# Patient Record
Sex: FEMALE | Race: WHITE | Employment: OTHER | URBAN - METROPOLITAN AREA
[De-identification: names, ages, dates, MRNs, and addresses within clinical notes are randomized per-mention and may not be internally consistent; named-entity substitution may affect disease eponyms.]

---

## 2017-01-29 PROBLEM — J45.901 ACUTE EXACERBATION OF COPD WITH ASTHMA (HCC): Status: ACTIVE | Noted: 2017-01-29

## 2017-01-29 PROBLEM — J44.1 ACUTE EXACERBATION OF COPD WITH ASTHMA (HCC): Status: ACTIVE | Noted: 2017-01-29

## 2017-01-30 PROBLEM — R09.02 HYPOXIA: Status: ACTIVE | Noted: 2017-01-30

## 2017-02-01 ENCOUNTER — HOSPITAL ENCOUNTER (OUTPATIENT)
Dept: OTHER | Age: 77
Discharge: HOME | End: 2017-02-01
Attending: INTERNAL MEDICINE | Admitting: INTERNAL MEDICINE

## 2017-02-15 ENCOUNTER — NURSE ONLY (OUTPATIENT)
Dept: CARDIOLOGY CLINIC | Age: 77
End: 2017-02-15

## 2017-02-15 ENCOUNTER — TELEPHONE (OUTPATIENT)
Dept: CARDIOLOGY CLINIC | Age: 77
End: 2017-02-15

## 2017-02-15 VITALS — SYSTOLIC BLOOD PRESSURE: 138 MMHG | DIASTOLIC BLOOD PRESSURE: 60 MMHG

## 2017-02-15 DIAGNOSIS — R07.9 CHEST PAIN, UNSPECIFIED TYPE: Primary | ICD-10-CM

## 2017-02-15 PROBLEM — J44.9 COPD (CHRONIC OBSTRUCTIVE PULMONARY DISEASE) (HCC): Chronic | Status: ACTIVE | Noted: 2017-02-15

## 2017-02-15 PROCEDURE — 93000 ELECTROCARDIOGRAM COMPLETE: CPT | Performed by: INTERNAL MEDICINE

## 2017-02-16 PROBLEM — R10.13 EPIGASTRIC PAIN: Status: ACTIVE | Noted: 2017-02-16

## 2017-02-16 PROBLEM — R00.2 PALPITATIONS: Status: ACTIVE | Noted: 2017-02-16

## 2017-02-24 LAB
ANION GAP SERPL CALCULATED.3IONS-SCNC: 12 MMOL/L (ref 4–16)
BASOPHILS ABSOLUTE: 0 K/CU MM
BASOPHILS RELATIVE PERCENT: 0.5 % (ref 0–1)
BUN BLDV-MCNC: 29 MG/DL (ref 6–23)
CALCIUM SERPL-MCNC: 9.5 MG/DL (ref 8.3–10.6)
CHLORIDE BLD-SCNC: 102 MMOL/L (ref 99–110)
CO2: 30 MMOL/L (ref 21–32)
CREAT SERPL-MCNC: 1.2 MG/DL (ref 0.6–1.1)
DIFFERENTIAL TYPE: ABNORMAL
EOSINOPHILS ABSOLUTE: 0.3 K/CU MM
EOSINOPHILS RELATIVE PERCENT: 3.7 % (ref 0–3)
GFR AFRICAN AMERICAN: 52 ML/MIN/1.73M2
GFR NON-AFRICAN AMERICAN: 43 ML/MIN/1.73M2
GLUCOSE BLD-MCNC: 121 MG/DL (ref 70–140)
HCT VFR BLD CALC: 34 % (ref 37–47)
HEMOGLOBIN: 10 GM/DL (ref 12.5–16)
IMMATURE NEUTROPHIL %: 1.4 % (ref 0–0.43)
LYMPHOCYTES ABSOLUTE: 1.5 K/CU MM
LYMPHOCYTES RELATIVE PERCENT: 20.3 % (ref 24–44)
MCH RBC QN AUTO: 28.9 PG (ref 27–31)
MCHC RBC AUTO-ENTMCNC: 29.4 % (ref 32–36)
MCV RBC AUTO: 98.3 FL (ref 78–100)
MONOCYTES ABSOLUTE: 1 K/CU MM
MONOCYTES RELATIVE PERCENT: 13.5 % (ref 0–4)
NUCLEATED RBC %: 0 %
PDW BLD-RTO: 18.3 % (ref 11.7–14.9)
PLATELET # BLD: 296 K/CU MM (ref 140–440)
PMV BLD AUTO: 12 FL (ref 7.5–11.1)
POTASSIUM SERPL-SCNC: 5.6 MMOL/L (ref 3.5–5.1)
RBC # BLD: 3.46 M/CU MM (ref 4.2–5.4)
SEGMENTED NEUTROPHILS ABSOLUTE COUNT: 4.5 K/CU MM
SEGMENTED NEUTROPHILS RELATIVE PERCENT: 60.6 % (ref 36–66)
SODIUM BLD-SCNC: 144 MMOL/L (ref 135–145)
TOTAL IMMATURE NEUTOROPHIL: 0.1 K/CU MM
TOTAL NUCLEATED RBC: 0 K/CU MM
WBC # BLD: 7.3 K/CU MM (ref 4–10.5)

## 2017-02-27 LAB
ANION GAP SERPL CALCULATED.3IONS-SCNC: 9 MMOL/L (ref 4–16)
BUN BLDV-MCNC: 30 MG/DL (ref 6–23)
CALCIUM SERPL-MCNC: 9.5 MG/DL (ref 8.3–10.6)
CHLORIDE BLD-SCNC: 103 MMOL/L (ref 99–110)
CO2: 31 MMOL/L (ref 21–32)
CREAT SERPL-MCNC: 1.2 MG/DL (ref 0.6–1.1)
GFR AFRICAN AMERICAN: 52 ML/MIN/1.73M2
GFR NON-AFRICAN AMERICAN: 43 ML/MIN/1.73M2
GLUCOSE BLD-MCNC: 97 MG/DL (ref 70–140)
POTASSIUM SERPL-SCNC: 5.6 MMOL/L (ref 3.5–5.1)
SODIUM BLD-SCNC: 143 MMOL/L (ref 135–145)

## 2017-03-01 ENCOUNTER — HOSPITAL ENCOUNTER (OUTPATIENT)
Dept: OTHER | Age: 77
Discharge: HOME | End: 2017-03-01
Attending: INTERNAL MEDICINE | Admitting: INTERNAL MEDICINE

## 2017-03-01 LAB
ANION GAP SERPL CALCULATED.3IONS-SCNC: 10 MMOL/L (ref 4–16)
BUN BLDV-MCNC: 32 MG/DL (ref 6–23)
CALCIUM SERPL-MCNC: 9 MG/DL (ref 8.3–10.6)
CHLORIDE BLD-SCNC: 105 MMOL/L (ref 99–110)
CO2: 30 MMOL/L (ref 21–32)
CREAT SERPL-MCNC: 1.1 MG/DL (ref 0.6–1.1)
GFR AFRICAN AMERICAN: 58 ML/MIN/1.73M2
GFR NON-AFRICAN AMERICAN: 48 ML/MIN/1.73M2
GLUCOSE BLD-MCNC: 111 MG/DL (ref 70–140)
POTASSIUM SERPL-SCNC: 5.2 MMOL/L (ref 3.5–5.1)
SODIUM BLD-SCNC: 145 MMOL/L (ref 135–145)

## 2017-03-03 LAB
ANION GAP SERPL CALCULATED.3IONS-SCNC: 10 MMOL/L (ref 4–16)
BUN BLDV-MCNC: 22 MG/DL (ref 6–23)
CALCIUM SERPL-MCNC: 9 MG/DL (ref 8.3–10.6)
CHLORIDE BLD-SCNC: 102 MMOL/L (ref 99–110)
CO2: 30 MMOL/L (ref 21–32)
CREAT SERPL-MCNC: 1.1 MG/DL (ref 0.6–1.1)
GFR AFRICAN AMERICAN: 58 ML/MIN/1.73M2
GFR NON-AFRICAN AMERICAN: 48 ML/MIN/1.73M2
GLUCOSE BLD-MCNC: 87 MG/DL (ref 70–140)
POTASSIUM SERPL-SCNC: 5.1 MMOL/L (ref 3.5–5.1)
SODIUM BLD-SCNC: 142 MMOL/L (ref 135–145)

## 2017-03-06 PROBLEM — R07.9 CHEST PAIN: Status: ACTIVE | Noted: 2017-03-06

## 2017-03-06 PROBLEM — R55 NEAR SYNCOPE: Status: ACTIVE | Noted: 2017-03-06

## 2017-03-06 PROBLEM — D64.9 ANEMIA: Status: ACTIVE | Noted: 2017-03-06

## 2017-03-07 PROBLEM — D50.0 CHRONIC BLOOD LOSS ANEMIA: Status: ACTIVE | Noted: 2017-03-06

## 2017-03-08 LAB
ANION GAP SERPL CALCULATED.3IONS-SCNC: 14 MMOL/L (ref 4–16)
BUN BLDV-MCNC: 12 MG/DL (ref 6–23)
CALCIUM SERPL-MCNC: 9.5 MG/DL (ref 8.3–10.6)
CHLORIDE BLD-SCNC: 102 MMOL/L (ref 99–110)
CO2: 27 MMOL/L (ref 21–32)
CREAT SERPL-MCNC: 0.8 MG/DL (ref 0.6–1.1)
GFR AFRICAN AMERICAN: >60 ML/MIN/1.73M2
GFR NON-AFRICAN AMERICAN: >60 ML/MIN/1.73M2
GLUCOSE BLD-MCNC: 81 MG/DL (ref 70–140)
HCT VFR BLD CALC: 37.7 % (ref 37–47)
HEMOGLOBIN: 11.3 GM/DL (ref 12.5–16)
MCH RBC QN AUTO: 28.9 PG (ref 27–31)
MCHC RBC AUTO-ENTMCNC: 30 % (ref 32–36)
MCV RBC AUTO: 96.4 FL (ref 78–100)
PDW BLD-RTO: 16.4 % (ref 11.7–14.9)
PLATELET # BLD: 446 K/CU MM (ref 140–440)
PMV BLD AUTO: 11.4 FL (ref 7.5–11.1)
POTASSIUM SERPL-SCNC: 4.8 MMOL/L (ref 3.5–5.1)
RBC # BLD: 3.91 M/CU MM (ref 4.2–5.4)
SODIUM BLD-SCNC: 143 MMOL/L (ref 135–145)
WBC # BLD: 9.8 K/CU MM (ref 4–10.5)

## 2017-03-15 LAB
HCT VFR BLD CALC: 40.7 % (ref 37–47)
HEMOGLOBIN: 11.8 GM/DL (ref 12.5–16)

## 2017-03-17 LAB
HCT VFR BLD CALC: 39.6 % (ref 37–47)
HEMOGLOBIN: 11.6 GM/DL (ref 12.5–16)

## 2017-03-20 LAB
HCT VFR BLD CALC: 41.7 % (ref 37–47)
HEMOGLOBIN: 12.3 GM/DL (ref 12.5–16)

## 2017-03-22 LAB
HCT VFR BLD CALC: 40.4 % (ref 37–47)
HEMOGLOBIN: 11.5 GM/DL (ref 12.5–16)

## 2017-04-03 ENCOUNTER — HOSPITAL ENCOUNTER (OUTPATIENT)
Dept: OTHER | Age: 77
Discharge: HOME | End: 2017-04-03
Attending: FAMILY MEDICINE | Admitting: FAMILY MEDICINE

## 2017-04-03 LAB
ALBUMIN SERPL-MCNC: 3.9 GM/DL (ref 3.4–5)
ALP BLD-CCNC: 92 IU/L (ref 40–128)
ALT SERPL-CCNC: 10 U/L (ref 10–40)
ANION GAP SERPL CALCULATED.3IONS-SCNC: 9 MMOL/L (ref 4–16)
AST SERPL-CCNC: 12 IU/L (ref 15–37)
BASOPHILS ABSOLUTE: 0.1 K/CU MM
BASOPHILS RELATIVE PERCENT: 1.3 % (ref 0–1)
BILIRUB SERPL-MCNC: 0.2 MG/DL (ref 0–1)
BUN BLDV-MCNC: 25 MG/DL (ref 6–23)
CALCIUM SERPL-MCNC: 9.7 MG/DL (ref 8.3–10.6)
CHLORIDE BLD-SCNC: 100 MMOL/L (ref 99–110)
CO2: 33 MMOL/L (ref 21–32)
CREAT SERPL-MCNC: 1.5 MG/DL (ref 0.6–1.1)
DIFFERENTIAL TYPE: ABNORMAL
EOSINOPHILS ABSOLUTE: 0.4 K/CU MM
EOSINOPHILS RELATIVE PERCENT: 6.3 % (ref 0–3)
GFR AFRICAN AMERICAN: 41 ML/MIN/1.73M2
GFR NON-AFRICAN AMERICAN: 33 ML/MIN/1.73M2
GLUCOSE BLD-MCNC: 94 MG/DL (ref 70–140)
HCT VFR BLD CALC: 38.9 % (ref 37–47)
HEMOGLOBIN: 11.9 GM/DL (ref 12.5–16)
IMMATURE NEUTROPHIL %: 0.4 % (ref 0–0.43)
LYMPHOCYTES ABSOLUTE: 1.5 K/CU MM
LYMPHOCYTES RELATIVE PERCENT: 21.5 % (ref 24–44)
MCH RBC QN AUTO: 29.7 PG (ref 27–31)
MCHC RBC AUTO-ENTMCNC: 30.6 % (ref 32–36)
MCV RBC AUTO: 97 FL (ref 78–100)
MONOCYTES ABSOLUTE: 0.9 K/CU MM
MONOCYTES RELATIVE PERCENT: 13.3 % (ref 0–4)
NUCLEATED RBC %: 0 %
PDW BLD-RTO: 16.5 % (ref 11.7–14.9)
PLATELET # BLD: 290 K/CU MM (ref 140–440)
PMV BLD AUTO: 13.1 FL (ref 7.5–11.1)
POTASSIUM SERPL-SCNC: 5.4 MMOL/L (ref 3.5–5.1)
RBC # BLD: 4.01 M/CU MM (ref 4.2–5.4)
SEGMENTED NEUTROPHILS ABSOLUTE COUNT: 3.9 K/CU MM
SEGMENTED NEUTROPHILS RELATIVE PERCENT: 57.2 % (ref 36–66)
SODIUM BLD-SCNC: 142 MMOL/L (ref 135–145)
TOTAL IMMATURE NEUTOROPHIL: 0.03 K/CU MM
TOTAL NUCLEATED RBC: 0 K/CU MM
TOTAL PROTEIN: 6 GM/DL (ref 6.4–8.2)
WBC # BLD: 6.8 K/CU MM (ref 4–10.5)

## 2017-04-10 ENCOUNTER — HOSPITAL ENCOUNTER (OUTPATIENT)
Dept: OTHER | Age: 77
Discharge: HOME | End: 2017-04-10
Attending: FAMILY MEDICINE | Admitting: FAMILY MEDICINE

## 2017-04-10 LAB
BASOPHILS ABSOLUTE: 0.1 K/CU MM
BASOPHILS RELATIVE PERCENT: 1.2 % (ref 0–1)
DIFFERENTIAL TYPE: ABNORMAL
EOSINOPHILS ABSOLUTE: 0.5 K/CU MM
EOSINOPHILS RELATIVE PERCENT: 7.9 % (ref 0–3)
HCT VFR BLD CALC: 41 % (ref 37–47)
HEMOGLOBIN: 12.4 GM/DL (ref 12.5–16)
IMMATURE NEUTROPHIL %: 0.1 % (ref 0–0.43)
LYMPHOCYTES ABSOLUTE: 1.4 K/CU MM
LYMPHOCYTES RELATIVE PERCENT: 20.5 % (ref 24–44)
MCH RBC QN AUTO: 29.5 PG (ref 27–31)
MCHC RBC AUTO-ENTMCNC: 30.2 % (ref 32–36)
MCV RBC AUTO: 97.6 FL (ref 78–100)
MONOCYTES ABSOLUTE: 0.9 K/CU MM
MONOCYTES RELATIVE PERCENT: 13.5 % (ref 0–4)
NUCLEATED RBC %: 0 %
PDW BLD-RTO: 16.9 % (ref 11.7–14.9)
PLATELET # BLD: 244 K/CU MM (ref 140–440)
PMV BLD AUTO: 13.1 FL (ref 7.5–11.1)
RBC # BLD: 4.2 M/CU MM (ref 4.2–5.4)
SEGMENTED NEUTROPHILS ABSOLUTE COUNT: 3.8 K/CU MM
SEGMENTED NEUTROPHILS RELATIVE PERCENT: 56.8 % (ref 36–66)
TOTAL IMMATURE NEUTOROPHIL: 0.01 K/CU MM
TOTAL NUCLEATED RBC: 0 K/CU MM
WBC # BLD: 6.7 K/CU MM (ref 4–10.5)

## 2017-04-19 ENCOUNTER — HOSPITAL ENCOUNTER (OUTPATIENT)
Dept: OTHER | Age: 77
Discharge: HOME | End: 2017-04-19
Attending: FAMILY MEDICINE | Admitting: FAMILY MEDICINE

## 2017-04-19 LAB
ALBUMIN SERPL-MCNC: 3.9 GM/DL (ref 3.4–5)
ALP BLD-CCNC: 95 IU/L (ref 40–129)
ALT SERPL-CCNC: 12 U/L (ref 10–40)
ANION GAP SERPL CALCULATED.3IONS-SCNC: 12 MMOL/L (ref 4–16)
AST SERPL-CCNC: 16 IU/L (ref 15–37)
BASOPHILS ABSOLUTE: 0.1 K/CU MM
BASOPHILS RELATIVE PERCENT: 1.3 % (ref 0–1)
BILIRUB SERPL-MCNC: 0.2 MG/DL (ref 0–1)
BUN BLDV-MCNC: 27 MG/DL (ref 6–23)
CALCIUM SERPL-MCNC: 9.7 MG/DL (ref 8.3–10.6)
CHLORIDE BLD-SCNC: 100 MMOL/L (ref 99–110)
CO2: 31 MMOL/L (ref 21–32)
CREAT SERPL-MCNC: 1.1 MG/DL (ref 0.6–1.1)
DIFFERENTIAL TYPE: ABNORMAL
EOSINOPHILS ABSOLUTE: 0.4 K/CU MM
EOSINOPHILS RELATIVE PERCENT: 5.3 % (ref 0–3)
GFR AFRICAN AMERICAN: 58 ML/MIN/1.73M2
GFR NON-AFRICAN AMERICAN: 48 ML/MIN/1.73M2
GLUCOSE BLD-MCNC: 91 MG/DL (ref 70–140)
HCT VFR BLD CALC: 41.9 % (ref 37–47)
HEMOGLOBIN: 12.8 GM/DL (ref 12.5–16)
IMMATURE NEUTROPHIL %: 0.4 % (ref 0–0.43)
LYMPHOCYTES ABSOLUTE: 1.2 K/CU MM
LYMPHOCYTES RELATIVE PERCENT: 18.1 % (ref 24–44)
MCH RBC QN AUTO: 29.8 PG (ref 27–31)
MCHC RBC AUTO-ENTMCNC: 30.5 % (ref 32–36)
MCV RBC AUTO: 97.4 FL (ref 78–100)
MONOCYTES ABSOLUTE: 0.8 K/CU MM
MONOCYTES RELATIVE PERCENT: 12 % (ref 0–4)
NUCLEATED RBC %: 0 %
PDW BLD-RTO: 16.7 % (ref 11.7–14.9)
PLATELET # BLD: 220 K/CU MM (ref 140–440)
PMV BLD AUTO: 13.4 FL (ref 7.5–11.1)
POTASSIUM SERPL-SCNC: 4.5 MMOL/L (ref 3.5–5.1)
RBC # BLD: 4.3 M/CU MM (ref 4.2–5.4)
SEGMENTED NEUTROPHILS ABSOLUTE COUNT: 4.3 K/CU MM
SEGMENTED NEUTROPHILS RELATIVE PERCENT: 62.9 % (ref 36–66)
SODIUM BLD-SCNC: 143 MMOL/L (ref 135–145)
TOTAL IMMATURE NEUTOROPHIL: 0.03 K/CU MM
TOTAL NUCLEATED RBC: 0 K/CU MM
TOTAL PROTEIN: 6.3 GM/DL (ref 6.4–8.2)
WBC # BLD: 6.8 K/CU MM (ref 4–10.5)

## 2017-05-08 ENCOUNTER — HOSPITAL ENCOUNTER (OUTPATIENT)
Dept: OTHER | Age: 77
Discharge: HOME | End: 2017-05-08
Attending: INTERNAL MEDICINE | Admitting: INTERNAL MEDICINE

## 2017-05-08 LAB
BASOPHILS ABSOLUTE: 0.1 K/CU MM
BASOPHILS RELATIVE PERCENT: 1 % (ref 0–1)
DIFFERENTIAL TYPE: ABNORMAL
EOSINOPHILS ABSOLUTE: 0.3 K/CU MM
EOSINOPHILS RELATIVE PERCENT: 4 % (ref 0–3)
FERRITIN: 49 NG/ML (ref 15–150)
FOLATE: 13.6 NG/ML (ref 3.1–17.5)
HCT VFR BLD CALC: 46.4 % (ref 37–47)
HEMOGLOBIN: 14.2 GM/DL (ref 12.5–16)
IMMATURE NEUTROPHIL %: 0.3 % (ref 0–0.43)
IRON: 100 UG/DL (ref 37–145)
LYMPHOCYTES ABSOLUTE: 1.8 K/CU MM
LYMPHOCYTES RELATIVE PERCENT: 26.3 % (ref 24–44)
MCH RBC QN AUTO: 30.4 PG (ref 27–31)
MCHC RBC AUTO-ENTMCNC: 30.6 % (ref 32–36)
MCV RBC AUTO: 99.4 FL (ref 78–100)
MONOCYTES ABSOLUTE: 0.9 K/CU MM
MONOCYTES RELATIVE PERCENT: 13.9 % (ref 0–4)
NUCLEATED RBC %: 0 %
PCT TRANSFERRIN: 34 % (ref 10–44)
PDW BLD-RTO: 16.3 % (ref 11.7–14.9)
PLATELET # BLD: 235 K/CU MM (ref 140–440)
PMV BLD AUTO: 13.3 FL (ref 7.5–11.1)
RBC # BLD: 4.67 M/CU MM (ref 4.2–5.4)
SEGMENTED NEUTROPHILS ABSOLUTE COUNT: 3.6 K/CU MM
SEGMENTED NEUTROPHILS RELATIVE PERCENT: 54.5 % (ref 36–66)
T3 FREE: 1.9 PG/ML (ref 2.3–4.2)
T4 FREE: 1.39 NG/DL (ref 0.9–1.8)
TOTAL IMMATURE NEUTOROPHIL: 0.02 K/CU MM
TOTAL IRON BINDING CAPACITY: 297 UG/DL (ref 250–450)
TOTAL NUCLEATED RBC: 0 K/CU MM
TSH HIGH SENSITIVITY: 0.48 UIU/ML (ref 0.27–4.2)
UNSATURATED IRON BINDING CAPACITY: 197 UG/DL (ref 110–370)
VITAMIN B-12: 245.6 PG/ML (ref 211–911)
WBC # BLD: 6.7 K/CU MM (ref 4–10.5)

## 2017-05-11 ENCOUNTER — OFFICE VISIT (OUTPATIENT)
Dept: CARDIOLOGY CLINIC | Age: 77
End: 2017-05-11

## 2017-05-11 VITALS
WEIGHT: 120 LBS | SYSTOLIC BLOOD PRESSURE: 112 MMHG | DIASTOLIC BLOOD PRESSURE: 72 MMHG | HEART RATE: 60 BPM | BODY MASS INDEX: 20.49 KG/M2 | HEIGHT: 64 IN

## 2017-05-11 DIAGNOSIS — I73.9 CLAUDICATION OF CALF MUSCLES (HCC): ICD-10-CM

## 2017-05-11 DIAGNOSIS — I50.31 ACUTE DIASTOLIC CHF (CONGESTIVE HEART FAILURE) (HCC): ICD-10-CM

## 2017-05-11 DIAGNOSIS — E78.2 MIXED HYPERLIPIDEMIA: Chronic | ICD-10-CM

## 2017-05-11 DIAGNOSIS — R55 NEAR SYNCOPE: ICD-10-CM

## 2017-05-11 DIAGNOSIS — E11.9 TYPE 2 DIABETES MELLITUS WITHOUT COMPLICATION, WITHOUT LONG-TERM CURRENT USE OF INSULIN (HCC): ICD-10-CM

## 2017-05-11 DIAGNOSIS — I25.10 CORONARY ARTERY DISEASE INVOLVING NATIVE CORONARY ARTERY OF NATIVE HEART WITHOUT ANGINA PECTORIS: Primary | ICD-10-CM

## 2017-05-11 DIAGNOSIS — R07.9 CHEST PAIN, UNSPECIFIED TYPE: ICD-10-CM

## 2017-05-11 DIAGNOSIS — I48.0 PAROXYSMAL ATRIAL FIBRILLATION (HCC): Chronic | ICD-10-CM

## 2017-05-11 PROCEDURE — G8427 DOCREV CUR MEDS BY ELIG CLIN: HCPCS | Performed by: INTERNAL MEDICINE

## 2017-05-11 PROCEDURE — 1123F ACP DISCUSS/DSCN MKR DOCD: CPT | Performed by: INTERNAL MEDICINE

## 2017-05-11 PROCEDURE — 93000 ELECTROCARDIOGRAM COMPLETE: CPT | Performed by: INTERNAL MEDICINE

## 2017-05-11 PROCEDURE — 4040F PNEUMOC VAC/ADMIN/RCVD: CPT | Performed by: INTERNAL MEDICINE

## 2017-05-11 PROCEDURE — G8400 PT W/DXA NO RESULTS DOC: HCPCS | Performed by: INTERNAL MEDICINE

## 2017-05-11 PROCEDURE — 1090F PRES/ABSN URINE INCON ASSESS: CPT | Performed by: INTERNAL MEDICINE

## 2017-05-11 PROCEDURE — G8598 ASA/ANTIPLAT THER USED: HCPCS | Performed by: INTERNAL MEDICINE

## 2017-05-11 PROCEDURE — 1036F TOBACCO NON-USER: CPT | Performed by: INTERNAL MEDICINE

## 2017-05-11 PROCEDURE — G8419 CALC BMI OUT NRM PARAM NOF/U: HCPCS | Performed by: INTERNAL MEDICINE

## 2017-05-11 PROCEDURE — 99214 OFFICE O/P EST MOD 30 MIN: CPT | Performed by: INTERNAL MEDICINE

## 2017-05-11 RX ORDER — CILOSTAZOL 50 MG/1
50 TABLET ORAL 2 TIMES DAILY
Status: ON HOLD | COMMUNITY
End: 2017-09-29 | Stop reason: HOSPADM

## 2017-08-08 ENCOUNTER — TELEPHONE (OUTPATIENT)
Dept: CARDIOLOGY CLINIC | Age: 77
End: 2017-08-08

## 2017-08-08 DIAGNOSIS — Z01.810 PRE-OPERATIVE CARDIOVASCULAR EXAMINATION: Primary | ICD-10-CM

## 2017-08-10 ENCOUNTER — PROCEDURE VISIT (OUTPATIENT)
Dept: CARDIOLOGY CLINIC | Age: 77
End: 2017-08-10

## 2017-08-10 DIAGNOSIS — Z01.810 PRE-OPERATIVE CARDIOVASCULAR EXAMINATION: ICD-10-CM

## 2017-08-10 LAB
LV EF: 70 %
LVEF MODALITY: NORMAL

## 2017-08-10 PROCEDURE — 93018 CV STRESS TEST I&R ONLY: CPT | Performed by: INTERNAL MEDICINE

## 2017-08-10 PROCEDURE — 78452 HT MUSCLE IMAGE SPECT MULT: CPT | Performed by: INTERNAL MEDICINE

## 2017-08-10 PROCEDURE — 93016 CV STRESS TEST SUPVJ ONLY: CPT | Performed by: INTERNAL MEDICINE

## 2017-08-10 PROCEDURE — 93017 CV STRESS TEST TRACING ONLY: CPT | Performed by: INTERNAL MEDICINE

## 2017-08-10 PROCEDURE — A9500 TC99M SESTAMIBI: HCPCS | Performed by: INTERNAL MEDICINE

## 2017-08-11 ENCOUNTER — TELEPHONE (OUTPATIENT)
Dept: CARDIOLOGY CLINIC | Age: 77
End: 2017-08-11

## 2017-09-28 PROBLEM — K92.2 GI BLEED: Status: ACTIVE | Noted: 2017-09-28

## 2017-09-28 PROBLEM — D64.9 ANEMIA: Status: ACTIVE | Noted: 2017-09-28

## 2017-09-28 PROBLEM — R06.00 DYSPNEA AND RESPIRATORY ABNORMALITIES: Status: ACTIVE | Noted: 2017-09-28

## 2017-09-28 PROBLEM — K92.1 MELENA: Status: ACTIVE | Noted: 2017-09-28

## 2017-09-28 PROBLEM — R06.89 DYSPNEA AND RESPIRATORY ABNORMALITIES: Status: ACTIVE | Noted: 2017-09-28

## 2017-09-28 PROBLEM — E11.9 TYPE 2 DIABETES MELLITUS (HCC): Chronic | Status: ACTIVE | Noted: 2017-09-28

## 2017-09-28 PROBLEM — Q27.30 AVM (ARTERIOVENOUS MALFORMATION): Chronic | Status: ACTIVE | Noted: 2017-09-28

## 2017-10-05 ENCOUNTER — INITIAL CONSULT (OUTPATIENT)
Dept: PULMONOLOGY | Age: 77
End: 2017-10-05

## 2017-10-05 VITALS
HEART RATE: 70 BPM | DIASTOLIC BLOOD PRESSURE: 60 MMHG | SYSTOLIC BLOOD PRESSURE: 114 MMHG | OXYGEN SATURATION: 94 % | HEIGHT: 64 IN | BODY MASS INDEX: 19.84 KG/M2 | RESPIRATION RATE: 18 BRPM | WEIGHT: 116.2 LBS

## 2017-10-05 DIAGNOSIS — Z72.0 TOBACCO ABUSE: Chronic | ICD-10-CM

## 2017-10-05 DIAGNOSIS — J42 CHRONIC BRONCHITIS, UNSPECIFIED CHRONIC BRONCHITIS TYPE (HCC): Primary | Chronic | ICD-10-CM

## 2017-10-05 DIAGNOSIS — R06.02 SHORTNESS OF BREATH: ICD-10-CM

## 2017-10-05 LAB
DLCO %PRED: NORMAL
DLCO PRE: NORMAL
FEF 25-75%-POST: 0.34
FEF 25-75%-PRE: 0.37
FEV1-POST: 0.86
FEV1-PRE: 0.78
FEV1/FVC-POST: 61.5
FEV1/FVC-PRE: 60
FVC-POST: 1.39
FVC-PRE: 1.31
MEP: NORMAL
MIP: NORMAL
TLC %PRED: NORMAL
TLC PRE: NORMAL

## 2017-10-05 PROCEDURE — G8926 SPIRO NO PERF OR DOC: HCPCS | Performed by: INTERNAL MEDICINE

## 2017-10-05 PROCEDURE — 1123F ACP DISCUSS/DSCN MKR DOCD: CPT | Performed by: INTERNAL MEDICINE

## 2017-10-05 PROCEDURE — 3023F SPIROM DOC REV: CPT | Performed by: INTERNAL MEDICINE

## 2017-10-05 PROCEDURE — G8420 CALC BMI NORM PARAMETERS: HCPCS | Performed by: INTERNAL MEDICINE

## 2017-10-05 PROCEDURE — 94060 EVALUATION OF WHEEZING: CPT | Performed by: INTERNAL MEDICINE

## 2017-10-05 PROCEDURE — G8400 PT W/DXA NO RESULTS DOC: HCPCS | Performed by: INTERNAL MEDICINE

## 2017-10-05 PROCEDURE — 99204 OFFICE O/P NEW MOD 45 MIN: CPT | Performed by: INTERNAL MEDICINE

## 2017-10-05 PROCEDURE — G8484 FLU IMMUNIZE NO ADMIN: HCPCS | Performed by: INTERNAL MEDICINE

## 2017-10-05 PROCEDURE — G8598 ASA/ANTIPLAT THER USED: HCPCS | Performed by: INTERNAL MEDICINE

## 2017-10-05 PROCEDURE — 4040F PNEUMOC VAC/ADMIN/RCVD: CPT | Performed by: INTERNAL MEDICINE

## 2017-10-05 PROCEDURE — 1036F TOBACCO NON-USER: CPT | Performed by: INTERNAL MEDICINE

## 2017-10-05 PROCEDURE — 1090F PRES/ABSN URINE INCON ASSESS: CPT | Performed by: INTERNAL MEDICINE

## 2017-10-05 PROCEDURE — G8427 DOCREV CUR MEDS BY ELIG CLIN: HCPCS | Performed by: INTERNAL MEDICINE

## 2017-10-05 PROCEDURE — 1111F DSCHRG MED/CURRENT MED MERGE: CPT | Performed by: INTERNAL MEDICINE

## 2017-10-05 RX ORDER — IPRATROPIUM BROMIDE AND ALBUTEROL SULFATE 2.5; .5 MG/3ML; MG/3ML
1 SOLUTION RESPIRATORY (INHALATION) EVERY 4 HOURS
COMMUNITY
End: 2017-10-05 | Stop reason: SDUPTHER

## 2017-10-05 RX ORDER — IPRATROPIUM BROMIDE AND ALBUTEROL SULFATE 2.5; .5 MG/3ML; MG/3ML
1 SOLUTION RESPIRATORY (INHALATION) EVERY 4 HOURS PRN
Qty: 120 VIAL | Refills: 12 | Status: SHIPPED | OUTPATIENT
Start: 2017-10-05 | End: 2017-10-25 | Stop reason: SDUPTHER

## 2017-10-05 ASSESSMENT — PULMONARY FUNCTION TESTS
FEV1_POST: 0.86
FVC_PRE: 1.31
FEV1/FVC_PRE: 60.0
FVC_POST: 1.39
FEV1_PRE: 0.78
FEV1/FVC_POST: 61.5

## 2017-10-05 NOTE — PROGRESS NOTES
Subjective:   CHIEF COMPLAINT / HPI: ***         Past Medical History:  Past Medical History:   Diagnosis Date    Acute diastolic CHF (congestive heart failure) ([facility] 12/23/2014    Anemia     Anxiety     Asthma     CAD (coronary artery disease)     follows with Dr Urmila Gomez [facility])     skin on ear    Chronic low back pain     s/p epidural steroids    COPD (chronic obstructive pulmonary disease) ([facility])     moderate to severe    Depression     Family history of cardiac disorder     Father, Brother    GERD (gastroesophageal reflux disease)     Goiter     s/p radioactive Iodine/ Low T4    H/O blood clots     right leg after miscarriage    H/O cardiovascular stress test 6/25/13 6/13-WNL EF 70%    H/O cardiovascular stress test 08/10/2017    Normal study EF 70%    Heart murmur     History of blood transfusion     2017    [place] (hard of hearing)     hearing aides    Hx of blood clots     Hx of echocardiogram 07/11/2013    EF>55%, mild MR, mild TR, abn lVSFx stage 1     HX OTHER MEDICAL     PCP is Dr Jacob Marc; Cardiologist is Dr Carola Hinojosa 7/30/13    Peripheral Angiogram.  Sluggish flow RLE, med mgmt.  Hydrocephalus, adult     shunt    Hyperlipidemia     Hypertension     stabled    Kidney stone     \"had stone last summer 2016- passed it\"    Memory loss     Old MI (myocardial infarction) 2001    Osteoarthritis     Pneumonia     recurrent    Sleep apnea     ? CPAP\"do not use this for long time\" per pt on 4/26/2017    Solitary kidney     Type II or unspecified type diabetes mellitus without mention of complication, not stated as uncontrolled     controlled    Unspecified cerebral artery occlusion with cerebral infarction     \"they discovered I had stroke in 1993- with CT scan of head but never knew I had it\"       Current Medications:    No current facility-administered medications for this visit.      Allergies   Allergen Reactions    Codeine Itching    Bactrim [Sulfamethoxazole-Trimethoprim]      dizziness       Social History:    Social History     Social History    Marital status:      Spouse name: N/A    Number of children: 5    Years of education: N/A     Occupational History    mental health counselor retired      Social History Main Topics    Smoking status: Former Smoker     Packs/day: 0.25     Years: 59.00     Types: Cigarettes     Quit date: 12/19/2014    Smokeless tobacco: Never Used    Alcohol use Yes      Comment: rarely/ average \"2-3 times per year'    Drug use: No    Sexual activity: Not Asked     Other Topics Concern    None     Social History Narrative    Do you donate blood or plasma? no    Caffeine intake? Moderate    Advance directive? yes    Is blood transfusion acceptable in an emergency? yes        Family History:    Family History   Problem Relation Age of Onset   [de-identified] Stroke Mother     Heart Disease Mother     Asthma Mother     Diabetes Mother     Cancer Mother      uterine    Emphysema Father     Stroke Father     Heart Disease Father     Heart Defect Father      hardening of arteries    Diabetes Sister     Diabetes Brother     Heart Disease Brother     Cancer Maternal Grandfather      stomach    Diabetes Paternal Grandmother     Stroke Paternal Grandmother     Diabetes Brother     Alcohol Abuse Maternal Aunt          REVIEW OF SYSTEMS:    CONSTITUTIONAL:  negative for fevers, chills, diaphoresis, activity change, appetite change, night sweats and unexpected weight change.    HEENT:  negative for hearing loss,  sinus pressure, nasal congestion, epistaxis and snoring  RESPIRATORY:  See HPI  CARDIOVASCULAR:  Negative for chest pain, palpitations, exertional chest pressure/discomfort, edema, syncope  GASTROINTESTINAL: negative for nausea, vomiting, diarrhea, constipation, blood in stool and abdominal pain  GENITOURINARY:  negative for frequency, dysuria and hematuria\  HEMATOLOGIC/LYMPHATIC:  negative for easy bruising, bleeding and lymphadenopathy  ALLERGIC/IMMUNOLOGIC:  negative for recurrent infections, angioedema, anaphylaxis and drug reaction  MUSCULOSKELETAL:  negative for  pain, joint swelling, decreased range of motion and muscle weakness    Objective:   PHYSICAL EXAM:      VITALS:    Vitals:    10/05/17 1522   BP: 114/60   Pulse: 70   Resp: 18   SpO2: 94%   Weight: 116 lb 3.2 oz (52.7 kg)   Height: 5' 4\" (1.626 m)         CONSTITUTIONAL:  awake, alert, cooperative, no apparent distress, and appears stated age  NECK:  Supple and nontender,  trachea midline, no adenopathy, thyroid nl, no JVD, no wheezing or stridor over neck  CHEST: Chest expansion equal and symmetrical, no intercostal retraction, no increased work of breathing  LUNGS: ***   CARDIOVASCULAR: Normal S1 and S2 , no murmurs or gallops ,no pericardial rubs  ABDOMEN:  normal bowel sounds, non-distended and no masses palpated, and no tenderness to palpation. No hepatospleenomegaly  LYMPHADENOPATHY:  no axillary or supraclavicular adenopathy. No cervical adnenopathy  RIGHT AND LEFT LOWER EXTREMITIES: No edema, no inflammation, no tenderness. LAB:***    RADIOLOGY: ***    PFT: ***    Assessment:     1. Chronic bronchitis, unspecified chronic bronchitis type ([facility]    2. Tobacco abuse    3. Shortness of breath        Plan:   ***  Return in about 6 weeks (around 11/16/2017) for Recheck for COPD, Recheck for Shortness of Breath, Recheck for Obstructive Sleep Apnea. This dictation was performed with a verbal recognition program and it was checked for errors. It is possible that there are still dictated errors within this office note. Any errors should be brought immediately to my attention for correction. All efforts were made to ensure that this office note is accurate.

## 2017-10-05 NOTE — PROGRESS NOTES
Subjective:   CHIEF COMPLAINT / HPI: Axel Toure Is a 68-year-old female referred here for evaluation of her COPD and chronic shortness of breath. She was recently hospitalized With multiple complaints including worsening shortness of breath, productive cough, dizziness   and recurrent episodes of GI bleeding from AVM. She has a long history of COPD and has been maintained on Advair, albuterol and DuoNeb per nebulizer. She has a less than 1 pack/day  of cigarette smoking for over 50 years. She states that just recently she's tried to completely stop. She denies hemoptysis or purulence being specked ration. She short of breath walking short distances. She also states that she has a history obstructive sleep apnea and just recently has gotten back to using her CPAP therapy. She has a long history of other comorbidities including coronary disease with myocardial infarction, GERD, hydrocephalus with shunt placement and multiple CVA  She denies a history of hemoptysis or pleuritic chest pain.   She is on anticoagulation but does not have a history of PE  Past Medical History:  Past Medical History:   Diagnosis Date    Acute diastolic CHF (congestive heart failure) ([facility] [address] 12/23/2014    Anemia     Anxiety     Asthma     CAD (coronary artery disease)     follows with Dr Becky Grey [facility])     skin on ear    Chronic low back pain     s/p epidural steroids    COPD (chronic obstructive pulmonary disease) (HCC)     moderate to severe    Depression     Family history of cardiac disorder     Father, Brother    GERD (gastroesophageal reflux disease)     Goiter     s/p radioactive Iodine/ Low T4    H/O blood clots     right leg after miscarriage    H/O cardiovascular stress test 6/25/13 6/13-WNL EF 70%    H/O cardiovascular stress test 08/10/2017    Normal study EF 70%    Heart murmur     History of blood transfusion     2017    [place] (hard of hearing)     hearing aides    Hx of blood clots     Hx of echocardiogram 07/11/2013    EF>55%, mild MR, mild TR, abn lVSFx stage 1     HX OTHER MEDICAL     PCP is Dr Baron Dee; Cardiologist is Dr Kimmy Eden 7/30/13    Peripheral Angiogram.  Sluggish flow RLE, med mgmt.  Hydrocephalus, adult     shunt    Hyperlipidemia     Hypertension     stabled    Kidney stone     \"had stone last summer 2016- passed it\"    Memory loss     Old MI (myocardial infarction) 2001    Osteoarthritis     Pneumonia     recurrent    Sleep apnea     ? CPAP\"do not use this for long time\" per pt on 4/26/2017    Solitary kidney     Type II or unspecified type diabetes mellitus without mention of complication, not stated as uncontrolled     controlled    Unspecified cerebral artery occlusion with cerebral infarction     \"they discovered I had stroke in 1993- with CT scan of head but never knew I had it\"       Current Medications:    No current facility-administered medications for this visit. Allergies   Allergen Reactions    Codeine Itching    Bactrim [Sulfamethoxazole-Trimethoprim]      dizziness       Social History:    Social History     Social History    Marital status:      Spouse name: N/A    Number of children: 11    Years of education: N/A     Occupational History    mental health counselor retired      Social History Main Topics    Smoking status: Former Smoker     Packs/day: 0.25     Years: 59.00     Types: Cigarettes     Quit date: 12/19/2014    Smokeless tobacco: Never Used    Alcohol use Yes      Comment: rarely/ average \"2-3 times per year'    Drug use: No    Sexual activity: Not Asked     Other Topics Concern    None     Social History Narrative    Do you donate blood or plasma? no    Caffeine intake? Moderate    Advance directive? yes    Is blood transfusion acceptable in an emergency?  yes        Family History:    Family History   Problem Relation Age of Onset    Stroke Mother     Heart Disease Mother     Asthma Mother    [facility] hepatospleenomegaly  LYMPHADENOPATHY:  no axillary or supraclavicular adenopathy. No cervical adnenopathy  RIGHT AND LEFT LOWER EXTREMITIES: No edema, no inflammation, no tenderness. RADIOLOGY: CT chest [facility] 9/25/17 demonstrated evidence of COPD/emphysema with linear change at left lung base suggesting atelectasis along with scarring possibly at the right lung base  Chest x-ray at [facility] on 9/27/17 showed no acute cardiopulmonary the was stable findings of COPD    PFT: Office spirometry demonstrates a moderate obstructive defect. Following bronchodilators she had no specific response  Assessment:     1. Chronic bronchitis, unspecified chronic bronchitis type ([address]    2. Tobacco abuse    3. Shortness of breath        Plan:   I think her current bronchodilator therapy is adequate. Hopefully she'll get back on CPAP using it on a regular basis. I will evaluate her oxygen needs pending her response to CPAP. Once again I urged her to quit smoking totally. I will continue to follow her  Return in about 6 weeks (around 11/16/2017) for Recheck for COPD, Recheck for Shortness of Breath, Recheck for Obstructive Sleep Apnea. This dictation was performed with a verbal recognition program and it was checked for errors. It is possible that there are still dictated errors within this office note. Any errors should be brought immediately to my attention for correction. All efforts were made to ensure that this office note is accurate.

## 2017-10-06 RX ORDER — IPRATROPIUM BROMIDE AND ALBUTEROL SULFATE 2.5; .5 MG/3ML; MG/3ML
1 SOLUTION RESPIRATORY (INHALATION) EVERY 4 HOURS
Qty: 360 ML | Refills: 3 | Status: SHIPPED | OUTPATIENT
Start: 2017-10-06 | End: 2018-07-20 | Stop reason: ALTCHOICE

## 2017-10-14 ENCOUNTER — HOSPITAL ENCOUNTER (OUTPATIENT)
Dept: OTHER | Age: 77
Discharge: HOME | End: 2017-10-14
Attending: FAMILY MEDICINE | Admitting: FAMILY MEDICINE

## 2017-10-16 LAB
BASOPHILS ABSOLUTE: 0 K/CU MM
BASOPHILS RELATIVE PERCENT: 0.3 % (ref 0–1)
DIFFERENTIAL TYPE: ABNORMAL
EOSINOPHILS ABSOLUTE: 0.1 K/CU MM
EOSINOPHILS RELATIVE PERCENT: 1.2 % (ref 0–3)
HCT VFR BLD CALC: 31.1 % (ref 37–47)
HEMOGLOBIN: 9.2 GM/DL (ref 12.5–16)
IMMATURE NEUTROPHIL %: 1 % (ref 0–0.43)
LYMPHOCYTES ABSOLUTE: 0.7 K/CU MM
LYMPHOCYTES RELATIVE PERCENT: 7.2 % (ref 24–44)
MCH RBC QN AUTO: 31.1 PG (ref 27–31)
MCHC RBC AUTO-ENTMCNC: 29.6 % (ref 32–36)
MCV RBC AUTO: 105.1 FL (ref 78–100)
MONOCYTES ABSOLUTE: 1.1 K/CU MM
MONOCYTES RELATIVE PERCENT: 11.4 % (ref 0–4)
NUCLEATED RBC %: 0 %
PDW BLD-RTO: 17.7 % (ref 11.7–14.9)
PLATELET # BLD: 302 K/CU MM (ref 140–440)
PMV BLD AUTO: 12.4 FL (ref 7.5–11.1)
RBC # BLD: 2.96 M/CU MM (ref 4.2–5.4)
SEGMENTED NEUTROPHILS ABSOLUTE COUNT: 7.9 K/CU MM
SEGMENTED NEUTROPHILS RELATIVE PERCENT: 78.9 % (ref 36–66)
TOTAL IMMATURE NEUTOROPHIL: 0.1 K/CU MM
TOTAL NUCLEATED RBC: 0 K/CU MM
TOTAL RETICULOCYTE COUNT: 0.19 K/CU MM
WBC # BLD: 10 K/CU MM (ref 4–10.5)

## 2017-10-25 ENCOUNTER — OFFICE VISIT (OUTPATIENT)
Dept: CARDIOLOGY CLINIC | Age: 77
End: 2017-10-25

## 2017-10-25 VITALS
BODY MASS INDEX: 19.94 KG/M2 | WEIGHT: 116.8 LBS | DIASTOLIC BLOOD PRESSURE: 58 MMHG | HEIGHT: 64 IN | HEART RATE: 84 BPM | SYSTOLIC BLOOD PRESSURE: 118 MMHG

## 2017-10-25 DIAGNOSIS — R55 NEAR SYNCOPE: ICD-10-CM

## 2017-10-25 DIAGNOSIS — R07.9 CHEST PAIN, UNSPECIFIED TYPE: ICD-10-CM

## 2017-10-25 DIAGNOSIS — K85.00 IDIOPATHIC ACUTE PANCREATITIS, UNSPECIFIED COMPLICATION STATUS: ICD-10-CM

## 2017-10-25 DIAGNOSIS — Z72.0 TOBACCO ABUSE: Chronic | ICD-10-CM

## 2017-10-25 DIAGNOSIS — I25.10 CORONARY ARTERY DISEASE INVOLVING NATIVE CORONARY ARTERY OF NATIVE HEART WITHOUT ANGINA PECTORIS: Primary | ICD-10-CM

## 2017-10-25 DIAGNOSIS — E11.9 TYPE 2 DIABETES MELLITUS WITHOUT COMPLICATION, WITHOUT LONG-TERM CURRENT USE OF INSULIN (HCC): ICD-10-CM

## 2017-10-25 DIAGNOSIS — R00.2 PALPITATIONS: ICD-10-CM

## 2017-10-25 DIAGNOSIS — R06.02 SHORTNESS OF BREATH: ICD-10-CM

## 2017-10-25 DIAGNOSIS — I48.0 PAROXYSMAL ATRIAL FIBRILLATION (HCC): ICD-10-CM

## 2017-10-25 DIAGNOSIS — E78.2 MIXED HYPERLIPIDEMIA: Chronic | ICD-10-CM

## 2017-10-25 PROCEDURE — 99214 OFFICE O/P EST MOD 30 MIN: CPT | Performed by: INTERNAL MEDICINE

## 2017-10-25 PROCEDURE — 1036F TOBACCO NON-USER: CPT | Performed by: INTERNAL MEDICINE

## 2017-10-25 PROCEDURE — G8484 FLU IMMUNIZE NO ADMIN: HCPCS | Performed by: INTERNAL MEDICINE

## 2017-10-25 PROCEDURE — 4040F PNEUMOC VAC/ADMIN/RCVD: CPT | Performed by: INTERNAL MEDICINE

## 2017-10-25 PROCEDURE — 1111F DSCHRG MED/CURRENT MED MERGE: CPT | Performed by: INTERNAL MEDICINE

## 2017-10-25 PROCEDURE — G8400 PT W/DXA NO RESULTS DOC: HCPCS | Performed by: INTERNAL MEDICINE

## 2017-10-25 PROCEDURE — G8420 CALC BMI NORM PARAMETERS: HCPCS | Performed by: INTERNAL MEDICINE

## 2017-10-25 PROCEDURE — 1090F PRES/ABSN URINE INCON ASSESS: CPT | Performed by: INTERNAL MEDICINE

## 2017-10-25 PROCEDURE — G8427 DOCREV CUR MEDS BY ELIG CLIN: HCPCS | Performed by: INTERNAL MEDICINE

## 2017-10-25 PROCEDURE — G8598 ASA/ANTIPLAT THER USED: HCPCS | Performed by: INTERNAL MEDICINE

## 2017-10-25 PROCEDURE — 1123F ACP DISCUSS/DSCN MKR DOCD: CPT | Performed by: INTERNAL MEDICINE

## 2017-10-25 NOTE — PROGRESS NOTES
OFFICE PROGRESS NOTES      Cherri Tan is a 76 y.o. female who has    CHIEF COMPLAINT AS FOLLOWS:  CHEST PAIN: Patient C/O chest pain but symptoms appear to be atypical.   SOB:  Has SOB with exertion but no change over previous noted. LEG EDEMA: No leg edema   PALPITATIONS: Denies any C/O Palpitations                                  DIZZINESS: No C/O Dizziness   SYNCOPE: None   OTHER:                                     HPI: Patient is here for F/U on her CAD, HTN & Dyslipidemia problems.      Silvia Jean has the following history recorded in care path:  Patient Active Problem List    Diagnosis Date Noted    COPD exacerbation [facility])      Priority: High    Coronary artery disease involving native coronary artery of native heart without angina pectoris      Priority: High    Type 2 diabetes mellitus without complication, without long-term current use of insulin ([facility] [address]      Priority: High    Mental status alteration 06/26/2016     Priority: High    Idiopathic acute pancreatitis 06/11/2016     Priority: High    Hypoxia 01/30/2017     Priority: Low    Acute exacerbation of COPD with asthma ([facility] [address] 01/29/2017     Priority: Low    Moderate protein-calorie malnutrition ([facility] [address] 06/28/2016     Priority: Low    Decreased thyroid stimulating hormone (TSH) level 06/27/2016     Priority: Low    Dehydration, mild 06/26/2016     Priority: Low    Anemia 06/11/2016     Priority: Low    Abdominal hernia without obstruction or gangrene 06/11/2016     Priority: Low    Paroxysmal atrial fibrillation (HCC)      Priority: Low    Shingles 12/31/2014     Priority: Low    Acute diastolic CHF (congestive heart failure) ([facility] [address] 12/23/2014     Priority: Low    A-fib ([facility] [address]      Priority: Low    Acute upper respiratory infection      Priority: Low    Bronchitis 12/19/2014     Priority: Low    Shortness of breath 12/19/2014     Priority: Low    Weakness 06/24/2013 Priority: Low    Claudication of calf muscles (HCC) 06/24/2013     Priority: Low    Tobacco abuse 06/24/2013     Priority: Low    Hyperlipidemia      Priority: Low    Anemia 09/28/2017    GI bleed 09/28/2017    Type 2 diabetes mellitus ([facility] 09/28/2017    history of AVM (arteriovenous malformation)-duodenum 09/28/2017    Melena 09/28/2017    Dyspnea and respiratory abnormalities 09/28/2017    Near syncope 03/06/2017    Chest pain 03/06/2017    Chronic blood loss anemia 03/06/2017    Palpitations 02/16/2017    Epigastric pain 02/16/2017    COPD (chronic obstructive pulmonary disease) ([facility] 02/15/2017     Current Outpatient Prescriptions   Medication Sig Dispense Refill    ipratropium-albuterol (DUONEB) 0.5-2.5 (3) MG/3ML SOLN nebulizer solution Inhale 3 mLs into the lungs every 4 hours 360 mL 3    CPAP Machine MISC by Does not apply route      ferrous sulfate 325 (65 Fe) MG tablet Take 1 tablet by mouth nightly Until you start iv iron (Patient taking differently: Take 650 mg by mouth nightly Until you start iv iron) 30 tablet 3    rivaroxaban (XARELTO) 15 MG TABS tablet Take 15 mg by mouth daily      acetaminophen (APAP EXTRA STRENGTH) 500 MG tablet Take 1 tablet by mouth every 6 hours as needed for Pain 30 tablet 0    aspirin EC 81 MG EC tablet Take 1 tablet by mouth daily 30 tablet 3    pantoprazole (PROTONIX) 40 MG tablet Take 1 tablet by mouth every morning (before breakfast) (Patient taking differently: Take 40 mg by mouth 2 times daily ) 30 tablet 3    estradiol (ESTRACE) 0.5 MG tablet Take 1 mg by mouth daily       ranitidine (ZANTAC) 150 MG tablet Take 150 mg by mouth 2 times daily      gabapentin (NEURONTIN) 300 MG capsule Take 300 mg by mouth nightly       DULoxetine (CYMBALTA) 60 MG extended release capsule Take 60 mg by mouth nightly      azelastine HCl 0.15 % SOLN 2 sprays by Nasal route 2 times daily      amiodarone (CORDARONE) 200 MG tablet Take 1 tablet by mouth 2 times daily (Patient taking differently: Take 200 mg by mouth daily ) 30 tablet 3    digoxin (LANOXIN) 125 MCG tablet Take 1 tablet by mouth daily 30 tablet 3    fluticasone-salmeterol (ADVAIR DISKUS) 250-50 MCG/DOSE AEPB Inhale 1 puff into the lungs every 12 hours 60 each 3    metFORMIN (GLUCOPHAGE) 500 MG tablet Take 1 tablet by mouth nightly 60 tablet 3    donepezil (ARICEPT) 10 MG tablet Take 10 mg by mouth nightly.  diltiazem (CARTIA XT) 180 MG extended release capsule Take 1 capsule by mouth daily 30 capsule 5     No current facility-administered medications for this visit. Allergies: Codeine and Bactrim [sulfamethoxazole-trimethoprim]  Past Medical History:   Diagnosis Date    A-fib [facility]) 2015    Acute diastolic CHF (congestive heart failure) ([facility] [address] 12/23/2014    Anemia     Anxiety     Asthma     CAD (coronary artery disease)     follows with Dr Aleena Lopez [facility])     skin on ear    Chronic low back pain     s/p epidural steroids    COPD (chronic obstructive pulmonary disease) ([facility])     moderate to severe    Depression     Family history of cardiac disorder     Father, Brother    GERD (gastroesophageal reflux disease)     Goiter     s/p radioactive Iodine/ Low T4    H/O blood clots     right leg after miscarriage    H/O cardiovascular stress test 6/25/13 6/13-WNL EF 70%    H/O cardiovascular stress test 08/10/2017    Normal study EF 70%    Heart murmur     History of blood transfusion     2017    [place] (hard of hearing)     hearing aides    Hx of blood clots     Hx of echocardiogram 07/11/2013    EF>55%, mild MR, mild TR, abn lVSFx stage 1     HX OTHER MEDICAL     PCP is Dr Baron Dee; Cardiologist is Dr Kimmy Eden 7/30/13    Peripheral Angiogram.  Sluggish flow RLE, med mgmt.      Hydrocephalus, adult     shunt    Hyperlipidemia     Hypertension     stabled    Kidney stone     \"had stone last summer 2016- passed it\"    Memory loss     Old MI BNP  PT/INR:    Lab Results   Component Value Date    INR 0.93 06/10/2017     Lab Results   Component Value Date    LABA1C 5.6 03/05/2017    LABA1C 6.1 02/02/2017     Lab Results   Component Value Date    WBC 10.0 10/14/2017    HCT 31.1 (L) 10/14/2017    .1 (H) 10/14/2017     10/14/2017     Lab Results   Component Value Date    CHOL 137 01/10/2014    TRIG 78 06/12/2016    HDL 60 (L) 01/10/2014    LDLCALC 125 06/26/2013    LDLDIRECT 61 01/10/2014     Lab Results   Component Value Date    ALT 11 09/27/2017    AST 10 (L) 09/27/2017     BMP:    Lab Results   Component Value Date     09/29/2017    K 4.6 09/29/2017     09/29/2017    CO2 31 09/29/2017    BUN 24 09/29/2017    CREATININE 1.1 09/29/2017     CMP:   Lab Results   Component Value Date     09/29/2017    K 4.6 09/29/2017     09/29/2017    CO2 31 09/29/2017    BUN 24 09/29/2017    PROT 5.7 09/27/2017     TSH:    Lab Results   Component Value Date    TSH 0.20 06/26/2013       QUALITY MEASURES REVIEWED:  1.CAD:Patient is taking anti platelet agent:Yes  2. DYSLIPIDEMIA: Patient is on cholesterol lowering medication:Yes  3. Beta-Blocker therapy for CAD, if prior Myocardial Infarction:No  4. Atrial fibrillation & anticoagulation therapy Yes    Impression:    1. Coronary artery disease involving native coronary artery of native heart without angina pectoris    2. Type 2 diabetes mellitus without complication, without long-term current use of insulin ([facility]    3. Idiopathic acute pancreatitis, unspecified complication status    4. Tobacco abuse    5. Shortness of breath    6. Paroxysmal atrial fibrillation (HCC)    7. Mixed hyperlipidemia    8. Near syncope    9. Palpitations    10.  Chest pain, unspecified type       Patient Active Problem List   Diagnosis Code    Hyperlipidemia E78.5    Weakness R53.1    Claudication of calf muscles (HCC) I73.9    Tobacco abuse Z72.0    Bronchitis J40    Shortness of breath R06.02    A-fib ([facility]

## 2017-10-25 NOTE — LETTER
Cardiology [address] [place] [id number]  Phone: [phone]  Fax: [phone]    Natalie Kelley MD        October 25, 2017     Diana Cunningham MD  [address]    Patient: Amrik Alicea  MR Number: [id number]  YOB: 1940  Date of Visit: 10/25/2017    Dear Dr. Diana Cunningham: Thank you for the request for consultation for Gisel Padron to me for the evaluation of CAD / A-fib. Below are the relevant portions of my assessment and plan of care. If you have questions, please do not hesitate to call me. I look forward to following Alan Aldana along with you.     Sincerely,        Natalie Kelley MD

## 2017-10-25 NOTE — PROGRESS NOTES
Patient did not bring medication bottles or list. Medications were verbally verified with the patient. Patient was reminded to bring medication bottles to appointments. When Pt was started on Amiodarone:     Test                     Date of last test  EKG                     [x]  1025/2017  PFT                      [x] 10/05/2017                                                                        CXR                     [x] 09/28/2017                                                                       THYROID            [x] 09/27/2017                                               LFT                      [x] 09/27/2017                                         EYE EXAM          [x]  05/2017    [id number] Score for Atrial Fibrillation Stroke Risk   Risk   Factors  Component Value   C CHF Yes 1   H HTN No 0   A2 Age >= 76 Yes,  (73 y.o.) 2   D DM Yes 1   S2 Prior Stroke/TIA No 0   V Vascular Disease No 0   A Age 74-69 No,  (73 y.o.) 0   Sc Sex female 1    [id number]  Score  5   Score last updated 53/38/64 2:97 PM    Click here for a link to the UpToDate guideline \"Atrial Fibrillation: Anticoagulation therapy to prevent embolization    Disclaimer: Risk Score calculation is dependent on accuracy of patient problem list and past encounter diagnosis.

## 2017-10-25 NOTE — PATIENT INSTRUCTIONS
If you have any questions, please call our office at [phone]    CAD:Yes   clinically stable. Patient is on optimal medical regimen ( see medication list above )  -     CORONARY ARTERY DISEASE: Patient is currently  mildly symptomatic from CAD. - changes in  treatment:   no           - Testing ordered:  no  [facility] classification: 2  [place] RISK SCORE:  [place] RISK SCORE:  HTN:well controlled on current medical regimen, see list above. - changes in  treatment:   no     VHD: No significant VHD noted  DYSLIPIDEMIA: Patient's profile is at / near Goal.yes,                                 HDL is low                                Tolerating current medical regimen wellyes,                                  See most recent Lab values in Labs section above. Diabetes mellitis: .yes,                                      Managed by family MD                                     BS under good control yes,                                      Hgb A1c avilable yes,   OTHER RELEVANT DIAGNOSIS:as noted in patient's active problem list:  TESTS ORDERED: None this visit                                             All previously ordered tests reviewed. ARRHYTHMIAS: Patient has H/O Atrial fibrillation                                She is rate controlled & on anticoagulation. MEDICATIONS: CPM   Office f/u in three months. Primary/secondary prevention is the goal by aggressive risk modification, healthy and therapeutic life style changes for cardiovascular risk reduction. Various goals are discussed and multiple questions answered.

## 2017-10-26 ENCOUNTER — TELEPHONE (OUTPATIENT)
Dept: CARDIOLOGY CLINIC | Age: 77
End: 2017-10-26

## 2017-10-26 ENCOUNTER — HOSPITAL ENCOUNTER (OUTPATIENT)
Dept: OTHER | Age: 77
Discharge: HOME | End: 2017-10-26
Attending: INTERNAL MEDICINE | Admitting: INTERNAL MEDICINE

## 2017-10-26 LAB
ALBUMIN SERPL-MCNC: 4 GM/DL (ref 3.4–5)
ALP BLD-CCNC: 89 IU/L (ref 40–129)
ALT SERPL-CCNC: 10 U/L (ref 10–40)
ANION GAP SERPL CALCULATED.3IONS-SCNC: 11 MMOL/L (ref 4–16)
APTT: 61.1 SECONDS (ref 21.2–33)
AST SERPL-CCNC: 12 IU/L (ref 15–37)
BILIRUB SERPL-MCNC: 0.2 MG/DL (ref 0–1)
BUN BLDV-MCNC: 21 MG/DL (ref 6–23)
CALCIUM SERPL-MCNC: 9.2 MG/DL (ref 8.3–10.6)
CHLORIDE BLD-SCNC: 100 MMOL/L (ref 99–110)
CO2: 32 MMOL/L (ref 21–32)
CREAT SERPL-MCNC: 1.2 MG/DL (ref 0.6–1.1)
FERRITIN: 115 NG/ML (ref 15–150)
FOLATE: 15.6 NG/ML (ref 3.1–17.5)
GFR AFRICAN AMERICAN: 52 ML/MIN/1.73M2
GFR NON-AFRICAN AMERICAN: 43 ML/MIN/1.73M2
GLUCOSE BLD-MCNC: 81 MG/DL (ref 70–140)
INR BLD: 1.65 INDEX
IRON: 28 UG/DL (ref 37–145)
PCT TRANSFERRIN: 8 % (ref 10–44)
POTASSIUM SERPL-SCNC: 5.2 MMOL/L (ref 3.5–5.1)
PROTHROMBIN TIME: 19.1 SECONDS (ref 9.12–12.5)
RETICULOCYTE COUNT PCT: 5.6 % (ref 0.2–2.2)
SODIUM BLD-SCNC: 143 MMOL/L (ref 135–145)
TOTAL IRON BINDING CAPACITY: 369 UG/DL (ref 250–450)
TOTAL PROTEIN: 5.9 GM/DL (ref 6.4–8.2)
UNSATURATED IRON BINDING CAPACITY: 341 UG/DL (ref 110–370)
VITAMIN B-12: 272.8 PG/ML (ref 211–911)

## 2017-10-27 ENCOUNTER — TELEPHONE (OUTPATIENT)
Dept: CARDIOLOGY CLINIC | Age: 77
End: 2017-10-27

## 2017-12-20 ENCOUNTER — HOSPITAL ENCOUNTER (OUTPATIENT)
Dept: OTHER | Age: 77
Discharge: HOME | End: 2017-12-20
Attending: INTERNAL MEDICINE | Admitting: INTERNAL MEDICINE

## 2017-12-20 LAB
ALBUMIN SERPL-MCNC: 4.1 GM/DL (ref 3.4–5)
ALP BLD-CCNC: 88 IU/L (ref 40–129)
ALT SERPL-CCNC: 9 U/L (ref 10–40)
ANION GAP SERPL CALCULATED.3IONS-SCNC: 9 MMOL/L (ref 4–16)
AST SERPL-CCNC: 11 IU/L (ref 15–37)
BILIRUB SERPL-MCNC: 0.1 MG/DL (ref 0–1)
BUN BLDV-MCNC: 30 MG/DL (ref 6–23)
CALCIUM SERPL-MCNC: 10.1 MG/DL (ref 8.3–10.6)
CHLORIDE BLD-SCNC: 99 MMOL/L (ref 99–110)
CO2: 32 MMOL/L (ref 21–32)
CREAT SERPL-MCNC: 1.1 MG/DL (ref 0.6–1.1)
FERRITIN: 341 NG/ML (ref 15–150)
GFR AFRICAN AMERICAN: 58 ML/MIN/1.73M2
GFR NON-AFRICAN AMERICAN: 48 ML/MIN/1.73M2
GLUCOSE BLD-MCNC: 80 MG/DL (ref 70–99)
IRON: 65 UG/DL (ref 37–145)
PCT TRANSFERRIN: 27 % (ref 10–44)
POTASSIUM SERPL-SCNC: 5 MMOL/L (ref 3.5–5.1)
SODIUM BLD-SCNC: 140 MMOL/L (ref 135–145)
T4 FREE: 1.91 NG/DL (ref 0.9–1.8)
TOTAL IRON BINDING CAPACITY: 243 UG/DL (ref 250–450)
TOTAL PROTEIN: 6.1 GM/DL (ref 6.4–8.2)
TSH HIGH SENSITIVITY: 0.19 UIU/ML (ref 0.27–4.2)
UNSATURATED IRON BINDING CAPACITY: 178 UG/DL (ref 110–370)

## 2018-01-19 ENCOUNTER — HOSPITAL ENCOUNTER (OUTPATIENT)
Dept: OTHER | Age: 78
Discharge: HOME | End: 2018-01-19
Attending: INTERNAL MEDICINE | Admitting: INTERNAL MEDICINE

## 2018-01-19 LAB
ALBUMIN SERPL-MCNC: 4.3 GM/DL (ref 3.4–5)
ALP BLD-CCNC: 76 IU/L (ref 40–129)
ALT SERPL-CCNC: 10 U/L (ref 10–40)
ANION GAP SERPL CALCULATED.3IONS-SCNC: 12 MMOL/L (ref 4–16)
AST SERPL-CCNC: 13 IU/L (ref 15–37)
BILIRUB SERPL-MCNC: 0.1 MG/DL (ref 0–1)
BUN BLDV-MCNC: 39 MG/DL (ref 6–23)
CALCIUM SERPL-MCNC: 10.5 MG/DL (ref 8.3–10.6)
CHLORIDE BLD-SCNC: 97 MMOL/L (ref 99–110)
CO2: 32 MMOL/L (ref 21–32)
CREAT SERPL-MCNC: 1.5 MG/DL (ref 0.6–1.1)
FERRITIN: 299 NG/ML (ref 15–150)
GFR AFRICAN AMERICAN: 41 ML/MIN/1.73M2
GFR NON-AFRICAN AMERICAN: 33 ML/MIN/1.73M2
GLUCOSE BLD-MCNC: 159 MG/DL (ref 70–99)
IRON: 81 UG/DL (ref 37–145)
POTASSIUM SERPL-SCNC: 5.5 MMOL/L (ref 3.5–5.1)
SODIUM BLD-SCNC: 141 MMOL/L (ref 135–145)
TOTAL PROTEIN: 6.2 GM/DL (ref 6.4–8.2)

## 2018-07-05 ENCOUNTER — HOSPITAL ENCOUNTER (OUTPATIENT)
Dept: OTHER | Age: 78
Discharge: HOME | End: 2018-07-05
Attending: INTERNAL MEDICINE | Admitting: INTERNAL MEDICINE

## 2018-07-05 LAB
ALBUMIN SERPL-MCNC: 4.4 GM/DL (ref 3.4–5)
ALP BLD-CCNC: 129 IU/L (ref 40–129)
ALT SERPL-CCNC: 11 U/L (ref 10–40)
ANION GAP SERPL CALCULATED.3IONS-SCNC: 10 MMOL/L (ref 4–16)
AST SERPL-CCNC: 14 IU/L (ref 15–37)
BILIRUB SERPL-MCNC: 0.1 MG/DL (ref 0–1)
BUN BLDV-MCNC: 40 MG/DL (ref 6–23)
CALCIUM SERPL-MCNC: 10.1 MG/DL (ref 8.3–10.6)
CHLORIDE BLD-SCNC: 98 MMOL/L (ref 99–110)
CO2: 34 MMOL/L (ref 21–32)
CREAT SERPL-MCNC: 1.5 MG/DL (ref 0.6–1.1)
FERRITIN: 42 NG/ML (ref 15–150)
GFR AFRICAN AMERICAN: 40 ML/MIN/1.73M2
GFR NON-AFRICAN AMERICAN: 33 ML/MIN/1.73M2
GLUCOSE BLD-MCNC: 135 MG/DL (ref 70–99)
IRON: 137 UG/DL (ref 37–145)
PCT TRANSFERRIN: 49 % (ref 10–44)
POTASSIUM SERPL-SCNC: 5.1 MMOL/L (ref 3.5–5.1)
SODIUM BLD-SCNC: 142 MMOL/L (ref 135–145)
TOTAL IRON BINDING CAPACITY: 282 UG/DL (ref 250–450)
TOTAL PROTEIN: 6.5 GM/DL (ref 6.4–8.2)
UNSATURATED IRON BINDING CAPACITY: 145 UG/DL (ref 110–370)

## 2018-07-20 ENCOUNTER — OFFICE VISIT (OUTPATIENT)
Dept: CARDIOLOGY CLINIC | Age: 78
End: 2018-07-20

## 2018-07-20 VITALS
DIASTOLIC BLOOD PRESSURE: 74 MMHG | HEART RATE: 69 BPM | SYSTOLIC BLOOD PRESSURE: 116 MMHG | WEIGHT: 137.6 LBS | HEIGHT: 64 IN | BODY MASS INDEX: 23.49 KG/M2

## 2018-07-20 DIAGNOSIS — R10.13 EPIGASTRIC PAIN: ICD-10-CM

## 2018-07-20 DIAGNOSIS — R07.9 CHEST PAIN, UNSPECIFIED TYPE: ICD-10-CM

## 2018-07-20 DIAGNOSIS — Z72.0 TOBACCO ABUSE: Chronic | ICD-10-CM

## 2018-07-20 DIAGNOSIS — E11.9 TYPE 2 DIABETES MELLITUS WITHOUT COMPLICATION, WITHOUT LONG-TERM CURRENT USE OF INSULIN (HCC): ICD-10-CM

## 2018-07-20 DIAGNOSIS — R55 NEAR SYNCOPE: ICD-10-CM

## 2018-07-20 DIAGNOSIS — I25.10 CORONARY ARTERY DISEASE INVOLVING NATIVE CORONARY ARTERY OF NATIVE HEART WITHOUT ANGINA PECTORIS: Primary | ICD-10-CM

## 2018-07-20 DIAGNOSIS — K85.00 IDIOPATHIC ACUTE PANCREATITIS, UNSPECIFIED COMPLICATION STATUS: ICD-10-CM

## 2018-07-20 DIAGNOSIS — I48.0 PAROXYSMAL ATRIAL FIBRILLATION (HCC): Chronic | ICD-10-CM

## 2018-07-20 DIAGNOSIS — E78.2 MIXED HYPERLIPIDEMIA: Chronic | ICD-10-CM

## 2018-07-20 PROCEDURE — G8598 ASA/ANTIPLAT THER USED: HCPCS | Performed by: INTERNAL MEDICINE

## 2018-07-20 PROCEDURE — 1101F PT FALLS ASSESS-DOCD LE1/YR: CPT | Performed by: INTERNAL MEDICINE

## 2018-07-20 PROCEDURE — 99214 OFFICE O/P EST MOD 30 MIN: CPT | Performed by: INTERNAL MEDICINE

## 2018-07-20 PROCEDURE — G8400 PT W/DXA NO RESULTS DOC: HCPCS | Performed by: INTERNAL MEDICINE

## 2018-07-20 PROCEDURE — 1090F PRES/ABSN URINE INCON ASSESS: CPT | Performed by: INTERNAL MEDICINE

## 2018-07-20 PROCEDURE — G8427 DOCREV CUR MEDS BY ELIG CLIN: HCPCS | Performed by: INTERNAL MEDICINE

## 2018-07-20 PROCEDURE — 1036F TOBACCO NON-USER: CPT | Performed by: INTERNAL MEDICINE

## 2018-07-20 PROCEDURE — G8420 CALC BMI NORM PARAMETERS: HCPCS | Performed by: INTERNAL MEDICINE

## 2018-07-20 PROCEDURE — 1123F ACP DISCUSS/DSCN MKR DOCD: CPT | Performed by: INTERNAL MEDICINE

## 2018-07-20 PROCEDURE — 93000 ELECTROCARDIOGRAM COMPLETE: CPT | Performed by: INTERNAL MEDICINE

## 2018-07-20 PROCEDURE — 4040F PNEUMOC VAC/ADMIN/RCVD: CPT | Performed by: INTERNAL MEDICINE

## 2018-07-20 RX ORDER — PRIMIDONE 50 MG/1
50 TABLET ORAL 3 TIMES DAILY
COMMUNITY
End: 2019-08-16 | Stop reason: SDUPTHER

## 2018-07-20 RX ORDER — ATORVASTATIN CALCIUM 10 MG/1
10 TABLET, FILM COATED ORAL DAILY
COMMUNITY
End: 2019-08-16 | Stop reason: SDUPTHER

## 2018-07-20 RX ORDER — FUROSEMIDE 20 MG/1
20 TABLET ORAL SEE ADMIN INSTRUCTIONS
COMMUNITY
End: 2019-08-16 | Stop reason: SDUPTHER

## 2018-07-20 NOTE — PROGRESS NOTES
OFFICE PROGRESS NOTES      Lupe Muller is a [de-identified] y.o. female who has    CHIEF COMPLAINT AS FOLLOWS:  CHEST PAIN: Patient C/O chest pain but symptoms appear to be atypical.   SOB: No C/O SOB at this time. LEG EDEMA: No leg edema   PALPITATIONS: Denies any C/O Palpitations                                   DIZZINESS: No C/O Dizziness                           SYNCOPE: None   OTHER:                                     HPI: Patient is here for F/U on her CAD, HTN & Dyslipidemia problems. She does not have any complaints at this time.     Marilee Mak has the following history recorded in care path:  Patient Active Problem List    Diagnosis Date Noted    COPD exacerbation [facility])      Priority: High    Coronary artery disease involving native coronary artery of native heart without angina pectoris      Priority: High    Type 2 diabetes mellitus without complication, without long-term current use of insulin ([facility] [address]      Priority: High    Mental status alteration 06/26/2016     Priority: High    Idiopathic acute pancreatitis 06/11/2016     Priority: High    Hypoxia 01/30/2017     Priority: Low    Acute exacerbation of COPD with asthma ([address] 01/29/2017     Priority: Low    Moderate protein-calorie malnutrition ([address] 06/28/2016     Priority: Low    Decreased thyroid stimulating hormone (TSH) level 06/27/2016     Priority: Low    Dehydration, mild 06/26/2016     Priority: Low    Anemia 06/11/2016     Priority: Low    Abdominal hernia without obstruction or gangrene 06/11/2016     Priority: Low    Paroxysmal atrial fibrillation ([address]      Priority: Low    Shingles 12/31/2014     Priority: Low    Acute diastolic CHF (congestive heart failure) ([address] 12/23/2014     Priority: Low    A-fib ([address]      Priority: Low    Acute upper respiratory infection      Priority: Low    Bronchitis 12/19/2014     Priority: Low    Shortness of breath 12/19/2014     Priority: Low    Weakness 06/24/2013     Priority: Low    Claudication of calf muscles ([address] 06/24/2013     Priority: Low    Tobacco abuse 06/24/2013     Priority: Low    Hyperlipidemia      Priority: Low    Anemia 09/28/2017    GI bleed 09/28/2017    Type 2 diabetes mellitus ([address] 09/28/2017    history of AVM (arteriovenous malformation)-duodenum 09/28/2017    Melena 09/28/2017    Dyspnea and respiratory abnormalities 09/28/2017    Near syncope 03/06/2017    Chest pain 03/06/2017    Chronic blood loss anemia 03/06/2017    Palpitations 02/16/2017    Epigastric pain 02/16/2017    COPD (chronic obstructive pulmonary disease) ([address] 02/15/2017     Current Outpatient Prescriptions   Medication Sig Dispense Refill    ipratropium-albuterol (DUONEB) 0.5-2.5 (3) MG/3ML SOLN nebulizer solution Inhale 3 mLs into the lungs every 4 hours 360 mL 3    CPAP Machine MISC by Does not apply route      ferrous sulfate 325 (65 Fe) MG tablet Take 1 tablet by mouth nightly Until you start iv iron (Patient taking differently: Take 650 mg by mouth nightly Until you start iv iron) 30 tablet 3    rivaroxaban (XARELTO) 15 MG TABS tablet Take 15 mg by mouth daily      acetaminophen (APAP EXTRA STRENGTH) 500 MG tablet Take 1 tablet by mouth every 6 hours as needed for Pain 30 tablet 0    aspirin EC 81 MG EC tablet Take 1 tablet by mouth daily 30 tablet 3    pantoprazole (PROTONIX) 40 MG tablet Take 1 tablet by mouth every morning (before breakfast) (Patient taking differently: Take 40 mg by mouth 2 times daily ) 30 tablet 3    estradiol (ESTRACE) 0.5 MG tablet Take 1 mg by mouth daily       ranitidine (ZANTAC) 150 MG tablet Take 150 mg by mouth 2 times daily      gabapentin (NEURONTIN) 300 MG capsule Take 300 mg by mouth nightly       DULoxetine (CYMBALTA) 60 MG extended release capsule Take 60 mg by mouth nightly      azelastine HCl 0.15 % SOLN 2 sprays by Nasal route 2 times daily      amiodarone Review of Systems:    Constitutional: Negative for diaphoresis and fatigue  Psychological:Negative for anxiety or depression  HENT: Negative for headaches, nasal congestion, sinus pain or vertigo  Eyes: Negative for visual disturbance. Endocrine: Negative for polydipsia/polyuria  Respiratory: Negative for shortness of breath  Cardiovascular: Negative for chest pain, dyspnea on exertion, claudication, edema, irregular heartbeat, murmur, palpitations or shortness of breath  Gastrointestinal: Negative for abdominal pain or heartburn  Genito-Urinary: Negative for urinary frequency/urgency  Musculoskeletal: Negative for muscle pain, muscular weakness, negative for pain in arm and leg or swelling in foot and leg  Neurological: Negative for dizziness, headaches, memory loss, numbness/tingling, visual changes, syncope  Dermatological: Negative for rash    Objective:  /74   Pulse 69   Ht 5' 4\" (1.626 m)   Wt 137 lb 9.6 oz (62.4 kg)   BMI 23.62 kg/m²   Wt Readings from Last 3 Encounters:   07/20/18 137 lb 9.6 oz (62.4 kg)   01/26/18 107 lb (48.5 kg)   10/25/17 116 lb 12.8 oz (53 kg)     Body mass index is 23.62 kg/m². GENERAL - Alert, oriented, pleasant, in no apparent distress. EYES: No jaundice, no conjunctival pallor. SKIN: It is warm & dry. No rashes. No Echhymosis    HEENT  No clinically significant abnormalities seen. Neck - Supple. No jugular venous distention noted. No carotid bruits. Cardiovascular  Normal S1 and S2 without obvious murmur or gallop. Extremities - No cyanosis, clubbing, or significant edema. Pulmonary  No respiratory distress. No wheezes or rales. Abdomen  No masses, tenderness, or organomegaly. Musculoskeletal  No significant edema. No joint deformities. No muscle wasting. Neurologic  Cranial nerves II through XII are grossly intact. There were no gross focal neurologic abnormalities.     Lab Review   Lab Results   Component Value Date    CKTOTAL 21 calf muscles ([facility]) I73.9    Tobacco abuse Z72.0    Bronchitis J40    Shortness of breath R06.02    A-fib ([facility]) I48.91    Acute upper respiratory infection J06.9    Acute diastolic CHF (congestive heart failure) ([facility]) I50.31    Shingles B02.9    Paroxysmal atrial fibrillation ([facility]) I48.0    Anemia D64.9    Idiopathic acute pancreatitis K85.00    Abdominal hernia without obstruction or gangrene K46.9    Mental status alteration R41.82    Dehydration, mild E86.0    Decreased thyroid stimulating hormone (TSH) level R94.6    Moderate protein-calorie malnutrition ([facility]) E44.0    Acute exacerbation of COPD with asthma ([facility]) J44.1, J45.901    Hypoxia R09.02    COPD exacerbation ([facility]) J44.1    Coronary artery disease involving native coronary artery of native heart without angina pectoris I25.10    Type 2 diabetes mellitus without complication, without long-term current use of insulin ([facility]) E11.9    COPD (chronic obstructive pulmonary disease) ([facility]) J44.9    Palpitations R00.2    Epigastric pain R10.13    Near syncope R55    Chest pain R07.9    Chronic blood loss anemia D50.0    Anemia D64.9    GI bleed K92.2    Type 2 diabetes mellitus ([facility]) E11.9    history of AVM (arteriovenous malformation)-duodenum Q27.30    Melena K92.1    Dyspnea and respiratory abnormalities R06.00, R06.89     EKG: NSR, QTc 421    Assessment & Plan:    CAD:Yes   clinically stable. Patient is on optimal medical regimen ( see medication list above )  -     CORONARY ARTERY DISEASE: Patient is currently  asymptomatic from CAD. Symptoms reported are atypical.           - changes in  treatment:   no           - Testing ordered:  no  [facility] classification: 1  [place] RISK SCORE:  [place] RISK SCORE:  HTN:well controlled on current medical regimen, see list above. Not well controlled needs medication adjustment.    - changes in  treatment:   no   CARDIOMYOPATHY: None known   CONGESTIVE HEART FAILURE:  HISTORY of in the past.      VHD: No significant VHD noted  DYSLIPIDEMIA: Patient's profile is at / near Goal.yes,                                 HDL is low                                Patient is not on any medications                                See most recent Lab values in Labs section above. Diabetes mellitis: .yes,                                      Managed by family MD                                     BS under good control yes,                                      Hgb A1c avilable yes,   OTHER RELEVANT DIAGNOSIS:as noted in patient's active problem list:  TESTS ORDERED: None this visit                                    All previously ordered tests reviewed. ARRHYTHMIAS: known                                Patient has H/O Atrial fibrillation                                She is on anticoagulation. Patient is in NSR with the help of anti arrhythmics. Reportedly thyroid function abnormalities are noted. ? Due to Amiodarone. Patient wants me to discuss with  & decide if she should stop Amiodarone as she is anticoagulated any ways. MEDICATIONS: CPM   Office f/u in six months. Primary/secondary prevention is the goal by aggressive risk modification, healthy and therapeutic life style changes for cardiovascular risk reduction. Various goals are discussed and multiple questions answered.

## 2019-02-11 ENCOUNTER — HOSPITAL ENCOUNTER (OUTPATIENT)
Age: 79
Setting detail: SPECIMEN
Discharge: HOME | End: 2019-02-11
Payer: MEDICARE

## 2019-02-11 LAB
ALBUMIN SERPL-MCNC: 4.4 GM/DL (ref 3.4–5)
ALP BLD-CCNC: 134 IU/L (ref 40–129)
ALT SERPL-CCNC: 10 U/L (ref 10–40)
ANION GAP SERPL CALCULATED.3IONS-SCNC: 12 MMOL/L (ref 4–16)
AST SERPL-CCNC: 12 IU/L (ref 15–37)
BILIRUB SERPL-MCNC: 0.1 MG/DL (ref 0–1)
BUN BLDV-MCNC: 39 MG/DL (ref 6–23)
CALCIUM SERPL-MCNC: 10.1 MG/DL (ref 8.3–10.6)
CHLORIDE BLD-SCNC: 96 MMOL/L (ref 99–110)
CO2: 31 MMOL/L (ref 21–32)
CREAT SERPL-MCNC: 1.5 MG/DL (ref 0.6–1.1)
FERRITIN: 15 NG/ML (ref 15–150)
GFR AFRICAN AMERICAN: 40 ML/MIN/1.73M2
GFR NON-AFRICAN AMERICAN: 33 ML/MIN/1.73M2
GLUCOSE BLD-MCNC: 140 MG/DL (ref 70–99)
IRON: 35 UG/DL (ref 37–145)
PCT TRANSFERRIN: 10 % (ref 10–44)
POTASSIUM SERPL-SCNC: 5.3 MMOL/L (ref 3.5–5.1)
SODIUM BLD-SCNC: 139 MMOL/L (ref 135–145)
TOTAL IRON BINDING CAPACITY: 366 UG/DL (ref 250–450)
TOTAL PROTEIN: 6.8 GM/DL (ref 6.4–8.2)
UNSATURATED IRON BINDING CAPACITY: 331 UG/DL (ref 110–370)

## 2019-02-11 PROCEDURE — 83540 ASSAY OF IRON: CPT

## 2019-02-11 PROCEDURE — 80053 COMPREHEN METABOLIC PANEL: CPT

## 2019-02-11 PROCEDURE — 82728 ASSAY OF FERRITIN: CPT

## 2019-02-11 PROCEDURE — 83550 IRON BINDING TEST: CPT

## 2019-04-18 ENCOUNTER — HOSPITAL ENCOUNTER (OUTPATIENT)
Dept: ULTRASOUND IMAGING | Age: 79
Discharge: HOME | End: 2019-04-18
Payer: MEDICARE

## 2019-04-18 DIAGNOSIS — I73.9 PERIPHERAL VASCULAR DISEASE, UNSPECIFIED (HCC): ICD-10-CM

## 2019-04-18 PROCEDURE — 93925 LOWER EXTREMITY STUDY: CPT

## 2019-05-02 ENCOUNTER — TELEPHONE (OUTPATIENT)
Dept: FAMILY MEDICINE CLINIC | Age: 79
End: 2019-05-02

## 2019-05-02 NOTE — TELEPHONE ENCOUNTER
Spoke with pt informed received prior auth cyclobenzaprine. Exp 4/30/2020.   Electronically signed by Teri Ca LPN on 5/0/3134 at 7:68 PM

## 2019-05-08 RX ORDER — PANTOPRAZOLE SODIUM 40 MG/1
TABLET, DELAYED RELEASE ORAL
Qty: 180 TABLET | Refills: 0 | Status: SHIPPED | OUTPATIENT
Start: 2019-05-08 | End: 2019-08-16 | Stop reason: SDUPTHER

## 2019-05-09 DIAGNOSIS — G89.29 CHRONIC BILATERAL LOW BACK PAIN WITHOUT SCIATICA: Primary | ICD-10-CM

## 2019-05-09 DIAGNOSIS — M54.50 CHRONIC BILATERAL LOW BACK PAIN WITHOUT SCIATICA: Primary | ICD-10-CM

## 2019-05-09 RX ORDER — HYDROCODONE BITARTRATE AND ACETAMINOPHEN 7.5; 325 MG/1; MG/1
.5-1 TABLET ORAL 3 TIMES DAILY PRN
Qty: 90 TABLET | Refills: 0 | Status: SHIPPED | OUTPATIENT
Start: 2019-05-09 | End: 2019-10-23 | Stop reason: SDUPTHER

## 2019-05-09 RX ORDER — HYDROCODONE BITARTRATE AND ACETAMINOPHEN 5; 325 MG/1; MG/1
.5-1 TABLET ORAL 3 TIMES DAILY PRN
Refills: 0 | COMMUNITY
Start: 2019-03-28 | End: 2019-05-09

## 2019-05-09 NOTE — TELEPHONE ENCOUNTER
We dont have any messages from yesterday requesting norco. The only thing we received was a request for pantoprazole.  (just fyi)

## 2019-06-06 ENCOUNTER — HOSPITAL ENCOUNTER (OUTPATIENT)
Age: 79
Setting detail: SPECIMEN
Discharge: HOME | End: 2019-06-06
Payer: MEDICARE

## 2019-06-06 LAB
ALBUMIN SERPL-MCNC: 4.3 GM/DL (ref 3.4–5)
ALP BLD-CCNC: 136 IU/L (ref 40–129)
ALT SERPL-CCNC: 6 U/L (ref 10–40)
ANION GAP SERPL CALCULATED.3IONS-SCNC: 11 MMOL/L (ref 4–16)
AST SERPL-CCNC: 10 IU/L (ref 15–37)
BILIRUB SERPL-MCNC: 0.2 MG/DL (ref 0–1)
BUN BLDV-MCNC: 25 MG/DL (ref 6–23)
CALCIUM SERPL-MCNC: 9.7 MG/DL (ref 8.3–10.6)
CHLORIDE BLD-SCNC: 99 MMOL/L (ref 99–110)
CO2: 33 MMOL/L (ref 21–32)
CREAT SERPL-MCNC: 1.2 MG/DL (ref 0.6–1.1)
FERRITIN: 53 NG/ML (ref 15–150)
FOLATE: 7.4 NG/ML (ref 3.1–17.5)
GFR AFRICAN AMERICAN: 52 ML/MIN/1.73M2
GFR NON-AFRICAN AMERICAN: 43 ML/MIN/1.73M2
GLUCOSE BLD-MCNC: 120 MG/DL (ref 70–99)
IRON: 50 UG/DL (ref 37–145)
PCT TRANSFERRIN: 20 % (ref 10–44)
POTASSIUM SERPL-SCNC: 4.6 MMOL/L (ref 3.5–5.1)
SODIUM BLD-SCNC: 143 MMOL/L (ref 135–145)
T4 FREE: 1.69 NG/DL (ref 0.9–1.8)
TOTAL IRON BINDING CAPACITY: 247 UG/DL (ref 250–450)
TOTAL PROTEIN: 6.2 GM/DL (ref 6.4–8.2)
TSH HIGH SENSITIVITY: 0.41 UIU/ML (ref 0.27–4.2)
UNSATURATED IRON BINDING CAPACITY: 197 UG/DL (ref 110–370)
VITAMIN B-12: 297.8 PG/ML (ref 211–911)

## 2019-06-06 PROCEDURE — 82607 VITAMIN B-12: CPT

## 2019-06-06 PROCEDURE — 82746 ASSAY OF FOLIC ACID SERUM: CPT

## 2019-06-06 PROCEDURE — 80053 COMPREHEN METABOLIC PANEL: CPT

## 2019-06-06 PROCEDURE — 83550 IRON BINDING TEST: CPT

## 2019-06-06 PROCEDURE — 84443 ASSAY THYROID STIM HORMONE: CPT

## 2019-06-06 PROCEDURE — 82728 ASSAY OF FERRITIN: CPT

## 2019-06-06 PROCEDURE — 83540 ASSAY OF IRON: CPT

## 2019-06-06 PROCEDURE — 84439 ASSAY OF FREE THYROXINE: CPT

## 2019-07-11 DIAGNOSIS — Q27.30 AVM (ARTERIOVENOUS MALFORMATION): Primary | Chronic | ICD-10-CM

## 2019-07-11 DIAGNOSIS — R10.13 EPIGASTRIC PAIN: ICD-10-CM

## 2019-07-11 RX ORDER — AMIODARONE HYDROCHLORIDE 200 MG/1
200 TABLET ORAL DAILY
Qty: 30 TABLET | Refills: 1 | Status: SHIPPED | OUTPATIENT
Start: 2019-07-11 | End: 2019-11-26 | Stop reason: SDUPTHER

## 2019-07-11 RX ORDER — HYDROCODONE BITARTRATE AND ACETAMINOPHEN 5; 325 MG/1; MG/1
1 TABLET ORAL 3 TIMES DAILY PRN
COMMUNITY
End: 2019-07-11 | Stop reason: SDUPTHER

## 2019-07-11 RX ORDER — HYDROCODONE BITARTRATE AND ACETAMINOPHEN 5; 325 MG/1; MG/1
1 TABLET ORAL 3 TIMES DAILY PRN
Qty: 90 TABLET | Refills: 0 | Status: SHIPPED | OUTPATIENT
Start: 2019-07-11 | End: 2019-08-10

## 2019-07-20 DIAGNOSIS — N39.0 ACUTE URINARY TRACT INFECTION: ICD-10-CM

## 2019-07-20 DIAGNOSIS — E05.90 HYPERTHYROIDISM: ICD-10-CM

## 2019-07-20 DIAGNOSIS — E04.9 GOITER: ICD-10-CM

## 2019-07-20 DIAGNOSIS — Z86.73 H/O: CVA (CEREBROVASCULAR ACCIDENT): ICD-10-CM

## 2019-07-20 PROBLEM — G47.33 OSA (OBSTRUCTIVE SLEEP APNEA): Status: ACTIVE | Noted: 2019-07-20

## 2019-07-20 PROBLEM — M54.50 CHRONIC LOW BACK PAIN: Status: ACTIVE | Noted: 2019-07-20

## 2019-07-20 PROBLEM — K21.9 GERD (GASTROESOPHAGEAL REFLUX DISEASE): Status: ACTIVE | Noted: 2019-07-20

## 2019-07-20 PROBLEM — G89.29 CHRONIC LOW BACK PAIN: Status: ACTIVE | Noted: 2019-07-20

## 2019-07-20 PROBLEM — G91.9 HYDROCEPHALUS (HCC): Status: ACTIVE | Noted: 2019-07-20

## 2019-07-20 PROBLEM — J18.9 PNEUMONIA: Status: ACTIVE | Noted: 2019-07-20

## 2019-07-20 RX ORDER — LANCETS 28 GAUGE
1 EACH MISCELLANEOUS DAILY
COMMUNITY
End: 2020-12-30 | Stop reason: SDUPTHER

## 2019-07-20 RX ORDER — DIGOXIN 125 MCG
125 TABLET ORAL DAILY
Status: ON HOLD | COMMUNITY
End: 2019-09-27 | Stop reason: HOSPADM

## 2019-07-20 RX ORDER — MONTELUKAST SODIUM 10 MG/1
1 TABLET ORAL DAILY
Refills: 1 | COMMUNITY
Start: 2019-05-08 | End: 2019-08-16 | Stop reason: SDUPTHER

## 2019-07-20 RX ORDER — DONEPEZIL HYDROCHLORIDE 10 MG/1
1 TABLET, FILM COATED ORAL DAILY
COMMUNITY
Start: 2019-07-08 | End: 2019-11-06

## 2019-07-20 RX ORDER — METHIMAZOLE 10 MG/1
10 TABLET ORAL 3 TIMES DAILY
COMMUNITY
End: 2019-08-16 | Stop reason: SDUPTHER

## 2019-07-20 RX ORDER — MIRTAZAPINE 15 MG/1
1 TABLET, FILM COATED ORAL NIGHTLY
COMMUNITY
Start: 2019-07-08 | End: 2020-03-16 | Stop reason: SDUPTHER

## 2019-07-20 RX ORDER — DOCUSATE SODIUM 100 MG/1
100 CAPSULE, LIQUID FILLED ORAL 2 TIMES DAILY
Status: ON HOLD | COMMUNITY
End: 2021-09-05

## 2019-07-20 RX ORDER — DILTIAZEM HYDROCHLORIDE 120 MG/1
1 CAPSULE, COATED, EXTENDED RELEASE ORAL DAILY
COMMUNITY
Start: 2019-07-08 | End: 2019-08-16 | Stop reason: SDUPTHER

## 2019-07-20 RX ORDER — CYCLOBENZAPRINE HCL 10 MG
1 TABLET ORAL NIGHTLY
Refills: 1 | COMMUNITY
Start: 2019-05-02 | End: 2019-08-16 | Stop reason: SDUPTHER

## 2019-07-20 RX ORDER — ALPRAZOLAM 0.5 MG/1
0.5 TABLET ORAL NIGHTLY PRN
Status: ON HOLD | COMMUNITY
End: 2019-09-27 | Stop reason: HOSPADM

## 2019-08-07 RX ORDER — FLUTICASONE PROPIONATE 50 MCG
SPRAY, SUSPENSION (ML) NASAL
Refills: 0 | OUTPATIENT
Start: 2019-08-07

## 2019-08-16 ENCOUNTER — TELEPHONE (OUTPATIENT)
Dept: FAMILY MEDICINE CLINIC | Age: 79
End: 2019-08-16

## 2019-08-16 RX ORDER — CYCLOBENZAPRINE HCL 10 MG
10 TABLET ORAL NIGHTLY
Qty: 90 TABLET | Refills: 0 | Status: SHIPPED | OUTPATIENT
Start: 2019-08-16 | End: 2020-02-21 | Stop reason: SDUPTHER

## 2019-08-16 RX ORDER — METHIMAZOLE 10 MG/1
10 TABLET ORAL 3 TIMES DAILY
Qty: 270 TABLET | Refills: 0 | Status: ON HOLD | OUTPATIENT
Start: 2019-08-16 | End: 2019-09-27 | Stop reason: HOSPADM

## 2019-08-16 RX ORDER — RANITIDINE 150 MG/1
150 TABLET ORAL 2 TIMES DAILY
Qty: 180 TABLET | Refills: 0 | Status: ON HOLD | OUTPATIENT
Start: 2019-08-16 | End: 2020-01-10 | Stop reason: HOSPADM

## 2019-08-16 RX ORDER — PRIMIDONE 50 MG/1
50 TABLET ORAL 3 TIMES DAILY
Qty: 270 TABLET | Refills: 0 | Status: SHIPPED | OUTPATIENT
Start: 2019-08-16 | End: 2019-11-26 | Stop reason: SDUPTHER

## 2019-08-16 RX ORDER — DULOXETIN HYDROCHLORIDE 60 MG/1
30 CAPSULE, DELAYED RELEASE ORAL NIGHTLY
Qty: 30 CAPSULE | Refills: 0 | Status: SHIPPED | OUTPATIENT
Start: 2019-08-16 | End: 2019-11-26 | Stop reason: SDUPTHER

## 2019-08-16 RX ORDER — ATORVASTATIN CALCIUM 10 MG/1
10 TABLET, FILM COATED ORAL DAILY
Qty: 90 TABLET | Refills: 0 | Status: SHIPPED | OUTPATIENT
Start: 2019-08-16 | End: 2019-11-26 | Stop reason: SDUPTHER

## 2019-08-16 RX ORDER — DONEPEZIL HYDROCHLORIDE 10 MG/1
10 TABLET, FILM COATED ORAL NIGHTLY
Qty: 90 TABLET | Refills: 0 | Status: SHIPPED | OUTPATIENT
Start: 2019-08-16 | End: 2019-11-26 | Stop reason: SDUPTHER

## 2019-08-16 RX ORDER — GABAPENTIN 300 MG/1
300 CAPSULE ORAL NIGHTLY
Qty: 90 CAPSULE | Refills: 0 | Status: SHIPPED | OUTPATIENT
Start: 2019-08-16 | End: 2019-09-20

## 2019-08-16 RX ORDER — MONTELUKAST SODIUM 10 MG/1
10 TABLET ORAL DAILY
Qty: 90 TABLET | Refills: 0 | Status: SHIPPED | OUTPATIENT
Start: 2019-08-16 | End: 2019-11-26 | Stop reason: SDUPTHER

## 2019-08-16 RX ORDER — FUROSEMIDE 20 MG/1
20 TABLET ORAL SEE ADMIN INSTRUCTIONS
Qty: 45 TABLET | Refills: 0 | Status: SHIPPED | OUTPATIENT
Start: 2019-08-16 | End: 2019-11-26 | Stop reason: SDUPTHER

## 2019-08-16 RX ORDER — PANTOPRAZOLE SODIUM 40 MG/1
40 TABLET, DELAYED RELEASE ORAL DAILY
Qty: 180 TABLET | Refills: 0 | Status: SHIPPED | OUTPATIENT
Start: 2019-08-16 | End: 2019-11-26 | Stop reason: SDUPTHER

## 2019-08-16 RX ORDER — DILTIAZEM HYDROCHLORIDE 120 MG/1
120 CAPSULE, COATED, EXTENDED RELEASE ORAL DAILY
Qty: 90 CAPSULE | Refills: 0 | Status: SHIPPED | OUTPATIENT
Start: 2019-08-16 | End: 2019-11-26 | Stop reason: SDUPTHER

## 2019-08-16 NOTE — TELEPHONE ENCOUNTER
mouth daily     Authorizing Provider: Daryn Shea     Ordering User: ANTOINETTE Pelayo    methIMAzole (TAPAZOLE) 10 MG tablet 270 tablet 0     Sig: Take 1 tablet by mouth 3 times daily     Authorizing Provider: Daryn Shea     Ordering User: ANTOINETTE Pelayo    rivaroxaban (XARELTO) 15 MG TABS tablet 90 tablet 0     Sig: Take 1 tablet by mouth daily     Authorizing Provider: Daryn Shea     Ordering User: Suki Quan

## 2019-08-27 DIAGNOSIS — M54.50 CHRONIC BILATERAL LOW BACK PAIN WITHOUT SCIATICA: Primary | ICD-10-CM

## 2019-08-27 DIAGNOSIS — G89.29 CHRONIC BILATERAL LOW BACK PAIN WITHOUT SCIATICA: Primary | ICD-10-CM

## 2019-08-27 RX ORDER — HYDROCODONE BITARTRATE AND ACETAMINOPHEN 5; 325 MG/1; MG/1
1 TABLET ORAL DAILY PRN
Qty: 30 TABLET | Refills: 0 | Status: SHIPPED | OUTPATIENT
Start: 2019-08-27 | End: 2019-09-20 | Stop reason: SDUPTHER

## 2019-09-20 ENCOUNTER — OFFICE VISIT (OUTPATIENT)
Dept: FAMILY MEDICINE CLINIC | Age: 79
End: 2019-09-20
Payer: MEDICARE

## 2019-09-20 VITALS
HEIGHT: 64 IN | SYSTOLIC BLOOD PRESSURE: 112 MMHG | WEIGHT: 135 LBS | TEMPERATURE: 98.5 F | DIASTOLIC BLOOD PRESSURE: 64 MMHG | BODY MASS INDEX: 23.05 KG/M2 | OXYGEN SATURATION: 91 % | HEART RATE: 62 BPM

## 2019-09-20 DIAGNOSIS — J45.901 ACUTE EXACERBATION OF COPD WITH ASTHMA (HCC): ICD-10-CM

## 2019-09-20 DIAGNOSIS — J44.1 ACUTE EXACERBATION OF COPD WITH ASTHMA (HCC): ICD-10-CM

## 2019-09-20 DIAGNOSIS — M54.50 CHRONIC BILATERAL LOW BACK PAIN WITHOUT SCIATICA: ICD-10-CM

## 2019-09-20 DIAGNOSIS — G89.29 CHRONIC BILATERAL LOW BACK PAIN WITHOUT SCIATICA: ICD-10-CM

## 2019-09-20 DIAGNOSIS — J42 CHRONIC BRONCHITIS, UNSPECIFIED CHRONIC BRONCHITIS TYPE (HCC): ICD-10-CM

## 2019-09-20 DIAGNOSIS — J21.9 ACUTE BRONCHIOLITIS DUE TO UNSPECIFIED ORGANISM: Primary | ICD-10-CM

## 2019-09-20 PROBLEM — N39.0 ACUTE URINARY TRACT INFECTION: Status: RESOLVED | Noted: 2019-07-20 | Resolved: 2019-09-20

## 2019-09-20 PROCEDURE — 4040F PNEUMOC VAC/ADMIN/RCVD: CPT | Performed by: FAMILY MEDICINE

## 2019-09-20 PROCEDURE — G8598 ASA/ANTIPLAT THER USED: HCPCS | Performed by: FAMILY MEDICINE

## 2019-09-20 PROCEDURE — 99213 OFFICE O/P EST LOW 20 MIN: CPT | Performed by: FAMILY MEDICINE

## 2019-09-20 PROCEDURE — G8400 PT W/DXA NO RESULTS DOC: HCPCS | Performed by: FAMILY MEDICINE

## 2019-09-20 PROCEDURE — 1123F ACP DISCUSS/DSCN MKR DOCD: CPT | Performed by: FAMILY MEDICINE

## 2019-09-20 PROCEDURE — 1090F PRES/ABSN URINE INCON ASSESS: CPT | Performed by: FAMILY MEDICINE

## 2019-09-20 PROCEDURE — G8427 DOCREV CUR MEDS BY ELIG CLIN: HCPCS | Performed by: FAMILY MEDICINE

## 2019-09-20 PROCEDURE — G8420 CALC BMI NORM PARAMETERS: HCPCS | Performed by: FAMILY MEDICINE

## 2019-09-20 PROCEDURE — 1036F TOBACCO NON-USER: CPT | Performed by: FAMILY MEDICINE

## 2019-09-20 PROCEDURE — 3023F SPIROM DOC REV: CPT | Performed by: FAMILY MEDICINE

## 2019-09-20 PROCEDURE — G8926 SPIRO NO PERF OR DOC: HCPCS | Performed by: FAMILY MEDICINE

## 2019-09-20 RX ORDER — GABAPENTIN 300 MG/1
300 CAPSULE ORAL 2 TIMES DAILY
Qty: 180 CAPSULE | Refills: 1 | Status: SHIPPED | OUTPATIENT
Start: 2019-09-20 | End: 2019-11-26 | Stop reason: SDUPTHER

## 2019-09-20 RX ORDER — AMOXICILLIN AND CLAVULANATE POTASSIUM 500; 125 MG/1; MG/1
1 TABLET, FILM COATED ORAL 2 TIMES DAILY
Qty: 20 TABLET | Refills: 0 | Status: ON HOLD | OUTPATIENT
Start: 2019-09-20 | End: 2019-09-27 | Stop reason: HOSPADM

## 2019-09-20 RX ORDER — HYDROCODONE BITARTRATE AND ACETAMINOPHEN 5; 325 MG/1; MG/1
1 TABLET ORAL 2 TIMES DAILY PRN
Qty: 60 TABLET | Refills: 0 | Status: SHIPPED | OUTPATIENT
Start: 2019-09-20 | End: 2019-10-20

## 2019-09-20 RX ORDER — PREDNISONE 20 MG/1
20 TABLET ORAL DAILY
Qty: 5 TABLET | Refills: 0 | Status: ON HOLD | OUTPATIENT
Start: 2019-09-20 | End: 2019-09-27

## 2019-09-20 ASSESSMENT — PATIENT HEALTH QUESTIONNAIRE - PHQ9
1. LITTLE INTEREST OR PLEASURE IN DOING THINGS: 0
SUM OF ALL RESPONSES TO PHQ QUESTIONS 1-9: 0
SUM OF ALL RESPONSES TO PHQ9 QUESTIONS 1 & 2: 0
2. FEELING DOWN, DEPRESSED OR HOPELESS: 0
SUM OF ALL RESPONSES TO PHQ QUESTIONS 1-9: 0

## 2019-09-20 ASSESSMENT — ENCOUNTER SYMPTOMS
SHORTNESS OF BREATH: 0
SINUS PRESSURE: 0
SORE THROAT: 0
RHINORRHEA: 0
CHEST TIGHTNESS: 0

## 2019-09-20 NOTE — PROGRESS NOTES
9/20/2019    Ebony Dennison    Chief Complaint   Patient presents with    Other     urgent care f/u bronchitis. received rx's tessalon perles 100mg 1 tid prn, [id number] 1 bid x 7 days, zyrtec 100mg 1 qd    Cough     productive, chest congestiin, mid back pain, sob, nausea, malaise. also using nebulizer, taking mucinex. finished atb 9/19/19       HPI  Laura Doctor is a 78 y.o. female who presents today with follow-up on exacerbation of COPD brought by bronchitis. Patient also wanting the pain medications brought back to prior doses. Her narcotic had been decreased from 3 a day she likely due to 1 a day she like it back to at least twice a day. And her gabapentin down from twice a day to once a day once brought back up to twice daily. Patient notes that her back pain is not controlled with the lower doses. Part of her dose lowering is that patient was taking multiple months to get through her narcotic and insurance was seeing her as acute feels on her narcotic. There is no sign of abuse or diversion. Patient has finished a seven-day course of doxycycline for COPD exacerbation. She appears to be in no distress although pale. Patient's COPD is moderate to severe at baseline. REVIEW OF SYMPTOMS  Review of Systems   Constitutional: Negative for chills and fever. HENT: Negative for ear pain, rhinorrhea, sinus pressure and sore throat. Respiratory: Negative for chest tightness and shortness of breath. Musculoskeletal: Negative for myalgias.        PAST MEDICAL HISTORY  Past Medical History:   Diagnosis Date    A-fib [facility]) 2015    Acute diastolic CHF (congestive heart failure) ([facility] [address] 12/23/2014    Acute urinary tract infection 7/20/2019    Single Kidney    Anemia     Anxiety     Asthma     CAD (coronary artery disease)     follows with Dr Zachary Bryson [facility])     skin on ear    Chronic low back pain     s/p epidural steroids    COPD (chronic obstructive pulmonary disease) ([facility] [address] Paternal Grandmother     Diabetes Brother     Alcohol Abuse Maternal Aunt        SOCIAL HISTORY  Social History     Socioeconomic History    Marital status:      Spouse name: None    Number of children: 11    Years of education: None    Highest education level: None   Occupational History    Occupation: mental health counselor retired   Social Needs    Financial resource strain: None    Food insecurity:     Worry: None     Inability: None    Transportation needs:     Medical: None     Non-medical: None   Tobacco Use    Smoking status: Former Smoker     Packs/day: 0.25     Years: 59.00     Pack years: 14.75     Types: Cigarettes     Last attempt to quit: 2014     Years since quittin.7    Smokeless tobacco: Never Used   Substance and Sexual Activity    Alcohol use: Yes     Comment: rarely/ average \"2-3 times per year'    Drug use: No    Sexual activity: None   Lifestyle    Physical activity:     Days per week: None     Minutes per session: None    Stress: None   Relationships    Social connections:     Talks on phone: None     Gets together: None     Attends Yazdanism service: None     Active member of club or organization: None     Attends meetings of clubs or organizations: None     Relationship status: None    Intimate partner violence:     Fear of current or ex partner: None     Emotionally abused: None     Physically abused: None     Forced sexual activity: None   Other Topics Concern    None   Social History Narrative    Do you donate blood or plasma? no    Caffeine intake? Moderate    Advance directive? yes    Is blood transfusion acceptable in an emergency?  yes         SURGICAL HISTORY  Past Surgical History:   Procedure Laterality Date    BACK SURGERY      disc ruptured    BRAIN SURGERY      right shunt    CHEST SURGERY      reconstruction of breast bone and rib-\"pigeon chest\"   [facility]  2011    F/U 2016    CORONARY reviewed. ASSESSMENT & PLAN  1. Chronic bilateral low back pain without sciatica  No sign of abuse or diversion. Patient originally was 3 times a day and lowered to once a day bring her back to twice a day. No evidence of side effects or problems from the gabapentin. Bring her back up to twice a day. - HYDROcodone-acetaminophen (NORCO) 5-325 MG per tablet; Take 1 tablet by mouth 2 times daily as needed for Pain for up to 30 days. Dispense: 60 tablet; Refill: 0    2. Acute bronchiolitis due to unspecified organism  Use prednisone at 20 mg daily x5 days  Add Augmentin 500 mg twice daily for 10 days. Call Monday if not improving. 3. Acute exacerbation of COPD with asthma ([facility] [address]  Refilled patient's albuterol for nebulization. Patient to use it 4 times daily  Refilled her Advair to use twice daily                 No follow-ups on file.             Electronically signed by Brooklyn Lopez MD on 9/20/2019

## 2019-09-22 ENCOUNTER — APPOINTMENT (OUTPATIENT)
Dept: GENERAL RADIOLOGY | Age: 79
DRG: 242 | End: 2019-09-22
Payer: MEDICARE

## 2019-09-22 ENCOUNTER — HOSPITAL ENCOUNTER (INPATIENT)
Age: 79
LOS: 5 days | Discharge: NURSING, REHAB OR LONG-TERM CARE | DRG: 242 | End: 2019-09-27
Attending: EMERGENCY MEDICINE | Admitting: INTERNAL MEDICINE
Payer: MEDICARE

## 2019-09-22 ENCOUNTER — APPOINTMENT (OUTPATIENT)
Dept: CT IMAGING | Age: 79
DRG: 242 | End: 2019-09-22
Payer: MEDICARE

## 2019-09-22 DIAGNOSIS — K85.90 ACUTE PANCREATITIS, UNSPECIFIED COMPLICATION STATUS, UNSPECIFIED PANCREATITIS TYPE: ICD-10-CM

## 2019-09-22 DIAGNOSIS — R07.9 CHEST PAIN, UNSPECIFIED TYPE: Primary | ICD-10-CM

## 2019-09-22 LAB
ALBUMIN SERPL-MCNC: 3.9 GM/DL (ref 3.4–5)
ALP BLD-CCNC: 123 IU/L (ref 40–129)
ALT SERPL-CCNC: 16 U/L (ref 10–40)
ANION GAP SERPL CALCULATED.3IONS-SCNC: 8 MMOL/L (ref 4–16)
AST SERPL-CCNC: 24 IU/L (ref 15–37)
BASOPHILS ABSOLUTE: 0 K/CU MM
BASOPHILS RELATIVE PERCENT: 0.3 % (ref 0–1)
BILIRUB SERPL-MCNC: 0.2 MG/DL (ref 0–1)
BUN BLDV-MCNC: 55 MG/DL (ref 6–23)
CALCIUM SERPL-MCNC: 8.8 MG/DL (ref 8.3–10.6)
CHLORIDE BLD-SCNC: 97 MMOL/L (ref 99–110)
CO2: 34 MMOL/L (ref 21–32)
CREAT SERPL-MCNC: 1.3 MG/DL (ref 0.6–1.1)
DIFFERENTIAL TYPE: ABNORMAL
EOSINOPHILS ABSOLUTE: 0.2 K/CU MM
EOSINOPHILS RELATIVE PERCENT: 1.2 % (ref 0–3)
GFR AFRICAN AMERICAN: 48 ML/MIN/1.73M2
GFR NON-AFRICAN AMERICAN: 39 ML/MIN/1.73M2
GLUCOSE BLD-MCNC: 96 MG/DL (ref 70–99)
HCT VFR BLD CALC: 35.4 % (ref 37–47)
HEMOGLOBIN: 10.4 GM/DL (ref 12.5–16)
IMMATURE NEUTROPHIL %: 0.7 % (ref 0–0.43)
LIPASE: 391 IU/L (ref 13–60)
LYMPHOCYTES ABSOLUTE: 1.7 K/CU MM
LYMPHOCYTES RELATIVE PERCENT: 14.2 % (ref 24–44)
MCH RBC QN AUTO: 29.9 PG (ref 27–31)
MCHC RBC AUTO-ENTMCNC: 29.4 % (ref 32–36)
MCV RBC AUTO: 101.7 FL (ref 78–100)
MONOCYTES ABSOLUTE: 1.2 K/CU MM
MONOCYTES RELATIVE PERCENT: 9.7 % (ref 0–4)
NUCLEATED RBC %: 0 %
PDW BLD-RTO: 14.5 % (ref 11.7–14.9)
PLATELET # BLD: 191 K/CU MM (ref 140–440)
PMV BLD AUTO: 12.7 FL (ref 7.5–11.1)
POTASSIUM SERPL-SCNC: 4.5 MMOL/L (ref 3.5–5.1)
RBC # BLD: 3.48 M/CU MM (ref 4.2–5.4)
SEGMENTED NEUTROPHILS ABSOLUTE COUNT: 8.9 K/CU MM
SEGMENTED NEUTROPHILS RELATIVE PERCENT: 73.9 % (ref 36–66)
SODIUM BLD-SCNC: 139 MMOL/L (ref 135–145)
TOTAL IMMATURE NEUTOROPHIL: 0.09 K/CU MM
TOTAL NUCLEATED RBC: 0 K/CU MM
TOTAL PROTEIN: 6.3 GM/DL (ref 6.4–8.2)
TROPONIN T: <0.01 NG/ML
WBC # BLD: 12 K/CU MM (ref 4–10.5)

## 2019-09-22 PROCEDURE — 93005 ELECTROCARDIOGRAM TRACING: CPT | Performed by: PHYSICIAN ASSISTANT

## 2019-09-22 PROCEDURE — 83690 ASSAY OF LIPASE: CPT

## 2019-09-22 PROCEDURE — 80053 COMPREHEN METABOLIC PANEL: CPT

## 2019-09-22 PROCEDURE — 1200000000 HC SEMI PRIVATE

## 2019-09-22 PROCEDURE — 83880 ASSAY OF NATRIURETIC PEPTIDE: CPT

## 2019-09-22 PROCEDURE — 6360000002 HC RX W HCPCS: Performed by: INTERNAL MEDICINE

## 2019-09-22 PROCEDURE — 2580000003 HC RX 258: Performed by: INTERNAL MEDICINE

## 2019-09-22 PROCEDURE — 74176 CT ABD & PELVIS W/O CONTRAST: CPT

## 2019-09-22 PROCEDURE — 6370000000 HC RX 637 (ALT 250 FOR IP): Performed by: INTERNAL MEDICINE

## 2019-09-22 PROCEDURE — 71045 X-RAY EXAM CHEST 1 VIEW: CPT

## 2019-09-22 PROCEDURE — 99285 EMERGENCY DEPT VISIT HI MDM: CPT

## 2019-09-22 PROCEDURE — 6370000000 HC RX 637 (ALT 250 FOR IP): Performed by: PHYSICIAN ASSISTANT

## 2019-09-22 PROCEDURE — 84484 ASSAY OF TROPONIN QUANT: CPT

## 2019-09-22 PROCEDURE — 36415 COLL VENOUS BLD VENIPUNCTURE: CPT

## 2019-09-22 PROCEDURE — 85025 COMPLETE CBC W/AUTO DIFF WBC: CPT

## 2019-09-22 RX ORDER — DILTIAZEM HYDROCHLORIDE 120 MG/1
120 CAPSULE, COATED, EXTENDED RELEASE ORAL DAILY
Status: DISCONTINUED | OUTPATIENT
Start: 2019-09-23 | End: 2019-09-23

## 2019-09-22 RX ORDER — ASPIRIN 81 MG/1
81 TABLET, CHEWABLE ORAL ONCE
Status: COMPLETED | OUTPATIENT
Start: 2019-09-22 | End: 2019-09-22

## 2019-09-22 RX ORDER — SODIUM CHLORIDE 0.9 % (FLUSH) 0.9 %
10 SYRINGE (ML) INJECTION PRN
Status: DISCONTINUED | OUTPATIENT
Start: 2019-09-22 | End: 2019-09-27 | Stop reason: HOSPADM

## 2019-09-22 RX ORDER — ASPIRIN 81 MG/1
81 TABLET ORAL DAILY
Status: DISCONTINUED | OUTPATIENT
Start: 2019-09-23 | End: 2019-09-27 | Stop reason: HOSPADM

## 2019-09-22 RX ORDER — ALPRAZOLAM 0.5 MG/1
0.5 TABLET ORAL NIGHTLY PRN
Status: DISCONTINUED | OUTPATIENT
Start: 2019-09-23 | End: 2019-09-23

## 2019-09-22 RX ORDER — IPRATROPIUM BROMIDE AND ALBUTEROL SULFATE 2.5; .5 MG/3ML; MG/3ML
1 SOLUTION RESPIRATORY (INHALATION) EVERY 4 HOURS PRN
Status: DISCONTINUED | OUTPATIENT
Start: 2019-09-22 | End: 2019-09-23

## 2019-09-22 RX ORDER — AMIODARONE HYDROCHLORIDE 200 MG/1
200 TABLET ORAL DAILY
Status: DISCONTINUED | OUTPATIENT
Start: 2019-09-23 | End: 2019-09-23

## 2019-09-22 RX ORDER — GABAPENTIN 300 MG/1
300 CAPSULE ORAL 2 TIMES DAILY
Status: DISCONTINUED | OUTPATIENT
Start: 2019-09-23 | End: 2019-09-27 | Stop reason: HOSPADM

## 2019-09-22 RX ORDER — METHIMAZOLE 10 MG/1
10 TABLET ORAL 3 TIMES DAILY
Status: DISCONTINUED | OUTPATIENT
Start: 2019-09-23 | End: 2019-09-25

## 2019-09-22 RX ORDER — PANTOPRAZOLE SODIUM 40 MG/1
40 TABLET, DELAYED RELEASE ORAL DAILY
Status: DISCONTINUED | OUTPATIENT
Start: 2019-09-23 | End: 2019-09-23 | Stop reason: SDUPTHER

## 2019-09-22 RX ORDER — PRIMIDONE 50 MG/1
50 TABLET ORAL 3 TIMES DAILY
Status: DISCONTINUED | OUTPATIENT
Start: 2019-09-23 | End: 2019-09-27 | Stop reason: HOSPADM

## 2019-09-22 RX ORDER — ATORVASTATIN CALCIUM 10 MG/1
10 TABLET, FILM COATED ORAL NIGHTLY
Status: DISCONTINUED | OUTPATIENT
Start: 2019-09-23 | End: 2019-09-27 | Stop reason: HOSPADM

## 2019-09-22 RX ORDER — CYCLOBENZAPRINE HCL 10 MG
10 TABLET ORAL NIGHTLY
Status: DISCONTINUED | OUTPATIENT
Start: 2019-09-23 | End: 2019-09-27 | Stop reason: HOSPADM

## 2019-09-22 RX ORDER — SODIUM CHLORIDE 0.9 % (FLUSH) 0.9 %
10 SYRINGE (ML) INJECTION EVERY 12 HOURS SCHEDULED
Status: DISCONTINUED | OUTPATIENT
Start: 2019-09-22 | End: 2019-09-27 | Stop reason: HOSPADM

## 2019-09-22 RX ORDER — DIGOXIN 125 MCG
125 TABLET ORAL DAILY
Status: DISCONTINUED | OUTPATIENT
Start: 2019-09-23 | End: 2019-09-23

## 2019-09-22 RX ORDER — FUROSEMIDE 10 MG/ML
40 INJECTION INTRAMUSCULAR; INTRAVENOUS DAILY
Status: DISCONTINUED | OUTPATIENT
Start: 2019-09-22 | End: 2019-09-23

## 2019-09-22 RX ORDER — MONTELUKAST SODIUM 10 MG/1
10 TABLET ORAL NIGHTLY
Status: DISCONTINUED | OUTPATIENT
Start: 2019-09-23 | End: 2019-09-27 | Stop reason: HOSPADM

## 2019-09-22 RX ORDER — AMOXICILLIN AND CLAVULANATE POTASSIUM 500; 125 MG/1; MG/1
1 TABLET, FILM COATED ORAL 2 TIMES DAILY
Status: DISCONTINUED | OUTPATIENT
Start: 2019-09-23 | End: 2019-09-23

## 2019-09-22 RX ORDER — DONEPEZIL HYDROCHLORIDE 10 MG/1
10 TABLET, FILM COATED ORAL NIGHTLY
Status: DISCONTINUED | OUTPATIENT
Start: 2019-09-23 | End: 2019-09-27 | Stop reason: HOSPADM

## 2019-09-22 RX ORDER — ONDANSETRON 2 MG/ML
4 INJECTION INTRAMUSCULAR; INTRAVENOUS EVERY 6 HOURS PRN
Status: DISCONTINUED | OUTPATIENT
Start: 2019-09-22 | End: 2019-09-27 | Stop reason: HOSPADM

## 2019-09-22 RX ADMIN — GABAPENTIN 300 MG: 300 CAPSULE ORAL at 23:51

## 2019-09-22 RX ADMIN — CYCLOBENZAPRINE HYDROCHLORIDE 10 MG: 10 TABLET, FILM COATED ORAL at 23:51

## 2019-09-22 RX ADMIN — ASPIRIN 81 MG 81 MG: 81 TABLET ORAL at 18:42

## 2019-09-22 RX ADMIN — METHIMAZOLE 10 MG: 10 TABLET ORAL at 23:51

## 2019-09-22 RX ADMIN — FUROSEMIDE 40 MG: 10 INJECTION, SOLUTION INTRAMUSCULAR; INTRAVENOUS at 23:51

## 2019-09-22 RX ADMIN — Medication 10 ML: at 23:51

## 2019-09-22 RX ADMIN — PRIMIDONE 50 MG: 50 TABLET ORAL at 23:51

## 2019-09-22 RX ADMIN — DONEPEZIL HYDROCHLORIDE 10 MG: 10 TABLET, FILM COATED ORAL at 23:51

## 2019-09-22 ASSESSMENT — PAIN DESCRIPTION - PAIN TYPE: TYPE: ACUTE PAIN

## 2019-09-22 ASSESSMENT — PAIN DESCRIPTION - DESCRIPTORS: DESCRIPTORS: STABBING

## 2019-09-22 ASSESSMENT — PAIN - FUNCTIONAL ASSESSMENT: PAIN_FUNCTIONAL_ASSESSMENT: ACTIVITIES ARE NOT PREVENTED

## 2019-09-22 ASSESSMENT — PAIN DESCRIPTION - ONSET: ONSET: ON-GOING

## 2019-09-22 ASSESSMENT — PAIN SCALES - GENERAL
PAINLEVEL_OUTOF10: 4
PAINLEVEL_OUTOF10: 0

## 2019-09-22 ASSESSMENT — PAIN DESCRIPTION - FREQUENCY: FREQUENCY: CONTINUOUS

## 2019-09-22 ASSESSMENT — PAIN DESCRIPTION - LOCATION: LOCATION: CHEST

## 2019-09-22 ASSESSMENT — PAIN DESCRIPTION - PROGRESSION: CLINICAL_PROGRESSION: GRADUALLY WORSENING

## 2019-09-23 PROBLEM — K85.90 ACUTE PANCREATITIS WITHOUT INFECTION OR NECROSIS: Status: ACTIVE | Noted: 2019-09-23

## 2019-09-23 LAB
ALBUMIN SERPL-MCNC: 3.5 GM/DL (ref 3.4–5)
ALP BLD-CCNC: 114 IU/L (ref 40–129)
ALT SERPL-CCNC: 12 U/L (ref 10–40)
ANION GAP SERPL CALCULATED.3IONS-SCNC: 8 MMOL/L (ref 4–16)
AST SERPL-CCNC: 13 IU/L (ref 15–37)
BILIRUB SERPL-MCNC: 0.2 MG/DL (ref 0–1)
BILIRUBIN DIRECT: 0.2 MG/DL (ref 0–0.3)
BILIRUBIN, INDIRECT: 0 MG/DL (ref 0–0.7)
BUN BLDV-MCNC: 55 MG/DL (ref 6–23)
CALCIUM SERPL-MCNC: 8.9 MG/DL (ref 8.3–10.6)
CHLORIDE BLD-SCNC: 101 MMOL/L (ref 99–110)
CO2: 34 MMOL/L (ref 21–32)
CREAT SERPL-MCNC: 1.4 MG/DL (ref 0.6–1.1)
DIGOXIN LEVEL: <0.3 NG/ML (ref 0.8–2)
DOSE AMOUNT: ABNORMAL
DOSE TIME: ABNORMAL
GFR AFRICAN AMERICAN: 44 ML/MIN/1.73M2
GFR NON-AFRICAN AMERICAN: 36 ML/MIN/1.73M2
GLUCOSE BLD-MCNC: 94 MG/DL (ref 70–99)
HCT VFR BLD CALC: 33.9 % (ref 37–47)
HEMOGLOBIN: 10.3 GM/DL (ref 12.5–16)
LIPASE: 46 IU/L (ref 13–60)
MCH RBC QN AUTO: 30.5 PG (ref 27–31)
MCHC RBC AUTO-ENTMCNC: 30.4 % (ref 32–36)
MCV RBC AUTO: 100.3 FL (ref 78–100)
PDW BLD-RTO: 14.3 % (ref 11.7–14.9)
PLATELET # BLD: 176 K/CU MM (ref 140–440)
PMV BLD AUTO: 13.5 FL (ref 7.5–11.1)
POTASSIUM SERPL-SCNC: 4.5 MMOL/L (ref 3.5–5.1)
PRO-BNP: 666 PG/ML
RBC # BLD: 3.38 M/CU MM (ref 4.2–5.4)
SODIUM BLD-SCNC: 143 MMOL/L (ref 135–145)
TOTAL PROTEIN: 5.4 GM/DL (ref 6.4–8.2)
TROPONIN T: <0.01 NG/ML
TROPONIN T: <0.01 NG/ML
TSH HIGH SENSITIVITY: 0.01 UIU/ML (ref 0.27–4.2)
WBC # BLD: 9.9 K/CU MM (ref 4–10.5)

## 2019-09-23 PROCEDURE — 6360000002 HC RX W HCPCS: Performed by: PHYSICIAN ASSISTANT

## 2019-09-23 PROCEDURE — 36415 COLL VENOUS BLD VENIPUNCTURE: CPT

## 2019-09-23 PROCEDURE — 6370000000 HC RX 637 (ALT 250 FOR IP): Performed by: INTERNAL MEDICINE

## 2019-09-23 PROCEDURE — C9113 INJ PANTOPRAZOLE SODIUM, VIA: HCPCS | Performed by: PHYSICIAN ASSISTANT

## 2019-09-23 PROCEDURE — 94761 N-INVAS EAR/PLS OXIMETRY MLT: CPT

## 2019-09-23 PROCEDURE — 84484 ASSAY OF TROPONIN QUANT: CPT

## 2019-09-23 PROCEDURE — 84443 ASSAY THYROID STIM HORMONE: CPT

## 2019-09-23 PROCEDURE — 85027 COMPLETE CBC AUTOMATED: CPT

## 2019-09-23 PROCEDURE — 2580000003 HC RX 258: Performed by: INTERNAL MEDICINE

## 2019-09-23 PROCEDURE — 80053 COMPREHEN METABOLIC PANEL: CPT

## 2019-09-23 PROCEDURE — 93005 ELECTROCARDIOGRAM TRACING: CPT | Performed by: INTERNAL MEDICINE

## 2019-09-23 PROCEDURE — 80162 ASSAY OF DIGOXIN TOTAL: CPT

## 2019-09-23 PROCEDURE — 2060000000 HC ICU INTERMEDIATE R&B

## 2019-09-23 PROCEDURE — 99222 1ST HOSP IP/OBS MODERATE 55: CPT | Performed by: INTERNAL MEDICINE

## 2019-09-23 PROCEDURE — 94664 DEMO&/EVAL PT USE INHALER: CPT

## 2019-09-23 PROCEDURE — 99223 1ST HOSP IP/OBS HIGH 75: CPT | Performed by: INTERNAL MEDICINE

## 2019-09-23 PROCEDURE — 94640 AIRWAY INHALATION TREATMENT: CPT

## 2019-09-23 PROCEDURE — 93005 ELECTROCARDIOGRAM TRACING: CPT | Performed by: HOSPITALIST

## 2019-09-23 PROCEDURE — 6370000000 HC RX 637 (ALT 250 FOR IP): Performed by: PHYSICIAN ASSISTANT

## 2019-09-23 PROCEDURE — 82248 BILIRUBIN DIRECT: CPT

## 2019-09-23 PROCEDURE — 83690 ASSAY OF LIPASE: CPT

## 2019-09-23 RX ORDER — AMIODARONE HYDROCHLORIDE 200 MG/1
200 TABLET ORAL DAILY
Status: DISCONTINUED | OUTPATIENT
Start: 2019-09-23 | End: 2019-09-27 | Stop reason: HOSPADM

## 2019-09-23 RX ORDER — ALPRAZOLAM 0.5 MG/1
0.5 TABLET ORAL NIGHTLY PRN
Status: DISCONTINUED | OUTPATIENT
Start: 2019-09-23 | End: 2019-09-27 | Stop reason: HOSPADM

## 2019-09-23 RX ORDER — CALCIUM CARBONATE 200(500)MG
500 TABLET,CHEWABLE ORAL 3 TIMES DAILY PRN
Status: DISCONTINUED | OUTPATIENT
Start: 2019-09-23 | End: 2019-09-27 | Stop reason: HOSPADM

## 2019-09-23 RX ORDER — FUROSEMIDE 10 MG/ML
20 INJECTION INTRAMUSCULAR; INTRAVENOUS DAILY
Status: DISCONTINUED | OUTPATIENT
Start: 2019-09-23 | End: 2019-09-23

## 2019-09-23 RX ORDER — PANTOPRAZOLE SODIUM 40 MG/10ML
40 INJECTION, POWDER, LYOPHILIZED, FOR SOLUTION INTRAVENOUS DAILY
Status: DISCONTINUED | OUTPATIENT
Start: 2019-09-23 | End: 2019-09-27 | Stop reason: HOSPADM

## 2019-09-23 RX ORDER — IPRATROPIUM BROMIDE AND ALBUTEROL SULFATE 2.5; .5 MG/3ML; MG/3ML
1 SOLUTION RESPIRATORY (INHALATION) 4 TIMES DAILY
Status: DISCONTINUED | OUTPATIENT
Start: 2019-09-24 | End: 2019-09-27 | Stop reason: HOSPADM

## 2019-09-23 RX ADMIN — PRIMIDONE 50 MG: 50 TABLET ORAL at 20:01

## 2019-09-23 RX ADMIN — PRIMIDONE 50 MG: 50 TABLET ORAL at 10:40

## 2019-09-23 RX ADMIN — Medication 10 ML: at 12:18

## 2019-09-23 RX ADMIN — Medication 2 PUFF: at 20:58

## 2019-09-23 RX ADMIN — CALCIUM CARBONATE 500 MG: 500 TABLET, CHEWABLE ORAL at 10:39

## 2019-09-23 RX ADMIN — ASPIRIN 81 MG: 81 TABLET, COATED ORAL at 10:39

## 2019-09-23 RX ADMIN — DONEPEZIL HYDROCHLORIDE 10 MG: 10 TABLET, FILM COATED ORAL at 20:01

## 2019-09-23 RX ADMIN — ATORVASTATIN CALCIUM 10 MG: 10 TABLET, FILM COATED ORAL at 20:01

## 2019-09-23 RX ADMIN — ALPRAZOLAM 0.5 MG: 0.5 TABLET ORAL at 01:26

## 2019-09-23 RX ADMIN — PANTOPRAZOLE SODIUM 40 MG: 40 INJECTION, POWDER, FOR SOLUTION INTRAVENOUS at 01:23

## 2019-09-23 RX ADMIN — AMIODARONE HYDROCHLORIDE 200 MG: 200 TABLET ORAL at 16:48

## 2019-09-23 RX ADMIN — GABAPENTIN 300 MG: 300 CAPSULE ORAL at 10:40

## 2019-09-23 RX ADMIN — GABAPENTIN 300 MG: 300 CAPSULE ORAL at 20:00

## 2019-09-23 RX ADMIN — CYCLOBENZAPRINE HYDROCHLORIDE 10 MG: 10 TABLET, FILM COATED ORAL at 20:00

## 2019-09-23 RX ADMIN — Medication 2 PUFF: at 08:07

## 2019-09-23 RX ADMIN — MONTELUKAST 10 MG: 10 TABLET, FILM COATED ORAL at 20:00

## 2019-09-23 RX ADMIN — IPRATROPIUM BROMIDE AND ALBUTEROL SULFATE 1 AMPULE: .5; 3 SOLUTION RESPIRATORY (INHALATION) at 22:38

## 2019-09-23 RX ADMIN — METHIMAZOLE 10 MG: 10 TABLET ORAL at 20:00

## 2019-09-23 RX ADMIN — Medication 10 ML: at 20:01

## 2019-09-23 ASSESSMENT — PAIN SCALES - GENERAL
PAINLEVEL_OUTOF10: 0
PAINLEVEL_OUTOF10: 0

## 2019-09-24 ENCOUNTER — APPOINTMENT (OUTPATIENT)
Dept: ULTRASOUND IMAGING | Age: 79
DRG: 242 | End: 2019-09-24
Payer: MEDICARE

## 2019-09-24 LAB
ALBUMIN SERPL-MCNC: 3.3 GM/DL (ref 3.4–5)
ALP BLD-CCNC: 114 IU/L (ref 40–128)
ALT SERPL-CCNC: 10 U/L (ref 10–40)
ANION GAP SERPL CALCULATED.3IONS-SCNC: 12 MMOL/L (ref 4–16)
AST SERPL-CCNC: 11 IU/L (ref 15–37)
BACTERIA: NEGATIVE /HPF
BILIRUB SERPL-MCNC: 0.2 MG/DL (ref 0–1)
BILIRUBIN URINE: NEGATIVE MG/DL
BLOOD, URINE: ABNORMAL
BUN BLDV-MCNC: 48 MG/DL (ref 6–23)
CALCIUM SERPL-MCNC: 9.1 MG/DL (ref 8.3–10.6)
CHLORIDE BLD-SCNC: 97 MMOL/L (ref 99–110)
CHLORIDE URINE RANDOM: 29 MMOL/L (ref 43–210)
CLARITY: ABNORMAL
CO2: 30 MMOL/L (ref 21–32)
COLOR: YELLOW
CREAT SERPL-MCNC: 1.5 MG/DL (ref 0.6–1.1)
CREATININE URINE: 71 MG/DL (ref 28–217)
EKG ATRIAL RATE: 62 BPM
EKG ATRIAL RATE: 68 BPM
EKG ATRIAL RATE: 70 BPM
EKG DIAGNOSIS: NORMAL
EKG P AXIS: 68 DEGREES
EKG P AXIS: 72 DEGREES
EKG P AXIS: 81 DEGREES
EKG P-R INTERVAL: 140 MS
EKG P-R INTERVAL: 140 MS
EKG P-R INTERVAL: 142 MS
EKG Q-T INTERVAL: 422 MS
EKG Q-T INTERVAL: 436 MS
EKG Q-T INTERVAL: 444 MS
EKG QRS DURATION: 76 MS
EKG QRS DURATION: 78 MS
EKG QRS DURATION: 78 MS
EKG QTC CALCULATION (BAZETT): 448 MS
EKG QTC CALCULATION (BAZETT): 450 MS
EKG QTC CALCULATION (BAZETT): 470 MS
EKG R AXIS: -2 DEGREES
EKG R AXIS: 23 DEGREES
EKG R AXIS: 9 DEGREES
EKG T AXIS: 23 DEGREES
EKG T AXIS: 29 DEGREES
EKG T AXIS: 29 DEGREES
EKG VENTRICULAR RATE: 62 BPM
EKG VENTRICULAR RATE: 68 BPM
EKG VENTRICULAR RATE: 70 BPM
GFR AFRICAN AMERICAN: 40 ML/MIN/1.73M2
GFR NON-AFRICAN AMERICAN: 33 ML/MIN/1.73M2
GLUCOSE BLD-MCNC: 187 MG/DL (ref 70–99)
GLUCOSE, URINE: 150 MG/DL
HCT VFR BLD CALC: 35.4 % (ref 37–47)
HEMOGLOBIN: 10.6 GM/DL (ref 12.5–16)
KETONES, URINE: NEGATIVE MG/DL
LEUKOCYTE ESTERASE, URINE: NEGATIVE
LIPASE: 33 IU/L (ref 13–60)
MAGNESIUM: 2.4 MG/DL (ref 1.8–2.4)
MCH RBC QN AUTO: 30.6 PG (ref 27–31)
MCHC RBC AUTO-ENTMCNC: 29.9 % (ref 32–36)
MCV RBC AUTO: 102.3 FL (ref 78–100)
MUCUS: ABNORMAL HPF
NITRITE URINE, QUANTITATIVE: NEGATIVE
PDW BLD-RTO: 14.3 % (ref 11.7–14.9)
PH, URINE: 5 (ref 5–8)
PLATELET # BLD: 187 K/CU MM (ref 140–440)
PMV BLD AUTO: 13.3 FL (ref 7.5–11.1)
POTASSIUM SERPL-SCNC: 4.4 MMOL/L (ref 3.5–5.1)
POTASSIUM, UR: 45.6 MMOL/L (ref 22–119)
PROT/CREAT RATIO, UR: ABNORMAL
PROTEIN UA: NEGATIVE MG/DL
RBC # BLD: 3.46 M/CU MM (ref 4.2–5.4)
RBC URINE: 11 /HPF (ref 0–6)
SODIUM BLD-SCNC: 139 MMOL/L (ref 135–145)
SODIUM URINE: 56 MMOL/L (ref 35–167)
SPECIFIC GRAVITY UA: 1.02 (ref 1–1.03)
SQUAMOUS EPITHELIAL: 1 /HPF
TOTAL PROTEIN: 5.4 GM/DL (ref 6.4–8.2)
TRICHOMONAS: ABNORMAL /HPF
URINE TOTAL PROTEIN: 12.5 MG/DL
UROBILINOGEN, URINE: NORMAL MG/DL (ref 0.2–1)
WBC # BLD: 11.1 K/CU MM (ref 4–10.5)
WBC UA: 10 /HPF (ref 0–5)

## 2019-09-24 PROCEDURE — 84133 ASSAY OF URINE POTASSIUM: CPT

## 2019-09-24 PROCEDURE — 6370000000 HC RX 637 (ALT 250 FOR IP): Performed by: INTERNAL MEDICINE

## 2019-09-24 PROCEDURE — 80053 COMPREHEN METABOLIC PANEL: CPT

## 2019-09-24 PROCEDURE — 2580000003 HC RX 258: Performed by: INTERNAL MEDICINE

## 2019-09-24 PROCEDURE — 87086 URINE CULTURE/COLONY COUNT: CPT

## 2019-09-24 PROCEDURE — 99231 SBSQ HOSP IP/OBS SF/LOW 25: CPT | Performed by: INTERNAL MEDICINE

## 2019-09-24 PROCEDURE — 2060000000 HC ICU INTERMEDIATE R&B

## 2019-09-24 PROCEDURE — 82436 ASSAY OF URINE CHLORIDE: CPT

## 2019-09-24 PROCEDURE — 94761 N-INVAS EAR/PLS OXIMETRY MLT: CPT

## 2019-09-24 PROCEDURE — 99232 SBSQ HOSP IP/OBS MODERATE 35: CPT | Performed by: NURSE PRACTITIONER

## 2019-09-24 PROCEDURE — 94640 AIRWAY INHALATION TREATMENT: CPT

## 2019-09-24 PROCEDURE — 84156 ASSAY OF PROTEIN URINE: CPT

## 2019-09-24 PROCEDURE — 93010 ELECTROCARDIOGRAM REPORT: CPT | Performed by: INTERNAL MEDICINE

## 2019-09-24 PROCEDURE — 83690 ASSAY OF LIPASE: CPT

## 2019-09-24 PROCEDURE — 36415 COLL VENOUS BLD VENIPUNCTURE: CPT

## 2019-09-24 PROCEDURE — 99232 SBSQ HOSP IP/OBS MODERATE 35: CPT | Performed by: INTERNAL MEDICINE

## 2019-09-24 PROCEDURE — 83735 ASSAY OF MAGNESIUM: CPT

## 2019-09-24 PROCEDURE — 82570 ASSAY OF URINE CREATININE: CPT

## 2019-09-24 PROCEDURE — 85027 COMPLETE CBC AUTOMATED: CPT

## 2019-09-24 PROCEDURE — 6370000000 HC RX 637 (ALT 250 FOR IP): Performed by: NURSE PRACTITIONER

## 2019-09-24 PROCEDURE — 6370000000 HC RX 637 (ALT 250 FOR IP): Performed by: HOSPITALIST

## 2019-09-24 PROCEDURE — C9113 INJ PANTOPRAZOLE SODIUM, VIA: HCPCS | Performed by: PHYSICIAN ASSISTANT

## 2019-09-24 PROCEDURE — 84300 ASSAY OF URINE SODIUM: CPT

## 2019-09-24 PROCEDURE — 6360000002 HC RX W HCPCS: Performed by: PHYSICIAN ASSISTANT

## 2019-09-24 PROCEDURE — 76775 US EXAM ABDO BACK WALL LIM: CPT

## 2019-09-24 PROCEDURE — 81001 URINALYSIS AUTO W/SCOPE: CPT

## 2019-09-24 PROCEDURE — 6370000000 HC RX 637 (ALT 250 FOR IP): Performed by: PHYSICIAN ASSISTANT

## 2019-09-24 RX ORDER — HYDROCODONE BITARTRATE AND ACETAMINOPHEN 5; 325 MG/1; MG/1
1 TABLET ORAL 2 TIMES DAILY PRN
Status: DISCONTINUED | OUTPATIENT
Start: 2019-09-24 | End: 2019-09-27 | Stop reason: HOSPADM

## 2019-09-24 RX ORDER — SODIUM CHLORIDE 9 MG/ML
INJECTION, SOLUTION INTRAVENOUS CONTINUOUS
Status: DISCONTINUED | OUTPATIENT
Start: 2019-09-24 | End: 2019-09-27

## 2019-09-24 RX ADMIN — Medication 2 PUFF: at 08:50

## 2019-09-24 RX ADMIN — IPRATROPIUM BROMIDE AND ALBUTEROL SULFATE 1 AMPULE: .5; 3 SOLUTION RESPIRATORY (INHALATION) at 08:50

## 2019-09-24 RX ADMIN — SODIUM CHLORIDE: 9 INJECTION, SOLUTION INTRAVENOUS at 15:16

## 2019-09-24 RX ADMIN — METHIMAZOLE 10 MG: 10 TABLET ORAL at 15:16

## 2019-09-24 RX ADMIN — GABAPENTIN 300 MG: 300 CAPSULE ORAL at 09:29

## 2019-09-24 RX ADMIN — MONTELUKAST 10 MG: 10 TABLET, FILM COATED ORAL at 21:23

## 2019-09-24 RX ADMIN — DONEPEZIL HYDROCHLORIDE 10 MG: 10 TABLET, FILM COATED ORAL at 21:23

## 2019-09-24 RX ADMIN — DILTIAZEM HYDROCHLORIDE 30 MG: 30 TABLET, FILM COATED ORAL at 17:53

## 2019-09-24 RX ADMIN — PRIMIDONE 50 MG: 50 TABLET ORAL at 15:16

## 2019-09-24 RX ADMIN — GABAPENTIN 300 MG: 300 CAPSULE ORAL at 21:22

## 2019-09-24 RX ADMIN — ASPIRIN 81 MG: 81 TABLET, COATED ORAL at 09:29

## 2019-09-24 RX ADMIN — PANTOPRAZOLE SODIUM 40 MG: 40 INJECTION, POWDER, FOR SOLUTION INTRAVENOUS at 09:29

## 2019-09-24 RX ADMIN — Medication 2 PUFF: at 20:37

## 2019-09-24 RX ADMIN — HYDROCODONE BITARTRATE AND ACETAMINOPHEN 1 TABLET: 5; 325 TABLET ORAL at 15:16

## 2019-09-24 RX ADMIN — HYDROCODONE BITARTRATE AND ACETAMINOPHEN 1 TABLET: 5; 325 TABLET ORAL at 03:10

## 2019-09-24 RX ADMIN — Medication 10 ML: at 21:23

## 2019-09-24 RX ADMIN — CYCLOBENZAPRINE HYDROCHLORIDE 10 MG: 10 TABLET, FILM COATED ORAL at 21:23

## 2019-09-24 RX ADMIN — IPRATROPIUM BROMIDE AND ALBUTEROL SULFATE 1 AMPULE: .5; 3 SOLUTION RESPIRATORY (INHALATION) at 13:14

## 2019-09-24 RX ADMIN — PRIMIDONE 50 MG: 50 TABLET ORAL at 09:29

## 2019-09-24 RX ADMIN — IPRATROPIUM BROMIDE AND ALBUTEROL SULFATE 1 AMPULE: .5; 3 SOLUTION RESPIRATORY (INHALATION) at 20:37

## 2019-09-24 RX ADMIN — RIVAROXABAN 15 MG: 15 TABLET, FILM COATED ORAL at 09:29

## 2019-09-24 RX ADMIN — Medication 10 ML: at 09:31

## 2019-09-24 RX ADMIN — PRIMIDONE 50 MG: 50 TABLET ORAL at 21:23

## 2019-09-24 RX ADMIN — METHIMAZOLE 10 MG: 10 TABLET ORAL at 09:29

## 2019-09-24 RX ADMIN — IPRATROPIUM BROMIDE AND ALBUTEROL SULFATE 1 AMPULE: .5; 3 SOLUTION RESPIRATORY (INHALATION) at 16:18

## 2019-09-24 RX ADMIN — ATORVASTATIN CALCIUM 10 MG: 10 TABLET, FILM COATED ORAL at 21:23

## 2019-09-24 RX ADMIN — METHIMAZOLE 10 MG: 10 TABLET ORAL at 21:22

## 2019-09-24 ASSESSMENT — PAIN DESCRIPTION - PROGRESSION: CLINICAL_PROGRESSION: GRADUALLY WORSENING

## 2019-09-24 ASSESSMENT — PAIN DESCRIPTION - ONSET: ONSET: ON-GOING

## 2019-09-24 ASSESSMENT — PAIN SCALES - GENERAL
PAINLEVEL_OUTOF10: 8
PAINLEVEL_OUTOF10: 2
PAINLEVEL_OUTOF10: 7
PAINLEVEL_OUTOF10: 8

## 2019-09-24 ASSESSMENT — PAIN DESCRIPTION - DESCRIPTORS: DESCRIPTORS: THROBBING

## 2019-09-24 ASSESSMENT — PAIN DESCRIPTION - PAIN TYPE: TYPE: ACUTE PAIN;CHRONIC PAIN

## 2019-09-24 ASSESSMENT — PAIN DESCRIPTION - LOCATION: LOCATION: BACK;CHEST

## 2019-09-24 ASSESSMENT — PAIN DESCRIPTION - FREQUENCY: FREQUENCY: CONTINUOUS

## 2019-09-25 ENCOUNTER — APPOINTMENT (OUTPATIENT)
Dept: GENERAL RADIOLOGY | Age: 79
DRG: 242 | End: 2019-09-25
Payer: MEDICARE

## 2019-09-25 ENCOUNTER — APPOINTMENT (OUTPATIENT)
Dept: ULTRASOUND IMAGING | Age: 79
DRG: 242 | End: 2019-09-25
Payer: MEDICARE

## 2019-09-25 LAB
ALBUMIN SERPL-MCNC: 3.2 GM/DL (ref 3.4–5)
ANION GAP SERPL CALCULATED.3IONS-SCNC: 10 MMOL/L (ref 4–16)
BUN BLDV-MCNC: 36 MG/DL (ref 6–23)
CALCIUM SERPL-MCNC: 8.8 MG/DL (ref 8.3–10.6)
CHLORIDE BLD-SCNC: 104 MMOL/L (ref 99–110)
CO2: 26 MMOL/L (ref 21–32)
CREAT SERPL-MCNC: 1.1 MG/DL (ref 0.6–1.1)
EKG ATRIAL RATE: 34 BPM
EKG DIAGNOSIS: NORMAL
EKG DIAGNOSIS: NORMAL
EKG P AXIS: 63 DEGREES
EKG P-R INTERVAL: 148 MS
EKG Q-T INTERVAL: 312 MS
EKG Q-T INTERVAL: 442 MS
EKG QRS DURATION: 74 MS
EKG QRS DURATION: 78 MS
EKG QTC CALCULATION (BAZETT): 332 MS
EKG QTC CALCULATION (BAZETT): 443 MS
EKG R AXIS: 17 DEGREES
EKG R AXIS: 18 DEGREES
EKG T AXIS: -71 DEGREES
EKG T AXIS: 31 DEGREES
EKG VENTRICULAR RATE: 121 BPM
EKG VENTRICULAR RATE: 34 BPM
GFR AFRICAN AMERICAN: 58 ML/MIN/1.73M2
GFR NON-AFRICAN AMERICAN: 48 ML/MIN/1.73M2
GLUCOSE BLD-MCNC: 192 MG/DL (ref 70–99)
INR BLD: 1.4 INDEX
INR BLD: 1.93 INDEX
LV EF: 58 %
LVEF MODALITY: NORMAL
MAGNESIUM: 2.2 MG/DL (ref 1.8–2.4)
PHOSPHORUS: 2.8 MG/DL (ref 2.5–4.9)
POTASSIUM SERPL-SCNC: 4.4 MMOL/L (ref 3.5–5.1)
PROTHROMBIN TIME: 16 SECONDS (ref 9.12–12.5)
PROTHROMBIN TIME: 22.2 SECONDS (ref 9.12–12.5)
SODIUM BLD-SCNC: 140 MMOL/L (ref 135–145)
T3 FREE: 2.1 PG/ML (ref 2.3–4.2)

## 2019-09-25 PROCEDURE — 2580000003 HC RX 258: Performed by: INTERNAL MEDICINE

## 2019-09-25 PROCEDURE — 84481 FREE ASSAY (FT-3): CPT

## 2019-09-25 PROCEDURE — 93010 ELECTROCARDIOGRAM REPORT: CPT | Performed by: INTERNAL MEDICINE

## 2019-09-25 PROCEDURE — 93306 TTE W/DOPPLER COMPLETE: CPT

## 2019-09-25 PROCEDURE — 6370000000 HC RX 637 (ALT 250 FOR IP): Performed by: INTERNAL MEDICINE

## 2019-09-25 PROCEDURE — 86376 MICROSOMAL ANTIBODY EACH: CPT

## 2019-09-25 PROCEDURE — 80069 RENAL FUNCTION PANEL: CPT

## 2019-09-25 PROCEDURE — 6370000000 HC RX 637 (ALT 250 FOR IP): Performed by: NURSE PRACTITIONER

## 2019-09-25 PROCEDURE — 86800 THYROGLOBULIN ANTIBODY: CPT

## 2019-09-25 PROCEDURE — 94761 N-INVAS EAR/PLS OXIMETRY MLT: CPT

## 2019-09-25 PROCEDURE — 93005 ELECTROCARDIOGRAM TRACING: CPT | Performed by: INTERNAL MEDICINE

## 2019-09-25 PROCEDURE — 83735 ASSAY OF MAGNESIUM: CPT

## 2019-09-25 PROCEDURE — 85610 PROTHROMBIN TIME: CPT

## 2019-09-25 PROCEDURE — 6370000000 HC RX 637 (ALT 250 FOR IP): Performed by: HOSPITALIST

## 2019-09-25 PROCEDURE — 2060000000 HC ICU INTERMEDIATE R&B

## 2019-09-25 PROCEDURE — 76536 US EXAM OF HEAD AND NECK: CPT

## 2019-09-25 PROCEDURE — 6360000002 HC RX W HCPCS: Performed by: PHYSICIAN ASSISTANT

## 2019-09-25 PROCEDURE — 71045 X-RAY EXAM CHEST 1 VIEW: CPT

## 2019-09-25 PROCEDURE — 99232 SBSQ HOSP IP/OBS MODERATE 35: CPT | Performed by: NURSE PRACTITIONER

## 2019-09-25 PROCEDURE — C9113 INJ PANTOPRAZOLE SODIUM, VIA: HCPCS | Performed by: PHYSICIAN ASSISTANT

## 2019-09-25 PROCEDURE — 36415 COLL VENOUS BLD VENIPUNCTURE: CPT

## 2019-09-25 PROCEDURE — 6370000000 HC RX 637 (ALT 250 FOR IP): Performed by: PHYSICIAN ASSISTANT

## 2019-09-25 PROCEDURE — 94640 AIRWAY INHALATION TREATMENT: CPT

## 2019-09-25 RX ORDER — GUAIFENESIN/DEXTROMETHORPHAN 100-10MG/5
5 SYRUP ORAL EVERY 4 HOURS PRN
Status: DISCONTINUED | OUTPATIENT
Start: 2019-09-25 | End: 2019-09-27 | Stop reason: HOSPADM

## 2019-09-25 RX ORDER — METHIMAZOLE 10 MG/1
5 TABLET ORAL 2 TIMES DAILY
Status: DISCONTINUED | OUTPATIENT
Start: 2019-09-25 | End: 2019-09-27 | Stop reason: HOSPADM

## 2019-09-25 RX ORDER — DIAPER,BRIEF,INFANT-TODD,DISP
EACH MISCELLANEOUS 2 TIMES DAILY PRN
Status: DISCONTINUED | OUTPATIENT
Start: 2019-09-25 | End: 2019-09-27 | Stop reason: HOSPADM

## 2019-09-25 RX ADMIN — PRIMIDONE 50 MG: 50 TABLET ORAL at 21:50

## 2019-09-25 RX ADMIN — DILTIAZEM HYDROCHLORIDE 30 MG: 30 TABLET, FILM COATED ORAL at 07:14

## 2019-09-25 RX ADMIN — IPRATROPIUM BROMIDE AND ALBUTEROL SULFATE 1 AMPULE: .5; 3 SOLUTION RESPIRATORY (INHALATION) at 11:19

## 2019-09-25 RX ADMIN — GUAIFENESIN AND DEXTROMETHORPHAN 5 ML: 100; 10 SYRUP ORAL at 03:47

## 2019-09-25 RX ADMIN — IPRATROPIUM BROMIDE AND ALBUTEROL SULFATE 1 AMPULE: .5; 3 SOLUTION RESPIRATORY (INHALATION) at 16:30

## 2019-09-25 RX ADMIN — PANTOPRAZOLE SODIUM 40 MG: 40 INJECTION, POWDER, FOR SOLUTION INTRAVENOUS at 09:56

## 2019-09-25 RX ADMIN — CYCLOBENZAPRINE HYDROCHLORIDE 10 MG: 10 TABLET, FILM COATED ORAL at 21:50

## 2019-09-25 RX ADMIN — MONTELUKAST 10 MG: 10 TABLET, FILM COATED ORAL at 21:50

## 2019-09-25 RX ADMIN — METHIMAZOLE 10 MG: 10 TABLET ORAL at 09:56

## 2019-09-25 RX ADMIN — METHIMAZOLE 5 MG: 10 TABLET ORAL at 21:50

## 2019-09-25 RX ADMIN — ALPRAZOLAM 0.5 MG: 0.5 TABLET ORAL at 01:12

## 2019-09-25 RX ADMIN — GABAPENTIN 300 MG: 300 CAPSULE ORAL at 21:50

## 2019-09-25 RX ADMIN — PRIMIDONE 50 MG: 50 TABLET ORAL at 14:14

## 2019-09-25 RX ADMIN — HYDROCODONE BITARTRATE AND ACETAMINOPHEN 1 TABLET: 5; 325 TABLET ORAL at 14:15

## 2019-09-25 RX ADMIN — SODIUM CHLORIDE: 9 INJECTION, SOLUTION INTRAVENOUS at 03:44

## 2019-09-25 RX ADMIN — ATORVASTATIN CALCIUM 10 MG: 10 TABLET, FILM COATED ORAL at 21:50

## 2019-09-25 RX ADMIN — RIVAROXABAN 15 MG: 15 TABLET, FILM COATED ORAL at 09:56

## 2019-09-25 RX ADMIN — DILTIAZEM HYDROCHLORIDE 30 MG: 30 TABLET, FILM COATED ORAL at 01:16

## 2019-09-25 RX ADMIN — HYDROCODONE BITARTRATE AND ACETAMINOPHEN 1 TABLET: 5; 325 TABLET ORAL at 23:49

## 2019-09-25 RX ADMIN — AMIODARONE HYDROCHLORIDE 200 MG: 200 TABLET ORAL at 09:56

## 2019-09-25 RX ADMIN — HYDROCODONE BITARTRATE AND ACETAMINOPHEN 1 TABLET: 5; 325 TABLET ORAL at 01:11

## 2019-09-25 RX ADMIN — Medication 2 PUFF: at 11:20

## 2019-09-25 RX ADMIN — PRIMIDONE 50 MG: 50 TABLET ORAL at 09:56

## 2019-09-25 RX ADMIN — GABAPENTIN 300 MG: 300 CAPSULE ORAL at 09:56

## 2019-09-25 RX ADMIN — ASPIRIN 81 MG: 81 TABLET, COATED ORAL at 09:56

## 2019-09-25 RX ADMIN — DONEPEZIL HYDROCHLORIDE 10 MG: 10 TABLET, FILM COATED ORAL at 21:51

## 2019-09-25 RX ADMIN — ALPRAZOLAM 0.5 MG: 0.5 TABLET ORAL at 23:49

## 2019-09-25 ASSESSMENT — PAIN SCALES - GENERAL
PAINLEVEL_OUTOF10: 0
PAINLEVEL_OUTOF10: 6
PAINLEVEL_OUTOF10: 0
PAINLEVEL_OUTOF10: 8
PAINLEVEL_OUTOF10: 8

## 2019-09-26 ENCOUNTER — APPOINTMENT (OUTPATIENT)
Dept: GENERAL RADIOLOGY | Age: 79
DRG: 242 | End: 2019-09-26
Payer: MEDICARE

## 2019-09-26 LAB
ALBUMIN SERPL-MCNC: 3.4 GM/DL (ref 3.4–5)
ANION GAP SERPL CALCULATED.3IONS-SCNC: 8 MMOL/L (ref 4–16)
BACTERIA: NEGATIVE /HPF
BILIRUBIN URINE: NEGATIVE MG/DL
BLOOD, URINE: NEGATIVE
BUN BLDV-MCNC: 26 MG/DL (ref 6–23)
CALCIUM SERPL-MCNC: 9.5 MG/DL (ref 8.3–10.6)
CHLORIDE BLD-SCNC: 105 MMOL/L (ref 99–110)
CLARITY: CLEAR
CO2: 29 MMOL/L (ref 21–32)
COLOR: YELLOW
CREAT SERPL-MCNC: 1 MG/DL (ref 0.6–1.1)
CULTURE: ABNORMAL
CULTURE: ABNORMAL
GFR AFRICAN AMERICAN: >60 ML/MIN/1.73M2
GFR NON-AFRICAN AMERICAN: 53 ML/MIN/1.73M2
GLUCOSE BLD-MCNC: 139 MG/DL (ref 70–99)
GLUCOSE, URINE: 50 MG/DL
KETONES, URINE: NEGATIVE MG/DL
LEUKOCYTE ESTERASE, URINE: ABNORMAL
Lab: ABNORMAL
MAGNESIUM: 2.3 MG/DL (ref 1.8–2.4)
MUCUS: ABNORMAL HPF
NITRITE URINE, QUANTITATIVE: NEGATIVE
PH, URINE: 5 (ref 5–8)
PHOSPHORUS: 3.1 MG/DL (ref 2.5–4.9)
POTASSIUM SERPL-SCNC: 5.1 MMOL/L (ref 3.5–5.1)
PROTEIN UA: NEGATIVE MG/DL
RBC URINE: 3 /HPF (ref 0–6)
SODIUM BLD-SCNC: 142 MMOL/L (ref 135–145)
SPECIFIC GRAVITY UA: 1.02 (ref 1–1.03)
SPECIMEN: ABNORMAL
SQUAMOUS EPITHELIAL: 4 /HPF
TOTAL COLONY COUNT: ABNORMAL
TRICHOMONAS: ABNORMAL /HPF
UROBILINOGEN, URINE: NORMAL MG/DL (ref 0.2–1)
WBC UA: 19 /HPF (ref 0–5)

## 2019-09-26 PROCEDURE — 87086 URINE CULTURE/COLONY COUNT: CPT

## 2019-09-26 PROCEDURE — 02HK3JZ INSERTION OF PACEMAKER LEAD INTO RIGHT VENTRICLE, PERCUTANEOUS APPROACH: ICD-10-PCS | Performed by: INTERNAL MEDICINE

## 2019-09-26 PROCEDURE — 6370000000 HC RX 637 (ALT 250 FOR IP): Performed by: INTERNAL MEDICINE

## 2019-09-26 PROCEDURE — 6360000002 HC RX W HCPCS: Performed by: PHYSICIAN ASSISTANT

## 2019-09-26 PROCEDURE — 87186 SC STD MICRODIL/AGAR DIL: CPT

## 2019-09-26 PROCEDURE — 6360000002 HC RX W HCPCS

## 2019-09-26 PROCEDURE — 71045 X-RAY EXAM CHEST 1 VIEW: CPT

## 2019-09-26 PROCEDURE — 80069 RENAL FUNCTION PANEL: CPT

## 2019-09-26 PROCEDURE — 33208 INSRT HEART PM ATRIAL & VENT: CPT

## 2019-09-26 PROCEDURE — 2060000000 HC ICU INTERMEDIATE R&B

## 2019-09-26 PROCEDURE — 2580000003 HC RX 258: Performed by: INTERNAL MEDICINE

## 2019-09-26 PROCEDURE — 6360000002 HC RX W HCPCS: Performed by: INTERNAL MEDICINE

## 2019-09-26 PROCEDURE — 36415 COLL VENOUS BLD VENIPUNCTURE: CPT

## 2019-09-26 PROCEDURE — 6360000004 HC RX CONTRAST MEDICATION

## 2019-09-26 PROCEDURE — 6370000000 HC RX 637 (ALT 250 FOR IP): Performed by: HOSPITALIST

## 2019-09-26 PROCEDURE — 02H63JZ INSERTION OF PACEMAKER LEAD INTO RIGHT ATRIUM, PERCUTANEOUS APPROACH: ICD-10-PCS | Performed by: INTERNAL MEDICINE

## 2019-09-26 PROCEDURE — 83735 ASSAY OF MAGNESIUM: CPT

## 2019-09-26 PROCEDURE — C1898 LEAD, PMKR, OTHER THAN TRANS: HCPCS

## 2019-09-26 PROCEDURE — 2709999900 HC NON-CHARGEABLE SUPPLY

## 2019-09-26 PROCEDURE — 2500000003 HC RX 250 WO HCPCS

## 2019-09-26 PROCEDURE — 94761 N-INVAS EAR/PLS OXIMETRY MLT: CPT

## 2019-09-26 PROCEDURE — 0JH606Z INSERTION OF PACEMAKER, DUAL CHAMBER INTO CHEST SUBCUTANEOUS TISSUE AND FASCIA, OPEN APPROACH: ICD-10-PCS | Performed by: INTERNAL MEDICINE

## 2019-09-26 PROCEDURE — C1785 PMKR, DUAL, RATE-RESP: HCPCS

## 2019-09-26 PROCEDURE — 6370000000 HC RX 637 (ALT 250 FOR IP): Performed by: NURSE PRACTITIONER

## 2019-09-26 PROCEDURE — 33208 INSRT HEART PM ATRIAL & VENT: CPT | Performed by: INTERNAL MEDICINE

## 2019-09-26 PROCEDURE — 81001 URINALYSIS AUTO W/SCOPE: CPT

## 2019-09-26 PROCEDURE — 6370000000 HC RX 637 (ALT 250 FOR IP): Performed by: PHYSICIAN ASSISTANT

## 2019-09-26 PROCEDURE — C1894 INTRO/SHEATH, NON-LASER: HCPCS

## 2019-09-26 PROCEDURE — C9113 INJ PANTOPRAZOLE SODIUM, VIA: HCPCS | Performed by: PHYSICIAN ASSISTANT

## 2019-09-26 PROCEDURE — 87088 URINE BACTERIA CULTURE: CPT

## 2019-09-26 PROCEDURE — B51N1ZZ FLUOROSCOPY OF LEFT UPPER EXTREMITY VEINS USING LOW OSMOLAR CONTRAST: ICD-10-PCS | Performed by: INTERNAL MEDICINE

## 2019-09-26 PROCEDURE — 94640 AIRWAY INHALATION TREATMENT: CPT

## 2019-09-26 RX ORDER — CEFAZOLIN SODIUM 1 G/50ML
1 INJECTION, SOLUTION INTRAVENOUS EVERY 8 HOURS
Status: COMPLETED | OUTPATIENT
Start: 2019-09-26 | End: 2019-09-27

## 2019-09-26 RX ORDER — SODIUM CHLORIDE 0.9 % (FLUSH) 0.9 %
10 SYRINGE (ML) INJECTION EVERY 12 HOURS SCHEDULED
Status: DISCONTINUED | OUTPATIENT
Start: 2019-09-26 | End: 2019-09-27 | Stop reason: HOSPADM

## 2019-09-26 RX ORDER — SODIUM CHLORIDE 0.9 % (FLUSH) 0.9 %
10 SYRINGE (ML) INJECTION PRN
Status: DISCONTINUED | OUTPATIENT
Start: 2019-09-26 | End: 2019-09-27 | Stop reason: HOSPADM

## 2019-09-26 RX ORDER — LORAZEPAM 2 MG/ML
0.5 INJECTION INTRAMUSCULAR ONCE
Status: DISCONTINUED | OUTPATIENT
Start: 2019-09-26 | End: 2019-09-26

## 2019-09-26 RX ORDER — HYDROCODONE BITARTRATE AND ACETAMINOPHEN 5; 325 MG/1; MG/1
1 TABLET ORAL ONCE
Status: COMPLETED | OUTPATIENT
Start: 2019-09-26 | End: 2019-09-26

## 2019-09-26 RX ORDER — ACETAMINOPHEN 325 MG/1
650 TABLET ORAL EVERY 6 HOURS PRN
Status: DISCONTINUED | OUTPATIENT
Start: 2019-09-26 | End: 2019-09-27 | Stop reason: HOSPADM

## 2019-09-26 RX ADMIN — DONEPEZIL HYDROCHLORIDE 10 MG: 10 TABLET, FILM COATED ORAL at 20:22

## 2019-09-26 RX ADMIN — DILTIAZEM HYDROCHLORIDE 30 MG: 30 TABLET, FILM COATED ORAL at 18:15

## 2019-09-26 RX ADMIN — CEFAZOLIN SODIUM 1 G: 1 INJECTION, SOLUTION INTRAVENOUS at 18:51

## 2019-09-26 RX ADMIN — HYDROCODONE BITARTRATE AND ACETAMINOPHEN 1 TABLET: 5; 325 TABLET ORAL at 14:26

## 2019-09-26 RX ADMIN — HYDROCODONE BITARTRATE AND ACETAMINOPHEN 1 TABLET: 5; 325 TABLET ORAL at 21:03

## 2019-09-26 RX ADMIN — PRIMIDONE 50 MG: 50 TABLET ORAL at 20:22

## 2019-09-26 RX ADMIN — CYCLOBENZAPRINE HYDROCHLORIDE 10 MG: 10 TABLET, FILM COATED ORAL at 20:22

## 2019-09-26 RX ADMIN — IPRATROPIUM BROMIDE AND ALBUTEROL SULFATE 1 AMPULE: .5; 3 SOLUTION RESPIRATORY (INHALATION) at 11:44

## 2019-09-26 RX ADMIN — ASPIRIN 81 MG: 81 TABLET, COATED ORAL at 09:08

## 2019-09-26 RX ADMIN — AMIODARONE HYDROCHLORIDE 200 MG: 200 TABLET ORAL at 09:08

## 2019-09-26 RX ADMIN — CALCIUM CARBONATE 500 MG: 500 TABLET, CHEWABLE ORAL at 23:49

## 2019-09-26 RX ADMIN — PANTOPRAZOLE SODIUM 40 MG: 40 INJECTION, POWDER, FOR SOLUTION INTRAVENOUS at 09:09

## 2019-09-26 RX ADMIN — METHIMAZOLE 5 MG: 10 TABLET ORAL at 09:08

## 2019-09-26 RX ADMIN — SODIUM CHLORIDE: 9 INJECTION, SOLUTION INTRAVENOUS at 00:45

## 2019-09-26 RX ADMIN — ATORVASTATIN CALCIUM 10 MG: 10 TABLET, FILM COATED ORAL at 20:22

## 2019-09-26 RX ADMIN — GABAPENTIN 300 MG: 300 CAPSULE ORAL at 09:09

## 2019-09-26 RX ADMIN — IPRATROPIUM BROMIDE AND ALBUTEROL SULFATE 1 AMPULE: .5; 3 SOLUTION RESPIRATORY (INHALATION) at 08:26

## 2019-09-26 RX ADMIN — Medication 2 PUFF: at 08:27

## 2019-09-26 RX ADMIN — PRIMIDONE 50 MG: 50 TABLET ORAL at 14:26

## 2019-09-26 RX ADMIN — DILTIAZEM HYDROCHLORIDE 30 MG: 30 TABLET, FILM COATED ORAL at 23:47

## 2019-09-26 RX ADMIN — MONTELUKAST 10 MG: 10 TABLET, FILM COATED ORAL at 20:22

## 2019-09-26 RX ADMIN — METHIMAZOLE 5 MG: 10 TABLET ORAL at 20:22

## 2019-09-26 RX ADMIN — SODIUM CHLORIDE: 9 INJECTION, SOLUTION INTRAVENOUS at 14:52

## 2019-09-26 RX ADMIN — Medication 10 ML: at 09:11

## 2019-09-26 RX ADMIN — CALCIUM CARBONATE 500 MG: 500 TABLET, CHEWABLE ORAL at 04:33

## 2019-09-26 RX ADMIN — Medication 3 MG: at 03:19

## 2019-09-26 RX ADMIN — GABAPENTIN 300 MG: 300 CAPSULE ORAL at 20:22

## 2019-09-26 RX ADMIN — GUAIFENESIN AND DEXTROMETHORPHAN 5 ML: 100; 10 SYRUP ORAL at 00:43

## 2019-09-26 RX ADMIN — PRIMIDONE 50 MG: 50 TABLET ORAL at 09:09

## 2019-09-26 RX ADMIN — IPRATROPIUM BROMIDE AND ALBUTEROL SULFATE 1 AMPULE: .5; 3 SOLUTION RESPIRATORY (INHALATION) at 15:47

## 2019-09-26 ASSESSMENT — PAIN SCALES - GENERAL
PAINLEVEL_OUTOF10: 7
PAINLEVEL_OUTOF10: 10

## 2019-09-26 ASSESSMENT — PAIN DESCRIPTION - PAIN TYPE: TYPE: ACUTE PAIN

## 2019-09-26 ASSESSMENT — PAIN DESCRIPTION - LOCATION: LOCATION: BACK

## 2019-09-27 VITALS
DIASTOLIC BLOOD PRESSURE: 49 MMHG | RESPIRATION RATE: 17 BRPM | WEIGHT: 143.3 LBS | OXYGEN SATURATION: 92 % | HEIGHT: 64 IN | TEMPERATURE: 98.2 F | HEART RATE: 64 BPM | BODY MASS INDEX: 24.46 KG/M2 | SYSTOLIC BLOOD PRESSURE: 126 MMHG

## 2019-09-27 LAB
ALBUMIN SERPL-MCNC: 3 GM/DL (ref 3.4–5)
ALBUMIN SERPL-MCNC: 3.1 GM/DL (ref 3.4–5)
ALP BLD-CCNC: 112 IU/L (ref 40–128)
ALT SERPL-CCNC: 10 U/L (ref 10–40)
ANION GAP SERPL CALCULATED.3IONS-SCNC: 10 MMOL/L (ref 4–16)
ANION GAP SERPL CALCULATED.3IONS-SCNC: 8 MMOL/L (ref 4–16)
ANTITHYROGLOBULIN AB: <0.9 IU/ML (ref 0–4)
ANTITHYROGLOBULIN AB: NORMAL IU/ML (ref 0–4)
ANTITHYROID MICORSOMAL: <0.3 IU/ML (ref 0–9)
ANTITHYROID MICORSOMAL: NORMAL IU/ML (ref 0–9)
AST SERPL-CCNC: 17 IU/L (ref 15–37)
BILIRUB SERPL-MCNC: 0.2 MG/DL (ref 0–1)
BUN BLDV-MCNC: 21 MG/DL (ref 6–23)
BUN BLDV-MCNC: 21 MG/DL (ref 6–23)
CALCIUM SERPL-MCNC: 9 MG/DL (ref 8.3–10.6)
CALCIUM SERPL-MCNC: 9.1 MG/DL (ref 8.3–10.6)
CHLORIDE BLD-SCNC: 102 MMOL/L (ref 99–110)
CHLORIDE BLD-SCNC: 104 MMOL/L (ref 99–110)
CHOLESTEROL: 133 MG/DL
CO2: 27 MMOL/L (ref 21–32)
CO2: 27 MMOL/L (ref 21–32)
CREAT SERPL-MCNC: 1 MG/DL (ref 0.6–1.1)
CREAT SERPL-MCNC: 1 MG/DL (ref 0.6–1.1)
GFR AFRICAN AMERICAN: >60 ML/MIN/1.73M2
GFR AFRICAN AMERICAN: >60 ML/MIN/1.73M2
GFR NON-AFRICAN AMERICAN: 53 ML/MIN/1.73M2
GFR NON-AFRICAN AMERICAN: 53 ML/MIN/1.73M2
GLUCOSE BLD-MCNC: 108 MG/DL (ref 70–99)
GLUCOSE BLD-MCNC: 171 MG/DL (ref 70–99)
HDLC SERPL-MCNC: 62 MG/DL
LDL CHOLESTEROL DIRECT: 53 MG/DL
MAGNESIUM: 2.1 MG/DL (ref 1.8–2.4)
PHOSPHORUS: 3.1 MG/DL (ref 2.5–4.9)
POTASSIUM SERPL-SCNC: 4.6 MMOL/L (ref 3.5–5.1)
POTASSIUM SERPL-SCNC: 4.9 MMOL/L (ref 3.5–5.1)
SODIUM BLD-SCNC: 139 MMOL/L (ref 135–145)
SODIUM BLD-SCNC: 139 MMOL/L (ref 135–145)
T3 FREE: 2.1 PG/ML (ref 2.3–4.2)
T4 FREE: 1.72 NG/DL (ref 0.9–1.8)
THYROTROPIN BINDING INHIBITORY IMMUNOGLOBULIN: <0.9 IU/L
THYROTROPIN BINDING INHIBITORY IMMUNOGLOBULIN: NORMAL IU/L
TOTAL PROTEIN: 5.3 GM/DL (ref 6.4–8.2)
TRIGL SERPL-MCNC: 88 MG/DL

## 2019-09-27 PROCEDURE — 82787 IGG 1 2 3 OR 4 EACH: CPT

## 2019-09-27 PROCEDURE — 80053 COMPREHEN METABOLIC PANEL: CPT

## 2019-09-27 PROCEDURE — 84439 ASSAY OF FREE THYROXINE: CPT

## 2019-09-27 PROCEDURE — 80061 LIPID PANEL: CPT

## 2019-09-27 PROCEDURE — 2580000003 HC RX 258: Performed by: INTERNAL MEDICINE

## 2019-09-27 PROCEDURE — 86301 IMMUNOASSAY TUMOR CA 19-9: CPT

## 2019-09-27 PROCEDURE — 93280 PM DEVICE PROGR EVAL DUAL: CPT | Performed by: INTERNAL MEDICINE

## 2019-09-27 PROCEDURE — 83735 ASSAY OF MAGNESIUM: CPT

## 2019-09-27 PROCEDURE — 99231 SBSQ HOSP IP/OBS SF/LOW 25: CPT | Performed by: NURSE PRACTITIONER

## 2019-09-27 PROCEDURE — 83721 ASSAY OF BLOOD LIPOPROTEIN: CPT

## 2019-09-27 PROCEDURE — 84481 FREE ASSAY (FT-3): CPT

## 2019-09-27 PROCEDURE — 6370000000 HC RX 637 (ALT 250 FOR IP): Performed by: INTERNAL MEDICINE

## 2019-09-27 PROCEDURE — 6360000002 HC RX W HCPCS: Performed by: INTERNAL MEDICINE

## 2019-09-27 PROCEDURE — 6370000000 HC RX 637 (ALT 250 FOR IP): Performed by: NURSE PRACTITIONER

## 2019-09-27 PROCEDURE — 80069 RENAL FUNCTION PANEL: CPT

## 2019-09-27 PROCEDURE — C9113 INJ PANTOPRAZOLE SODIUM, VIA: HCPCS | Performed by: INTERNAL MEDICINE

## 2019-09-27 PROCEDURE — 36415 COLL VENOUS BLD VENIPUNCTURE: CPT

## 2019-09-27 RX ORDER — DILTIAZEM HYDROCHLORIDE 60 MG/1
60 TABLET, FILM COATED ORAL EVERY 6 HOURS SCHEDULED
Status: DISCONTINUED | OUTPATIENT
Start: 2019-09-27 | End: 2019-09-27 | Stop reason: HOSPADM

## 2019-09-27 RX ORDER — METHIMAZOLE 5 MG/1
5 TABLET ORAL 2 TIMES DAILY
Qty: 60 TABLET | Refills: 3 | Status: SHIPPED | OUTPATIENT
Start: 2019-09-27 | End: 2019-10-27

## 2019-09-27 RX ORDER — GUAIFENESIN/DEXTROMETHORPHAN 100-10MG/5
5 SYRUP ORAL EVERY 4 HOURS PRN
Qty: 120 ML | Refills: 0 | Status: SHIPPED | OUTPATIENT
Start: 2019-09-27 | End: 2019-10-07

## 2019-09-27 RX ORDER — PREDNISONE 20 MG/1
20 TABLET ORAL DAILY
Qty: 5 TABLET | Refills: 0
Start: 2019-09-27 | End: 2019-10-02

## 2019-09-27 RX ADMIN — HYDROCODONE BITARTRATE AND ACETAMINOPHEN 1 TABLET: 5; 325 TABLET ORAL at 09:06

## 2019-09-27 RX ADMIN — ASPIRIN 81 MG: 81 TABLET, COATED ORAL at 10:01

## 2019-09-27 RX ADMIN — DILTIAZEM HYDROCHLORIDE 30 MG: 30 TABLET, FILM COATED ORAL at 06:02

## 2019-09-27 RX ADMIN — PRIMIDONE 50 MG: 50 TABLET ORAL at 10:01

## 2019-09-27 RX ADMIN — Medication 10 ML: at 09:07

## 2019-09-27 RX ADMIN — CEFAZOLIN SODIUM 1 G: 1 INJECTION, SOLUTION INTRAVENOUS at 03:17

## 2019-09-27 RX ADMIN — DILTIAZEM HYDROCHLORIDE 60 MG: 60 TABLET, FILM COATED ORAL at 19:19

## 2019-09-27 RX ADMIN — AMIODARONE HYDROCHLORIDE 200 MG: 200 TABLET ORAL at 10:01

## 2019-09-27 RX ADMIN — DILTIAZEM HYDROCHLORIDE 60 MG: 60 TABLET, FILM COATED ORAL at 13:29

## 2019-09-27 RX ADMIN — PRIMIDONE 50 MG: 50 TABLET ORAL at 15:24

## 2019-09-27 RX ADMIN — GABAPENTIN 300 MG: 300 CAPSULE ORAL at 10:02

## 2019-09-27 RX ADMIN — SODIUM CHLORIDE: 9 INJECTION, SOLUTION INTRAVENOUS at 05:59

## 2019-09-27 RX ADMIN — Medication 2 PUFF: at 12:00

## 2019-09-27 RX ADMIN — Medication 10 ML: at 09:06

## 2019-09-27 RX ADMIN — HYDROCODONE BITARTRATE AND ACETAMINOPHEN 1 TABLET: 5; 325 TABLET ORAL at 03:16

## 2019-09-27 RX ADMIN — PANTOPRAZOLE SODIUM 40 MG: 40 INJECTION, POWDER, FOR SOLUTION INTRAVENOUS at 09:05

## 2019-09-27 RX ADMIN — GUAIFENESIN AND DEXTROMETHORPHAN 5 ML: 100; 10 SYRUP ORAL at 13:29

## 2019-09-27 RX ADMIN — METHIMAZOLE 5 MG: 10 TABLET ORAL at 10:01

## 2019-09-27 RX ADMIN — RIVAROXABAN 15 MG: 15 TABLET, FILM COATED ORAL at 10:01

## 2019-09-27 ASSESSMENT — PAIN SCALES - GENERAL
PAINLEVEL_OUTOF10: 5
PAINLEVEL_OUTOF10: 6

## 2019-09-29 LAB
CULTURE: ABNORMAL
CULTURE: ABNORMAL
Lab: ABNORMAL
SPECIMEN: ABNORMAL
TOTAL COLONY COUNT: ABNORMAL

## 2019-09-30 ENCOUNTER — TELEPHONE (OUTPATIENT)
Dept: CARDIOLOGY CLINIC | Age: 79
End: 2019-09-30

## 2019-10-01 LAB
CA 19-9: 6 U/ML (ref 0–37)
CA 19-9: NORMAL U/ML (ref 0–37)

## 2019-10-02 ENCOUNTER — TELEPHONE (OUTPATIENT)
Dept: FAMILY MEDICINE CLINIC | Age: 79
End: 2019-10-02

## 2019-10-02 LAB
Lab: NORMAL
Lab: NORMAL
TEST NAME: NORMAL

## 2019-10-04 PROBLEM — N20.0 STAGHORN CALCULUS: Status: ACTIVE | Noted: 2019-10-04

## 2019-10-07 ENCOUNTER — OFFICE VISIT (OUTPATIENT)
Dept: FAMILY MEDICINE CLINIC | Age: 79
End: 2019-10-07
Payer: MEDICARE

## 2019-10-07 ENCOUNTER — NURSE ONLY (OUTPATIENT)
Dept: CARDIOLOGY CLINIC | Age: 79
End: 2019-10-07

## 2019-10-07 VITALS
HEART RATE: 65 BPM | WEIGHT: 137.8 LBS | DIASTOLIC BLOOD PRESSURE: 68 MMHG | HEIGHT: 64 IN | BODY MASS INDEX: 23.52 KG/M2 | SYSTOLIC BLOOD PRESSURE: 122 MMHG

## 2019-10-07 VITALS — TEMPERATURE: 98.9 F

## 2019-10-07 DIAGNOSIS — E11.9 TYPE 2 DIABETES MELLITUS WITHOUT COMPLICATION, WITHOUT LONG-TERM CURRENT USE OF INSULIN (HCC): Primary | ICD-10-CM

## 2019-10-07 DIAGNOSIS — I48.0 PAROXYSMAL ATRIAL FIBRILLATION (HCC): Chronic | ICD-10-CM

## 2019-10-07 DIAGNOSIS — Z95.0 STATUS POST PLACEMENT OF CARDIAC PACEMAKER: Primary | ICD-10-CM

## 2019-10-07 DIAGNOSIS — R00.1 BRADYCARDIA: ICD-10-CM

## 2019-10-07 DIAGNOSIS — G47.33 OSA (OBSTRUCTIVE SLEEP APNEA): ICD-10-CM

## 2019-10-07 DIAGNOSIS — I10 ESSENTIAL HYPERTENSION: ICD-10-CM

## 2019-10-07 PROCEDURE — G8420 CALC BMI NORM PARAMETERS: HCPCS | Performed by: FAMILY MEDICINE

## 2019-10-07 PROCEDURE — 4040F PNEUMOC VAC/ADMIN/RCVD: CPT | Performed by: FAMILY MEDICINE

## 2019-10-07 PROCEDURE — G8598 ASA/ANTIPLAT THER USED: HCPCS | Performed by: FAMILY MEDICINE

## 2019-10-07 PROCEDURE — 1090F PRES/ABSN URINE INCON ASSESS: CPT | Performed by: FAMILY MEDICINE

## 2019-10-07 PROCEDURE — 1123F ACP DISCUSS/DSCN MKR DOCD: CPT | Performed by: FAMILY MEDICINE

## 2019-10-07 PROCEDURE — G8482 FLU IMMUNIZE ORDER/ADMIN: HCPCS | Performed by: FAMILY MEDICINE

## 2019-10-07 PROCEDURE — G0008 ADMIN INFLUENZA VIRUS VAC: HCPCS | Performed by: FAMILY MEDICINE

## 2019-10-07 PROCEDURE — G8400 PT W/DXA NO RESULTS DOC: HCPCS | Performed by: FAMILY MEDICINE

## 2019-10-07 PROCEDURE — 4004F PT TOBACCO SCREEN RCVD TLK: CPT | Performed by: FAMILY MEDICINE

## 2019-10-07 PROCEDURE — 1111F DSCHRG MED/CURRENT MED MERGE: CPT | Performed by: FAMILY MEDICINE

## 2019-10-07 PROCEDURE — 99024 POSTOP FOLLOW-UP VISIT: CPT | Performed by: INTERNAL MEDICINE

## 2019-10-07 PROCEDURE — 99214 OFFICE O/P EST MOD 30 MIN: CPT | Performed by: FAMILY MEDICINE

## 2019-10-07 PROCEDURE — 90653 IIV ADJUVANT VACCINE IM: CPT | Performed by: FAMILY MEDICINE

## 2019-10-07 PROCEDURE — G8427 DOCREV CUR MEDS BY ELIG CLIN: HCPCS | Performed by: FAMILY MEDICINE

## 2019-10-07 RX ORDER — BUDESONIDE AND FORMOTEROL FUMARATE DIHYDRATE 160; 4.5 UG/1; UG/1
2 AEROSOL RESPIRATORY (INHALATION) 2 TIMES DAILY
Qty: 1 INHALER | Refills: 5 | Status: SHIPPED | OUTPATIENT
Start: 2019-10-07 | End: 2019-11-26 | Stop reason: SDUPTHER

## 2019-10-07 ASSESSMENT — ENCOUNTER SYMPTOMS
CHEST TIGHTNESS: 0
RHINORRHEA: 0
SORE THROAT: 0
CONSTIPATION: 0
ABDOMINAL PAIN: 0
DIARRHEA: 0
SINUS PRESSURE: 0
SHORTNESS OF BREATH: 0
COUGH: 0

## 2019-10-08 ENCOUNTER — TELEPHONE (OUTPATIENT)
Dept: CARDIOLOGY CLINIC | Age: 79
End: 2019-10-08

## 2019-10-11 ENCOUNTER — TELEPHONE (OUTPATIENT)
Dept: CARDIOLOGY CLINIC | Age: 79
End: 2019-10-11

## 2019-10-22 DIAGNOSIS — M54.50 CHRONIC BILATERAL LOW BACK PAIN WITHOUT SCIATICA: ICD-10-CM

## 2019-10-22 DIAGNOSIS — G89.29 CHRONIC BILATERAL LOW BACK PAIN WITHOUT SCIATICA: ICD-10-CM

## 2019-10-23 RX ORDER — HYDROCODONE BITARTRATE AND ACETAMINOPHEN 7.5; 325 MG/1; MG/1
.5-1 TABLET ORAL 3 TIMES DAILY PRN
Qty: 90 TABLET | Refills: 0 | Status: SHIPPED | OUTPATIENT
Start: 2019-10-23 | End: 2019-11-26 | Stop reason: SDUPTHER

## 2019-11-06 ENCOUNTER — OFFICE VISIT (OUTPATIENT)
Dept: CARDIOLOGY CLINIC | Age: 79
End: 2019-11-06
Payer: MEDICARE

## 2019-11-06 ENCOUNTER — APPOINTMENT (OUTPATIENT)
Dept: GENERAL RADIOLOGY | Age: 79
End: 2019-11-06
Payer: MEDICARE

## 2019-11-06 ENCOUNTER — HOSPITAL ENCOUNTER (EMERGENCY)
Age: 79
Discharge: HOME | End: 2019-11-06
Attending: EMERGENCY MEDICINE
Payer: MEDICARE

## 2019-11-06 ENCOUNTER — TELEPHONE (OUTPATIENT)
Dept: CARDIOLOGY CLINIC | Age: 79
End: 2019-11-06

## 2019-11-06 VITALS
TEMPERATURE: 97.7 F | WEIGHT: 137 LBS | DIASTOLIC BLOOD PRESSURE: 63 MMHG | SYSTOLIC BLOOD PRESSURE: 117 MMHG | OXYGEN SATURATION: 95 % | HEIGHT: 63 IN | RESPIRATION RATE: 20 BRPM | BODY MASS INDEX: 24.27 KG/M2 | HEART RATE: 63 BPM

## 2019-11-06 VITALS
HEART RATE: 88 BPM | DIASTOLIC BLOOD PRESSURE: 60 MMHG | WEIGHT: 137.6 LBS | BODY MASS INDEX: 24.38 KG/M2 | SYSTOLIC BLOOD PRESSURE: 102 MMHG | HEIGHT: 63 IN

## 2019-11-06 DIAGNOSIS — I48.91 ATRIAL FIBRILLATION WITH RVR (HCC): ICD-10-CM

## 2019-11-06 DIAGNOSIS — R53.83 FATIGUE, UNSPECIFIED TYPE: ICD-10-CM

## 2019-11-06 DIAGNOSIS — R05.9 COUGH: ICD-10-CM

## 2019-11-06 DIAGNOSIS — I48.91 ATRIAL FIBRILLATION WITH RVR (HCC): Primary | ICD-10-CM

## 2019-11-06 DIAGNOSIS — D64.9 ANEMIA, UNSPECIFIED TYPE: ICD-10-CM

## 2019-11-06 DIAGNOSIS — Z95.0 S/P PLACEMENT OF CARDIAC PACEMAKER: Primary | ICD-10-CM

## 2019-11-06 LAB
ALBUMIN SERPL-MCNC: 3.6 GM/DL (ref 3.4–5)
ALP BLD-CCNC: 124 IU/L (ref 40–129)
ALT SERPL-CCNC: 6 U/L (ref 10–40)
ANION GAP SERPL CALCULATED.3IONS-SCNC: 8 MMOL/L (ref 4–16)
AST SERPL-CCNC: 11 IU/L (ref 15–37)
BACTERIA: NEGATIVE /HPF
BASOPHILS ABSOLUTE: 0.1 K/CU MM
BASOPHILS RELATIVE PERCENT: 0.9 % (ref 0–1)
BILIRUB SERPL-MCNC: 0.1 MG/DL (ref 0–1)
BILIRUBIN URINE: NEGATIVE MG/DL
BLOOD, URINE: NEGATIVE
BUN BLDV-MCNC: 22 MG/DL (ref 6–23)
CALCIUM SERPL-MCNC: 9.3 MG/DL (ref 8.3–10.6)
CHLORIDE BLD-SCNC: 102 MMOL/L (ref 99–110)
CLARITY: CLEAR
CO2: 32 MMOL/L (ref 21–32)
COLOR: ABNORMAL
CREAT SERPL-MCNC: 1.2 MG/DL (ref 0.6–1.1)
DIFFERENTIAL TYPE: ABNORMAL
EOSINOPHILS ABSOLUTE: 0.3 K/CU MM
EOSINOPHILS RELATIVE PERCENT: 3.9 % (ref 0–3)
GFR AFRICAN AMERICAN: 52 ML/MIN/1.73M2
GFR NON-AFRICAN AMERICAN: 43 ML/MIN/1.73M2
GLUCOSE BLD-MCNC: 103 MG/DL (ref 70–99)
GLUCOSE, URINE: NEGATIVE MG/DL
HCT VFR BLD CALC: 32.5 % (ref 37–47)
HEMOGLOBIN: 9.3 GM/DL (ref 12.5–16)
HYALINE CASTS: 2 /LPF
IMMATURE NEUTROPHIL %: 0.2 % (ref 0–0.43)
KETONES, URINE: NEGATIVE MG/DL
LEUKOCYTE ESTERASE, URINE: NEGATIVE
LIPASE: 37 IU/L (ref 13–60)
LYMPHOCYTES ABSOLUTE: 1.7 K/CU MM
LYMPHOCYTES RELATIVE PERCENT: 21.2 % (ref 24–44)
MCH RBC QN AUTO: 28.4 PG (ref 27–31)
MCHC RBC AUTO-ENTMCNC: 28.6 % (ref 32–36)
MCV RBC AUTO: 99.1 FL (ref 78–100)
MONOCYTES ABSOLUTE: 1 K/CU MM
MONOCYTES RELATIVE PERCENT: 12.6 % (ref 0–4)
MUCUS: ABNORMAL HPF
NITRITE URINE, QUANTITATIVE: NEGATIVE
NUCLEATED RBC %: 0 %
PDW BLD-RTO: 14.6 % (ref 11.7–14.9)
PH, URINE: 6 (ref 5–8)
PLATELET # BLD: 351 K/CU MM (ref 140–440)
PMV BLD AUTO: 12 FL (ref 7.5–11.1)
POTASSIUM SERPL-SCNC: 4.5 MMOL/L (ref 3.5–5.1)
PRO-BNP: 429.2 PG/ML
PROTEIN UA: NEGATIVE MG/DL
RBC # BLD: 3.28 M/CU MM (ref 4.2–5.4)
RBC URINE: ABNORMAL /HPF (ref 0–6)
SEGMENTED NEUTROPHILS ABSOLUTE COUNT: 5 K/CU MM
SEGMENTED NEUTROPHILS RELATIVE PERCENT: 61.2 % (ref 36–66)
SODIUM BLD-SCNC: 142 MMOL/L (ref 135–145)
SPECIFIC GRAVITY UA: 1.01 (ref 1–1.03)
TOTAL IMMATURE NEUTOROPHIL: 0.02 K/CU MM
TOTAL NUCLEATED RBC: 0 K/CU MM
TOTAL PROTEIN: 6.5 GM/DL (ref 6.4–8.2)
TRICHOMONAS: ABNORMAL /HPF
TROPONIN T: <0.01 NG/ML
UROBILINOGEN, URINE: NORMAL MG/DL (ref 0.2–1)
WBC # BLD: 8.2 K/CU MM (ref 4–10.5)
WBC UA: <1 /HPF (ref 0–5)

## 2019-11-06 PROCEDURE — G8400 PT W/DXA NO RESULTS DOC: HCPCS | Performed by: NURSE PRACTITIONER

## 2019-11-06 PROCEDURE — G8427 DOCREV CUR MEDS BY ELIG CLIN: HCPCS | Performed by: NURSE PRACTITIONER

## 2019-11-06 PROCEDURE — G8598 ASA/ANTIPLAT THER USED: HCPCS | Performed by: NURSE PRACTITIONER

## 2019-11-06 PROCEDURE — 71046 X-RAY EXAM CHEST 2 VIEWS: CPT

## 2019-11-06 PROCEDURE — 99212 OFFICE O/P EST SF 10 MIN: CPT | Performed by: NURSE PRACTITIONER

## 2019-11-06 PROCEDURE — 6370000000 HC RX 637 (ALT 250 FOR IP): Performed by: PHYSICIAN ASSISTANT

## 2019-11-06 PROCEDURE — G8420 CALC BMI NORM PARAMETERS: HCPCS | Performed by: NURSE PRACTITIONER

## 2019-11-06 PROCEDURE — 1090F PRES/ABSN URINE INCON ASSESS: CPT | Performed by: NURSE PRACTITIONER

## 2019-11-06 PROCEDURE — 1123F ACP DISCUSS/DSCN MKR DOCD: CPT | Performed by: NURSE PRACTITIONER

## 2019-11-06 PROCEDURE — G8482 FLU IMMUNIZE ORDER/ADMIN: HCPCS | Performed by: NURSE PRACTITIONER

## 2019-11-06 PROCEDURE — 85025 COMPLETE CBC W/AUTO DIFF WBC: CPT

## 2019-11-06 PROCEDURE — 93005 ELECTROCARDIOGRAM TRACING: CPT | Performed by: PHYSICIAN ASSISTANT

## 2019-11-06 PROCEDURE — 93010 ELECTROCARDIOGRAM REPORT: CPT | Performed by: INTERNAL MEDICINE

## 2019-11-06 PROCEDURE — 80053 COMPREHEN METABOLIC PANEL: CPT

## 2019-11-06 PROCEDURE — 83880 ASSAY OF NATRIURETIC PEPTIDE: CPT

## 2019-11-06 PROCEDURE — 93000 ELECTROCARDIOGRAM COMPLETE: CPT | Performed by: NURSE PRACTITIONER

## 2019-11-06 PROCEDURE — 83690 ASSAY OF LIPASE: CPT

## 2019-11-06 PROCEDURE — 84484 ASSAY OF TROPONIN QUANT: CPT

## 2019-11-06 PROCEDURE — 81001 URINALYSIS AUTO W/SCOPE: CPT

## 2019-11-06 PROCEDURE — 99285 EMERGENCY DEPT VISIT HI MDM: CPT

## 2019-11-06 PROCEDURE — 1036F TOBACCO NON-USER: CPT | Performed by: NURSE PRACTITIONER

## 2019-11-06 PROCEDURE — 4040F PNEUMOC VAC/ADMIN/RCVD: CPT | Performed by: NURSE PRACTITIONER

## 2019-11-06 PROCEDURE — 36415 COLL VENOUS BLD VENIPUNCTURE: CPT

## 2019-11-06 RX ORDER — ALPRAZOLAM 0.5 MG/1
0.5 TABLET ORAL NIGHTLY PRN
COMMUNITY
End: 2019-11-25 | Stop reason: SDUPTHER

## 2019-11-06 RX ORDER — DILTIAZEM HYDROCHLORIDE 120 MG/1
120 CAPSULE, COATED, EXTENDED RELEASE ORAL ONCE
Status: COMPLETED | OUTPATIENT
Start: 2019-11-06 | End: 2019-11-06

## 2019-11-06 RX ADMIN — DILTIAZEM HYDROCHLORIDE 120 MG: 120 CAPSULE, COATED, EXTENDED RELEASE ORAL at 15:38

## 2019-11-06 ASSESSMENT — ENCOUNTER SYMPTOMS
VOMITING: 0
BACK PAIN: 0
COLOR CHANGE: 0
COUGH: 0
EYE REDNESS: 0
SINUS PAIN: 0
DIARRHEA: 0
NAUSEA: 0
CONSTIPATION: 0
BLOOD IN STOOL: 0
SHORTNESS OF BREATH: 1
EYE ITCHING: 0
ABDOMINAL DISTENTION: 0
SINUS PRESSURE: 0
CHEST TIGHTNESS: 0
SORE THROAT: 0
ABDOMINAL PAIN: 0

## 2019-11-06 NOTE — TELEPHONE ENCOUNTER
Daughter called Mother was taken  by [facility] to [facility] , They are going to discharge her Daughter very upset doesn't feel she should be discharged

## 2019-11-07 ENCOUNTER — TELEPHONE (OUTPATIENT)
Dept: CARDIOLOGY CLINIC | Age: 79
End: 2019-11-07

## 2019-11-08 ENCOUNTER — PROCEDURE VISIT (OUTPATIENT)
Dept: CARDIOLOGY CLINIC | Age: 79
End: 2019-11-08
Payer: MEDICARE

## 2019-11-08 ENCOUNTER — OFFICE VISIT (OUTPATIENT)
Dept: CARDIOLOGY CLINIC | Age: 79
End: 2019-11-08
Payer: MEDICARE

## 2019-11-08 ENCOUNTER — TELEPHONE (OUTPATIENT)
Dept: CARDIOLOGY CLINIC | Age: 79
End: 2019-11-08

## 2019-11-08 VITALS
WEIGHT: 136.2 LBS | DIASTOLIC BLOOD PRESSURE: 52 MMHG | OXYGEN SATURATION: 96 % | HEIGHT: 63 IN | HEART RATE: 64 BPM | BODY MASS INDEX: 24.13 KG/M2 | SYSTOLIC BLOOD PRESSURE: 88 MMHG

## 2019-11-08 DIAGNOSIS — I48.91 ATRIAL FIBRILLATION WITH RVR (HCC): ICD-10-CM

## 2019-11-08 DIAGNOSIS — I50.31 ACUTE DIASTOLIC CHF (CONGESTIVE HEART FAILURE) (HCC): ICD-10-CM

## 2019-11-08 DIAGNOSIS — R06.02 SHORTNESS OF BREATH: ICD-10-CM

## 2019-11-08 DIAGNOSIS — I48.0 PAF (PAROXYSMAL ATRIAL FIBRILLATION) (HCC): Primary | ICD-10-CM

## 2019-11-08 PROCEDURE — 93308 TTE F-UP OR LMTD: CPT | Performed by: INTERNAL MEDICINE

## 2019-11-08 PROCEDURE — 1090F PRES/ABSN URINE INCON ASSESS: CPT | Performed by: INTERNAL MEDICINE

## 2019-11-08 PROCEDURE — G8420 CALC BMI NORM PARAMETERS: HCPCS | Performed by: INTERNAL MEDICINE

## 2019-11-08 PROCEDURE — 4040F PNEUMOC VAC/ADMIN/RCVD: CPT | Performed by: INTERNAL MEDICINE

## 2019-11-08 PROCEDURE — G8482 FLU IMMUNIZE ORDER/ADMIN: HCPCS | Performed by: INTERNAL MEDICINE

## 2019-11-08 PROCEDURE — 99212 OFFICE O/P EST SF 10 MIN: CPT | Performed by: INTERNAL MEDICINE

## 2019-11-08 PROCEDURE — 1123F ACP DISCUSS/DSCN MKR DOCD: CPT | Performed by: INTERNAL MEDICINE

## 2019-11-08 PROCEDURE — 1036F TOBACCO NON-USER: CPT | Performed by: INTERNAL MEDICINE

## 2019-11-08 PROCEDURE — G8427 DOCREV CUR MEDS BY ELIG CLIN: HCPCS | Performed by: INTERNAL MEDICINE

## 2019-11-08 PROCEDURE — G8598 ASA/ANTIPLAT THER USED: HCPCS | Performed by: INTERNAL MEDICINE

## 2019-11-08 PROCEDURE — G8400 PT W/DXA NO RESULTS DOC: HCPCS | Performed by: INTERNAL MEDICINE

## 2019-11-08 PROCEDURE — 93000 ELECTROCARDIOGRAM COMPLETE: CPT | Performed by: NURSE PRACTITIONER

## 2019-11-08 RX ORDER — MIDODRINE HYDROCHLORIDE 5 MG/1
5 TABLET ORAL 3 TIMES DAILY
Qty: 90 TABLET | Refills: 3 | Status: SHIPPED | OUTPATIENT
Start: 2019-11-08 | End: 2019-11-08 | Stop reason: SDUPTHER

## 2019-11-11 ENCOUNTER — TELEPHONE (OUTPATIENT)
Dept: CARDIOLOGY CLINIC | Age: 79
End: 2019-11-11

## 2019-11-11 ENCOUNTER — HOSPITAL ENCOUNTER (OUTPATIENT)
Age: 79
Setting detail: SPECIMEN
Discharge: HOME | End: 2019-11-11
Payer: MEDICARE

## 2019-11-11 LAB
FERRITIN: 14 NG/ML (ref 15–150)
IRON: 24 UG/DL (ref 37–145)
PCT TRANSFERRIN: 7 % (ref 10–44)
TOTAL IRON BINDING CAPACITY: 367 UG/DL (ref 250–450)
UNSATURATED IRON BINDING CAPACITY: 343 UG/DL (ref 110–370)

## 2019-11-11 PROCEDURE — 82728 ASSAY OF FERRITIN: CPT

## 2019-11-11 PROCEDURE — 83540 ASSAY OF IRON: CPT

## 2019-11-11 PROCEDURE — 83550 IRON BINDING TEST: CPT

## 2019-11-15 LAB
EKG DIAGNOSIS: NORMAL
EKG Q-T INTERVAL: 348 MS
EKG QRS DURATION: 72 MS
EKG QTC CALCULATION (BAZETT): 457 MS
EKG R AXIS: 9 DEGREES
EKG T AXIS: -29 DEGREES
EKG VENTRICULAR RATE: 104 BPM

## 2019-11-20 ENCOUNTER — TELEPHONE (OUTPATIENT)
Dept: CARDIOLOGY CLINIC | Age: 79
End: 2019-11-20

## 2019-11-22 PROBLEM — F32.A DEPRESSION: Status: ACTIVE | Noted: 2019-11-22

## 2019-11-22 PROBLEM — F32.9 REACTIVE DEPRESSION: Status: ACTIVE | Noted: 2019-11-22

## 2019-11-25 ENCOUNTER — TELEPHONE (OUTPATIENT)
Dept: FAMILY MEDICINE CLINIC | Age: 79
End: 2019-11-25

## 2019-11-25 DIAGNOSIS — F41.1 GENERALIZED ANXIETY DISORDER: Primary | ICD-10-CM

## 2019-11-25 DIAGNOSIS — M54.50 CHRONIC BILATERAL LOW BACK PAIN WITHOUT SCIATICA: ICD-10-CM

## 2019-11-25 DIAGNOSIS — G89.29 CHRONIC BILATERAL LOW BACK PAIN WITHOUT SCIATICA: ICD-10-CM

## 2019-11-25 RX ORDER — MIDODRINE HYDROCHLORIDE 5 MG/1
TABLET ORAL
Qty: 270 TABLET | Refills: 3 | Status: CANCELLED | OUTPATIENT
Start: 2019-11-25

## 2019-11-26 RX ORDER — BUDESONIDE AND FORMOTEROL FUMARATE DIHYDRATE 160; 4.5 UG/1; UG/1
2 AEROSOL RESPIRATORY (INHALATION) 2 TIMES DAILY
Qty: 1 INHALER | Refills: 5 | Status: SHIPPED | OUTPATIENT
Start: 2019-11-26 | End: 2020-03-16 | Stop reason: SDUPTHER

## 2019-11-26 RX ORDER — DONEPEZIL HYDROCHLORIDE 10 MG/1
10 TABLET, FILM COATED ORAL NIGHTLY
Qty: 90 TABLET | Refills: 0 | Status: SHIPPED | OUTPATIENT
Start: 2019-11-26 | End: 2020-03-16 | Stop reason: SDUPTHER

## 2019-11-26 RX ORDER — ATORVASTATIN CALCIUM 10 MG/1
10 TABLET, FILM COATED ORAL DAILY
Qty: 90 TABLET | Refills: 0 | Status: SHIPPED | OUTPATIENT
Start: 2019-11-26 | End: 2020-03-16 | Stop reason: SDUPTHER

## 2019-11-26 RX ORDER — ALPRAZOLAM 0.5 MG/1
0.5 TABLET ORAL NIGHTLY PRN
Qty: 30 TABLET | Refills: 2 | Status: SHIPPED | OUTPATIENT
Start: 2019-11-26 | End: 2019-12-26

## 2019-11-26 RX ORDER — FUROSEMIDE 20 MG/1
20 TABLET ORAL SEE ADMIN INSTRUCTIONS
Qty: 45 TABLET | Refills: 0 | Status: SHIPPED | OUTPATIENT
Start: 2019-11-26 | End: 2020-03-16 | Stop reason: SDUPTHER

## 2019-11-26 RX ORDER — PANTOPRAZOLE SODIUM 40 MG/1
40 TABLET, DELAYED RELEASE ORAL DAILY
Qty: 90 TABLET | Refills: 0 | Status: SHIPPED | OUTPATIENT
Start: 2019-11-26 | End: 2020-03-16 | Stop reason: SDUPTHER

## 2019-11-26 RX ORDER — DULOXETIN HYDROCHLORIDE 60 MG/1
60 CAPSULE, DELAYED RELEASE ORAL NIGHTLY
Qty: 90 CAPSULE | Refills: 0 | Status: SHIPPED | OUTPATIENT
Start: 2019-11-26 | End: 2020-03-16 | Stop reason: SDUPTHER

## 2019-11-26 RX ORDER — PRIMIDONE 50 MG/1
50 TABLET ORAL 3 TIMES DAILY
Qty: 270 TABLET | Refills: 0 | Status: SHIPPED | OUTPATIENT
Start: 2019-11-26 | End: 2020-03-16 | Stop reason: SDUPTHER

## 2019-11-26 RX ORDER — HYDROCODONE BITARTRATE AND ACETAMINOPHEN 7.5; 325 MG/1; MG/1
.5-1 TABLET ORAL 3 TIMES DAILY PRN
Qty: 90 TABLET | Refills: 0 | Status: SHIPPED | OUTPATIENT
Start: 2019-11-26 | End: 2020-02-20 | Stop reason: SDUPTHER

## 2019-11-26 RX ORDER — AMIODARONE HYDROCHLORIDE 200 MG/1
200 TABLET ORAL DAILY
Qty: 90 TABLET | Refills: 0 | Status: ON HOLD | OUTPATIENT
Start: 2019-11-26 | End: 2020-01-10 | Stop reason: HOSPADM

## 2019-11-26 RX ORDER — GABAPENTIN 300 MG/1
300 CAPSULE ORAL 2 TIMES DAILY
Qty: 180 CAPSULE | Refills: 1 | Status: SHIPPED | OUTPATIENT
Start: 2019-11-26 | End: 2020-03-16 | Stop reason: SDUPTHER

## 2019-11-26 RX ORDER — DILTIAZEM HYDROCHLORIDE 120 MG/1
120 CAPSULE, COATED, EXTENDED RELEASE ORAL DAILY
Qty: 90 CAPSULE | Refills: 0 | Status: ON HOLD | OUTPATIENT
Start: 2019-11-26 | End: 2020-01-10 | Stop reason: HOSPADM

## 2019-11-26 RX ORDER — MONTELUKAST SODIUM 10 MG/1
10 TABLET ORAL DAILY
Qty: 90 TABLET | Refills: 0 | Status: SHIPPED | OUTPATIENT
Start: 2019-11-26 | End: 2020-03-16 | Stop reason: SDUPTHER

## 2019-11-27 ENCOUNTER — TELEPHONE (OUTPATIENT)
Dept: FAMILY MEDICINE CLINIC | Age: 79
End: 2019-11-27

## 2019-12-03 ENCOUNTER — TELEPHONE (OUTPATIENT)
Dept: FAMILY MEDICINE CLINIC | Age: 79
End: 2019-12-03

## 2019-12-06 ENCOUNTER — TELEPHONE (OUTPATIENT)
Dept: CARDIOLOGY CLINIC | Age: 79
End: 2019-12-06

## 2019-12-18 ENCOUNTER — OFFICE VISIT (OUTPATIENT)
Dept: FAMILY MEDICINE CLINIC | Age: 79
End: 2019-12-18
Payer: MEDICARE

## 2019-12-18 VITALS
HEIGHT: 64 IN | WEIGHT: 131 LBS | DIASTOLIC BLOOD PRESSURE: 78 MMHG | SYSTOLIC BLOOD PRESSURE: 118 MMHG | OXYGEN SATURATION: 92 % | HEART RATE: 60 BPM | BODY MASS INDEX: 22.36 KG/M2

## 2019-12-18 DIAGNOSIS — I10 ESSENTIAL HYPERTENSION: ICD-10-CM

## 2019-12-18 DIAGNOSIS — D50.0 CHRONIC BLOOD LOSS ANEMIA: ICD-10-CM

## 2019-12-18 DIAGNOSIS — I48.0 PAROXYSMAL ATRIAL FIBRILLATION (HCC): Primary | ICD-10-CM

## 2019-12-18 PROCEDURE — 4040F PNEUMOC VAC/ADMIN/RCVD: CPT | Performed by: PHYSICIAN ASSISTANT

## 2019-12-18 PROCEDURE — G8400 PT W/DXA NO RESULTS DOC: HCPCS | Performed by: PHYSICIAN ASSISTANT

## 2019-12-18 PROCEDURE — 1123F ACP DISCUSS/DSCN MKR DOCD: CPT | Performed by: PHYSICIAN ASSISTANT

## 2019-12-18 PROCEDURE — 1090F PRES/ABSN URINE INCON ASSESS: CPT | Performed by: PHYSICIAN ASSISTANT

## 2019-12-18 PROCEDURE — G8427 DOCREV CUR MEDS BY ELIG CLIN: HCPCS | Performed by: PHYSICIAN ASSISTANT

## 2019-12-18 PROCEDURE — 1036F TOBACCO NON-USER: CPT | Performed by: PHYSICIAN ASSISTANT

## 2019-12-18 PROCEDURE — 99213 OFFICE O/P EST LOW 20 MIN: CPT | Performed by: PHYSICIAN ASSISTANT

## 2019-12-18 PROCEDURE — G8598 ASA/ANTIPLAT THER USED: HCPCS | Performed by: PHYSICIAN ASSISTANT

## 2019-12-18 PROCEDURE — G8482 FLU IMMUNIZE ORDER/ADMIN: HCPCS | Performed by: PHYSICIAN ASSISTANT

## 2019-12-18 PROCEDURE — G8420 CALC BMI NORM PARAMETERS: HCPCS | Performed by: PHYSICIAN ASSISTANT

## 2019-12-20 ENCOUNTER — TELEPHONE (OUTPATIENT)
Dept: CARDIOLOGY CLINIC | Age: 79
End: 2019-12-20

## 2019-12-31 ENCOUNTER — PROCEDURE VISIT (OUTPATIENT)
Dept: CARDIOLOGY CLINIC | Age: 79
End: 2019-12-31
Payer: MEDICARE

## 2019-12-31 DIAGNOSIS — Z95.0 CARDIAC PACEMAKER IN SITU: Primary | ICD-10-CM

## 2019-12-31 DIAGNOSIS — I49.5 SINUS NODE DYSFUNCTION (HCC): ICD-10-CM

## 2019-12-31 DIAGNOSIS — I44.0 AV BLOCK, 1ST DEGREE: ICD-10-CM

## 2019-12-31 PROCEDURE — 93296 REM INTERROG EVL PM/IDS: CPT | Performed by: INTERNAL MEDICINE

## 2019-12-31 PROCEDURE — 93294 REM INTERROG EVL PM/LDLS PM: CPT | Performed by: INTERNAL MEDICINE

## 2020-01-02 ENCOUNTER — TELEPHONE (OUTPATIENT)
Dept: CARDIOLOGY CLINIC | Age: 80
End: 2020-01-02

## 2020-01-02 NOTE — TELEPHONE ENCOUNTER
Patient's daughter, Marie Shah, calling to reschedule procedure with Matt Auguste that had to be canceled last month. Please return her call at ph# [phone].

## 2020-01-02 NOTE — LETTER
? Chest Prep> Clip hair anterior chest and posterior back. ? Groin Prep> Clip hair bilateral groins. ? Otero catheter if AVN Ablation is scheduled with new device implant or BIV upgrade. Physician Signature:_______________________Date:___________Time:___________                                                     [facility] ([facility]) Informed Consent for Anesthesia/Sedation, Surgery, Invasive Procedures, and other High-risk Interventions and Medication use     *This consent is applicable for 30 days following patient signature*    Procedure(s)   I, Patrick Handler authorize, Dr. Terry Higgins   and the associate(s) or assistant(s) of his/her choice, to perform the following procedure(s):  Atrioventricular Node Ablation    I know that unexpected conditions may require additional or different procedures than those above. I authorize the above named practitioner(s) perform these as necessary and desirable. This is based on the practitioners professional judgment. The above named practitioner has discussed the above procedure(s) with me, including:  ? Potential benefits, including likelihood of success of the procedure(s) goals  ? Risks  ? Side effects, risk of death, and risk of infection  ? Any potential problems that might occur during recuperation or healing post-procedure  ? Reasonable alternatives  ? Risks of NOT performing the procedure(s)    I acknowledge that no warranty or guarantee has been made to the results the procedure(s). I consent to the above named practitioner(s) providing additional services to me as deemed reasonable and necessary, including but not limited to:    ? Use of medications for anesthesia or sedation. ? All anesthesia and sedation carry risks. My practitioner has discussed my anticipated anesthesia and/or sedation and the risks of using, risk of not using, benefits, side effects, and alternatives.       ? Use of pathology This form has been fully explained to me. I understand its contents. Patients Signature: ___________________________Date: ________  Time: ________    If patient unable to sign, has engaged the [address] [place] [address], is a minor, or has a court-appointed Guardian:  [facility] Representative Name (Print):  ____________________________________      Relationship ([place] one):    Guardian   Parent    Spouse    HCPOA   Child   Sibling  Next-of-Kin Friend    Patients Representative Signature: _______________________________________              Date: ______________  Time: __________    An  was used.  name/ID: _________________________________      [facility] ([facility] Witness________________________  Date: ________   Time: _________    Physician/Practitioner _______________________  Date: ________   Time: _________           Revision 2017      Wright Goss[address] Dr. Apolinar Aldo     PATIENT NAME:    Maximino Castellon                               :   1940    PROCEDURE: Atrioventricular Node Ablation     DATE OF PROCEDURE: 20    DIAGNOSIS:   Z01.810, Z79.0        X MAG    X PHOS       X CBC       X  BMP    X    PT   X  PTT       X     Chest x-Ray PA & Lateral View      ? PLEASE CALL ABNORMAL RESULTS TO THE REQUESTING PHYSICIAN? ATTENTION PATIENTS:  You do not have to fast for the lab work.      You must go to the [facility], [facility] [address] or [facility]      Physician Signature:_______________________Date:_________Time:_________                                            Wright Goss[address] Dr . Apolinar Aldo     PROCEDURE TO SCHEDULE:    PROCEDURE: Atrioventricular Node Ablation    Patient Name: Maximino Castellon  : 1940 MRN# [id number]    Home Phone Number: [phone]   Weight:    Wt Readings from Last 3 Encounters:   19 131 lb (59.4 kg)   19 136 lb 3.2 oz (61.8 kg)   19 137 lb (62.1 kg) Insurance: Payor: MEDICARE / Plan: MEDICARE PART A AND B / Product Type: *No Product type* /     Date of Procedure: 1/9/20 Time: 1:00pm Arrival Time: 11:00am    Diagnosis:  afib (I48.91)  Allergies:    Allergies   Allergen Reactions    Codeine Itching    Bactrim [Sulfamethoxazole-Trimethoprim]      dizziness        1) Call [facility] scheduling ([phone]) or [phone] [facility]     PHONE OR   INSTANT MESSAGE  2) PREAUTHORIZATION NUMBER:    Spoke to:      From date:     expiration date:        Alcon Tejada

## 2020-01-06 ENCOUNTER — HOSPITAL ENCOUNTER (OUTPATIENT)
Age: 80
Discharge: HOME | End: 2020-01-06
Payer: MEDICARE

## 2020-01-06 ENCOUNTER — HOSPITAL ENCOUNTER (OUTPATIENT)
Dept: GENERAL RADIOLOGY | Age: 80
Discharge: HOME | End: 2020-01-06
Payer: MEDICARE

## 2020-01-06 ENCOUNTER — TELEPHONE (OUTPATIENT)
Dept: CARDIOLOGY CLINIC | Age: 80
End: 2020-01-06

## 2020-01-06 LAB
ANION GAP SERPL CALCULATED.3IONS-SCNC: 13 MMOL/L (ref 4–16)
APTT: 34 SECONDS (ref 25.1–37.1)
BASOPHILS ABSOLUTE: 0.1 K/CU MM
BASOPHILS RELATIVE PERCENT: 1.1 % (ref 0–1)
BUN BLDV-MCNC: 19 MG/DL (ref 6–23)
CALCIUM SERPL-MCNC: 9.7 MG/DL (ref 8.3–10.6)
CHLORIDE BLD-SCNC: 101 MMOL/L (ref 99–110)
CO2: 27 MMOL/L (ref 21–32)
CREAT SERPL-MCNC: 1.3 MG/DL (ref 0.6–1.1)
DIFFERENTIAL TYPE: ABNORMAL
EOSINOPHILS ABSOLUTE: 0.2 K/CU MM
EOSINOPHILS RELATIVE PERCENT: 3.4 % (ref 0–3)
GFR AFRICAN AMERICAN: 48 ML/MIN/1.73M2
GFR NON-AFRICAN AMERICAN: 39 ML/MIN/1.73M2
GLUCOSE BLD-MCNC: 112 MG/DL (ref 70–99)
HCT VFR BLD CALC: 39.5 % (ref 37–47)
HEMOGLOBIN: 11.1 GM/DL (ref 12.5–16)
IMMATURE NEUTROPHIL %: 0.3 % (ref 0–0.43)
INR BLD: 0.93 INDEX
LYMPHOCYTES ABSOLUTE: 1.6 K/CU MM
LYMPHOCYTES RELATIVE PERCENT: 24.7 % (ref 24–44)
MAGNESIUM: 2.3 MG/DL (ref 1.8–2.4)
MCH RBC QN AUTO: 29.1 PG (ref 27–31)
MCHC RBC AUTO-ENTMCNC: 28.1 % (ref 32–36)
MCV RBC AUTO: 103.7 FL (ref 78–100)
MONOCYTES ABSOLUTE: 0.8 K/CU MM
MONOCYTES RELATIVE PERCENT: 13 % (ref 0–4)
NUCLEATED RBC %: 0 %
PDW BLD-RTO: 22.5 % (ref 11.7–14.9)
PHOSPHORUS: 3.9 MG/DL (ref 2.5–4.9)
PLATELET # BLD: 231 K/CU MM (ref 140–440)
PMV BLD AUTO: 13.1 FL (ref 7.5–11.1)
POTASSIUM SERPL-SCNC: 5 MMOL/L (ref 3.5–5.1)
PROTHROMBIN TIME: 11.3 SECONDS (ref 11.7–14.5)
RBC # BLD: 3.81 M/CU MM (ref 4.2–5.4)
SEGMENTED NEUTROPHILS ABSOLUTE COUNT: 3.7 K/CU MM
SEGMENTED NEUTROPHILS RELATIVE PERCENT: 57.5 % (ref 36–66)
SODIUM BLD-SCNC: 141 MMOL/L (ref 135–145)
TOTAL IMMATURE NEUTOROPHIL: 0.02 K/CU MM
TOTAL NUCLEATED RBC: 0 K/CU MM
WBC # BLD: 6.5 K/CU MM (ref 4–10.5)

## 2020-01-06 PROCEDURE — 83735 ASSAY OF MAGNESIUM: CPT

## 2020-01-06 PROCEDURE — 71046 X-RAY EXAM CHEST 2 VIEWS: CPT

## 2020-01-06 PROCEDURE — 85730 THROMBOPLASTIN TIME PARTIAL: CPT

## 2020-01-06 PROCEDURE — 85610 PROTHROMBIN TIME: CPT

## 2020-01-06 PROCEDURE — 85025 COMPLETE CBC W/AUTO DIFF WBC: CPT

## 2020-01-06 PROCEDURE — 80048 BASIC METABOLIC PNL TOTAL CA: CPT

## 2020-01-06 PROCEDURE — 36415 COLL VENOUS BLD VENIPUNCTURE: CPT

## 2020-01-06 PROCEDURE — 84100 ASSAY OF PHOSPHORUS: CPT

## 2020-01-09 ENCOUNTER — HOSPITAL ENCOUNTER (OUTPATIENT)
Dept: CARDIAC CATH/INVASIVE PROCEDURES | Age: 80
Discharge: HOME | End: 2020-01-10
Attending: INTERNAL MEDICINE | Admitting: INTERNAL MEDICINE
Payer: MEDICARE

## 2020-01-09 PROBLEM — Z98.890 S/P ATRIOVENTRICULAR NODAL ABLATION: Status: ACTIVE | Noted: 2020-01-09

## 2020-01-09 LAB
ABO/RH: NORMAL
ANTIBODY SCREEN: NEGATIVE
GLUCOSE BLD-MCNC: 97 MG/DL (ref 70–99)

## 2020-01-09 PROCEDURE — 86901 BLOOD TYPING SEROLOGIC RH(D): CPT

## 2020-01-09 PROCEDURE — 2709999900 HC NON-CHARGEABLE SUPPLY

## 2020-01-09 PROCEDURE — 82962 GLUCOSE BLOOD TEST: CPT

## 2020-01-09 PROCEDURE — 86850 RBC ANTIBODY SCREEN: CPT

## 2020-01-09 PROCEDURE — 86900 BLOOD TYPING SEROLOGIC ABO: CPT

## 2020-01-09 PROCEDURE — C1732 CATH, EP, DIAG/ABL, 3D/VECT: HCPCS

## 2020-01-09 PROCEDURE — 93650 ICAR CATH ABLTJ AV NODE FUNC: CPT | Performed by: INTERNAL MEDICINE

## 2020-01-09 PROCEDURE — 6370000000 HC RX 637 (ALT 250 FOR IP): Performed by: INTERNAL MEDICINE

## 2020-01-09 PROCEDURE — C1894 INTRO/SHEATH, NON-LASER: HCPCS

## 2020-01-09 PROCEDURE — 94640 AIRWAY INHALATION TREATMENT: CPT

## 2020-01-09 PROCEDURE — 93650 ICAR CATH ABLTJ AV NODE FUNC: CPT

## 2020-01-09 PROCEDURE — 6360000002 HC RX W HCPCS

## 2020-01-09 PROCEDURE — 2500000003 HC RX 250 WO HCPCS

## 2020-01-09 PROCEDURE — 6360000002 HC RX W HCPCS: Performed by: INTERNAL MEDICINE

## 2020-01-09 RX ORDER — METFORMIN HYDROCHLORIDE 500 MG/1
500 TABLET, EXTENDED RELEASE ORAL NIGHTLY
Status: DISCONTINUED | OUTPATIENT
Start: 2020-01-09 | End: 2020-01-10 | Stop reason: HOSPADM

## 2020-01-09 RX ORDER — ASPIRIN 81 MG/1
81 TABLET, CHEWABLE ORAL DAILY
Status: DISCONTINUED | OUTPATIENT
Start: 2020-01-09 | End: 2020-01-10 | Stop reason: HOSPADM

## 2020-01-09 RX ORDER — FUROSEMIDE 20 MG/1
20 TABLET ORAL EVERY OTHER DAY
Status: DISCONTINUED | OUTPATIENT
Start: 2020-01-10 | End: 2020-01-10 | Stop reason: HOSPADM

## 2020-01-09 RX ORDER — MIRTAZAPINE 15 MG/1
15 TABLET, FILM COATED ORAL NIGHTLY
Status: DISCONTINUED | OUTPATIENT
Start: 2020-01-09 | End: 2020-01-10 | Stop reason: HOSPADM

## 2020-01-09 RX ORDER — BUDESONIDE AND FORMOTEROL FUMARATE DIHYDRATE 160; 4.5 UG/1; UG/1
2 AEROSOL RESPIRATORY (INHALATION) 2 TIMES DAILY
Status: DISCONTINUED | OUTPATIENT
Start: 2020-01-09 | End: 2020-01-09 | Stop reason: CLARIF

## 2020-01-09 RX ORDER — HYDROCODONE BITARTRATE AND ACETAMINOPHEN 7.5; 325 MG/1; MG/1
1 TABLET ORAL 2 TIMES DAILY PRN
Status: DISCONTINUED | OUTPATIENT
Start: 2020-01-09 | End: 2020-01-10 | Stop reason: HOSPADM

## 2020-01-09 RX ORDER — DONEPEZIL HYDROCHLORIDE 10 MG/1
10 TABLET, FILM COATED ORAL NIGHTLY
Status: DISCONTINUED | OUTPATIENT
Start: 2020-01-09 | End: 2020-01-10 | Stop reason: HOSPADM

## 2020-01-09 RX ORDER — FERROUS SULFATE 325(65) MG
325 TABLET ORAL NIGHTLY
Status: DISCONTINUED | OUTPATIENT
Start: 2020-01-09 | End: 2020-01-10 | Stop reason: HOSPADM

## 2020-01-09 RX ORDER — LANCETS 28 GAUGE
1 EACH MISCELLANEOUS DAILY
Status: DISCONTINUED | OUTPATIENT
Start: 2020-01-09 | End: 2020-01-09 | Stop reason: CLARIF

## 2020-01-09 RX ORDER — PANTOPRAZOLE SODIUM 40 MG/1
40 TABLET, DELAYED RELEASE ORAL DAILY
Status: DISCONTINUED | OUTPATIENT
Start: 2020-01-10 | End: 2020-01-10 | Stop reason: HOSPADM

## 2020-01-09 RX ORDER — ATORVASTATIN CALCIUM 10 MG/1
10 TABLET, FILM COATED ORAL DAILY
Status: DISCONTINUED | OUTPATIENT
Start: 2020-01-09 | End: 2020-01-10 | Stop reason: HOSPADM

## 2020-01-09 RX ORDER — GABAPENTIN 300 MG/1
300 CAPSULE ORAL 2 TIMES DAILY
Status: DISCONTINUED | OUTPATIENT
Start: 2020-01-09 | End: 2020-01-10 | Stop reason: HOSPADM

## 2020-01-09 RX ORDER — DOCUSATE SODIUM 100 MG/1
100 CAPSULE, LIQUID FILLED ORAL 2 TIMES DAILY
Status: DISCONTINUED | OUTPATIENT
Start: 2020-01-09 | End: 2020-01-10 | Stop reason: HOSPADM

## 2020-01-09 RX ORDER — DULOXETIN HYDROCHLORIDE 30 MG/1
60 CAPSULE, DELAYED RELEASE ORAL NIGHTLY
Status: DISCONTINUED | OUTPATIENT
Start: 2020-01-09 | End: 2020-01-10 | Stop reason: HOSPADM

## 2020-01-09 RX ORDER — MONTELUKAST SODIUM 10 MG/1
10 TABLET ORAL DAILY
Status: DISCONTINUED | OUTPATIENT
Start: 2020-01-09 | End: 2020-01-10 | Stop reason: HOSPADM

## 2020-01-09 RX ORDER — PRIMIDONE 50 MG/1
50 TABLET ORAL 3 TIMES DAILY
Status: DISCONTINUED | OUTPATIENT
Start: 2020-01-09 | End: 2020-01-10 | Stop reason: HOSPADM

## 2020-01-09 RX ADMIN — ATORVASTATIN CALCIUM 10 MG: 10 TABLET, FILM COATED ORAL at 21:17

## 2020-01-09 RX ADMIN — METOPROLOL TARTRATE 25 MG: 25 TABLET ORAL at 21:17

## 2020-01-09 RX ADMIN — ALBUTEROL SULFATE 5 MG: 2.5 SOLUTION RESPIRATORY (INHALATION) at 22:59

## 2020-01-09 RX ADMIN — DULOXETINE HYDROCHLORIDE 60 MG: 30 CAPSULE, DELAYED RELEASE ORAL at 21:17

## 2020-01-09 RX ADMIN — GABAPENTIN 300 MG: 300 CAPSULE ORAL at 21:18

## 2020-01-09 RX ADMIN — FERROUS SULFATE TAB 325 MG (65 MG ELEMENTAL FE) 325 MG: 325 (65 FE) TAB at 21:17

## 2020-01-09 RX ADMIN — ASPIRIN 81 MG 81 MG: 81 TABLET ORAL at 21:31

## 2020-01-09 RX ADMIN — HYDROCODONE BITARTRATE AND ACETAMINOPHEN 1 TABLET: 7.5; 325 TABLET ORAL at 21:31

## 2020-01-09 RX ADMIN — Medication 2 PUFF: at 22:58

## 2020-01-09 RX ADMIN — PRIMIDONE 50 MG: 50 TABLET ORAL at 21:17

## 2020-01-09 RX ADMIN — DONEPEZIL HYDROCHLORIDE 10 MG: 10 TABLET, FILM COATED ORAL at 21:17

## 2020-01-09 RX ADMIN — MONTELUKAST 10 MG: 10 TABLET, FILM COATED ORAL at 21:17

## 2020-01-09 RX ADMIN — MIRTAZAPINE 15 MG: 15 TABLET, FILM COATED ORAL at 21:17

## 2020-01-09 RX ADMIN — DOCUSATE SODIUM 100 MG: 100 CAPSULE, LIQUID FILLED ORAL at 21:17

## 2020-01-09 ASSESSMENT — PAIN DESCRIPTION - LOCATION: LOCATION: BACK

## 2020-01-09 ASSESSMENT — PAIN DESCRIPTION - ORIENTATION: ORIENTATION: LOWER

## 2020-01-09 ASSESSMENT — PAIN SCALES - GENERAL
PAINLEVEL_OUTOF10: 0
PAINLEVEL_OUTOF10: 9
PAINLEVEL_OUTOF10: 9

## 2020-01-09 ASSESSMENT — PAIN DESCRIPTION - DESCRIPTORS: DESCRIPTORS: ACHING

## 2020-01-09 ASSESSMENT — PAIN DESCRIPTION - PAIN TYPE: TYPE: ACUTE PAIN

## 2020-01-09 NOTE — OP NOTE
up pacing was arranged at 50 by the pacemaker prior to start of the procedure). After appropriate waiting period, we noted that patient was still paced through out with complete AV block. Sheaths and catheter were removed and hemostasis was achieved with manual compression    We then programmed the device to 80 bpm.    There was escape rhythm at 30 bpm    Patient tolerated he procedure well and no complications were noted. Summary:    Intracardiac catheter ablation of atrioventricular node function, atrioventricular conduction for creation of complete heart block      Plan:    Observe overnight  Continue anticoagulation.   Bed rest for 4 hours

## 2020-01-09 NOTE — FLOWSHEET NOTE
Received from cath lab. Rt Groin site wnl. No bleeding or hematoma noted. Denies any pain. Monitor and alarms on. Vital signs obtained. Menu provided. Drink provided. Call Light in reach. Daughter at bedside. Will continue to monitor patient.

## 2020-01-09 NOTE — H&P
Electrophysiology H&p  Note      Reason for consultation: Bradycardia    Chief complaint : shortness of breath and light headedness    Referring physician: Dr. Antonia Dunham      Primary care physician: Kenzie Ahumada MD      History of Present Illness: Today visit (01/09/20)    Patient here for AV NODE ABLATION. No change in H&P noted from previous clinic visit. Previous visit (11/8/2019)      Chief Complaint   Patient presents with    Shortness of Breath     Dr Shakira Walker patient here for c/o SOB and lightheadedness. She had pacemaker placed 9/26. Patient was in the office Wednesday for an appointment with Herlinda Bangura, patient was found to be in Afib and sent to ED. Patient was given cardizem and converted back to NSR. Dr Shakira Walker added metoprolol. Patient also c/o pain in her back and neck. Also unsteady on her feet. Previous visit:    Patient is a 49-year-old female with history of atrial fibrillation coronary artery disease COPD, hypertension, hyperlipidemia  and thyroid disease resented with chest pain and recently diagnosed with bronchitis. Patient is calm her mentation is completely altered and she is unable to give any information. she is very sleepy and confused. Most of the history is obtained from the chart.   Patient was noted to have bradycardia so she was moved to stepdown from first floor  EP consult for bradycardia      Pastmedical history:   Past Medical History:   Diagnosis Date    Acute diastolic CHF (congestive heart failure) ([facility] [address] 12/23/2014    Acute urinary tract infection 7/20/2019    Single Kidney    Anemia     Anxiety     CAD (coronary artery disease)     follows with Dr Fredrick [facility])     skin on ear    Chronic low back pain     s/p epidural steroids    COPD (chronic obstructive pulmonary disease) (HCC)     moderate to severe    Depression     Family history of cardiac disorder     Father, Brother    GERD [employer] XT DR ALEX Torres     ROTATOR CUFF REPAIR  1997    right    TUBAL LIGATION  1972       Family history:   Family History   Problem Relation Age of Onset   Gomez Stroke Mother     Heart Disease Mother     Asthma Mother     Diabetes Mother     Cancer Mother         uterine    Emphysema Father     Stroke Father     Heart Disease Father     Heart Defect Father         hardening of arteries    Diabetes Sister     Diabetes Brother     Heart Disease Brother     Cancer Maternal Grandfather         stomach    Diabetes Paternal Grandmother     Stroke Paternal Grandmother     Diabetes Brother     Alcohol Abuse Maternal Aunt        Social history :  reports that she has quit smoking. Her smoking use included cigarettes. She started smoking about 59 years ago. She quit after 59.00 years of use. She has never used smokeless tobacco. She reports current alcohol use. She reports that she does not use drugs. Allergies   Allergen Reactions    Codeine Itching    Bactrim [Sulfamethoxazole-Trimethoprim]      dizziness       No current facility-administered medications on file prior to encounter. Current Outpatient Medications on File Prior to Encounter   Medication Sig Dispense Refill    atorvastatin (LIPITOR) 10 MG tablet Take 1 tablet by mouth daily 90 tablet 0    diltiazem (CARDIZEM CD) 120 MG extended release capsule Take 1 capsule by mouth daily 90 capsule 0    donepezil (ARICEPT) 10 MG tablet Take 1 tablet by mouth nightly 90 tablet 0    furosemide (LASIX) 20 MG tablet Take 1 tablet by mouth See Admin Instructions Take 1 tab every other day 45 tablet 0    gabapentin (NEURONTIN) 300 MG capsule Take 1 capsule by mouth 2 times daily for 90 days.  180 capsule 1    montelukast (SINGULAIR) 10 MG tablet Take 1 tablet by mouth daily 90 tablet 0    pantoprazole (PROTONIX) 40 MG tablet Take 1 tablet by mouth daily 90 tablet 0    primidone (MYSOLINE) 50 MG tablet Take 1 tablet by mouth 3 times daily 270 tablet 0    DULoxetine (CYMBALTA) 60 MG extended release capsule Take 1 capsule by mouth nightly 90 capsule 0    HYDROcodone-acetaminophen (NORCO) 7.5-325 MG per tablet Take 0.5-1 tablets by mouth 3 times daily as needed for Pain (m54.5) for up to 30 days.  90 tablet 0    amiodarone (CORDARONE) 200 MG tablet Take 1 tablet by mouth daily 90 tablet 0    metoprolol tartrate (LOPRESSOR) 25 MG tablet Take 1 tablet by mouth 2 times daily 60 tablet 0    ranitidine (ZANTAC) 150 MG tablet Take 1 tablet by mouth 2 times daily 180 tablet 0    mirtazapine (REMERON) 15 MG tablet Take 1 tablet by mouth nightly      CPAP Machine MISC by Does not apply route      ferrous sulfate 325 (65 Fe) MG tablet Take 1 tablet by mouth nightly Until you start iv iron (Patient taking differently: Take 650 mg by mouth nightly Until you start iv iron) 30 tablet 3    metFORMIN (GLUCOPHAGE) 500 MG tablet Take 1 tablet by mouth nightly 90 tablet 0    rivaroxaban (XARELTO) 15 MG TABS tablet Take 1 tablet by mouth daily 90 tablet 0    albuterol (PROVENTIL) (5 MG/ML) 0.5% nebulizer solution Take 1 mL by nebulization 4 times daily 120 vial 5    budesonide-formoterol (SYMBICORT) 160-4.5 MCG/ACT AERO Inhale 2 puffs into the lungs 2 times daily 1 Inhaler 5    midodrine (PROAMATINE) 5 MG tablet TAKE 1 TABLET BY MOUTH THREE TIMES DAILY 270 tablet 3    aspirin 81 MG tablet Take 81 mg by mouth daily      docusate sodium (COLACE) 100 MG capsule Take 100 mg by mouth 2 times daily      FREESTYLE LANCETS MISC 1 each by Does not apply route daily      Glucose Blood (FREESTYLE LITE TEST VI) by In Vitro route         Review of Systems:   Review of Systems   Reports shortness o fbreath with exertion and dizziness  Some arthralgia  Denies chest pain  Occasional palpitations   C/o back and neck pain  Denies abdominal pain, nausea and vomiting  Denies wheezing  DEnies any eye problems        Examination:      Vitals:    01/09/20 1219   BP: 131/62 Pulse: 61   Resp: 11   Temp: 97.1 °F (36.2 °C)   TempSrc: Temporal   SpO2: 92%   Weight: 130 lb (59 kg)   Height: 5' 4\" (1.626 m)        Body mass index is 22.31 kg/m². Physical Exam   Constitutional: She appears well-developed and well-nourished. HENT:   Head: Normocephalic and atraumatic. Eyes: Pupils are equal, round, and reactive to light. Conjunctivae and EOM are normal. Right eye exhibits no discharge. Neck: Normal range of motion. No JVD present. No thyromegaly present. Cardiovascular: Normal rate, regular rhythm and normal heart sounds. Exam reveals no friction rub. No murmur heard. Pulmonary/Chest: Effort normal and breath sounds normal. No stridor. No respiratory distress. She has no wheezes. Abdominal: Soft. Bowel sounds are normal. She exhibits no distension. There is no tenderness. Musculoskeletal: Normal range of motion. She exhibits no edema or tenderness. Neurological: She displays normal reflexes. Skin: Skin is warm and dry. No rash noted. No erythema. Psychiatric: She has a normal mood and affect.  Her behavior is normal.           CBC:   Lab Results   Component Value Date    WBC 6.5 01/06/2020    HGB 11.1 01/06/2020    HCT 39.5 01/06/2020     01/06/2020     Lipids:  Lab Results   Component Value Date    CHOL 133 09/27/2019    TRIG 88 09/27/2019    HDL 62 09/27/2019    LDLCALC 125 06/26/2013    LDLDIRECT 53 09/27/2019     PT/INR:   Lab Results   Component Value Date    INR 0.93 01/06/2020        BMP:    Lab Results   Component Value Date     01/06/2020    K 5.0 01/06/2020     01/06/2020    CO2 27 01/06/2020    BUN 19 01/06/2020     CMP:   Lab Results   Component Value Date    AST 11 (L) 11/06/2019    PROT 6.5 11/06/2019    BILITOT 0.1 11/06/2019    ALKPHOS 124 11/06/2019     TSH:    Lab Results   Component Value Date    TSH 0.20 06/26/2013       EKGINTERPRETATION - EKG Interpretation:  Atrial paced adnd ventricular sensed    ASA -3  MALLAMPATI - 2        IMPRESSION / RECOMMENDATIONS:     Tachy tiffany sp pacemaker  Recurrent atrial fibrillation with RVR episodes  Coronary artery disease  Hypertension  Hyperlipidemia  Altered mental status    Patient having PAF episodes. Discussed with patient about options of atrial fib ablation vs av node ablation  BP is low because of medications probably - start midodrine  Patient decided to proceed with AV node ablation and does not want atrial fib ablation            Thanks again for allowing me to participate in care of this patient. Please call me if you have any questions. With best regards.       Ahsan George MD,

## 2020-01-10 VITALS
BODY MASS INDEX: 22.2 KG/M2 | TEMPERATURE: 98.3 F | HEART RATE: 80 BPM | HEIGHT: 64 IN | DIASTOLIC BLOOD PRESSURE: 57 MMHG | WEIGHT: 130 LBS | SYSTOLIC BLOOD PRESSURE: 110 MMHG | RESPIRATION RATE: 19 BRPM | OXYGEN SATURATION: 92 %

## 2020-01-10 PROCEDURE — 6370000000 HC RX 637 (ALT 250 FOR IP): Performed by: INTERNAL MEDICINE

## 2020-01-10 PROCEDURE — 6370000000 HC RX 637 (ALT 250 FOR IP): Performed by: NURSE PRACTITIONER

## 2020-01-10 RX ORDER — MIDODRINE HYDROCHLORIDE 5 MG/1
5 TABLET ORAL
Status: DISCONTINUED | OUTPATIENT
Start: 2020-01-10 | End: 2020-01-10 | Stop reason: HOSPADM

## 2020-01-10 RX ADMIN — GABAPENTIN 300 MG: 300 CAPSULE ORAL at 08:55

## 2020-01-10 RX ADMIN — MIDODRINE HYDROCHLORIDE 5 MG: 5 TABLET ORAL at 08:55

## 2020-01-10 RX ADMIN — PRIMIDONE 50 MG: 50 TABLET ORAL at 08:55

## 2020-01-10 RX ADMIN — ASPIRIN 81 MG 81 MG: 81 TABLET ORAL at 08:55

## 2020-01-10 RX ADMIN — MIDODRINE HYDROCHLORIDE 5 MG: 5 TABLET ORAL at 11:47

## 2020-01-10 RX ADMIN — DOCUSATE SODIUM 100 MG: 100 CAPSULE, LIQUID FILLED ORAL at 08:56

## 2020-01-10 RX ADMIN — RIVAROXABAN 15 MG: 15 TABLET, FILM COATED ORAL at 09:54

## 2020-01-10 RX ADMIN — FUROSEMIDE 20 MG: 20 TABLET ORAL at 09:54

## 2020-01-10 RX ADMIN — PANTOPRAZOLE SODIUM 40 MG: 40 TABLET, DELAYED RELEASE ORAL at 06:39

## 2020-01-10 ASSESSMENT — PAIN SCALES - GENERAL
PAINLEVEL_OUTOF10: 0
PAINLEVEL_OUTOF10: 0

## 2020-01-14 ENCOUNTER — TELEPHONE (OUTPATIENT)
Dept: CARDIOLOGY CLINIC | Age: 80
End: 2020-01-14

## 2020-01-14 NOTE — TELEPHONE ENCOUNTER
Daughter called and needs to schedule ov post ablation for this week with Josef.   Also 1 month with Maria Esther Massey call daughter at [phone] Usha Avila

## 2020-01-21 ENCOUNTER — OFFICE VISIT (OUTPATIENT)
Dept: CARDIOLOGY CLINIC | Age: 80
End: 2020-01-21
Payer: MEDICARE

## 2020-01-21 VITALS
DIASTOLIC BLOOD PRESSURE: 60 MMHG | HEART RATE: 80 BPM | SYSTOLIC BLOOD PRESSURE: 102 MMHG | HEIGHT: 64 IN | BODY MASS INDEX: 23.01 KG/M2 | WEIGHT: 134.8 LBS | OXYGEN SATURATION: 96 %

## 2020-01-21 PROBLEM — I48.91 ATRIAL FIBRILLATION WITH RVR (HCC): Status: ACTIVE | Noted: 2020-01-21

## 2020-01-21 PROCEDURE — G8427 DOCREV CUR MEDS BY ELIG CLIN: HCPCS | Performed by: NURSE PRACTITIONER

## 2020-01-21 PROCEDURE — 93000 ELECTROCARDIOGRAM COMPLETE: CPT | Performed by: NURSE PRACTITIONER

## 2020-01-21 PROCEDURE — 1090F PRES/ABSN URINE INCON ASSESS: CPT | Performed by: NURSE PRACTITIONER

## 2020-01-21 PROCEDURE — 1036F TOBACCO NON-USER: CPT | Performed by: NURSE PRACTITIONER

## 2020-01-21 PROCEDURE — 1123F ACP DISCUSS/DSCN MKR DOCD: CPT | Performed by: NURSE PRACTITIONER

## 2020-01-21 PROCEDURE — G8420 CALC BMI NORM PARAMETERS: HCPCS | Performed by: NURSE PRACTITIONER

## 2020-01-21 PROCEDURE — 4040F PNEUMOC VAC/ADMIN/RCVD: CPT | Performed by: NURSE PRACTITIONER

## 2020-01-21 PROCEDURE — G8482 FLU IMMUNIZE ORDER/ADMIN: HCPCS | Performed by: NURSE PRACTITIONER

## 2020-01-21 PROCEDURE — G8400 PT W/DXA NO RESULTS DOC: HCPCS | Performed by: NURSE PRACTITIONER

## 2020-01-21 PROCEDURE — 99212 OFFICE O/P EST SF 10 MIN: CPT | Performed by: NURSE PRACTITIONER

## 2020-01-21 NOTE — PROGRESS NOTES
pulmonary disease) ([facility] [address]     moderate to severe    Depression     Family history of cardiac disorder     Father, Brother    GERD (gastroesophageal reflux disease)     Goiter     s/p radioactive Iodine/ Low T4    H/O blood clots     right leg after miscarriage    H/O cardiovascular stress test 6/25/13 6/13-WNL EF 70%    H/O cardiovascular stress test 08/10/2017    Normal study EF 70%    H/O: CVA (cerebrovascular accident) 7/20/2019    Heart murmur     History of blood transfusion     2017    History of LIMITED Echocardiogram 11/08/2019    EF 55-60%, Normal left ventricle structure and systolic function , no regional wall motion abnormalities were detected    [place] (hard of hearing[place]     hearing aides    Hx of blood clots     Hx of echocardiogram 07/11/2013    EF>55%, mild MR, mild TR, abn lVSFx stage 1     HX OTHER MEDICAL     PCP is Dr Juan Mccall; Cardiologist is Dr Nahum Rios 7/30/13    Peripheral Angiogram.  Sluggish flow RLE, med mgmt.      Hydrocephalus ([facility] [address] 7/20/2019    Hydrocephalus, adult ([facility] [address]     shunt    Hyperlipidemia     Hyperthyroidism 7/20/2019    Kidney stone     \"had stone last summer 2016- passed it\"    Memory loss     Old MI (myocardial infarction) 2001    Osteoarthritis     Pneumonia     recurrent    Solitary kidney     Unspecified cerebral artery occlusion with cerebral infarction     \"they discovered I had stroke in 1993- with CT scan of head but never knew I had it\"       Surgical history :   Past Surgical History:   Procedure Laterality Date   [place]    disc ruptured    BRAIN SURGERY  2001    right shunt    CHEST SURGERY  1953    reconstruction of breast bone and rib-\"pigeon chest\"    CHOLECYSTECTOMY  1990    COLONOSCOPY  7/2011    F/U 7/2016    CORONARY ANGIOPLASTY WITH STENT PLACEMENT  2001    CSF SHUNT      ENDOSCOPY, COLON, DIAGNOSTIC      per old chart pt had EGD done with admission 2/17/2017   [address] BSO-dub/fibroids    KIDNEY SURGERY Right 1967    suspension    KNEE SURGERY  1997    right     PACEMAKER INSERTION Left 09/26/2019    [employer] XT DR ALEX Torres     ROTATOR CUFF REPAIR  1997    right    TUBAL LIGATION  1972       Family history:   Family History   Problem Relation Age of Onset   Gomez Stroke Mother     Heart Disease Mother     Asthma Mother     Diabetes Mother     Cancer Mother         uterine    Emphysema Father     Stroke Father     Heart Disease Father     Heart Defect Father         hardening of arteries    Diabetes Sister     Diabetes Brother     Heart Disease Brother     Cancer Maternal Grandfather         stomach    Diabetes Paternal Grandmother     Stroke Paternal Grandmother     Diabetes Brother     Alcohol Abuse Maternal Aunt        Social history :  reports that she has quit smoking. Her smoking use included cigarettes. She started smoking about 59 years ago. She quit after 59.00 years of use. She has never used smokeless tobacco. She reports current alcohol use. She reports that she does not use drugs. Allergies   Allergen Reactions    Codeine Itching    Bactrim [Sulfamethoxazole-Trimethoprim]      dizziness       Current Outpatient Medications on File Prior to Visit   Medication Sig Dispense Refill    atorvastatin (LIPITOR) 10 MG tablet Take 1 tablet by mouth daily 90 tablet 0    donepezil (ARICEPT) 10 MG tablet Take 1 tablet by mouth nightly 90 tablet 0    furosemide (LASIX) 20 MG tablet Take 1 tablet by mouth See Admin Instructions Take 1 tab every other day 45 tablet 0    gabapentin (NEURONTIN) 300 MG capsule Take 1 capsule by mouth 2 times daily for 90 days.  180 capsule 1    metFORMIN (GLUCOPHAGE) 500 MG tablet Take 1 tablet by mouth nightly 90 tablet 0    montelukast (SINGULAIR) 10 MG tablet Take 1 tablet by mouth daily 90 tablet 0    pantoprazole (PROTONIX) 40 MG tablet Take 1 tablet by mouth daily 90 tablet 0    rivaroxaban (XARELTO) 15 MG urinating and dysuria. Musculoskeletal: Positive for arthralgias and gait problem. Negative for back pain. Skin: Negative for color change, pallor, rash and wound. Neurological: Negative for dizziness, syncope and light-headedness. Psychiatric/Behavioral: Negative for confusion. The patient is not nervous/anxious. Examination:      Vitals:    01/21/20 1315   BP: 102/60   Pulse: 80   SpO2: 96%   Weight: 134 lb 12.8 oz (61.1 kg)   Height: 5' 4\" (1.626 m)        Body mass index is 23.14 kg/m². Physical Exam   Constitutional: She appears well-developed and well-nourished. HENT:   Head: Normocephalic and atraumatic. Eyes: Pupils are equal, round, and reactive to light. Conjunctivaeare normal. Right and left eye exhibits no discharge. Neck: Normal range of motion. No JVD present. No thyromegaly present. Cardiovascular: Normal rate, regular rhythm and normal heart sounds. Exam reveals no friction rub. No murmur heard. Pulmonary/Chest: Effort normal and breath sounds normal. No stridor. No respiratory distress. She has no wheezes. Abdominal: Soft. Bowel sounds are normal. She exhibits no distension. There is no tenderness. Musculoskeletal: Normal range of motion. She exhibits no edema or tenderness. Neurological: She displays normal reflexes. Skin: Skin is warm and dry. No rash noted. No erythema. right femoral groin site soft, nontender. No hematoma. No redness or swelling  Psychiatric: She has a normal mood and affect.  Her behavior is normal.           CBC:   Lab Results   Component Value Date    WBC 6.5 01/06/2020    HGB 11.1 01/06/2020    HCT 39.5 01/06/2020     01/06/2020     Lipids:  Lab Results   Component Value Date    CHOL 133 09/27/2019    TRIG 88 09/27/2019    HDL 62 09/27/2019    LDLCALC 125 06/26/2013    LDLDIRECT 53 09/27/2019     PT/INR:   Lab Results   Component Value Date    INR 0.93 01/06/2020        BMP:    Lab Results   Component Value Date

## 2020-02-17 ENCOUNTER — TELEPHONE (OUTPATIENT)
Dept: CARDIOLOGY CLINIC | Age: 80
End: 2020-02-17

## 2020-02-18 ENCOUNTER — TELEPHONE (OUTPATIENT)
Dept: CARDIOLOGY CLINIC | Age: 80
End: 2020-02-18

## 2020-02-18 NOTE — TELEPHONE ENCOUNTER
Cancelled patients appointment with Caldwell Phalen. Left message on voice mail for patient to reschedule the appointment.

## 2020-02-20 RX ORDER — HYDROCODONE BITARTRATE AND ACETAMINOPHEN 7.5; 325 MG/1; MG/1
.5-1 TABLET ORAL 3 TIMES DAILY PRN
Qty: 90 TABLET | Refills: 0 | Status: SHIPPED | OUTPATIENT
Start: 2020-02-20 | End: 2020-03-16 | Stop reason: SDUPTHER

## 2020-02-21 RX ORDER — CYCLOBENZAPRINE HCL 10 MG
10 TABLET ORAL NIGHTLY
Qty: 90 TABLET | Refills: 0 | Status: SHIPPED | OUTPATIENT
Start: 2020-02-21 | End: 2020-03-16 | Stop reason: SDUPTHER

## 2020-02-26 ENCOUNTER — OFFICE VISIT (OUTPATIENT)
Dept: CARDIOLOGY CLINIC | Age: 80
End: 2020-02-26
Payer: MEDICARE

## 2020-02-26 VITALS
BODY MASS INDEX: 22.57 KG/M2 | DIASTOLIC BLOOD PRESSURE: 78 MMHG | HEIGHT: 64 IN | HEART RATE: 80 BPM | SYSTOLIC BLOOD PRESSURE: 100 MMHG | WEIGHT: 132.2 LBS

## 2020-02-26 PROCEDURE — 1123F ACP DISCUSS/DSCN MKR DOCD: CPT | Performed by: NURSE PRACTITIONER

## 2020-02-26 PROCEDURE — 99213 OFFICE O/P EST LOW 20 MIN: CPT | Performed by: NURSE PRACTITIONER

## 2020-02-26 PROCEDURE — 1036F TOBACCO NON-USER: CPT | Performed by: NURSE PRACTITIONER

## 2020-02-26 PROCEDURE — G8420 CALC BMI NORM PARAMETERS: HCPCS | Performed by: NURSE PRACTITIONER

## 2020-02-26 PROCEDURE — 1090F PRES/ABSN URINE INCON ASSESS: CPT | Performed by: NURSE PRACTITIONER

## 2020-02-26 PROCEDURE — G8482 FLU IMMUNIZE ORDER/ADMIN: HCPCS | Performed by: NURSE PRACTITIONER

## 2020-02-26 PROCEDURE — 4040F PNEUMOC VAC/ADMIN/RCVD: CPT | Performed by: NURSE PRACTITIONER

## 2020-02-26 PROCEDURE — G8400 PT W/DXA NO RESULTS DOC: HCPCS | Performed by: NURSE PRACTITIONER

## 2020-02-26 PROCEDURE — G8427 DOCREV CUR MEDS BY ELIG CLIN: HCPCS | Performed by: NURSE PRACTITIONER

## 2020-02-26 NOTE — LETTER
CLINICAL STAFF DOCUMENTATION    Serg Parsons  1940  [id number]    Have you had any Chest Pain - No    Have you had any Shortness of Breath - Yes  If Yes - When on exertion    Have you had any dizziness - No    Have you had any palpitations - No    Do you have any edema - swelling in legs    If Yes - CHECK TO SEE IF THE EDEMA IS PITTING  How long have they been having edema - Years  If Yes - Have they worn compression stockings No    Do you have a surgery or procedure scheduled in the near future - No      Ask patient if they [facility] if they are not already signed [facility]    Check to see if we have an E-MAIL on file for the patient    Check medication list thoroughly!!!  BE SURE TO ASK PATIENT IF THEY NEED MEDICATION REFILLS

## 2020-02-26 NOTE — PROGRESS NOTES
Electrophysiology Follow up    Reason for consultation: bradycardia      Chief complaint :  F/u AV node ablation     Referring physician: Dr. Marialuisa Alatorre     Primary care physician: Jhonathan Kramer MD     History of Present Illness: 2/26/2020  She is here for a 1 month follow up post AV node ablation she does note occasional palpations. She is feeling tired. She notes SOB exertion. She denies any dizziness. There is no edema noted. Previous visit on 1/21/2020  Patient is here today for 1 week follow up s/p av node ablation. She reports she is feeling well. She states her shortness of breath has resolved. She denies chest pain,  palpitations, shortness of breath, edema, dizziness, or syncope.      Previous visit (11/8/2019)             Chief Complaint   Patient presents with    Shortness of Breath       Dr Devi Welch patient here for c/o SOB and lightheadedness. She had pacemaker placed 9/26. Patient was in the office Wednesday for an appointment with Anant Price, patient was found to be in Afib and sent to ED. Patient was given cardizem and converted back to NSR. Dr Devi Welch added metoprolol. Patient also c/o pain in her back and neck. Also unsteady on her feet.             Previous visit:     Patient is a 13-year-old female with history of atrial fibrillation coronary artery disease COPD, hypertension, hyperlipidemia  and thyroid disease resented with chest pain and recently diagnosed with bronchitis.     Patient is calm her mentation is completely altered and she is unable to give any information. she is very sleepy and confused. Most of the history is obtained from the chart.   Patient was noted to have bradycardia so she was moved to stepdown from first floor  EP consult for bradycardia      Past Medical History:   Diagnosis Date    Acute diastolic CHF (congestive heart failure) ([facility] [address] 12/23/2014    Acute urinary tract infection 7/20/2019    Single Kidney    Anemia     Anxiety     CAD (coronary artery disease)     follows with [facility] Amy Le[facility])     skin on ear    Chronic low back pain     s/p epidural steroids    COPD (chronic obstructive pulmonary disease) (HCC)     moderate to severe    Depression     Family history of cardiac disorder     Father, Brother    GERD (gastroesophageal reflux disease)     Goiter     s/p radioactive Iodine/ Low T4    H/O blood clots     right leg after miscarriage    H/O cardiovascular stress test 6/25/13 6/13-WNL EF 70%    H/O cardiovascular stress test 08/10/2017    Normal study EF 70%    H/O: CVA (cerebrovascular accident) 7/20/2019    Heart murmur     History of blood transfusion     2017    History of LIMITED Echocardiogram 11/08/2019    EF 55-60%, Normal left ventricle structure and systolic function , no regional wall motion abnormalities were detected    [place] (hard of hearing)     hearing aides    Hx of blood clots     Hx of echocardiogram 07/11/2013    EF>55%, mild MR, mild TR, abn lVSFx stage 1     HX OTHER MEDICAL     PCP is Dr John Dumas; Cardiologist is Dr Omalley Said 7/30/13    Peripheral Angiogram.  Sluggish flow RLE, med mgmt.      Hydrocephalus ([facility] [address] 7/20/2019    Hydrocephalus, adult ([facility] [address]     shunt    Hyperlipidemia     Hyperthyroidism 7/20/2019    Kidney stone     \"had stone last summer 2016- passed it\"    Memory loss     Old MI (myocardial infarction) 2001    Osteoarthritis     Pneumonia     recurrent    Solitary kidney     Unspecified cerebral artery occlusion with cerebral infarction     \"they discovered I had stroke in 1993- with CT scan of head but never knew I had it\"     Past Surgical History:   Procedure Laterality Date   [place]    disc ruptured    BRAIN SURGERY  2001    right shunt    CHEST SURGERY  1953    reconstruction of breast bone and rib-\"pigeon chest\"    CHOLECYSTECTOMY  1990    COLONOSCOPY  7/2011    F/U 7/2016    CORONARY ANGIOPLASTY WITH STENT PLACEMENT  2001    CSF SHUNT intolerance  · Hematologic/Lymphatic: No bleeding problems, blood clots or swollen lymph nodes  · Allergic/Immunologic: No nasal congestion or hives        There were no vitals taken for this visit. Wt Readings from Last 3 Encounters:   01/21/20 134 lb 12.8 oz (61.1 kg)   01/09/20 130 lb (59 kg)   12/18/19 131 lb (59.4 kg)       Physical exam:   General Appearance:  No distress, conversant  Constitutional:  Well developed, Well nourished, No acute distress, Non-toxic appearance. HENT:  Normocephalic,shunt noted to right side parietal lobe region, Atraumatic, Bilateral external ears normal, Oropharynx moist, No oral exudates, Nose normal. Neck- Normal range of motion, No tenderness, Supple, No stridor,no apical-carotid delay  Eyes:  PERRL, EOMI, Conjunctiva normal, No discharge. Respiratory:  Normal breath sounds, No respiratory distress, No wheezing, No chest tenderness. Cardiovascular: (PMI) apex non displaced,no lifts no thrills, S1 S2 normal   RRR  No murmur appreciated left sided pacer pocket is intact. GI:  Bowel sounds normal, Soft, No tenderness   :  No costovertebral angle tenderness   Musculoskeletal:  trace edema to left foot, no tenderness, no deformities. Integument:  Well hydrated, no rash   Lymphatic:  No lymphadenopathy noted   Neurologic:  Alert & oriented x 3, CN 2-12 grossly intact. normal   Psychiatric:  Speech and behavior appropriate     DATA:  Lab Results   Component Value Date    TROPONINT <0.010 11/06/2019     BNP:    Lab Results   Component Value Date    PROBNP 429.2 (H) 11/06/2019     PT/INR:  No results found for: PTINR  Lab Results   Component Value Date    LABA1C 5.6 03/05/2017    LABA1C 6.1 02/02/2017     Lab Results   Component Value Date    CHOL 133 09/27/2019    TRIG 88 09/27/2019    HDL 62 09/27/2019    LDLCALC 125 06/26/2013    LDLDIRECT 53 09/27/2019     Lab Results   Component Value Date    ALT 6 (L) 11/06/2019    AST 11 (L) 11/06/2019     TSH:   Lab Results Component Value Date    TSH 0.20 06/26/2013         Impression and recommendations:   S/p AV node ablation   Tachy tiffany  S/p Pacemaker  PAF  CAD   HTN  Dementia   SUE    Patient states she is feeling fair since ablation   She does note occasional palpitation   Will continue with xarelto   S/p Medtronic dual chamber pacemaker   Device Assessment:- left sided pacer pocket intact. The device is Medtronic pacemaker - Dual Chamber chamber    MRI Compatible : yes  Device interrogation was performed. Mode: AAI --- DDD   Sensing is normal. Impedence is normal.  Threshold is normal.   There has not been interval changes. Estimated battery life is 13.1 years   The underlying rhythm is , AP.  86 % atrial paced; 100 % ventricular paced. Atrial Arrhythmia : Yes  Non sustained VT episodes : No  Sustained VT episodes : No  Patient activity reported 0 hrs/day    The patient is pacemaker dependent.          FREDERICK Walsh - CNP on 2/26/2020 at 10:48 AM

## 2020-02-27 PROBLEM — F32.9 REACTIVE DEPRESSION: Status: RESOLVED | Noted: 2019-11-22 | Resolved: 2020-02-27

## 2020-02-27 PROBLEM — I50.32 CHRONIC DIASTOLIC CHF (CONGESTIVE HEART FAILURE) (HCC): Status: ACTIVE | Noted: 2020-02-27

## 2020-03-02 PROBLEM — J44.9 COPD (CHRONIC OBSTRUCTIVE PULMONARY DISEASE) (HCC): Chronic | Status: RESOLVED | Noted: 2017-02-15 | Resolved: 2020-03-02

## 2020-03-04 ENCOUNTER — TELEPHONE (OUTPATIENT)
Dept: FAMILY MEDICINE CLINIC | Age: 80
End: 2020-03-04

## 2020-03-05 ENCOUNTER — HOSPITAL ENCOUNTER (OUTPATIENT)
Dept: INFUSION THERAPY | Age: 80
Discharge: HOME | End: 2020-03-05
Payer: MEDICARE

## 2020-03-05 LAB
ALBUMIN SERPL-MCNC: 4.2 GM/DL (ref 3.4–5)
ALP BLD-CCNC: 115 IU/L (ref 40–129)
ALT SERPL-CCNC: 9 U/L (ref 10–40)
ANION GAP SERPL CALCULATED.3IONS-SCNC: 7 MMOL/L (ref 4–16)
AST SERPL-CCNC: 13 IU/L (ref 15–37)
BASOPHILS ABSOLUTE: 0 K/CU MM
BASOPHILS RELATIVE PERCENT: 0.3 % (ref 0–1)
BILIRUB SERPL-MCNC: 0.1 MG/DL (ref 0–1)
BUN BLDV-MCNC: 27 MG/DL (ref 6–23)
CALCIUM SERPL-MCNC: 9.9 MG/DL (ref 8.3–10.6)
CHLORIDE BLD-SCNC: 99 MMOL/L (ref 99–110)
CO2: 31 MMOL/L (ref 21–32)
CREAT SERPL-MCNC: 1.3 MG/DL (ref 0.6–1.1)
DIFFERENTIAL TYPE: ABNORMAL
EOSINOPHILS ABSOLUTE: 0.2 K/CU MM
EOSINOPHILS RELATIVE PERCENT: 3.7 % (ref 0–3)
FERRITIN: 61 NG/ML (ref 15–150)
FOLATE: 11.1 NG/ML (ref 3.1–17.5)
GFR AFRICAN AMERICAN: 47 ML/MIN/1.73M2
GFR NON-AFRICAN AMERICAN: 39 ML/MIN/1.73M2
GLUCOSE BLD-MCNC: 97 MG/DL (ref 70–99)
HCT VFR BLD CALC: 37.3 % (ref 37–47)
HEMOGLOBIN: 11.5 GM/DL (ref 12.5–16)
IRON: 49 UG/DL (ref 37–145)
LYMPHOCYTES ABSOLUTE: 1.2 K/CU MM
LYMPHOCYTES RELATIVE PERCENT: 19.1 % (ref 24–44)
MCH RBC QN AUTO: 31.3 PG (ref 27–31)
MCHC RBC AUTO-ENTMCNC: 30.8 % (ref 32–36)
MCV RBC AUTO: 101.4 FL (ref 78–100)
MONOCYTES ABSOLUTE: 0.8 K/CU MM
MONOCYTES RELATIVE PERCENT: 13.4 % (ref 0–4)
PCT TRANSFERRIN: 18 % (ref 10–44)
PDW BLD-RTO: 15.5 % (ref 11.7–14.9)
PLATELET # BLD: 213 K/CU MM (ref 140–440)
PMV BLD AUTO: 12.8 FL (ref 7.5–11.1)
POTASSIUM SERPL-SCNC: 4.9 MMOL/L (ref 3.5–5.1)
RBC # BLD: 3.68 M/CU MM (ref 4.2–5.4)
RETICULOCYTE COUNT PCT: 2.3 % (ref 0.2–2.2)
SEGMENTED NEUTROPHILS ABSOLUTE COUNT: 4 K/CU MM
SEGMENTED NEUTROPHILS RELATIVE PERCENT: 63.5 % (ref 36–66)
SODIUM BLD-SCNC: 137 MMOL/L (ref 135–145)
TOTAL IRON BINDING CAPACITY: 265 UG/DL (ref 250–450)
TOTAL PROTEIN: 6.3 GM/DL (ref 6.4–8.2)
UNSATURATED IRON BINDING CAPACITY: 216 UG/DL (ref 110–370)
VITAMIN B-12: 334.4 PG/ML (ref 211–911)
WBC # BLD: 6.3 K/CU MM (ref 4–10.5)

## 2020-03-05 PROCEDURE — 85025 COMPLETE CBC W/AUTO DIFF WBC: CPT

## 2020-03-05 PROCEDURE — 82746 ASSAY OF FOLIC ACID SERUM: CPT

## 2020-03-05 PROCEDURE — 82728 ASSAY OF FERRITIN: CPT

## 2020-03-05 PROCEDURE — 80053 COMPREHEN METABOLIC PANEL: CPT

## 2020-03-05 PROCEDURE — 83540 ASSAY OF IRON: CPT

## 2020-03-05 PROCEDURE — 85045 AUTOMATED RETICULOCYTE COUNT: CPT

## 2020-03-05 PROCEDURE — 83550 IRON BINDING TEST: CPT

## 2020-03-05 PROCEDURE — 82607 VITAMIN B-12: CPT

## 2020-03-05 PROCEDURE — 36415 COLL VENOUS BLD VENIPUNCTURE: CPT

## 2020-03-16 ENCOUNTER — APPOINTMENT (OUTPATIENT)
Dept: CT IMAGING | Age: 80
DRG: 312 | End: 2020-03-16
Payer: MEDICARE

## 2020-03-16 ENCOUNTER — HOSPITAL ENCOUNTER (INPATIENT)
Age: 80
LOS: 2 days | Discharge: HOME HEALTH | DRG: 312 | End: 2020-03-18
Attending: EMERGENCY MEDICINE | Admitting: INTERNAL MEDICINE
Payer: MEDICARE

## 2020-03-16 ENCOUNTER — APPOINTMENT (OUTPATIENT)
Dept: GENERAL RADIOLOGY | Age: 80
DRG: 312 | End: 2020-03-16
Payer: MEDICARE
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
## 2020-03-16 LAB
ABO/RH: NORMAL
ALBUMIN SERPL-MCNC: 3.7 GM/DL (ref 3.4–5)
ALP BLD-CCNC: 99 IU/L (ref 40–129)
ALT SERPL-CCNC: 15 U/L (ref 10–40)
ANION GAP SERPL CALCULATED.3IONS-SCNC: 7 MMOL/L (ref 4–16)
ANTIBODY SCREEN: NEGATIVE
APTT: 33 SECONDS (ref 25.1–37.1)
AST SERPL-CCNC: 33 IU/L (ref 15–37)
BACTERIA: NEGATIVE /HPF
BASOPHILS ABSOLUTE: 0.1 K/CU MM
BASOPHILS RELATIVE PERCENT: 1 % (ref 0–1)
BILIRUB SERPL-MCNC: 0.1 MG/DL (ref 0–1)
BILIRUBIN URINE: NEGATIVE MG/DL
BLOOD, URINE: NEGATIVE
BUN BLDV-MCNC: 24 MG/DL (ref 6–23)
CALCIUM SERPL-MCNC: 9.3 MG/DL (ref 8.3–10.6)
CHLORIDE BLD-SCNC: 102 MMOL/L (ref 99–110)
CLARITY: CLEAR
CO2: 31 MMOL/L (ref 21–32)
COLOR: YELLOW
CREAT SERPL-MCNC: 1.3 MG/DL (ref 0.6–1.1)
DIFFERENTIAL TYPE: ABNORMAL
EOSINOPHILS ABSOLUTE: 0.3 K/CU MM
EOSINOPHILS RELATIVE PERCENT: 4.2 % (ref 0–3)
GFR AFRICAN AMERICAN: 47 ML/MIN/1.73M2
GFR NON-AFRICAN AMERICAN: 39 ML/MIN/1.73M2
GLUCOSE BLD-MCNC: 103 MG/DL (ref 70–99)
GLUCOSE, URINE: NEGATIVE MG/DL
HCT VFR BLD CALC: 38.5 % (ref 37–47)
HEMOGLOBIN: 11.6 GM/DL (ref 12.5–16)
HYALINE CASTS: 2 /LPF
IMMATURE NEUTROPHIL %: 0.3 % (ref 0–0.43)
INR BLD: 0.97 INDEX
KETONES, URINE: NEGATIVE MG/DL
LEUKOCYTE ESTERASE, URINE: NEGATIVE
LIPASE: 43 IU/L (ref 13–60)
LYMPHOCYTES ABSOLUTE: 1.2 K/CU MM
LYMPHOCYTES RELATIVE PERCENT: 19.9 % (ref 24–44)
MCH RBC QN AUTO: 31.4 PG (ref 27–31)
MCHC RBC AUTO-ENTMCNC: 30.1 % (ref 32–36)
MCV RBC AUTO: 104.1 FL (ref 78–100)
MONOCYTES ABSOLUTE: 0.8 K/CU MM
MONOCYTES RELATIVE PERCENT: 13.2 % (ref 0–4)
MUCUS: ABNORMAL HPF
NITRITE URINE, QUANTITATIVE: NEGATIVE
NUCLEATED RBC %: 0 %
PDW BLD-RTO: 13.8 % (ref 11.7–14.9)
PH, URINE: 6 (ref 5–8)
PLATELET # BLD: 215 K/CU MM (ref 140–440)
PMV BLD AUTO: 12.6 FL (ref 7.5–11.1)
POTASSIUM SERPL-SCNC: 4.8 MMOL/L (ref 3.5–5.1)
PROTEIN UA: NEGATIVE MG/DL
PROTHROMBIN TIME: 11.7 SECONDS (ref 11.7–14.5)
RBC # BLD: 3.7 M/CU MM (ref 4.2–5.4)
RBC URINE: ABNORMAL /HPF (ref 0–6)
SEGMENTED NEUTROPHILS ABSOLUTE COUNT: 3.7 K/CU MM
SEGMENTED NEUTROPHILS RELATIVE PERCENT: 61.4 % (ref 36–66)
SODIUM BLD-SCNC: 140 MMOL/L (ref 135–145)
SPECIFIC GRAVITY UA: 1.01 (ref 1–1.03)
SQUAMOUS EPITHELIAL: <1 /HPF
TOTAL IMMATURE NEUTOROPHIL: 0.02 K/CU MM
TOTAL NUCLEATED RBC: 0 K/CU MM
TOTAL PROTEIN: 6.1 GM/DL (ref 6.4–8.2)
TRICHOMONAS: ABNORMAL /HPF
TROPONIN T: <0.01 NG/ML
UROBILINOGEN, URINE: NORMAL MG/DL (ref 0.2–1)
WBC # BLD: 6 K/CU MM (ref 4–10.5)
WBC UA: <1 /HPF (ref 0–5)

## 2020-03-16 PROCEDURE — 85025 COMPLETE CBC W/AUTO DIFF WBC: CPT

## 2020-03-16 PROCEDURE — 99285 EMERGENCY DEPT VISIT HI MDM: CPT

## 2020-03-16 PROCEDURE — 70450 CT HEAD/BRAIN W/O DYE: CPT

## 2020-03-16 PROCEDURE — 71045 X-RAY EXAM CHEST 1 VIEW: CPT

## 2020-03-16 PROCEDURE — 86850 RBC ANTIBODY SCREEN: CPT

## 2020-03-16 PROCEDURE — 96360 HYDRATION IV INFUSION INIT: CPT

## 2020-03-16 PROCEDURE — 81001 URINALYSIS AUTO W/SCOPE: CPT

## 2020-03-16 PROCEDURE — 86900 BLOOD TYPING SEROLOGIC ABO: CPT

## 2020-03-16 PROCEDURE — 2060000000 HC ICU INTERMEDIATE R&B

## 2020-03-16 PROCEDURE — 83690 ASSAY OF LIPASE: CPT

## 2020-03-16 PROCEDURE — 93005 ELECTROCARDIOGRAM TRACING: CPT | Performed by: EMERGENCY MEDICINE

## 2020-03-16 PROCEDURE — 85730 THROMBOPLASTIN TIME PARTIAL: CPT

## 2020-03-16 PROCEDURE — 2580000003 HC RX 258: Performed by: EMERGENCY MEDICINE

## 2020-03-16 PROCEDURE — 80053 COMPREHEN METABOLIC PANEL: CPT

## 2020-03-16 PROCEDURE — 84484 ASSAY OF TROPONIN QUANT: CPT

## 2020-03-16 PROCEDURE — 74176 CT ABD & PELVIS W/O CONTRAST: CPT

## 2020-03-16 PROCEDURE — 86901 BLOOD TYPING SEROLOGIC RH(D): CPT

## 2020-03-16 PROCEDURE — 72125 CT NECK SPINE W/O DYE: CPT

## 2020-03-16 PROCEDURE — 85610 PROTHROMBIN TIME: CPT

## 2020-03-16 RX ORDER — FUROSEMIDE 20 MG/1
20 TABLET ORAL SEE ADMIN INSTRUCTIONS
Qty: 45 TABLET | Refills: 0 | Status: ON HOLD | OUTPATIENT
Start: 2020-03-16 | End: 2020-03-18 | Stop reason: SDUPTHER

## 2020-03-16 RX ORDER — PANTOPRAZOLE SODIUM 40 MG/1
40 TABLET, DELAYED RELEASE ORAL DAILY
Status: DISCONTINUED | OUTPATIENT
Start: 2020-03-17 | End: 2020-03-18 | Stop reason: HOSPADM

## 2020-03-16 RX ORDER — BUDESONIDE AND FORMOTEROL FUMARATE DIHYDRATE 160; 4.5 UG/1; UG/1
2 AEROSOL RESPIRATORY (INHALATION) 2 TIMES DAILY
Qty: 1 INHALER | Refills: 5 | Status: SHIPPED | OUTPATIENT
Start: 2020-03-16 | End: 2020-05-20 | Stop reason: CLARIF

## 2020-03-16 RX ORDER — PROMETHAZINE HYDROCHLORIDE 12.5 MG/1
12.5 TABLET ORAL EVERY 6 HOURS PRN
Status: DISCONTINUED | OUTPATIENT
Start: 2020-03-16 | End: 2020-03-18 | Stop reason: HOSPADM

## 2020-03-16 RX ORDER — PRIMIDONE 50 MG/1
50 TABLET ORAL 3 TIMES DAILY
Qty: 270 TABLET | Refills: 0 | Status: SHIPPED | OUTPATIENT
Start: 2020-03-16 | End: 2020-05-20 | Stop reason: SDUPTHER

## 2020-03-16 RX ORDER — MONTELUKAST SODIUM 10 MG/1
10 TABLET ORAL DAILY
Qty: 90 TABLET | Refills: 0 | Status: SHIPPED | OUTPATIENT
Start: 2020-03-16 | End: 2020-05-20 | Stop reason: SDUPTHER

## 2020-03-16 RX ORDER — DULOXETIN HYDROCHLORIDE 60 MG/1
60 CAPSULE, DELAYED RELEASE ORAL NIGHTLY
Qty: 90 CAPSULE | Refills: 0 | Status: SHIPPED | OUTPATIENT
Start: 2020-03-16 | End: 2020-05-20 | Stop reason: SDUPTHER

## 2020-03-16 RX ORDER — PRIMIDONE 50 MG/1
50 TABLET ORAL 3 TIMES DAILY
Status: DISCONTINUED | OUTPATIENT
Start: 2020-03-17 | End: 2020-03-18 | Stop reason: HOSPADM

## 2020-03-16 RX ORDER — FERROUS SULFATE 325(65) MG
325 TABLET ORAL NIGHTLY
Status: DISCONTINUED | OUTPATIENT
Start: 2020-03-17 | End: 2020-03-18 | Stop reason: HOSPADM

## 2020-03-16 RX ORDER — BUDESONIDE AND FORMOTEROL FUMARATE DIHYDRATE 160; 4.5 UG/1; UG/1
2 AEROSOL RESPIRATORY (INHALATION) 2 TIMES DAILY
Status: DISCONTINUED | OUTPATIENT
Start: 2020-03-17 | End: 2020-03-18 | Stop reason: HOSPADM

## 2020-03-16 RX ORDER — DONEPEZIL HYDROCHLORIDE 10 MG/1
10 TABLET, FILM COATED ORAL NIGHTLY
Qty: 90 TABLET | Refills: 0 | Status: SHIPPED | OUTPATIENT
Start: 2020-03-16 | End: 2020-05-20 | Stop reason: SDUPTHER

## 2020-03-16 RX ORDER — ATORVASTATIN CALCIUM 10 MG/1
10 TABLET, FILM COATED ORAL DAILY
Qty: 90 TABLET | Refills: 0 | Status: SHIPPED | OUTPATIENT
Start: 2020-03-16 | End: 2020-05-20 | Stop reason: SDUPTHER

## 2020-03-16 RX ORDER — FERROUS SULFATE 325(65) MG
325 TABLET ORAL NIGHTLY
Qty: 30 TABLET | Refills: 3 | Status: SHIPPED | OUTPATIENT
Start: 2020-03-16 | End: 2020-12-30 | Stop reason: SDUPTHER

## 2020-03-16 RX ORDER — ATORVASTATIN CALCIUM 10 MG/1
10 TABLET, FILM COATED ORAL DAILY
Status: DISCONTINUED | OUTPATIENT
Start: 2020-03-17 | End: 2020-03-18 | Stop reason: HOSPADM

## 2020-03-16 RX ORDER — MONTELUKAST SODIUM 10 MG/1
10 TABLET ORAL DAILY
Status: DISCONTINUED | OUTPATIENT
Start: 2020-03-17 | End: 2020-03-18 | Stop reason: HOSPADM

## 2020-03-16 RX ORDER — ALBUTEROL SULFATE 2.5 MG/3ML
2.5 SOLUTION RESPIRATORY (INHALATION) 4 TIMES DAILY
Status: DISCONTINUED | OUTPATIENT
Start: 2020-03-17 | End: 2020-03-18 | Stop reason: HOSPADM

## 2020-03-16 RX ORDER — HYDROCODONE BITARTRATE AND ACETAMINOPHEN 7.5; 325 MG/1; MG/1
.5-1 TABLET ORAL 3 TIMES DAILY PRN
Qty: 90 TABLET | Refills: 0 | Status: SHIPPED | OUTPATIENT
Start: 2020-03-16 | End: 2020-04-16 | Stop reason: SDUPTHER

## 2020-03-16 RX ORDER — POLYETHYLENE GLYCOL 3350 17 G/17G
17 POWDER, FOR SOLUTION ORAL DAILY PRN
Status: DISCONTINUED | OUTPATIENT
Start: 2020-03-16 | End: 2020-03-18 | Stop reason: HOSPADM

## 2020-03-16 RX ORDER — ACETAMINOPHEN 325 MG/1
650 TABLET ORAL EVERY 6 HOURS PRN
Status: DISCONTINUED | OUTPATIENT
Start: 2020-03-16 | End: 2020-03-18 | Stop reason: HOSPADM

## 2020-03-16 RX ORDER — CYCLOBENZAPRINE HCL 10 MG
10 TABLET ORAL NIGHTLY
Qty: 90 TABLET | Refills: 0 | Status: SHIPPED | OUTPATIENT
Start: 2020-03-16 | End: 2020-05-20 | Stop reason: SDUPTHER

## 2020-03-16 RX ORDER — FUROSEMIDE 20 MG/1
20 TABLET ORAL EVERY OTHER DAY
Status: DISCONTINUED | OUTPATIENT
Start: 2020-03-17 | End: 2020-03-18 | Stop reason: HOSPADM

## 2020-03-16 RX ORDER — ACETAMINOPHEN 650 MG/1
650 SUPPOSITORY RECTAL EVERY 6 HOURS PRN
Status: DISCONTINUED | OUTPATIENT
Start: 2020-03-16 | End: 2020-03-18 | Stop reason: HOSPADM

## 2020-03-16 RX ORDER — 0.9 % SODIUM CHLORIDE 0.9 %
1000 INTRAVENOUS SOLUTION INTRAVENOUS ONCE
Status: COMPLETED | OUTPATIENT
Start: 2020-03-16 | End: 2020-03-16

## 2020-03-16 RX ORDER — GABAPENTIN 300 MG/1
300 CAPSULE ORAL 2 TIMES DAILY
Qty: 180 CAPSULE | Refills: 1 | Status: SHIPPED | OUTPATIENT
Start: 2020-03-16 | End: 2020-05-04 | Stop reason: SDUPTHER

## 2020-03-16 RX ORDER — SODIUM CHLORIDE 0.9 % (FLUSH) 0.9 %
10 SYRINGE (ML) INJECTION 2 TIMES DAILY
Status: DISCONTINUED | OUTPATIENT
Start: 2020-03-17 | End: 2020-03-18 | Stop reason: HOSPADM

## 2020-03-16 RX ORDER — GABAPENTIN 300 MG/1
300 CAPSULE ORAL 2 TIMES DAILY
Status: DISCONTINUED | OUTPATIENT
Start: 2020-03-17 | End: 2020-03-18 | Stop reason: HOSPADM

## 2020-03-16 RX ORDER — HYDROCODONE BITARTRATE AND ACETAMINOPHEN 5; 325 MG/1; MG/1
1 TABLET ORAL EVERY 6 HOURS PRN
Status: DISCONTINUED | OUTPATIENT
Start: 2020-03-16 | End: 2020-03-18 | Stop reason: HOSPADM

## 2020-03-16 RX ORDER — MIDODRINE HYDROCHLORIDE 5 MG/1
5 TABLET ORAL
Status: DISCONTINUED | OUTPATIENT
Start: 2020-03-17 | End: 2020-03-18 | Stop reason: HOSPADM

## 2020-03-16 RX ORDER — ASPIRIN 81 MG/1
81 TABLET, CHEWABLE ORAL DAILY
Status: DISCONTINUED | OUTPATIENT
Start: 2020-03-17 | End: 2020-03-18 | Stop reason: HOSPADM

## 2020-03-16 RX ORDER — DOCUSATE SODIUM 100 MG/1
100 CAPSULE, LIQUID FILLED ORAL 2 TIMES DAILY
Status: DISCONTINUED | OUTPATIENT
Start: 2020-03-17 | End: 2020-03-18 | Stop reason: HOSPADM

## 2020-03-16 RX ORDER — MIRTAZAPINE 15 MG/1
15 TABLET, FILM COATED ORAL NIGHTLY
Qty: 90 TABLET | Refills: 0 | Status: SHIPPED | OUTPATIENT
Start: 2020-03-16 | End: 2020-05-20 | Stop reason: SDUPTHER

## 2020-03-16 RX ORDER — DULOXETIN HYDROCHLORIDE 30 MG/1
60 CAPSULE, DELAYED RELEASE ORAL NIGHTLY
Status: DISCONTINUED | OUTPATIENT
Start: 2020-03-17 | End: 2020-03-18 | Stop reason: HOSPADM

## 2020-03-16 RX ORDER — SODIUM CHLORIDE 0.9 % (FLUSH) 0.9 %
10 SYRINGE (ML) INJECTION PRN
Status: DISCONTINUED | OUTPATIENT
Start: 2020-03-16 | End: 2020-03-18 | Stop reason: HOSPADM

## 2020-03-16 RX ORDER — ONDANSETRON 2 MG/ML
4 INJECTION INTRAMUSCULAR; INTRAVENOUS EVERY 6 HOURS PRN
Status: DISCONTINUED | OUTPATIENT
Start: 2020-03-16 | End: 2020-03-18 | Stop reason: HOSPADM

## 2020-03-16 RX ORDER — DONEPEZIL HYDROCHLORIDE 10 MG/1
10 TABLET, FILM COATED ORAL NIGHTLY
Status: DISCONTINUED | OUTPATIENT
Start: 2020-03-17 | End: 2020-03-18 | Stop reason: HOSPADM

## 2020-03-16 RX ORDER — PANTOPRAZOLE SODIUM 40 MG/1
40 TABLET, DELAYED RELEASE ORAL DAILY
Qty: 90 TABLET | Refills: 0 | Status: SHIPPED | OUTPATIENT
Start: 2020-03-16 | End: 2020-05-20 | Stop reason: SDUPTHER

## 2020-03-16 RX ADMIN — SODIUM CHLORIDE 1000 ML: 9 INJECTION, SOLUTION INTRAVENOUS at 18:42

## 2020-03-16 ASSESSMENT — PAIN SCALES - GENERAL
PAINLEVEL_OUTOF10: 3
PAINLEVEL_OUTOF10: 5

## 2020-03-16 ASSESSMENT — PAIN DESCRIPTION - LOCATION: LOCATION: ABDOMEN

## 2020-03-16 ASSESSMENT — ENCOUNTER SYMPTOMS
WHEEZING: 0
RHINORRHEA: 0
NAUSEA: 0
SHORTNESS OF BREATH: 0
ABDOMINAL PAIN: 1
VOMITING: 0
COUGH: 0
SINUS PRESSURE: 0
CONSTIPATION: 0
DIARRHEA: 0
SORE THROAT: 0

## 2020-03-16 NOTE — ED PROVIDER NOTES
blood clots     right leg after miscarriage    H/O cardiovascular stress test 6/25/13 6/13-WNL EF 70%    H/O cardiovascular stress test 08/10/2017    Normal study EF 70%    H/O: CVA (cerebrovascular accident) 7/20/2019    Heart murmur     History of blood transfusion     2017    History of LIMITED Echocardiogram 11/08/2019    EF 55-60%, Normal left ventricle structure and systolic function , no regional wall motion abnormalities were detected    [place] (hard of hearing)     hearing aides    Hx of blood clots     Hx of echocardiogram 07/11/2013    EF>55%, mild MR, mild TR, abn lVSFx stage 1     HX OTHER MEDICAL     PCP is Dr Jeremías Marshall; Cardiologist is Dr Iliana Vital 7/30/13    Peripheral Angiogram.  Sluggish flow RLE, med mgmt.      Hydrocephalus ([address] 7/20/2019    Hydrocephalus, adult ([address]     shunt    Hyperlipidemia     Hyperthyroidism 7/20/2019    Kidney stone     \"had stone last summer 2016- passed it\"    Memory loss     Old MI (myocardial infarction) 2001    Osteoarthritis     Pneumonia     recurrent    Solitary kidney     Unspecified cerebral artery occlusion with cerebral infarction     \"they discovered I had stroke in 1993- with CT scan of head but never knew I had it\"     Past Surgical History:   Procedure Laterality Date   [place]    disc ruptured    BRAIN SURGERY  2001    right shunt    CHEST SURGERY  1953    reconstruction of breast bone and rib-\"[place] chest\"   [facility] COLONOSCOPY  7/2011    F/U 7/2016    CORONARY ANGIOPLASTY WITH STENT PLACEMENT  2001    CSF SHUNT      ENDOSCOPY, COLON, DIAGNOSTIC      per old chart pt had EGD done with admission 2/17/2017    HYSTERECTOMY  1978    VERONIQUE BSO-dub/fibroids    KIDNEY SURGERY Right 1967    suspension    KNEE SURGERY  1997    right     PACEMAKER INSERTION Left 09/26/2019    [employer] XT DR JOHNSON [employer]     ROTATOR CUFF REPAIR  1997    right   [address] [place] [address] file   Other Topics Concern    Not on file   Social History Narrative    Do you donate blood or plasma? no    Caffeine intake? Moderate    Advance directive? yes    Is blood transfusion acceptable in an emergency? yes      No current facility-administered medications for this encounter. Current Outpatient Medications   Medication Sig Dispense Refill    cyclobenzaprine (FLEXERIL) 10 MG tablet Take 1 tablet by mouth nightly 90 tablet 0    HYDROcodone-acetaminophen (NORCO) 7.5-325 MG per tablet Take 0.5-1 tablets by mouth 3 times daily as needed for Pain (m54.5) for up to 30 days. 90 tablet 0    atorvastatin (LIPITOR) 10 MG tablet Take 1 tablet by mouth daily 90 tablet 0    donepezil (ARICEPT) 10 MG tablet Take 1 tablet by mouth nightly 90 tablet 0    furosemide (LASIX) 20 MG tablet Take 1 tablet by mouth See Admin Instructions Take 1 tab every other day 45 tablet 0    gabapentin (NEURONTIN) 300 MG capsule Take 1 capsule by mouth 2 times daily for 90 days.  180 capsule 1    metFORMIN (GLUCOPHAGE) 500 MG tablet Take 1 tablet by mouth nightly 90 tablet 0    montelukast (SINGULAIR) 10 MG tablet Take 1 tablet by mouth daily 90 tablet 0    pantoprazole (PROTONIX) 40 MG tablet Take 1 tablet by mouth daily 90 tablet 0    primidone (MYSOLINE) 50 MG tablet Take 1 tablet by mouth 3 times daily 270 tablet 0    rivaroxaban (XARELTO) 15 MG TABS tablet Take 1 tablet by mouth daily 90 tablet 0    DULoxetine (CYMBALTA) 60 MG extended release capsule Take 1 capsule by mouth nightly 90 capsule 0    albuterol (PROVENTIL) (5 MG/ML) 0.5% nebulizer solution Take 1 mL by nebulization 4 times daily 120 vial 5    budesonide-formoterol (SYMBICORT) 160-4.5 MCG/ACT AERO Inhale 2 puffs into the lungs 2 times daily 1 Inhaler 5    midodrine (PROAMATINE) 5 MG tablet TAKE 1 TABLET BY MOUTH THREE TIMES DAILY (Patient not taking: Reported on 1/21/2020) 270 tablet 3    mirtazapine (REMERON) 15 MG tablet Take 1 tablet by mouth nightly  aspirin 81 MG tablet Take 81 mg by mouth daily      docusate sodium (COLACE) 100 MG capsule Take 100 mg by mouth 2 times daily      FREESTYLE LANCETS MISC 1 each by Does not apply route daily      Glucose Blood (FREESTYLE LITE TEST VI) by In Vitro route      CPAP Machine MISC by Does not apply route      ferrous sulfate 325 (65 Fe) MG tablet Take 1 tablet by mouth nightly Until you start iv iron (Patient taking differently: Take 650 mg by mouth nightly Until you start iv iron) 30 tablet 3     Allergies   Allergen Reactions    Codeine Itching    Bactrim [Sulfamethoxazole-Trimethoprim]      dizziness       Nursing Notes Reviewed    Physical Exam:  Triage VS:    ED Triage Vitals [03/16/20 1805]   Enc Vitals Group      BP (!) 76/50      Pulse 80      Resp 16      Temp 97.6 °F (36.4 °C)      Temp Source Oral      SpO2 93 %      Weight 133 lb (60.3 kg)      Height 5' 4\" (1.626 m)      Head Circumference       Peak Flow       Pain Score       Pain Loc       Pain Edu? Excl. in [address]? Physical Exam  Vitals signs and nursing note reviewed. Constitutional:       Appearance: Normal appearance. HENT:      Head:      Comments: Left-sided posterior scalp hematoma, no overlying abrasion or active bleeding. Nose: Nose normal.      Mouth/Throat:      Mouth: Mucous membranes are moist.   Eyes:      Conjunctiva/sclera: Conjunctivae normal.   Neck:      Musculoskeletal: Normal range of motion and neck supple. Cardiovascular:      Rate and Rhythm: Normal rate and regular rhythm. Pulses: Normal pulses. Pulmonary:      Effort: Pulmonary effort is normal. No respiratory distress. Breath sounds: Normal breath sounds. No wheezing or rhonchi. Abdominal:      General: Bowel sounds are normal.      Palpations: Abdomen is soft. Tenderness: There is abdominal tenderness in the right upper quadrant, right lower quadrant, epigastric area and periumbilical area. There is no guarding or rebound.

## 2020-03-16 NOTE — ED NOTES
Bed: ED-30  Expected date: 3/16/20  Expected time:   Means of arrival:   Comments:  Medic fall hypotensive 78 f     Zora Morocho RN  03/16/20 7123

## 2020-03-17 ENCOUNTER — APPOINTMENT (OUTPATIENT)
Dept: ULTRASOUND IMAGING | Age: 80
DRG: 312 | End: 2020-03-17
Payer: MEDICARE

## 2020-03-17 LAB
ANION GAP SERPL CALCULATED.3IONS-SCNC: 10 MMOL/L (ref 4–16)
BUN BLDV-MCNC: 24 MG/DL (ref 6–23)
CALCIUM SERPL-MCNC: 9.4 MG/DL (ref 8.3–10.6)
CHLORIDE BLD-SCNC: 105 MMOL/L (ref 99–110)
CO2: 29 MMOL/L (ref 21–32)
CREAT SERPL-MCNC: 1.3 MG/DL (ref 0.6–1.1)
GFR AFRICAN AMERICAN: 47 ML/MIN/1.73M2
GFR NON-AFRICAN AMERICAN: 39 ML/MIN/1.73M2
GLUCOSE BLD-MCNC: 210 MG/DL (ref 70–99)
HCT VFR BLD CALC: 40.4 % (ref 37–47)
HEMOGLOBIN: 11.6 GM/DL (ref 12.5–16)
LV EF: 58 %
LVEF MODALITY: NORMAL
MCH RBC QN AUTO: 30.9 PG (ref 27–31)
MCHC RBC AUTO-ENTMCNC: 28.7 % (ref 32–36)
MCV RBC AUTO: 107.4 FL (ref 78–100)
PDW BLD-RTO: 13.8 % (ref 11.7–14.9)
PLATELET # BLD: 222 K/CU MM (ref 140–440)
PMV BLD AUTO: 12.8 FL (ref 7.5–11.1)
POTASSIUM SERPL-SCNC: 5 MMOL/L (ref 3.5–5.1)
RBC # BLD: 3.76 M/CU MM (ref 4.2–5.4)
SODIUM BLD-SCNC: 144 MMOL/L (ref 135–145)
TROPONIN T: <0.01 NG/ML
TROPONIN T: <0.01 NG/ML
WBC # BLD: 8.4 K/CU MM (ref 4–10.5)

## 2020-03-17 PROCEDURE — 6370000000 HC RX 637 (ALT 250 FOR IP): Performed by: INTERNAL MEDICINE

## 2020-03-17 PROCEDURE — 97162 PT EVAL MOD COMPLEX 30 MIN: CPT

## 2020-03-17 PROCEDURE — 93010 ELECTROCARDIOGRAM REPORT: CPT | Performed by: INTERNAL MEDICINE

## 2020-03-17 PROCEDURE — 2580000003 HC RX 258: Performed by: INTERNAL MEDICINE

## 2020-03-17 PROCEDURE — 6360000002 HC RX W HCPCS: Performed by: NURSE PRACTITIONER

## 2020-03-17 PROCEDURE — 2060000000 HC ICU INTERMEDIATE R&B

## 2020-03-17 PROCEDURE — 97535 SELF CARE MNGMENT TRAINING: CPT

## 2020-03-17 PROCEDURE — 97166 OT EVAL MOD COMPLEX 45 MIN: CPT

## 2020-03-17 PROCEDURE — 93306 TTE W/DOPPLER COMPLETE: CPT

## 2020-03-17 PROCEDURE — 97530 THERAPEUTIC ACTIVITIES: CPT

## 2020-03-17 PROCEDURE — 99222 1ST HOSP IP/OBS MODERATE 55: CPT | Performed by: INTERNAL MEDICINE

## 2020-03-17 PROCEDURE — 97116 GAIT TRAINING THERAPY: CPT

## 2020-03-17 PROCEDURE — 94640 AIRWAY INHALATION TREATMENT: CPT

## 2020-03-17 PROCEDURE — 6370000000 HC RX 637 (ALT 250 FOR IP): Performed by: NURSE PRACTITIONER

## 2020-03-17 PROCEDURE — 84484 ASSAY OF TROPONIN QUANT: CPT

## 2020-03-17 PROCEDURE — 80048 BASIC METABOLIC PNL TOTAL CA: CPT

## 2020-03-17 PROCEDURE — 99221 1ST HOSP IP/OBS SF/LOW 40: CPT | Performed by: INTERNAL MEDICINE

## 2020-03-17 PROCEDURE — 93880 EXTRACRANIAL BILAT STUDY: CPT

## 2020-03-17 PROCEDURE — 85027 COMPLETE CBC AUTOMATED: CPT

## 2020-03-17 RX ORDER — SODIUM CHLORIDE 9 MG/ML
INJECTION, SOLUTION INTRAVENOUS CONTINUOUS
Status: DISPENSED | OUTPATIENT
Start: 2020-03-17 | End: 2020-03-18

## 2020-03-17 RX ADMIN — DULOXETINE HYDROCHLORIDE 60 MG: 30 CAPSULE, DELAYED RELEASE ORAL at 21:14

## 2020-03-17 RX ADMIN — MIDODRINE HYDROCHLORIDE 5 MG: 5 TABLET ORAL at 13:39

## 2020-03-17 RX ADMIN — GABAPENTIN 300 MG: 300 CAPSULE ORAL at 21:14

## 2020-03-17 RX ADMIN — PRIMIDONE 50 MG: 50 TABLET ORAL at 00:21

## 2020-03-17 RX ADMIN — SODIUM CHLORIDE, PRESERVATIVE FREE 10 ML: 5 INJECTION INTRAVENOUS at 00:21

## 2020-03-17 RX ADMIN — PRIMIDONE 50 MG: 50 TABLET ORAL at 21:14

## 2020-03-17 RX ADMIN — DULOXETINE HYDROCHLORIDE 60 MG: 30 CAPSULE, DELAYED RELEASE ORAL at 00:21

## 2020-03-17 RX ADMIN — ATORVASTATIN CALCIUM 10 MG: 10 TABLET, FILM COATED ORAL at 08:51

## 2020-03-17 RX ADMIN — DOCUSATE SODIUM 100 MG: 100 CAPSULE, LIQUID FILLED ORAL at 00:21

## 2020-03-17 RX ADMIN — BUDESONIDE AND FORMOTEROL FUMARATE DIHYDRATE 2 PUFF: 160; 4.5 AEROSOL RESPIRATORY (INHALATION) at 21:32

## 2020-03-17 RX ADMIN — GABAPENTIN 300 MG: 300 CAPSULE ORAL at 00:21

## 2020-03-17 RX ADMIN — HYDROCODONE BITARTRATE AND ACETAMINOPHEN 1 TABLET: 5; 325 TABLET ORAL at 21:14

## 2020-03-17 RX ADMIN — GABAPENTIN 300 MG: 300 CAPSULE ORAL at 08:50

## 2020-03-17 RX ADMIN — DOCUSATE SODIUM 100 MG: 100 CAPSULE, LIQUID FILLED ORAL at 08:51

## 2020-03-17 RX ADMIN — DOCUSATE SODIUM 100 MG: 100 CAPSULE, LIQUID FILLED ORAL at 21:14

## 2020-03-17 RX ADMIN — FERROUS SULFATE TAB 325 MG (65 MG ELEMENTAL FE) 325 MG: 325 (65 FE) TAB at 00:21

## 2020-03-17 RX ADMIN — MONTELUKAST 10 MG: 10 TABLET, FILM COATED ORAL at 08:50

## 2020-03-17 RX ADMIN — SODIUM CHLORIDE, PRESERVATIVE FREE 10 ML: 5 INJECTION INTRAVENOUS at 21:15

## 2020-03-17 RX ADMIN — PANTOPRAZOLE SODIUM 40 MG: 40 TABLET, DELAYED RELEASE ORAL at 05:02

## 2020-03-17 RX ADMIN — ACETAMINOPHEN 650 MG: 325 TABLET ORAL at 05:02

## 2020-03-17 RX ADMIN — SODIUM CHLORIDE, PRESERVATIVE FREE 10 ML: 5 INJECTION INTRAVENOUS at 08:52

## 2020-03-17 RX ADMIN — MIDODRINE HYDROCHLORIDE 5 MG: 5 TABLET ORAL at 08:51

## 2020-03-17 RX ADMIN — DONEPEZIL HYDROCHLORIDE 10 MG: 10 TABLET, FILM COATED ORAL at 21:14

## 2020-03-17 RX ADMIN — PRIMIDONE 50 MG: 50 TABLET ORAL at 08:51

## 2020-03-17 RX ADMIN — FERROUS SULFATE TAB 325 MG (65 MG ELEMENTAL FE) 325 MG: 325 (65 FE) TAB at 21:14

## 2020-03-17 RX ADMIN — FUROSEMIDE 20 MG: 20 TABLET ORAL at 08:51

## 2020-03-17 RX ADMIN — ASPIRIN 81 MG 81 MG: 81 TABLET ORAL at 08:51

## 2020-03-17 RX ADMIN — MIDODRINE HYDROCHLORIDE 5 MG: 5 TABLET ORAL at 16:57

## 2020-03-17 RX ADMIN — ALBUTEROL SULFATE 2.5 MG: 2.5 SOLUTION RESPIRATORY (INHALATION) at 21:32

## 2020-03-17 RX ADMIN — HYDROCODONE BITARTRATE AND ACETAMINOPHEN 1 TABLET: 5; 325 TABLET ORAL at 13:38

## 2020-03-17 RX ADMIN — SODIUM CHLORIDE: 9 INJECTION, SOLUTION INTRAVENOUS at 13:39

## 2020-03-17 RX ADMIN — HYDROCODONE BITARTRATE AND ACETAMINOPHEN 1 TABLET: 5; 325 TABLET ORAL at 00:21

## 2020-03-17 RX ADMIN — PRIMIDONE 50 MG: 50 TABLET ORAL at 13:39

## 2020-03-17 RX ADMIN — DONEPEZIL HYDROCHLORIDE 10 MG: 10 TABLET, FILM COATED ORAL at 00:21

## 2020-03-17 ASSESSMENT — PAIN DESCRIPTION - PAIN TYPE
TYPE: ACUTE PAIN

## 2020-03-17 ASSESSMENT — ENCOUNTER SYMPTOMS
COLOR CHANGE: 0
PHOTOPHOBIA: 0
BLOOD IN STOOL: 0
NAUSEA: 0
WHEEZING: 0
CONSTIPATION: 0
CHEST TIGHTNESS: 0
BACK PAIN: 0
EYE PAIN: 0
COUGH: 0
DIARRHEA: 0
ABDOMINAL PAIN: 0
VOMITING: 0

## 2020-03-17 ASSESSMENT — PAIN DESCRIPTION - LOCATION
LOCATION: HEAD
LOCATION: HEAD
LOCATION: HEAD;BACK;LEG
LOCATION: COCCYX
LOCATION: HEAD;BACK

## 2020-03-17 ASSESSMENT — PAIN SCALES - GENERAL
PAINLEVEL_OUTOF10: 2
PAINLEVEL_OUTOF10: 8
PAINLEVEL_OUTOF10: 5
PAINLEVEL_OUTOF10: 3
PAINLEVEL_OUTOF10: 2
PAINLEVEL_OUTOF10: 1
PAINLEVEL_OUTOF10: 5
PAINLEVEL_OUTOF10: 8
PAINLEVEL_OUTOF10: 7
PAINLEVEL_OUTOF10: 7
PAINLEVEL_OUTOF10: 5
PAINLEVEL_OUTOF10: 5
PAINLEVEL_OUTOF10: 3

## 2020-03-17 ASSESSMENT — PAIN DESCRIPTION - ORIENTATION
ORIENTATION: RIGHT;LEFT;LOWER
ORIENTATION: LOWER
ORIENTATION: LOWER

## 2020-03-17 NOTE — PROGRESS NOTES
kg)  · Admission Body Wt: 127 lb (57.6 kg)  · % Weight Change:  11.2% x 6 months (9/22/19)  · Ideal Body Wt: 120 lb (54.4 kg), % Ideal Body 106%  · Adjusted Body Wt: body weight adjusted for    · BMI Classification: BMI 18.5 - 24.9 Normal Weight    Nutrition Interventions:   Continue current diet, Start ONS  Continued Inpatient Monitoring, Education Not Indicated, Coordination of Care    Nutrition Evaluation:   · Evaluation: Goals set   · Goals: Pt will consume >75% of all meals and supplements provided.     · Monitoring: Nutrition Progression, Meal Intake, Supplement Intake, Weight, Pertinent Labs      Electronically signed by Kellen Mckeon RD, LD on 3/17/20 at 2:02 PM EDT    Contact Number: [phone]

## 2020-03-17 NOTE — PROGRESS NOTES
Physical Therapy    Facility/Department: [facility]  Initial Assessment    NAME: Eb Wright  : 1940  MRN: [id number]    Date of Service: 3/17/2020    Discharge Recommendations:  24 hour supervision or assist, Home with Home health PT, S Level 1       Functional Outcome Measure:    Acute Care Index of Function (ACIF):    Score: 0.82 (0.71 or greater = appropriate for home with home PT and 24 hour supervision/assist)      Assessment   Assessment: Pt is an 78 y.o. female with medical history, surgical history, co-morbidities, and personal factors including Acute diastolic CHF (congestive heart failure), Acute urinary tract infection, Anemia, Anxiety, Chronic low back pain, COPD (chronic obstructive pulmonary disease), Depression, Family history of cardiac disorder, GERD (gastroesophageal reflux disease), Goiter, H/O blood clots, H/O cardiovascular stress test, H/O cardiovascular stress test, H/O: CVA (cerebrovascular accident), Heart murmur, History of blood transfusion, History of LIMITED Echocardiogram, [place] (hard of hearing), Hx of blood clots, Hx of echocardiogram, Hydrocephalus ([facility] Hydrocephalus, Hyperlipidemia, Hyperthyroidism, Kidney stone, Memory loss, Old MI (myocardial infarction), Osteoarthritis, Pneumonia, Solitary kidney, Unspecified cerebral artery occlusion with cerebral infarction, Hysterectomy (); Chest surgery (); Kidney surgery (Right, ); Tubal ligation (); Rotator cuff repair (); back surgery (); Cholecystectomy (); knee surgery (); brain surgery (); Coronary angioplasty with stent (); Colonoscopy (2011); csf shunt; Endoscopy, colon, diagnostic; and Pacemaker insertion (Left, 2019) with admission for Syncope and collapse and Injury of head. Prior to admission, pt was independent with functional mobility and ADLs.   Examination of body systems reveals decreased endurance and decreased independence with functional mobility (requiring use of a front wheeled walker at this time).         Prognosis: Good  Decision Making: Medium Complexity  Clinical Presentation: evolving  PT Education: Goals;Transfer Training;Equipment;PT Role;Functional Mobility Training;Plan of Care;General Safety;Gait Training  REQUIRES PT FOLLOW UP: Yes  Activity Tolerance  Activity Tolerance: Patient Tolerated treatment well         Restrictions  Restrictions/Precautions  Restrictions/Precautions: General Precautions  Vision/Hearing  Vision: Within Functional Limits  Hearing: Within functional limits     Subjective  General  Chart Reviewed: Yes  Patient assessed for rehabilitation services?: Yes  Family / Caregiver Present: No  Follows Commands: Within Functional Limits  Pain Screening  Patient Currently in Pain: Yes  Pain Assessment  Pain Assessment: 0-10  Pain Level: 7  Pain Type: Acute pain  Pain Location: Head;Back;Leg  Pain Orientation: Right;Left;Lower  Vital Signs  Patient Currently in Pain: Yes       Orientation  Orientation  Overall Orientation Status: Within Normal Limits  Social/Functional History  Social/Functional History  Lives With: Daughter  Type of Home: House  Home Layout: One level  Home Access: Ramped entrance, Stairs to enter with rails  Entrance Stairs - Number of Steps: 3  Bathroom Shower/Tub: Walk-in shower  Bathroom Toilet: Handicap height  Bathroom Equipment: Grab bars in shower, Shower chair  Home Equipment: Rolling walker, 4 wheeled walker, Cane  ADL Assistance: Independent  Homemaking Assistance: Needs assistance  Ambulation Assistance: Independent  Transfer Assistance: Independent  Active : Yes(but not for the past 4 months)  Cognition   Cognition  Overall Cognitive Status: WNL    Objective             Strength RLE  Strength RLE: WFL  Strength LLE  Strength LLE: WFL     Sensation  Overall Sensation Status: WNL  Bed mobility  Rolling to Left: Independent  Rolling to Right: Independent  Supine to Sit: Independent  Sit to Supine: Independent  Transfers  Sit to Stand: Independent  Stand to sit: Independent  Ambulation  Ambulation?: Yes  More Ambulation?: Yes  Ambulation 1  Surface: level tile  Device: No Device  Assistance: Stand by assistance  Quality of Gait: decreased gait speed, intermittent wide-based gait, intermittent deviated path toward right/left side of hallway  Distance: 200 feet  Comments: therapist recommended a front wheeled walker at this time  Ambulation 2  Surface - 2: level tile  Device 2: [address] 2: Supervision  Quality of Gait 2: decreased gait speed  Distance: 400 feet  Comments: with initial verbal and tactile cues for BLE placement, walker placement, and sequence throughout ambulation; with initial verbal cues for directions in order to successfully navigate hallway and return to correct room     Balance  Posture: Fair(forward head; rounded shoulders)  Sitting - Static: Good  Sitting - Dynamic: Good  Standing - Static: Good  Standing - Dynamic: Good(good minus without A/D; good with front wheeled walker)      Gait Training:  Cues were given for safety, sequence, device management, balance, posture, awareness, path. Therapeutic Activity Training:   Therapeutic activity training was instructed today. Cues were given for safety, sequence, UE/LE placement, awareness, and balance. Activities performed today included bed mobility training, sup-sit, sit-stand. Increased time required for all task completion. Plan   Plan  Times per week: 3+  Current Treatment Recommendations: Strengthening, ROM, Balance Training, Functional Mobility Training, Transfer Training, Endurance Training, ADL/Self-care Training, IADL Training, Gait Training, Stair training, Neuromuscular Re-education, Patient/Caregiver Education & Training, Pain Management, Equipment Evaluation, Education, & procurement, Home Exercise Program, Safety Education & Training, Positioning  Safety Devices  Type of devices:  All fall risk precautions in place, Call light within reach, Left in chair, Chair alarm in place, Gait belt, Nurse notified      AM-PAC Score  AM-PAC Inpatient Mobility Raw Score : 22 (03/17/20 1755)  AM-PAC Inpatient T-Scale Score : 53.28 (03/17/20 1755)  Mobility Inpatient CMS 0-100% Score: 20.91 (03/17/20 1755)  Mobility Inpatient CMS G-Code Modifier : [place] (03/17/20 1755)          Goals  Long term goals  Long term goal 1: In one week, pt will ambulate 800 feet with modified independence with LRAD  Long term goal 2: In one week, pt will independently ascend/descend 3 steps with a handrail  Long term goal 3:  In one week, pt will independently complete 3 sets of 10 reps of BLE AROM exercises in available and allowed ROM       Time In: 1300  Time Out: 1350  Total Treatment Time: 50  Timed Code Treatment Minutes: [address] [facility] Isabella Horowitz PT, DPT  License #: [id number]

## 2020-03-17 NOTE — H&P
HISTORY AND PHYSICAL  (Hospitalist, Internal Medicine)  IDENTIFYING INFORMATION   PATIENT:  Vi Strauss  MRN:  [id number]  ADMIT DATE: 3/16/2020  TIME OF EVALUATION: 3/16/2020 9:55 PM    CHIEF COMPLAINT     Multiple falls with passing out  HISTORY OF PRESENT ILLNESS   Vi Strauss is a 78 y.o. female admitted for multiple falls that occurred today, patient states that she was in her living room standing, she passed out and fell and hit her head on hardwood floor, on the left side, now she has a bruise there, and she takes Xarelto daily. Xarelto was last taken yesterday morning, however did not take in this morning. Patient states the last time she fell was couple years ago, and has not had multiple falls recently. She did have a pacemaker placed recently, it was interrogated in the ED which showed paroxysmal A. Fib,  no other events were mentioned by the ED physician. Her second fall, was softened by the EMT who caught her. She states that she fainted when the ambulance came only briefly. Again she did not have any palpitations or shortness of breath or chest pain associated with the syncopal/presyncopal episode. Pt otherwise has no complaints of CP, SOB N/V/C/D, abdominal pain, dysuria, joint pains, rash/boils, or fevers.        PMH listed below:    PAST MEDICAL, SURGICAL, FAMILY, and SOCIAL HISTORY     Past Medical History:   Diagnosis Date    Acute diastolic CHF (congestive heart failure) ([facility] 12/23/2014    Acute urinary tract infection 7/20/2019    Single Kidney    Anemia     Anxiety     Chronic low back pain     s/p epidural steroids    COPD [facility]ch[facility] obstructive [facility] disease) ([facility])     moderate to severe    Depression     Family history of cardiac disorder     Father, Brother    GERD (gastroesophageal reflux disease)     Goiter     s/p radioactive Iodine/ Low T4    H/O blood clots     right leg after miscarriage    H/O cardiovascular stress test 6/25/13 6/13-WNL EF Not on file   Social History Narrative    Do you donate blood or plasma? no    Caffeine intake? Moderate    Advance directive? yes    Is blood transfusion acceptable in an emergency? yes        MEDICATIONS   Medications Prior to Admission  Not in a hospital admission. Current Medications  No current facility-administered medications for this encounter. Current Outpatient Medications   Medication Sig Dispense Refill    atorvastatin (LIPITOR) 10 MG tablet Take 1 tablet by mouth daily 90 tablet 0    cyclobenzaprine (FLEXERIL) 10 MG tablet Take 1 tablet by mouth nightly 90 tablet 0    budesonide-formoterol (SYMBICORT) 160-4.5 MCG/ACT AERO Inhale 2 puffs into the lungs 2 times daily 1 Inhaler 5    HYDROcodone-acetaminophen (NORCO) 7.5-325 MG per tablet Take 0.5-1 tablets by mouth 3 times daily as needed for Pain (m54.5) for up to 30 days. 90 tablet 0    metFORMIN (GLUCOPHAGE) 500 MG tablet Take 1 tablet by mouth nightly 90 tablet 0    gabapentin (NEURONTIN) 300 MG capsule Take 1 capsule by mouth 2 times daily for 90 days.  180 capsule 1    montelukast (SINGULAIR) 10 MG tablet Take 1 tablet by mouth daily 90 tablet 0    pantoprazole (PROTONIX) 40 MG tablet Take 1 tablet by mouth daily 90 tablet 0    mirtazapine (REMERON) 15 MG tablet Take 1 tablet by mouth nightly 90 tablet 0    furosemide (LASIX) 20 MG tablet Take 1 tablet by mouth See Admin Instructions Take 1 tab every other day 45 tablet 0    ferrous sulfate (IRON 325) 325 (65 Fe) MG tablet Take 1 tablet by mouth nightly Until you start iv iron 30 tablet 3    rivaroxaban (XARELTO) 15 MG TABS tablet Take 1 tablet by mouth daily 90 tablet 0    primidone (MYSOLINE) 50 MG tablet Take 1 tablet by mouth 3 times daily 270 tablet 0    donepezil (ARICEPT) 10 MG tablet Take 1 tablet by mouth nightly 90 tablet 0    DULoxetine (CYMBALTA) 60 MG extended release capsule Take 1 capsule by mouth nightly 90 capsule 0    albuterol (PROVENTIL) (5 MG/ML) 0.5% nebulizer solution Take 1 mL by nebulization 4 times daily 120 vial 5    midodrine (PROAMATINE) 5 MG tablet TAKE 1 TABLET BY MOUTH THREE TIMES DAILY (Patient not taking: Reported on 1/21/2020) 270 tablet 3    aspirin 81 MG tablet Take 81 mg by mouth daily      docusate sodium (COLACE) 100 MG capsule Take 100 mg by mouth 2 times daily      FREESTYLE LANCETS MISC 1 each by Does not apply route daily      Glucose Blood (FREESTYLE LITE TEST VI) by In Vitro route      CPAP Machine MISC by Does not apply route           Allergies  Allergies   Allergen Reactions    Codeine Itching    Bactrim [Sulfamethoxazole-Trimethoprim]      dizziness       REVIEW OF SYSTEMS   Within above limitations. 14 point review of systems reviewed. Pertinent positive or negative as per HPI or otherwise negative per 14 point systems review. PHYSICAL EXAM     Wt Readings from Last 3 Encounters:   03/16/20 133 lb (60.3 kg)   02/26/20 132 lb 3.2 oz (60 kg)   01/21/20 134 lb 12.8 oz (61.1 kg)       Blood pressure 113/65, pulse 80, temperature 97.6 °F (36.4 °C), temperature source Oral, resp. rate 16, height 5' 4\" (1.626 m), weight 133 lb (60.3 kg), SpO2 98 %, not currently breastfeeding. General - AAO x 3  Psych - Appropriate affect/speech. No agitation  Eyes - Eye lids intact. No scleral icterus  ENT - Lips wnl. External ear clear/dry/intact. No thyromegaly on inspection  Neuro - No gross peripheral or central neuro deficits on inspection  Heart - regualar. S1 and S2 present. Lung - Adequate air entry b/l, No crackles/wheezes appreciated  GI - Soft. No guarding/rigidity. No hepatosplenomegaly/ascites. BS+   - No CVA/suprapubic tenderness or palpable bladder distension  Skin - Intact. No rash/petechiae/ecchymosis. Warm extremities  MSK - Joints with normal ROM.  No joint swellings    Lines/Drains/Airways/Wounds:  [unfilled]    LABS AND IMAGING   CBC  [unfilled]    Last 3 Hemoglobin  Lab Results   Component Value Date    HGB 11.6 03/16/2020    HGB 11.5 03/05/2020    HGB 11.1 01/06/2020     Last 3 WBC/ANC  Lab Results   Component Value Date    WBC 6.0 03/16/2020    WBC 6.3 03/05/2020    WBC 6.5 01/06/2020     No components found for: GRNLOCTYABS  Last 3 Platelets  No results found for: PLATELET  Chemistry  [unfilled]  [unfilled]  No results found for: LDH  Coagulation Studies  Lab Results   Component Value Date    INR 0.97 03/16/2020     Liver Function Studies  Lab Results   Component Value Date    ALT 15 03/16/2020    AST 33 03/16/2020    ALKPHOS 99 03/16/2020       Recent Imaging        Relevant labs and imaging reviewed    ASSESSMENT AND PLAN   Rosalia Babinski is a 78 y.o. female p/w    Syncope with fall x2, with head trauma x1  - witnessed 2nd time/unwitnessed first-time  -ED reports patient had paroxysmal A. fib  - CT with no acute findings  - anticoagulations- on xarelto, patient agreeable to hold  - tele  - EKG with no heart blocks  - orthostatic BP  - cardiology consult  - repeat CT in 24 hours  - neurochecks  - ECHO    Paroxysmal A.  Fib  status post pacemaker  -We will hold Xarelto, patient verbalized risk of stroke, however understands that she is of greater risk of subdural hematoma \"brain bleed\" at this time given recent head trauma  -Neurochecks every 4  -Cardiology consult, to restart Xarelto when appropriate    Coronary disease, last stents placed in 2001 reportedly  -Hold aspirin  Restart when appropriate-   -No complaints of chest pain      History of dementia  -On Aricept and Cymbalta, mirtazapine  -Alert oriented x4, does not know exact date however notices mid-March states that when she retired she does not keep track with her dates     Type 2 diabetes-   hold metformin  IDAC finger stick   - monitor    H/o COPD stable        Case d/w ED provider    DVT ppx:SCDs  Code status: Full code, states that she needs more time to think about and that she has a power of  that could decide for her    [place]

## 2020-03-17 NOTE — PROGRESS NOTES
Occupational [address] [place] [address] CARE OCCUPATIONAL THERAPY EVALUATION    Graciela Self, 1940, [id number], 3/17/2020    Discharge Recommendation: Home with initial 24 hour supervision/assistance, Initial use of Rolling walker      History:  [place]:  The primary encounter diagnosis was Syncope and collapse. A diagnosis of Injury of head, initial encounter was also pertinent to this visit. Subjective:  Patient states: \"I've been up to the bathroom a couple times. \"  Pain: Pt reported 2/10 headache  Communication with other providers: PT Zachary Cam RN [place]   Restrictions: General Precautions, Fall Risk, Telemetry, Pulse Ox, BP cuff, Bed/chair alarm    Home Setup/Prior level of function:  Social/Functional History  Lives With: Daughter and Grandson  Type of Home: House  Home Layout: One level  Home Access: Ramped entrance, Stairs to enter with rails  Entrance Stairs - Number of Steps: 3  Bathroom Shower/Tub: Walk-in shower  Bathroom Toilet: Handicap height  Bathroom Equipment: Grab bars in shower, Shower chair  Home Equipment: Rolling walker, 4 wheeled walker, Cane  ADL Assistance: Independent  Homemaking Assistance: Needs assistance (daughter primarily manages)  Ambulation Assistance: Independent (uses no AD)  Transfer Assistance: Independent  Active : Yes (but not for the past 4 months)  Occupation: Retired  Additional Comments: Pt reports this is her first fall in the past two years    Examination:  · Observation: Supine in bed upon arrival. Pleasant and agreeable to evaluation. · Vision: WFL (Glasses)  · [facility]  · Vitals: Stable HR and o2 sats throughout session; BP of 99/45 seated EOB, 103/55 in standing. Pt largely asymptomatic despite low BP readings.     Body Systems and functions:  · ROM: WFL all joints in BL UEs  · Strength: 4+/5 MMT all major muscle groups BL UEs  · Sensation: WFL (denies numbness/tingling)  · Tone: Normal  · Coordination: WFL for ADLs  · Perception:

## 2020-03-17 NOTE — PROGRESS NOTES
functioning. Ten point ROS reviewed negative, unless as noted above    Objective:   No intake or output data in the 24 hours ending 03/17/20 1123   Vitals:   Vitals:    03/17/20 0830   BP: (!) 116/50   Pulse: 72   Resp: 17   Temp: 98.3 °F (36.8 °C)   SpO2: 97%     Physical Exam:   GEN Awake female, lying in bed in no apparent distress. RESP Clear to auscultation, no wheezes, rales or rhonchi. Symmetric chest movement while on room air. CARDIO/VASC S1/S2 auscultated. Regular rate without appreciable murmurs. No peripheral edema. GI Abdomen is soft without significant tenderness, masses, or guarding. Bowel sounds are normoactive. MSK No gross joint deformities. SKIN Normal coloration, warm, dry. NEURO normal speech, no lateralizing weakness.   PSYCH Awake, alert, oriented x 3    Medications:   Medications:    atorvastatin  10 mg Oral Daily    donepezil  10 mg Oral Nightly    DULoxetine  60 mg Oral Nightly    furosemide  20 mg Oral Every Other Day    gabapentin  300 mg Oral BID    montelukast  10 mg Oral Daily    pantoprazole  40 mg Oral Daily    primidone  50 mg Oral TID    ferrous sulfate  325 mg Oral Nightly    sodium chloride flush  10 mL Intravenous BID    albuterol  2.5 mg Nebulization 4x Daily    aspirin  81 mg Oral Daily    budesonide-formoterol  2 puff Inhalation BID    docusate sodium  100 mg Oral BID    midodrine  5 mg Oral TID WC      Infusions:   PRN Meds: sodium chloride flush, 10 mL, PRN  acetaminophen, 650 mg, Q6H PRN    Or  acetaminophen, 650 mg, Q6H PRN  polyethylene glycol, 17 g, Daily PRN  promethazine, 12.5 mg, Q6H PRN    Or  ondansetron, 4 mg, Q6H PRN  HYDROcodone 5 mg - acetaminophen, 1 tablet, Q6H PRN        CBC   Recent Labs     03/16/20  1830 03/17/20  0041   WBC 6.0 8.4   HGB 11.6* 11.6*   HCT 38.5 40.4    222      BMP   Recent Labs     03/16/20  1830 03/17/20  0041    144   K 4.8 5.0    105   CO2 31 29   BUN 24* 24*   CREATININE 1.3* 1.3* Radiology report reviewed:  Carotid US - no significant stenosis  CT abd/pel -   1. Mild right hydroureter and hydronephrosis with no discrete obstructing   stone identified.  Findings could reflect recently passed stone or possible   infection.  Correlate clinically with urinalysis. 2. Redemonstration of nonobstructing left renal stone. 3. No bowel obstruction.         CT head  1. No acute intracranial hemorrhage, intra-axial mass, or acute territorial   infarct   2. Ventricular shunt catheter in place   3.  Generalized atrophy again noted     CT C spine  Degenerative changes involving the cervical spine, without evidence of an   acute fracture or traumatic malalignment       Electronically signed by Trena Castillo MD on 3/17/2020 at 11:23 AM

## 2020-03-17 NOTE — CONSULTS
cardiovascular stress test 08/10/2017    Normal study EF 70%    H/O: CVA (cerebrovascular accident) 7/20/2019    Heart murmur     History of blood transfusion     2017    History of LIMITED Echocardiogram 11/08/2019    EF 55-60%, Normal left ventricle structure and systolic function , no regional wall motion abnormalities were detected    [place] (hard of hearing)     hearing aides    Hx of blood clots     Hx of echocardiogram 07/11/2013    EF>55%, mild MR, mild TR, abn lVSFx stage 1     HX OTHER MEDICAL     PCP is Dr Laury Simmonds; Cardiologist is Dr Cheli Kang 7/30/13    Peripheral Angiogram.  Sluggish flow RLE, med mgmt.      Hydrocephalus ([facility] [address] 7/20/2019    Hydrocephalus, adult ([facility] [address]     shunt    Hyperlipidemia     Hyperthyroidism 7/20/2019    Kidney stone     \"had stone last summer 2016- passed it\"    Memory loss     Old MI (myocardial infarction) 2001    Osteoarthritis     Pneumonia     recurrent    Solitary kidney     Unspecified cerebral artery occlusion with cerebral infarction     \"they discovered I had stroke in 1993- with CT scan of head but never knew I had it\"       Surgical history :   Past Surgical History:   Procedure Laterality Date   [place]    disc ruptured    BRAIN SURGERY  2001    right shunt    CHEST SURGERY  1953    reconstruction of breast bone and rib-\"pigeon chest\"    CHOLECYSTECTOMY  1990    COLONOSCOPY  7/2011    F/U 7/2016    CORONARY ANGIOPLASTY WITH STENT PLACEMENT  2001    CSF SHUNT      ENDOSCOPY, COLON, DIAGNOSTIC      per old chart pt had EGD done with admission 2/17/2017    HYSTERECTOMY  1978    VERONIQUE BSO-dub/fibroids    KIDNEY SURGERY Right 1967    suspension    KNEE SURGERY  1997    right     PACEMAKER INSERTION Left 09/26/2019    [employer] XT DR ALEX Torres     ROTATOR CUFF REPAIR  1997    right    TUBAL LIGATION  1972       Family history:   Family History   Problem Relation Age of Onset    Stroke Mother     Heart Disease Mother     Asthma Mother     Diabetes Mother     Cancer Mother         uterine    Emphysema Father     Stroke Father     Heart Disease Father     Heart Defect Father         hardening of arteries    Diabetes Sister     Diabetes Brother     Heart Disease Brother     Cancer Maternal Grandfather         stomach    Diabetes Paternal Grandmother     Stroke Paternal Grandmother     Diabetes Brother     Alcohol Abuse Maternal Aunt        Social history :  reports that she has quit smoking. Her smoking use included cigarettes. She started smoking about 59 years ago. She quit after 59.00 years of use. She has never used smokeless tobacco. She reports current alcohol use. She reports that she does not use drugs. Allergies   Allergen Reactions    Codeine Itching    Bactrim [Sulfamethoxazole-Trimethoprim]      dizziness       No current facility-administered medications on file prior to encounter. Current Outpatient Medications on File Prior to Encounter   Medication Sig Dispense Refill    atorvastatin (LIPITOR) 10 MG tablet Take 1 tablet by mouth daily 90 tablet 0    cyclobenzaprine (FLEXERIL) 10 MG tablet Take 1 tablet by mouth nightly 90 tablet 0    budesonide-formoterol (SYMBICORT) 160-4.5 MCG/ACT AERO Inhale 2 puffs into the lungs 2 times daily 1 Inhaler 5    HYDROcodone-acetaminophen (NORCO) 7.5-325 MG per tablet Take 0.5-1 tablets by mouth 3 times daily as needed for Pain (m54.5) for up to 30 days. 90 tablet 0    metFORMIN (GLUCOPHAGE) 500 MG tablet Take 1 tablet by mouth nightly 90 tablet 0    gabapentin (NEURONTIN) 300 MG capsule Take 1 capsule by mouth 2 times daily for 90 days.  180 capsule 1    montelukast (SINGULAIR) 10 MG tablet Take 1 tablet by mouth daily 90 tablet 0    pantoprazole (PROTONIX) 40 MG tablet Take 1 tablet by mouth daily 90 tablet 0    mirtazapine (REMERON) 15 MG tablet Take 1 tablet by mouth nightly 90 tablet 0    furosemide (LASIX) 20 MG tablet Take 1 tablet by mouth See Admin Instructions Take 1 tab every other day 45 tablet 0    ferrous sulfate (IRON 325) 325 (65 Fe) MG tablet Take 1 tablet by mouth nightly Until you start iv iron 30 tablet 3    rivaroxaban (XARELTO) 15 MG TABS tablet Take 1 tablet by mouth daily 90 tablet 0    primidone (MYSOLINE) 50 MG tablet Take 1 tablet by mouth 3 times daily 270 tablet 0    donepezil (ARICEPT) 10 MG tablet Take 1 tablet by mouth nightly 90 tablet 0    DULoxetine (CYMBALTA) 60 MG extended release capsule Take 1 capsule by mouth nightly 90 capsule 0    albuterol (PROVENTIL) (5 MG/ML) 0.5% nebulizer solution Take 1 mL by nebulization 4 times daily 120 vial 5    midodrine (PROAMATINE) 5 MG tablet TAKE 1 TABLET BY MOUTH THREE TIMES DAILY (Patient not taking: Reported on 1/21/2020) 270 tablet 3    aspirin 81 MG tablet Take 81 mg by mouth daily      docusate sodium (COLACE) 100 MG capsule Take 100 mg by mouth 2 times daily      FREESTYLE LANCETS MISC 1 each by Does not apply route daily      Glucose Blood (FREESTYLE LITE TEST VI) by In Vitro route      CPAP Machine MISC by Does not apply route         Review of Systems:   Review of Systems   Constitutional: Positive for fatigue. Negative for activity change, chills and fever. HENT: Negative for congestion, ear pain and tinnitus. Eyes: Negative for photophobia, pain and visual disturbance. Respiratory: Negative for cough, chest tightness and wheezing. Cardiovascular: Negative for chest pain, palpitations and leg swelling. Gastrointestinal: Negative for abdominal pain, blood in stool, constipation, diarrhea, nausea and vomiting. Endocrine: Negative for cold intolerance and heat intolerance. Genitourinary: Negative for dysuria, flank pain and hematuria. Musculoskeletal: Positive for arthralgias. Negative for back pain, myalgias and neck stiffness. Skin: Negative for color change and rash. Allergic/Immunologic: Negative for food allergies. HGB 11.6 03/17/2020    HCT 40.4 03/17/2020     03/17/2020     Lipids:  Lab Results   Component Value Date    CHOL 133 09/27/2019    TRIG 88 09/27/2019    HDL 62 09/27/2019    LDLCALC 125 06/26/2013    LDLDIRECT 53 09/27/2019     PT/INR:   Lab Results   Component Value Date    INR 0.97 03/16/2020        BMP:    Lab Results   Component Value Date     03/17/2020    K 5.0 03/17/2020     03/17/2020    CO2 29 03/17/2020    BUN 24 (H) 03/17/2020     CMP:   Lab Results   Component Value Date    AST 33 03/16/2020    PROT 6.1 (L) 03/16/2020    BILITOT 0.1 03/16/2020    ALKPHOS 99 03/16/2020     TSH:    Lab Results   Component Value Date    TSH 0.20 06/26/2013       EKGINTERPRETATION - EKG Interpretation:        IMPRESSION / RECOMMENDATIONS:     Syncope probably from hypotension  Atrial fibrillation sp AV node ablation  Pacemaker  HLD  CAD    Patient  episode of syncope appears to be from hypotension  Patient is supposed to be on Midodrine but reportedly she has not been taking it regularly  Patient known to have hypotensive episodes before and she is on Midodrine because for the same reason  Pacemaker interrogated and pacemaker is functioning within normal limits  Decrease the rate to 70 bpm  Discussed with patient about compliance with medication  Can be discharged from my Stand point    Thanks again for allowing me to participate in care of this patient. Please call me if you have any questions. With best regards. Randall Bowers MD, 3/17/2020 1:40 PM     Please note this report has been partially produced using speech recognition software and may contain errors related to that system including errors in grammar, punctuation, and spelling, as well as words and phrases that may be inappropriate. If there are any questions or concerns please feel free to contact the dictating provider for clarification.

## 2020-03-17 NOTE — CONSULTS
PRN Jarod Benson MD   650 mg at 03/17/20 0502    Or    acetaminophen (TYLENOL) suppository 650 mg  650 mg Rectal Q6H PRN Kenroy Riley MD        polyethylene glycol (GLYCOLAX) packet 17 g  17 g Oral Daily PRN Kenroy Riley MD        promethazine (PHENERGAN) tablet 12.5 mg  12.5 mg Oral Q6H PRN Kenroy Riley MD        Or    ondansetron (ZOFRAN) injection 4 mg  4 mg Intravenous Q6H PRN Kenroy Riley MD        albuterol (PROVENTIL) nebulizer solution 2.5 mg  2.5 mg Nebulization 4x Daily FREDERICK Morel CNP        aspirin chewable tablet 81 mg  81 mg Oral Daily FREDERICK Morel - CNP   81 mg at 03/17/20 0851    budesonide-formoterol (SYMBICORT) 160-4.5 MCG/ACT inhaler 2 puff  2 puff Inhalation BID FREDERICK Morel CNP        docusate sodium (COLACE) capsule 100 mg  100 mg Oral BID FREDERICK To - CNP   100 mg at 03/17/20 0851    midodrine (PROAMATINE) tablet 5 mg  5 mg Oral TID WC FREDERICK Morel - CNP   5 mg at 03/17/20 1339    HYDROcodone-acetaminophen (NORCO) 5-325 MG per tablet 1 tablet  1 tablet Oral Q6H PRN FREDERICK Morel - CNP   1 tablet at 03/17/20 1338     Review of Systems:  All 14 systems reviewed, all negative except for  passed    Physical Examination:    BP (!) 90/54   Pulse 73   Temp 98.1 °F (36.7 °C) (Oral)   Resp 18   Ht 5' 4\" (1.626 m)   Wt 127 lb 12.8 oz (58 kg)   SpO2 97%   BMI 21.94 kg/m²      Wt Readings from Last 3 Encounters:   03/16/20 127 lb 12.8 oz (58 kg)   02/26/20 132 lb 3.2 oz (60 kg)   01/21/20 134 lb 12.8 oz (61.1 kg)     Body mass index is 21.94 kg/m².       General Appearance:  fair  Head: normocephalic     Eyes: normal, noninjected conjunctiva    ENT: normal mucosa, noninjected throat, normal     NECK: No JVP  No thyromegaly        Cardiovascular: No thrills palpated   Auscultation: Normal S1 and S2,  no murmur   carotid bruit no   Abdominal Aorta no bruit    Respiratory:    Breath sounds Clear = 0    Extremities: none Edema clubbing ,   no cyanosis    SKIN: Warm and well perfused, no pallor or cyanosis    Vascular exam:  Pedal Pulses: palp  bilaterally        Abdomen:  No masses or tenderness. No organomegaly noted. Neurological:  Oriented to time, place, and person   No focal neurological deficit noted. Psychiatric:normal mood, no anxiety    Lab Review   Recent Labs     03/17/20  0041   WBC 8.4   HGB 11.6*   HCT 40.4         Recent Labs     03/17/20  0041      K 5.0      CO2 29   BUN 24*   CREATININE 1.3*     Recent Labs     03/16/20  1830   AST 33   ALT 15   BILITOT 0.1   ALKPHOS 99     No results for input(s): TROPONINI in the last 72 hours.   No results found for: BNP  Lab Results   Component Value Date    INR 0.97 03/16/2020    PROTIME 11.7 03/16/2020           Assessment/Recommendations:       - syncope; orthostasis,car US, check PPM  - EP cons  - Hyperlipidimea on statin       Neha Johnson MD, 3/17/2020 2:24 PM

## 2020-03-18 ENCOUNTER — TELEPHONE (OUTPATIENT)
Dept: FAMILY MEDICINE CLINIC | Age: 80
End: 2020-03-18

## 2020-03-18 ENCOUNTER — APPOINTMENT (OUTPATIENT)
Dept: CT IMAGING | Age: 80
DRG: 312 | End: 2020-03-18
Payer: MEDICARE

## 2020-03-18 VITALS
WEIGHT: 129.6 LBS | RESPIRATION RATE: 20 BRPM | BODY MASS INDEX: 22.13 KG/M2 | SYSTOLIC BLOOD PRESSURE: 99 MMHG | TEMPERATURE: 98.1 F | OXYGEN SATURATION: 94 % | HEART RATE: 73 BPM | DIASTOLIC BLOOD PRESSURE: 45 MMHG | HEIGHT: 64 IN

## 2020-03-18 PROCEDURE — 2580000003 HC RX 258

## 2020-03-18 PROCEDURE — G0378 HOSPITAL OBSERVATION PER HR: HCPCS

## 2020-03-18 PROCEDURE — 94640 AIRWAY INHALATION TREATMENT: CPT

## 2020-03-18 PROCEDURE — 94761 N-INVAS EAR/PLS OXIMETRY MLT: CPT

## 2020-03-18 PROCEDURE — 6370000000 HC RX 637 (ALT 250 FOR IP): Performed by: NURSE PRACTITIONER

## 2020-03-18 PROCEDURE — 70450 CT HEAD/BRAIN W/O DYE: CPT

## 2020-03-18 PROCEDURE — 6370000000 HC RX 637 (ALT 250 FOR IP): Performed by: INTERNAL MEDICINE

## 2020-03-18 PROCEDURE — 6360000002 HC RX W HCPCS: Performed by: NURSE PRACTITIONER

## 2020-03-18 PROCEDURE — 2580000003 HC RX 258: Performed by: INTERNAL MEDICINE

## 2020-03-18 RX ORDER — MIDODRINE HYDROCHLORIDE 5 MG/1
5 TABLET ORAL
Qty: 90 TABLET | Refills: 3 | Status: SHIPPED | OUTPATIENT
Start: 2020-03-18 | End: 2020-09-30 | Stop reason: SDUPTHER

## 2020-03-18 RX ORDER — FUROSEMIDE 20 MG/1
20 TABLET ORAL SEE ADMIN INSTRUCTIONS
Qty: 45 TABLET | Refills: 0 | Status: SHIPPED | OUTPATIENT
Start: 2020-03-18 | End: 2020-09-30 | Stop reason: SDUPTHER

## 2020-03-18 RX ORDER — SODIUM CHLORIDE 9 MG/ML
INJECTION, SOLUTION INTRAVENOUS
Status: COMPLETED
Start: 2020-03-18 | End: 2020-03-18

## 2020-03-18 RX ADMIN — ASPIRIN 81 MG 81 MG: 81 TABLET ORAL at 09:29

## 2020-03-18 RX ADMIN — HYDROCODONE BITARTRATE AND ACETAMINOPHEN 1 TABLET: 5; 325 TABLET ORAL at 09:29

## 2020-03-18 RX ADMIN — SODIUM CHLORIDE: 9 INJECTION, SOLUTION INTRAVENOUS at 03:09

## 2020-03-18 RX ADMIN — PANTOPRAZOLE SODIUM 40 MG: 40 TABLET, DELAYED RELEASE ORAL at 06:05

## 2020-03-18 RX ADMIN — PRIMIDONE 50 MG: 50 TABLET ORAL at 14:27

## 2020-03-18 RX ADMIN — ATORVASTATIN CALCIUM 10 MG: 10 TABLET, FILM COATED ORAL at 09:29

## 2020-03-18 RX ADMIN — BUDESONIDE AND FORMOTEROL FUMARATE DIHYDRATE 2 PUFF: 160; 4.5 AEROSOL RESPIRATORY (INHALATION) at 08:11

## 2020-03-18 RX ADMIN — MIDODRINE HYDROCHLORIDE 5 MG: 5 TABLET ORAL at 14:27

## 2020-03-18 RX ADMIN — MONTELUKAST 10 MG: 10 TABLET, FILM COATED ORAL at 09:29

## 2020-03-18 RX ADMIN — PRIMIDONE 50 MG: 50 TABLET ORAL at 09:29

## 2020-03-18 RX ADMIN — ALBUTEROL SULFATE 2.5 MG: 2.5 SOLUTION RESPIRATORY (INHALATION) at 08:09

## 2020-03-18 RX ADMIN — DOCUSATE SODIUM 100 MG: 100 CAPSULE, LIQUID FILLED ORAL at 09:29

## 2020-03-18 RX ADMIN — GABAPENTIN 300 MG: 300 CAPSULE ORAL at 09:29

## 2020-03-18 RX ADMIN — HYDROCODONE BITARTRATE AND ACETAMINOPHEN 1 TABLET: 5; 325 TABLET ORAL at 03:18

## 2020-03-18 RX ADMIN — SODIUM CHLORIDE, PRESERVATIVE FREE 10 ML: 5 INJECTION INTRAVENOUS at 09:30

## 2020-03-18 ASSESSMENT — PAIN SCALES - GENERAL
PAINLEVEL_OUTOF10: 5
PAINLEVEL_OUTOF10: 9
PAINLEVEL_OUTOF10: 9
PAINLEVEL_OUTOF10: 8
PAINLEVEL_OUTOF10: 5

## 2020-03-18 ASSESSMENT — PAIN DESCRIPTION - PAIN TYPE: TYPE: ACUTE PAIN

## 2020-03-18 ASSESSMENT — PAIN DESCRIPTION - DESCRIPTORS: DESCRIPTORS: ACHING

## 2020-03-18 NOTE — CARE COORDINATION
Pt has discharge orders for home with [facility]. LSW spoke with pt and she requested [facility]. LSW PS Dodie with [facility] and gave referral.  Cassandra Araujo will initiate HC services.

## 2020-03-18 NOTE — DISCHARGE SUMMARY
Discharge Summary    Name:  Juliette Light /Age/Sex: 1940  (78 y.o. female)   MRN & CSN:  [id number] & [id number] Admission Date/Time: 3/16/2020  6:11 PM   Attending:  She Madison MD Discharging Physician: She Madison MD     Hospital Course:   Juliette Light is a 78 y.o.  female  who presents with syncope and fall at home.     --- Recurrent syncope, suspected to be due to orthostatic hypotension. Improved at discharge. BP was low on admission, 96/51 mmHg, and improved with fluid resuscitation. Midodrine was resumed - she had stop taking it prior to this hospitalization. Electrophysiology was consulted and pacemaker was interrogated and found to be working fine. EP only recommended adherence with midodrine, and this was emphasized at discharge. ECHO showed normal EF. She was advised to take PO lasix only as needed basis for leg swelling.     --- Falls - no acute bony injury or intracranial bleed on CT head and C spine. --- Paroxysmal Atrial fibrillation s/p AV ablation   --- PPM present. --- Stroke prophylaxis with Xarelto. Resumed at discharge. --- CAD s/p PCI  - on ASA  --- Dementia - aricept  --- COPD - symbicort  --- T2DM -  meformin resumed at discharge. --- Remote ischemic CVA   --- Kidney stones   --- Mild rt hydronephrosis on CTabd - likely due to passed kidney stone. Cr stable. --- Hydrocephalus s/p  shunt - shunt catheter well positioned on CT.  --- Chronic back pain - on Norco. PDMP reviewed. Last prescribed 30 days supply on 20. --- depression - remeron and cymbalta  --- HLD - lipitor  --- Iron deficiency - ferrous sulfate    The patient expressed appropriate understanding of and agreement with the discharge recommendations, medications, and plan.      Consults this admission:  IP CONSULT TO HOSPITALIST  IP CONSULT TO CARDIOLOGY  IP CONSULT TO ELECTROPHYSIOLOGY  IP CONSULT TO HOME CARE NEEDS    Discharge Instruction:   Follow up appointments:

## 2020-03-18 NOTE — PROGRESS NOTES
[facility] Liaison spoke with pt & is aware of discharge & initiated Edel Yepez.

## 2020-03-19 ENCOUNTER — CARE COORDINATION (OUTPATIENT)
Dept: CASE MANAGEMENT | Age: 80
End: 2020-03-19

## 2020-03-19 NOTE — TELEPHONE ENCOUNTER
Spoke with alessandro. Need dr Alfonzo Colindres approval hosp is setting up home care.  Gave verbal approval

## 2020-03-20 ENCOUNTER — CARE COORDINATION (OUTPATIENT)
Dept: CASE MANAGEMENT | Age: 80
End: 2020-03-20

## 2020-03-20 ENCOUNTER — TELEPHONE (OUTPATIENT)
Dept: FAMILY MEDICINE CLINIC | Age: 80
End: 2020-03-20

## 2020-03-20 NOTE — CARE COORDINATION
COVID-19 Screening Initial Follow-up Note    Patient contacted regarding COVID-19  risk. Attempted to contact patient for COVID-19call. Contact information left to home and mobile  requesting call back at the earliest convenience.     Bong Moran, RN BSN   Care Transitions Nurse  [phone]

## 2020-03-20 NOTE — CARE COORDINATION
COVID-19 Screening Initial Follow-up Note    Patient contacted regarding COVID-19  Risk. Attempted to contact patient for COVID-19 call. Contact information left to  requesting call back at the earliest convenience.     Halie Kang RN BSN   Care Transitions Nurse  [phone]

## 2020-03-31 ENCOUNTER — TELEPHONE (OUTPATIENT)
Dept: FAMILY MEDICINE CLINIC | Age: 80
End: 2020-03-31

## 2020-04-01 NOTE — TELEPHONE ENCOUNTER
Spoke with pt's dtr. Informed med not on pt med list, did pt mean singulair which is on her list. dtr states she is concerned pt might be trying to order xarelto.  Will talk with her mother and return call to the office

## 2020-04-08 LAB
EKG ATRIAL RATE: 80 BPM
EKG DIAGNOSIS: NORMAL
EKG P AXIS: 48 DEGREES
EKG P-R INTERVAL: 186 MS
EKG Q-T INTERVAL: 454 MS
EKG QRS DURATION: 154 MS
EKG QTC CALCULATION (BAZETT): 523 MS
EKG R AXIS: 270 DEGREES
EKG T AXIS: 80 DEGREES
EKG VENTRICULAR RATE: 80 BPM

## 2020-04-13 ENCOUNTER — PROCEDURE VISIT (OUTPATIENT)
Dept: CARDIOLOGY CLINIC | Age: 80
End: 2020-04-13
Payer: MEDICARE

## 2020-04-14 PROCEDURE — 93296 REM INTERROG EVL PM/IDS: CPT | Performed by: INTERNAL MEDICINE

## 2020-04-14 PROCEDURE — 93294 REM INTERROG EVL PM/LDLS PM: CPT | Performed by: INTERNAL MEDICINE

## 2020-04-16 RX ORDER — HYDROCODONE BITARTRATE AND ACETAMINOPHEN 7.5; 325 MG/1; MG/1
.5-1 TABLET ORAL 3 TIMES DAILY PRN
Qty: 90 TABLET | Refills: 0 | Status: SHIPPED | OUTPATIENT
Start: 2020-04-16 | End: 2020-05-20 | Stop reason: SDUPTHER

## 2020-04-21 ENCOUNTER — TELEPHONE (OUTPATIENT)
Dept: OTHER | Facility: CLINIC | Age: 80
End: 2020-04-21

## 2020-04-21 NOTE — TELEPHONE ENCOUNTER
Brittney Huang was attempted to be contacted to set up a video visit with Judith Enamorado MD.     Attempted to contact patient at number listed in chart [phone], left  for scheduling.       Sophia Maldonado

## 2020-04-22 ENCOUNTER — TELEPHONE (OUTPATIENT)
Dept: FAMILY MEDICINE CLINIC | Age: 80
End: 2020-04-22

## 2020-05-04 ENCOUNTER — VIRTUAL VISIT (OUTPATIENT)
Dept: FAMILY MEDICINE CLINIC | Age: 80
End: 2020-05-04
Payer: MEDICARE

## 2020-05-04 PROBLEM — F41.1 GENERALIZED ANXIETY DISORDER: Status: ACTIVE | Noted: 2020-05-04

## 2020-05-04 PROCEDURE — 99441 PR PHYS/QHP TELEPHONE EVALUATION 5-10 MIN: CPT | Performed by: FAMILY MEDICINE

## 2020-05-04 RX ORDER — GABAPENTIN 300 MG/1
300 CAPSULE ORAL 3 TIMES DAILY
Qty: 180 CAPSULE | Refills: 1
Start: 2020-05-04 | End: 2020-09-30 | Stop reason: SDUPTHER

## 2020-05-20 ENCOUNTER — TELEMEDICINE (OUTPATIENT)
Dept: FAMILY MEDICINE CLINIC | Age: 80
End: 2020-05-20
Payer: MEDICARE

## 2020-05-20 PROCEDURE — 99214 OFFICE O/P EST MOD 30 MIN: CPT | Performed by: FAMILY MEDICINE

## 2020-05-20 RX ORDER — ATORVASTATIN CALCIUM 10 MG/1
10 TABLET, FILM COATED ORAL DAILY
Qty: 90 TABLET | Refills: 1 | Status: SHIPPED | OUTPATIENT
Start: 2020-05-20 | End: 2020-09-30 | Stop reason: SDUPTHER

## 2020-05-20 RX ORDER — MIRTAZAPINE 15 MG/1
15 TABLET, FILM COATED ORAL NIGHTLY
Qty: 90 TABLET | Refills: 1 | Status: SHIPPED | OUTPATIENT
Start: 2020-05-20 | End: 2020-09-30 | Stop reason: SDUPTHER

## 2020-05-20 RX ORDER — MONTELUKAST SODIUM 10 MG/1
10 TABLET ORAL DAILY
Qty: 90 TABLET | Refills: 1 | Status: SHIPPED | OUTPATIENT
Start: 2020-05-20 | End: 2020-09-30 | Stop reason: SDUPTHER

## 2020-05-20 RX ORDER — DULOXETIN HYDROCHLORIDE 60 MG/1
60 CAPSULE, DELAYED RELEASE ORAL NIGHTLY
Qty: 90 CAPSULE | Refills: 1 | Status: SHIPPED | OUTPATIENT
Start: 2020-05-20 | End: 2020-09-30 | Stop reason: SDUPTHER

## 2020-05-20 RX ORDER — HYDROCODONE BITARTRATE AND ACETAMINOPHEN 7.5; 325 MG/1; MG/1
.5-1 TABLET ORAL 3 TIMES DAILY PRN
Qty: 90 TABLET | Refills: 0 | Status: SHIPPED | OUTPATIENT
Start: 2020-05-20 | End: 2020-06-22 | Stop reason: SDUPTHER

## 2020-05-20 RX ORDER — PRIMIDONE 50 MG/1
50 TABLET ORAL 3 TIMES DAILY
Qty: 270 TABLET | Refills: 1 | Status: SHIPPED | OUTPATIENT
Start: 2020-05-20 | End: 2020-09-30 | Stop reason: SDUPTHER

## 2020-05-20 RX ORDER — DONEPEZIL HYDROCHLORIDE 10 MG/1
10 TABLET, FILM COATED ORAL NIGHTLY
Qty: 90 TABLET | Refills: 1 | Status: SHIPPED | OUTPATIENT
Start: 2020-05-20 | End: 2020-09-30 | Stop reason: SDUPTHER

## 2020-05-20 RX ORDER — CYCLOBENZAPRINE HCL 10 MG
10 TABLET ORAL NIGHTLY
Qty: 90 TABLET | Refills: 0 | Status: SHIPPED | OUTPATIENT
Start: 2020-05-20 | End: 2020-08-18

## 2020-05-20 RX ORDER — PANTOPRAZOLE SODIUM 40 MG/1
40 TABLET, DELAYED RELEASE ORAL DAILY
Qty: 90 TABLET | Refills: 1 | Status: SHIPPED | OUTPATIENT
Start: 2020-05-20 | End: 2020-09-30 | Stop reason: SDUPTHER

## 2020-05-20 NOTE — PROGRESS NOTES
2020    TELEHEALTH EVALUATION -- Audio/Visual (During AKP-41 public health emergency)    HPI:    Jered Whitman (:  1940) has requested an audio/video evaluation for the following concern(s):    Patient had difficulty with hearing. Palmira Corby was most all done through the daughter Lenin Ramírez. Lenin Ramírez herself look very tired. The quality of the visit was okay. It was performed on my chart after both the  and the nurses had difficulty reaching patient by phone. Denies orthostasis. Pt without side effects of his bp meds. Notes compliance with bp meds. Patient denies hypoglycemia. She states her sugars are good. She had no sugar numbers to review with us. Daughter complained of continued confusion. This is been a complaint of patient for the last 10 years. She is kept herself on Aricept although I am not convinced it is benefiting her. She feels it is. I suggested the only medication available to improve her thinking at this point is removing medications. I recommended weaning down on primidone 1 tablet weekly until you get down to 1 or come off of it to see if there is an improvement in her thinking. REVIEW OF SYSTEMS    Constitutional:  Denies fever, chills, weight loss or weakness  Eyes:  no photophobia or discharge  ENT:  no sore throat or ear pain  Cardiovascular:  Denies chest pain, palpitations or swelling  Respiratory:  Denies cough or shortness of breath  GI:  no abdominal pain, nausea, vomiting, or diarrhea  Musculoskeletal:  no back pain  Skin:  No rashes  Neurologic:  no headache, focal weakness, or sensory changes  Endocrine:  no polyuria or polydipsia        Prior to Visit Medications    Medication Sig Taking? Authorizing Provider   HYDROcodone-acetaminophen (NORCO) 7.5-325 MG per tablet Take 0.5-1 tablets by mouth 3 times daily as needed for Pain (m54.5) for up to 30 days.  Yes Baltazar Segovia MD   atorvastatin (LIPITOR) 10 MG tablet Take 1 daily  Patient not taking: Reported on 5/20/2020  Tayo Moon MD   CPAP Machine MISC by Does not apply route  Historical Provider, MD       Social History     Tobacco Use    Smoking status: Former Smoker     Years: 59.00     Types: Cigarettes     Start date: 10/7/1960    Smokeless tobacco: Never Used    Tobacco comment: pt quit 11/4/19   Substance Use Topics    Alcohol use: Yes     Comment: rarely/ average \"2-3 times per year'    Drug use: No                PHYSICAL EXAM    There were no vitals taken for this visit. Constitutional:  Well developed, well nourished  HEENT:  Normocephalic, atraumatic, bilateral external ears normal,  nose normal  Neck:  Normal range of motion,  supple  Lungs:  non-labored breathing  Skin:   dry, no erythema, no rash  Back:  straight  Extremities:  No edema,  no cyanosis  Musculoskeletal:  Good range of motion   Neurologic:  Alert & oriented X 3          ASSESSMENT/PLAN:  1. Chronic diastolic CHF (congestive heart failure) (HCC)  Appears stable. Continue the same    2. Unclear control. Asked patient to do more sugar checks. Reviewed last A1c with patient hypercholesterolemia - issue controlled. Continue meds. Refilled meds. 3. Type 2 diabetes mellitus without complication, without long-term current use of insulin (HCC)  Unclear control. Reviewed last A1c with patient. Patient to do more home blood sugar checks. - metFORMIN (GLUCOPHAGE) 500 MG tablet; Take 1 tablet by mouth nightly  Dispense: 90 tablet; Refill: 1    4. Chronic bronchitis, unspecified chronic bronchitis type ([facility] [address]  Issue controlled. Continue meds. Refilled meds. - montelukast (SINGULAIR) 10 MG tablet; Take 1 tablet by mouth daily  Dispense: 90 tablet; Refill: 1    5. Chronic bilateral low back pain without sciatica  No sign of abuse or diversion. Continue the same medicines for now. - HYDROcodone-acetaminophen (NORCO) 7.5-325 MG per tablet;  Take 0.5-1 tablets by mouth 3 times daily as

## 2020-06-08 ENCOUNTER — HOSPITAL ENCOUNTER (OUTPATIENT)
Age: 80
Setting detail: SPECIMEN
Discharge: HOME | End: 2020-06-08
Payer: MEDICARE

## 2020-06-08 LAB
T3 FREE: 2.1 PG/ML (ref 2.3–4.2)
T4 FREE: 1.01 NG/DL (ref 0.9–1.8)
TSH HIGH SENSITIVITY: 0.3 UIU/ML (ref 0.27–4.2)

## 2020-06-08 PROCEDURE — 84481 FREE ASSAY (FT-3): CPT

## 2020-06-08 PROCEDURE — 84439 ASSAY OF FREE THYROXINE: CPT

## 2020-06-08 PROCEDURE — 84443 ASSAY THYROID STIM HORMONE: CPT

## 2020-06-22 RX ORDER — HYDROCODONE BITARTRATE AND ACETAMINOPHEN 7.5; 325 MG/1; MG/1
.5-1 TABLET ORAL 3 TIMES DAILY PRN
Qty: 90 TABLET | Refills: 0 | Status: SHIPPED | OUTPATIENT
Start: 2020-06-22 | End: 2020-07-27 | Stop reason: SDUPTHER

## 2020-06-25 ENCOUNTER — HOSPITAL ENCOUNTER (OUTPATIENT)
Dept: INFUSION THERAPY | Age: 80
Discharge: HOME | End: 2020-06-25
Payer: MEDICARE

## 2020-06-25 LAB
ALBUMIN SERPL-MCNC: 4.3 GM/DL (ref 3.4–5)
ALP BLD-CCNC: 123 IU/L (ref 40–129)
ALT SERPL-CCNC: 10 U/L (ref 10–40)
ANION GAP SERPL CALCULATED.3IONS-SCNC: 7 MMOL/L (ref 4–16)
AST SERPL-CCNC: 15 IU/L (ref 15–37)
BASOPHILS ABSOLUTE: 0 K/CU MM
BASOPHILS RELATIVE PERCENT: 0.3 % (ref 0–1)
BILIRUB SERPL-MCNC: 0.1 MG/DL (ref 0–1)
BUN BLDV-MCNC: 35 MG/DL (ref 6–23)
CALCIUM SERPL-MCNC: 10.4 MG/DL (ref 8.3–10.6)
CHLORIDE BLD-SCNC: 101 MMOL/L (ref 99–110)
CO2: 33 MMOL/L (ref 21–32)
CREAT SERPL-MCNC: 1.4 MG/DL (ref 0.6–1.1)
DIFFERENTIAL TYPE: ABNORMAL
EOSINOPHILS ABSOLUTE: 0.3 K/CU MM
EOSINOPHILS RELATIVE PERCENT: 4.2 % (ref 0–3)
FERRITIN: 64 NG/ML (ref 15–150)
FOLATE: 10.7 NG/ML (ref 3.1–17.5)
GFR AFRICAN AMERICAN: 44 ML/MIN/1.73M2
GFR NON-AFRICAN AMERICAN: 36 ML/MIN/1.73M2
GLUCOSE BLD-MCNC: 137 MG/DL (ref 70–99)
HCT VFR BLD CALC: 40.5 % (ref 37–47)
HEMOGLOBIN: 13 GM/DL (ref 12.5–16)
IRON: 99 UG/DL (ref 37–145)
LYMPHOCYTES ABSOLUTE: 1.8 K/CU MM
LYMPHOCYTES RELATIVE PERCENT: 25.4 % (ref 24–44)
MCH RBC QN AUTO: 31.7 PG (ref 27–31)
MCHC RBC AUTO-ENTMCNC: 32.1 % (ref 32–36)
MCV RBC AUTO: 98.8 FL (ref 78–100)
MONOCYTES ABSOLUTE: 0.8 K/CU MM
MONOCYTES RELATIVE PERCENT: 11.4 % (ref 0–4)
PCT TRANSFERRIN: 40 % (ref 10–44)
PDW BLD-RTO: 14.5 % (ref 11.7–14.9)
PLATELET # BLD: 272 K/CU MM (ref 140–440)
PMV BLD AUTO: 12.8 FL (ref 7.5–11.1)
POTASSIUM SERPL-SCNC: 5 MMOL/L (ref 3.5–5.1)
RBC # BLD: 4.1 M/CU MM (ref 4.2–5.4)
RETICULOCYTE COUNT PCT: 1.4 % (ref 0.2–2.2)
SEGMENTED NEUTROPHILS ABSOLUTE COUNT: 4.1 K/CU MM
SEGMENTED NEUTROPHILS RELATIVE PERCENT: 58.7 % (ref 36–66)
SODIUM BLD-SCNC: 141 MMOL/L (ref 135–145)
TOTAL IRON BINDING CAPACITY: 245 UG/DL (ref 250–450)
TOTAL PROTEIN: 6.9 GM/DL (ref 6.4–8.2)
UNSATURATED IRON BINDING CAPACITY: 146 UG/DL (ref 110–370)
VITAMIN B-12: 278.6 PG/ML (ref 211–911)
WBC # BLD: 6.9 K/CU MM (ref 4–10.5)

## 2020-06-25 PROCEDURE — 83550 IRON BINDING TEST: CPT

## 2020-06-25 PROCEDURE — 85025 COMPLETE CBC W/AUTO DIFF WBC: CPT

## 2020-06-25 PROCEDURE — 82728 ASSAY OF FERRITIN: CPT

## 2020-06-25 PROCEDURE — 85045 AUTOMATED RETICULOCYTE COUNT: CPT

## 2020-06-25 PROCEDURE — 83540 ASSAY OF IRON: CPT

## 2020-06-25 PROCEDURE — 82746 ASSAY OF FOLIC ACID SERUM: CPT

## 2020-06-25 PROCEDURE — 36415 COLL VENOUS BLD VENIPUNCTURE: CPT

## 2020-06-25 PROCEDURE — 82607 VITAMIN B-12: CPT

## 2020-06-25 PROCEDURE — 80053 COMPREHEN METABOLIC PANEL: CPT

## 2020-07-02 ENCOUNTER — HOSPITAL ENCOUNTER (OUTPATIENT)
Dept: INFUSION THERAPY | Age: 80
Discharge: HOME | End: 2020-07-02
Payer: MEDICARE

## 2020-07-02 PROCEDURE — 99211 OFF/OP EST MAY X REQ PHY/QHP: CPT

## 2020-07-07 ENCOUNTER — TELEPHONE (OUTPATIENT)
Dept: FAMILY MEDICINE CLINIC | Age: 80
End: 2020-07-07

## 2020-07-07 NOTE — TELEPHONE ENCOUNTER
Per patient request, [facility] needs a verbal order to discharge from [address] [place] [address].

## 2020-07-09 ENCOUNTER — VIRTUAL VISIT (OUTPATIENT)
Dept: CARDIOLOGY CLINIC | Age: 80
End: 2020-07-09
Payer: MEDICARE

## 2020-07-09 PROBLEM — Z95.0 PACEMAKER: Status: ACTIVE | Noted: 2020-07-09

## 2020-07-09 PROCEDURE — G8427 DOCREV CUR MEDS BY ELIG CLIN: HCPCS | Performed by: INTERNAL MEDICINE

## 2020-07-09 PROCEDURE — 4040F PNEUMOC VAC/ADMIN/RCVD: CPT | Performed by: INTERNAL MEDICINE

## 2020-07-09 PROCEDURE — 1123F ACP DISCUSS/DSCN MKR DOCD: CPT | Performed by: INTERNAL MEDICINE

## 2020-07-09 PROCEDURE — G8400 PT W/DXA NO RESULTS DOC: HCPCS | Performed by: INTERNAL MEDICINE

## 2020-07-09 PROCEDURE — 1090F PRES/ABSN URINE INCON ASSESS: CPT | Performed by: INTERNAL MEDICINE

## 2020-07-09 PROCEDURE — 99213 OFFICE O/P EST LOW 20 MIN: CPT | Performed by: INTERNAL MEDICINE

## 2020-07-09 NOTE — PROGRESS NOTES
OFFICE PROGRESS NOTES      Mynor Beebe is a 78 y.o. female who has    CHIEF COMPLAINT AS FOLLOWS:  CHEST PAIN: Patient C/O chest pain but symptoms appear to be atypical.   SOB: No C/O SOB at this time. LEG EDEMA: No leg edema   PALPITATIONS: Denies any C/O Palpitations                                   DIZZINESS: No C/O Dizziness                           SYNCOPE: None   OTHER:                                     HPI: Patient is here for F/U on her A-Fib, CAD, HTN & Dyslipidemia problems. She does not have any complaints at this time.     Tim Temple has the following history recorded in care path:  Patient Active Problem List    Diagnosis Date Noted    Coronary artery disease involving native coronary artery of native heart without angina pectoris      Priority: High    Type 2 diabetes mellitus without complication, without long-term current use of insulin ([facility]      Priority: High    Mental status alteration 06/26/2016     Priority: High    Idiopathic acute pancreatitis 06/11/2016     Priority: High    Hypoxia 01/30/2017     Priority: Low    Acute exacerbation of COPD with asthma ([facility] 01/29/2017     Priority: Low    Moderate protein-calorie malnutrition ([facility] 06/28/2016     Priority: Low    Decreased thyroid stimulating hormone (TSH) level 06/27/2016     Priority: Low    Dehydration, mild 06/26/2016     Priority: Low    Anemia 06/11/2016     Priority: Low    Abdominal hernia without obstruction or gangrene 06/11/2016     Priority: Low    Paroxysmal atrial fibrillation ([address]      Priority: Low    Bronchitis 12/19/2014     Priority: Low    Weakness 06/24/2013     Priority: Low    Claudication of calf muscles ([facility] 06/24/2013     Priority: Low    Tobacco abuse 06/24/2013     Priority: Low    Hyperlipidemia      Priority: Low    Pacemaker 07/09/2020    Generalized anxiety disorder 05/04/2020    Syncope and collapse  Fall at home, initial encounter 03/16/2020    Chronic diastolic CHF (congestive heart failure) [facility] 02/27/2020    Atrial fibrillation with RVR ([facility] 01/21/2020    S/P atrioventricular fayn ablation 01/09/2020    Essential hypertension 10/07/2019    Staghorn calculus 10/04/2019    Bradycardia     Acute pancreatitis without infection or necrosis 09/23/2019    SUE (obstructive sleep apnea) 07/20/2019    Goiter 07/20/2019    GERD (gastroesophageal reflux disease) 07/20/2019    Pneumonia 07/20/2019    H/O: CVA (cerebrovascular accident) 07/20/2019    Hydrocephalus ([facility] 07/20/2019    Chronic low back pain 07/20/2019    Hyperthyroidism 07/20/2019    Normocytic anemia 09/28/2017    GI bleed 09/28/2017    history of AVM (arteriovenous malformation)-duodenum 09/28/2017    Dyspnea and respiratory abnormalities 09/28/2017    Near syncope 03/06/2017    Chest pain 03/06/2017    Chronic blood loss anemia 03/06/2017    Palpitations 02/16/2017    Epigastric pain 02/16/2017    COPD (chronic obstructive pulmonary disease) (HCC) 02/15/2017     Current Outpatient Medications   Medication Sig Dispense Refill    HYDROcodone-acetaminophen (NORCO) 7.5-325 MG per tablet Take 0.5-1 tablets by mouth 3 times daily as needed for Pain (m54.5) for up to 30 days.  90 tablet 0    atorvastatin (LIPITOR) 10 MG tablet Take 1 tablet by mouth daily 90 tablet 1    cyclobenzaprine (FLEXERIL) 10 MG tablet Take 1 tablet by mouth nightly 90 tablet 0    metFORMIN (GLUCOPHAGE) 500 MG tablet Take 1 tablet by mouth nightly 90 tablet 1    montelukast (SINGULAIR) 10 MG tablet Take 1 tablet by mouth daily 90 tablet 1    pantoprazole (PROTONIX) 40 MG tablet Take 1 tablet by mouth daily 90 tablet 1    mirtazapine (REMERON) 15 MG tablet Take 1 tablet by mouth nightly 90 tablet 1    rivaroxaban (XARELTO) 15 MG TABS tablet Take 1 tablet by mouth daily 90 tablet 1    primidone (MYSOLINE) 50 MG tablet Take 1 tablet by mouth 3 times daily 270 tablet 1    donepezil (ARICEPT) 10 MG tablet Take 1 tablet by mouth nightly 90 tablet 1    DULoxetine (CYMBALTA) 60 MG extended release capsule Take 1 capsule by mouth nightly 90 capsule 1    gabapentin (NEURONTIN) 300 MG capsule Take 1 capsule by mouth 3 times daily for 90 days. 180 capsule 1    furosemide (LASIX) 20 MG tablet Take 1 tablet by mouth See Admin Instructions Take 1 tab as needed if legs start to swell up. 45 tablet 0    midodrine (PROAMATINE) 5 MG tablet Take 1 tablet by mouth 3 times daily (with meals) 90 tablet 3    ferrous sulfate (IRON 325) 325 (65 Fe) MG tablet Take 1 tablet by mouth nightly Until you start iv iron 30 tablet 3    albuterol (PROVENTIL) (5 MG/ML) 0.5% nebulizer solution Take 1 mL by nebulization 4 times daily 120 vial 5    aspirin 81 MG tablet Take 81 mg by mouth daily      docusate sodium (COLACE) 100 MG capsule Take 100 mg by mouth 2 times daily      FREESTYLE LANCETS MISC 1 each by Does not apply route daily      Glucose Blood (FREESTYLE LITE TEST VI) by In Vitro route      CPAP Machine MISC by Does not apply route       No current facility-administered medications for this visit. Allergies: Codeine;  Bactrim [sulfamethoxazole-trimethoprim]; and Sulfamethoxazole-trimethoprim  Past Medical History:   Diagnosis Date    Acute diastolic CHF (congestive heart failure) ([facility] [address] 12/23/2014    Acute urinary tract infection 7/20/2019    Single Kidney    Anemia     Chronic low back pain     s/p epidural steroids    COPD (chronic obstructive [facility] disease) [facility]     moderate to severe    Depression     Family history of cardiac disorder     Father, Brother    GERD (gastroesophageal reflux disease)     Goiter     s/p radioactive Iodine/ Low T4    H/O blood clots     right leg after miscarriage    H/O cardiovascular stress test 6/25/13 6/13-WNL EF 70%    H/O cardiovascular stress test 08/10/2017    Normal study EF 70%    H/O: CVA (cerebrovascular accident) 7/20/2019    Heart murmur     History of blood transfusion     2017    History of LIMITED Echocardiogram 11/08/2019    EF 55-60%, Normal left ventricle structure and systolic function , no regional wall motion abnormalities were detected    [place] (hard of hearing)     hearing aides    Hx of blood clots     Hx of echocardiogram 07/11/2013    EF>55%, mild MR, mild TR, abn lVSFx stage 1     HX OTHER MEDICAL     PCP is Dr Paulina Eubanks; Cardiologist is Dr Naty Cooper 7/30/13    Peripheral Angiogram.  Sluggish flow RLE, med mgmt.      Hydrocephalus ([address] 7/20/2019    Hydrocephalus, adult ([address]     shunt    Hyperlipidemia     Hyperthyroidism 7/20/2019    Kidney stone     \"had stone last summer 2016- passed it\"    Memory loss     Old MI (myocardial infarction) 2001    Osteoarthritis     Pneumonia     recurrent    Solitary kidney     Unspecified cerebral artery occlusion with cerebral infarction     \"they discovered I had stroke in 1993- with CT scan of head but never knew I had it\"     Past Surgical History:   Procedure Laterality Date   [place]    disc ruptured    BRAIN SURGERY  2001    right shunt    CHEST SURGERY  1953    reconstruction of breast bone and rib-\"[place] chest\"    CHOLECYSTECTOMY  1990    COLONOSCOPY  7/2011    F/U 7/2016    CORONARY ANGIOPLASTY WITH STENT PLACEMENT  2001    CSF SHUNT      ENDOSCOPY, COLON, DIAGNOSTIC      per old chart pt had EGD done with admission 2/17/2017    HYSTERECTOMY  1978    VERONIQUE BSO-dub/fibroids    KIDNEY SURGERY Right 1967    suspension    KNEE SURGERY  1997    right     PACEMAKER INSERTION Left 09/26/2019    [employer] Brandie XT DR ALEX Torres     ROTATOR CUFF REPAIR  1997    right    TUBAL LIGATION  1972      As reviewed   Family History   Problem Relation Age of Onset    Stroke Mother     Heart Disease Mother     Asthma Mother     Diabetes Mother     Cancer Mother         uterine    Emphysema Father     Stroke Father     Heart Disease Father     Heart Defect Father         hardening of arteries    Diabetes Sister     Diabetes Brother     Heart Disease Brother     Cancer Maternal Grandfather         stomach    Diabetes Paternal Grandmother     Stroke Paternal Grandmother     Diabetes Brother     Alcohol Abuse Maternal Aunt      Social History     Tobacco Use    Smoking status: Former Smoker     Years: 59.00     Types: Cigarettes     Start date: 10/7/1960    Smokeless tobacco: Never Used    Tobacco comment: pt quit 11/4/19   Substance Use Topics    Alcohol use: Yes     Comment: rarely/ average \"2-3 times per year'      Review of Systems:    Constitutional: Negative for diaphoresis and fatigue  Psychological:Negative for anxiety or depression  HENT: Negative for headaches, nasal congestion, sinus pain or vertigo  Eyes: Negative for visual disturbance. Endocrine: Negative for polydipsia/polyuria  Respiratory: Negative for shortness of breath  Cardiovascular: Negative for chest pain, dyspnea on exertion, claudication, edema, irregular heartbeat, murmur, palpitations or shortness of breath  Gastrointestinal: Negative for abdominal pain or heartburn  Genito-Urinary: Negative for urinary frequency/urgency  Musculoskeletal: Negative for muscle pain, muscular weakness, negative for pain in arm and leg or swelling in foot and leg  Neurological: Negative for dizziness, headaches, memory loss, numbness/tingling, visual changes, syncope  Dermatological: Negative for rash    Objective: Wt Readings from Last 3 Encounters:   03/18/20 129 lb 9.6 oz (58.8 kg)   02/26/20 132 lb 3.2 oz (60 kg)   01/21/20 134 lb 12.8 oz (61.1 kg)       Patient-Reported Vitals 7/9/2020   Patient-Reported Weight 127 lbs   Patient-Reported Height 5 4    Patient-Reported Systolic 571   Patient-Reported Diastolic 60   Patient-Reported Pulse 80     GENERAL - Alert, oriented, pleasant, in no apparent distress.     Lab Review   Lab Results Component Value Date    CKTOTAL 20 09/27/2017    CKTOTAL 71 01/10/2014    TROPONINT <0.010 03/17/2020    TROPONINT <0.010 03/17/2020     BNP:  No results found for: BNP  PT/INR:    Lab Results   Component Value Date    INR 0.97 03/16/2020    INR 0.93 01/06/2020     Lab Results   Component Value Date    LABA1C 5.6 03/05/2017    LABA1C 6.1 02/02/2017     Lab Results   Component Value Date    WBC 6.9 06/25/2020    WBC 8.4 03/17/2020    HCT 40.5 06/25/2020    HCT 40.4 03/17/2020    MCV 98.8 06/25/2020    .4 (H) 03/17/2020     06/25/2020     03/17/2020     Lab Results   Component Value Date    CHOL 133 09/27/2019    CHOL 137 01/10/2014    TRIG 88 09/27/2019    TRIG 78 06/12/2016    HDL 62 09/27/2019    HDL 60 (L) 01/10/2014    LDLCALC 125 06/26/2013    LDLDIRECT 53 09/27/2019    LDLDIRECT 61 01/10/2014     Lab Results   Component Value Date    ALT 10 06/25/2020    ALT 15 03/16/2020    AST 15 06/25/2020    AST 33 03/16/2020     BMP:    Lab Results   Component Value Date     06/25/2020     03/17/2020    K 5.0 06/25/2020    K 5.0 03/17/2020     06/25/2020     03/17/2020    CO2 33 06/25/2020    CO2 29 03/17/2020    BUN 35 06/25/2020    BUN 24 03/17/2020    CREATININE 1.4 06/25/2020    CREATININE 1.3 03/17/2020     CMP:   Lab Results   Component Value Date     06/25/2020     03/17/2020    K 5.0 06/25/2020    K 5.0 03/17/2020     06/25/2020     03/17/2020    CO2 33 06/25/2020    CO2 29 03/17/2020    BUN 35 06/25/2020    BUN 24 03/17/2020    PROT 6.9 06/25/2020    PROT 6.1 03/16/2020     TSH:    Lab Results   Component Value Date    TSH 0.20 06/26/2013    TSHHS 0.297 06/08/2020    TSHHS 0.012 09/23/2019       QUALITY MEASURES REVIEWED:  1.CAD:Patient is taking anti platelet agent:Yes  2. DYSLIPIDEMIA: Patient is on cholesterol lowering medication:Yes  3. Beta-Blocker therapy for CAD, if prior Myocardial Infarction:No  4.  Atrial fibrillation & anticoagulation therapy Yes    Impression:    1. Coronary artery disease involving native coronary artery of native heart without angina pectoris    2. Mixed hyperlipidemia    3. Type 2 diabetes mellitus without complication, without long-term current use of insulin ([facility])    4. Paroxysmal atrial fibrillation ([facility]    5. SUE (obstructive sleep apnea)    6. Gastroesophageal reflux disease without esophagitis    7. Essential hypertension    8. S/P atrioventricular fany ablation    9. Atrial fibrillation with RVR ([facility]    10. Chronic diastolic CHF (congestive heart failure) ([facility]    11.  Pacemaker       Patient Active Problem List   Diagnosis Code    Hyperlipidemia E78.5    Weakness R53.1    Claudication of calf muscles ([facility]) I73.9    Tobacco abuse Z72.0    Bronchitis J40    Paroxysmal atrial fibrillation ([facility]) I48.0    Anemia D64.9    Idiopathic acute pancreatitis K85.00    Abdominal hernia without obstruction or gangrene K46.9    Mental status alteration R41.82    Dehydration, mild E86.0    Decreased thyroid stimulating hormone (TSH) level R79.89    Moderate protein-calorie malnutrition ([facility]) E44.0    Acute exacerbation of COPD with asthma ([facility]) J44.1, J45.901    Hypoxia R09.02    Coronary artery disease involving native coronary artery of native heart without angina pectoris I25.10    Type 2 diabetes mellitus without complication, without long-term current use of insulin ([facility]) E11.9    COPD (chronic obstructive pulmonary disease) ([facility]) J44.9    Palpitations R00.2    Epigastric pain R10.13    Near syncope R55    Chest pain R07.9    Chronic blood loss anemia D50.0    Normocytic anemia D64.9    GI bleed K92.2    history of AVM (arteriovenous malformation)-duodenum Q27.30    Dyspnea and respiratory abnormalities R06.00, R06.89    SUE (obstructive sleep apnea) G47.33    Goiter E04.9    GERD (gastroesophageal reflux disease) K21.9    Pneumonia J18.9    H/O: CVA (cerebrovascular accident) Z80.78    Hydrocephalus ([facility] G91.9    Chronic low back pain M54.5, G89.29    Hyperthyroidism E05.90    Acute pancreatitis without infection or necrosis K85.90    Bradycardia R00.1    Staghorn calculus N20.0    Essential hypertension I10    S/P atrioventricular fany ablation Z98.890    Atrial fibrillation with RVR ([facility]) I48.91    Chronic diastolic CHF (congestive heart failure) ([facility]) I50.32    Fall at home, initial encounter W19. Nikki Radha, Y92.009    Syncope and collapse R55    Generalized anxiety disorder F41.1    Pacemaker Z95.0       Assessment & Plan:    being evaluated by a Telephone visit encounter to address concerns as mentioned above. A caregiver was present when appropriate. Due to this being a TeleHealth encounter (During [facility] public health emergency), evaluation of the following organ systems was limited: Vitals/Constitutional/EENT/Resp/CV/GI//MS/Neuro/Skin/Heme-Lymph-Imm. Pursuant to the emergency declaration under the [address] [place] [address] and the [employer] Act, this Virtual Visit was conducted with patient's (and/or legal guardian's) consent, to reduce the patient's risk of exposure to COVID-19 and provide necessary medical care. The patient (and/or legal guardian) has also been advised to contact this office for worsening conditions or problems, and seek emergency medical treatment and/or call 911 if deemed necessary. Time spent during this visit 11 min    CAD:Yes   clinically stable. Patient is on optimal medical regimen ( see medication list above )  -     CORONARY ARTERY DISEASE: Patient is currently  asymptomatic from CAD. Symptoms reported are atypical.           - changes in  treatment:   no           - Testing ordered:  no  [facility] classification: 1  [place] RISK SCORE:  [place] RISK SCORE:  HTN:well controlled on current medical regimen, see list above.               - changes in treatment:   no   CARDIOMYOPATHY: None known   CONGESTIVE HEART FAILURE:  HISTORY of in the past.      VHD: No significant VHD noted  DYSLIPIDEMIA: Patient's profile is at / near Goal.yes,                                 HDL is low                                Patient is not on any medications                                See most recent Lab values in Labs section above. Diabetes mellitis: .yes,                                      Managed by family MD                                     BS under good control yes,                                      Hgb A1c avilable yes,   OTHER RELEVANT DIAGNOSIS:as noted in patient's active problem list:  TESTS ORDERED: None this visit                                    All previously ordered tests reviewed. ARRHYTHMIAS: known                                Patient has H/O Atrial fibrillation                                She is on anticoagulation. MEDICATIONS: CPM   Office f/u in six months. Device check per protocol. Primary/secondary prevention is the goal by aggressive risk modification, healthy and therapeutic life style changes for cardiovascular risk reduction.  Various goals are discussed and multiple questions answered.

## 2020-07-13 ENCOUNTER — OFFICE VISIT (OUTPATIENT)
Dept: FAMILY MEDICINE CLINIC | Age: 80
End: 2020-07-13
Payer: MEDICARE

## 2020-07-13 VITALS
SYSTOLIC BLOOD PRESSURE: 122 MMHG | DIASTOLIC BLOOD PRESSURE: 84 MMHG | HEART RATE: 76 BPM | HEIGHT: 63 IN | WEIGHT: 131.8 LBS | BODY MASS INDEX: 23.35 KG/M2 | TEMPERATURE: 98.8 F

## 2020-07-13 PROCEDURE — 99214 OFFICE O/P EST MOD 30 MIN: CPT | Performed by: FAMILY MEDICINE

## 2020-07-13 ASSESSMENT — PATIENT HEALTH QUESTIONNAIRE - PHQ9
1. LITTLE INTEREST OR PLEASURE IN DOING THINGS: 0
2. FEELING DOWN, DEPRESSED OR HOPELESS: 0
SUM OF ALL RESPONSES TO PHQ QUESTIONS 1-9: 0
SUM OF ALL RESPONSES TO PHQ9 QUESTIONS 1 & 2: 0
SUM OF ALL RESPONSES TO PHQ QUESTIONS 1-9: 0

## 2020-07-13 NOTE — PROGRESS NOTES
7/13/2020    Tara Howell    Chief Complaint   Patient presents with    Back Pain     lbp chronic & getting worse       HPI    Celina Kessler is a 78 y.o. female who presents today with follow-up. Patient notes low back pain that possibly radiates to her groin. She notes a groin pain with prolonged standing. She relates the pain to a fall in the past but does not know the year and my x-rays in the computer system do not bring it up. Patient is interested in trying more Tylenol with her Norco.    We reminded patient that she is on Xarelto with A. fib and cannot do nonsteroidals. She denies side effect of the Xarelto. She is worked hard to bring her blood count back up. She believes B12 is brought it up although I do not believe she had a B12 deficiency. Patient notes she still smoking tobacco.  Patient declines stopping at this time. Patient is tolerant of her cholesterol medication. She she is compliant with it. I reviewed her last cholesterol which is at goal.  She does not need a cholesterol at this time.       REVIEW OF SYSTEMS    Constitutional:  Denies fever, chills, weight loss or weakness  Eyes:  no photophobia or discharge  ENT:  no sore throat or ear pain  Cardiovascular:  Denies chest pain, palpitations or swelling  Respiratory:  Denies cough or shortness of breath  GI:  no abdominal pain, nausea, vomiting, or diarrhea  Musculoskeletal:  no back pain  Skin:  No rashes  Neurologic:  no headache, focal weakness, or sensory changes  Endocrine:  no polyuria or polydipsia      PAST MEDICAL HISTORY  Past Medical History:   Diagnosis Date    Acute diastolic CHF (congestive heart failure) ([facility] [address] 12/23/2014    Acute urinary tract infection 7/20/2019    Single Kidney    Anemia     Chronic low back pain     s/p epidural steroids    COPD [facility]ch[facility] obstructive [facility] disease) ([facility])     moderate to severe    Depression     Family history of cardiac disorder     Father, Brother    GERD (gastroesophageal reflux disease)     Goiter     s/p radioactive Iodine/ Low T4    H/O blood clots     right leg after miscarriage    H/O cardiovascular stress test 6/25/13 6/13-WNL EF 70%    H/O cardiovascular stress test 08/10/2017    Normal study EF 70%    H/O: CVA (cerebrovascular accident) 7/20/2019    Heart murmur     History of blood transfusion     2017    History of LIMITED Echocardiogram 11/08/2019    EF 55-60%, Normal left ventricle structure and systolic function , no regional wall motion abnormalities were detected    [place] (hard of hearing)     hearing aides    Hx of blood clots     Hx of echocardiogram 07/11/2013    EF>55%, mild MR, mild TR, abn lVSFx stage 1     HX OTHER MEDICAL     PCP is Dr Tayla Jose; Cardiologist is Dr Inga Ashraf 7/30/13    Peripheral Angiogram.  Sluggish flow RLE, med mgmt.      Hydrocephalus ([address] 7/20/2019    Hydrocephalus, adult ([address]     shunt    Hyperlipidemia     Hyperthyroidism 7/20/2019    Kidney stone     \"had stone last summer 2016- passed it\"    Memory loss     Old MI (myocardial infarction) 2001    Osteoarthritis     Pneumonia     recurrent    Solitary kidney     Unspecified cerebral artery occlusion with cerebral infarction     \"they discovered I had stroke in 1993- with CT scan of head but never knew I had it\"       FAMILY HISTORY  Family History   Problem Relation Age of Onset    Stroke Mother     Heart Disease Mother     Asthma Mother     Diabetes Mother     Cancer Mother         uterine    Emphysema Father     Stroke Father     Heart Disease Father     Heart Defect Father         hardening of arteries    Diabetes Sister     Diabetes Brother     Heart Disease Brother     Cancer Maternal Grandfather         stomach    Diabetes Paternal Grandmother     Stroke Paternal Grandmother     Diabetes Brother     Alcohol Abuse Maternal Aunt        SOCIAL HISTORY  Social History     Socioeconomic History    Marital status:      Spouse name: Not on file    Number of children: 11    Years of education: Not on file    Highest education level: Not on file   Occupational History    Occupation: mental health counselor retired   Social Needs    Financial resource strain: Not on file    Food insecurity     Worry: Not on file     Inability: Not on file   [employer] needs     Medical: Not on file     Non-medical: Not on file   Tobacco Use    Smoking status: Former Smoker     Years: 59.00     Types: Cigarettes     Start date: 10/7/1960    Smokeless tobacco: Never Used    Tobacco comment: pt quit 11/4/19   Substance and Sexual Activity    Alcohol use: Yes     Comment: rarely/ average \"2-3 times per year'    Drug use: No    Sexual activity: Not on file   Lifestyle    Physical activity     Days per week: Not on file     Minutes per session: Not on file    Stress: Not on file   Relationships    Social connections     Talks on phone: Not on file     Gets together: Not on file     Attends [employer] service: Not on file     Active member of club or organization: Not on file     Attends meetings of clubs or organizations: Not on file     Relationship status: Not on file    Intimate partner violence     Fear of current or ex partner: Not on file     Emotionally abused: Not on file     Physically abused: Not on file     Forced sexual activity: Not on file   Other Topics Concern    Not on file   Social History Narrative    Do you donate blood or plasma? no    Caffeine intake? Moderate    Advance directive? yes    Is blood transfusion acceptable in an emergency?  yes         SURGICAL HISTORY  Past Surgical History:   Procedure Laterality Date    BACK SURGERY  1997    disc ruptured    BRAIN SURGERY  2001    right shunt    CHEST SURGERY  2250    reconstruction of breast bone and rib-\"[place] chest\"   [facility] [place] COLONOSCOPY  7/2011    F/U 7/2016    CORONARY ANGIOPLASTY WITH STENT PLACEMENT  2001    CSF SHUNT      ENDOSCOPY, COLON, DIAGNOSTIC      per old chart pt had EGD done with admission 2/17/2017    HYSTERECTOMY  1978    VERONIQUE BSO-dub/fibroids    KIDNEY SURGERY Right 1967    suspension    KNEE SURGERY  1997    right     PACEMAKER INSERTION Left 09/26/2019    [employer] [facility] XT DR ALEX Torres     ROTATOR CUFF REPAIR  1997    right    TUBAL LIGATION  1972       CURRENT MEDICATIONS  Current Outpatient Medications   Medication Sig Dispense Refill    HYDROcodone-acetaminophen (NORCO) 7.5-325 MG per tablet Take 0.5-1 tablets by mouth 3 times daily as needed for Pain (m54.5) for up to 30 days. 90 tablet 0    atorvastatin (LIPITOR) 10 MG tablet Take 1 tablet by mouth daily 90 tablet 1    cyclobenzaprine (FLEXERIL) 10 MG tablet Take 1 tablet by mouth nightly 90 tablet 0    metFORMIN (GLUCOPHAGE) 500 MG tablet Take 1 tablet by mouth nightly 90 tablet 1    montelukast (SINGULAIR) 10 MG tablet Take 1 tablet by mouth daily 90 tablet 1    pantoprazole (PROTONIX) 40 MG tablet Take 1 tablet by mouth daily 90 tablet 1    mirtazapine (REMERON) 15 MG tablet Take 1 tablet by mouth nightly 90 tablet 1    primidone (MYSOLINE) 50 MG tablet Take 1 tablet by mouth 3 times daily 270 tablet 1    donepezil (ARICEPT) 10 MG tablet Take 1 tablet by mouth nightly 90 tablet 1    DULoxetine (CYMBALTA) 60 MG extended release capsule Take 1 capsule by mouth nightly 90 capsule 1    gabapentin (NEURONTIN) 300 MG capsule Take 1 capsule by mouth 3 times daily for 90 days. 180 capsule 1    furosemide (LASIX) 20 MG tablet Take 1 tablet by mouth See Admin Instructions Take 1 tab as needed if legs start to swell up.  45 tablet 0    midodrine (PROAMATINE) 5 MG tablet Take 1 tablet by mouth 3 times daily (with meals) 90 tablet 3    ferrous sulfate (IRON 325) 325 (65 Fe) MG tablet Take 1 tablet by mouth nightly Until you start iv iron 30 tablet 3    albuterol (PROVENTIL) (5 MG/ML) 0.5% nebulizer solution Take 1 mL by nebulization 4 times daily 120 vial 5    aspirin 81 MG tablet Take 81 mg by mouth daily      docusate sodium (COLACE) 100 MG capsule Take 100 mg by mouth 2 times daily      FREESTYLE LANCETS MISC 1 each by Does not apply route daily      Glucose Blood (FREESTYLE LITE TEST VI) by In Vitro route      CPAP Machine MISC by Does not apply route      rivaroxaban (XARELTO) 15 MG TABS tablet Take 1 tablet by mouth daily 90 tablet 1     No current facility-administered medications for this visit. ALLERGIES  Allergies   Allergen Reactions    Codeine Itching    Bactrim [Sulfamethoxazole-Trimethoprim]      dizziness    Sulfamethoxazole-Trimethoprim        PHYSICAL EXAM    /84   Pulse 76   Temp 98.8 °F (37.1 °C)   Ht 5' 3\" (1.6 m)   Wt 131 lb 12.8 oz (59.8 kg)   BMI 23.35 kg/m²     Constitutional:  Well developed, well nourished  HEENT:  Normocephalic, atraumatic, bilateral external ears normal, oropharynx moist, nose normal  Neck:  Normal range of motion, no tenderness, supple  Lymphatic:  No lymphadenopathy noted  Cardiovascular:  Normal heart rate, S1S2 nl  Thorax & Lungs:  Normal breath sounds, no respiratory distress, no wheezing  Skin:  Warm, dry, no erythema, no rash  Back:  straight  Extremities:  No edema, no tenderness, no cyanosis  Musculoskeletal:  Good range of motion   Neurologic:  Alert & oriented X 3      ASSESSMENT & PLAN    1. Tobacco abuse  Stop smoking    2. Paroxysmal atrial fibrillation (HCC)  Issue controlled. Continue meds. Refilled meds. Patient to continue to work with hematology until they release her. I feel she had blood loss anemia related to the Xarelto. 3. SUE (obstructive sleep apnea)  Issue controlled. Continue meds. Refilled meds. 4. Chronic bilateral low back pain without sciatica  Patient had 2 pills twice a day at 500 mg Tylenol to her [place].  Patient call back next week for [place] is not due this week.   Keep Norco the same is no sign of abuse or diversion.       Follow-up 3 months    Electronically signed by Lola Mcgowan MD on 7/13/2020

## 2020-07-21 ENCOUNTER — TELEPHONE (OUTPATIENT)
Dept: CARDIOLOGY CLINIC | Age: 80
End: 2020-07-21

## 2020-07-21 NOTE — TELEPHONE ENCOUNTER
Called to ask patient to send carelink transmission. I did not get transmission due 7/19. Daughter said she would.

## 2020-07-22 RX ORDER — HYDROCODONE BITARTRATE AND ACETAMINOPHEN 7.5; 325 MG/1; MG/1
.5-1 TABLET ORAL 3 TIMES DAILY PRN
Qty: 90 TABLET | Refills: 0 | Status: CANCELLED | OUTPATIENT
Start: 2020-07-22 | End: 2020-08-21

## 2020-07-27 RX ORDER — HYDROCODONE BITARTRATE AND ACETAMINOPHEN 7.5; 325 MG/1; MG/1
.5-1 TABLET ORAL 3 TIMES DAILY PRN
Qty: 90 TABLET | Refills: 0 | Status: SHIPPED | OUTPATIENT
Start: 2020-07-27 | End: 2020-08-28 | Stop reason: SDUPTHER

## 2020-07-28 PROCEDURE — 93294 REM INTERROG EVL PM/LDLS PM: CPT | Performed by: INTERNAL MEDICINE

## 2020-07-28 PROCEDURE — 93296 REM INTERROG EVL PM/IDS: CPT | Performed by: INTERNAL MEDICINE

## 2020-07-29 ENCOUNTER — PROCEDURE VISIT (OUTPATIENT)
Dept: CARDIOLOGY CLINIC | Age: 80
End: 2020-07-29
Payer: MEDICARE

## 2020-08-28 RX ORDER — HYDROCODONE BITARTRATE AND ACETAMINOPHEN 7.5; 325 MG/1; MG/1
.5-1 TABLET ORAL 3 TIMES DAILY PRN
Qty: 90 TABLET | Refills: 0 | Status: SHIPPED | OUTPATIENT
Start: 2020-08-28 | End: 2020-09-30 | Stop reason: SDUPTHER

## 2020-09-30 ENCOUNTER — OFFICE VISIT (OUTPATIENT)
Dept: FAMILY MEDICINE CLINIC | Age: 80
End: 2020-09-30
Payer: MEDICARE

## 2020-09-30 VITALS
SYSTOLIC BLOOD PRESSURE: 104 MMHG | HEIGHT: 63 IN | DIASTOLIC BLOOD PRESSURE: 62 MMHG | BODY MASS INDEX: 23.39 KG/M2 | HEART RATE: 68 BPM | WEIGHT: 132 LBS | TEMPERATURE: 97.3 F

## 2020-09-30 PROCEDURE — 90694 VACC AIIV4 NO PRSRV 0.5ML IM: CPT | Performed by: FAMILY MEDICINE

## 2020-09-30 PROCEDURE — 99214 OFFICE O/P EST MOD 30 MIN: CPT | Performed by: FAMILY MEDICINE

## 2020-09-30 PROCEDURE — G0008 ADMIN INFLUENZA VIRUS VAC: HCPCS | Performed by: FAMILY MEDICINE

## 2020-09-30 RX ORDER — DONEPEZIL HYDROCHLORIDE 10 MG/1
10 TABLET, FILM COATED ORAL NIGHTLY
Qty: 90 TABLET | Refills: 1 | Status: SHIPPED | OUTPATIENT
Start: 2020-09-30 | End: 2021-03-31 | Stop reason: SDUPTHER

## 2020-09-30 RX ORDER — MONTELUKAST SODIUM 10 MG/1
10 TABLET ORAL DAILY
Qty: 90 TABLET | Refills: 1 | Status: SHIPPED | OUTPATIENT
Start: 2020-09-30 | End: 2021-03-31 | Stop reason: SDUPTHER

## 2020-09-30 RX ORDER — DULOXETIN HYDROCHLORIDE 60 MG/1
60 CAPSULE, DELAYED RELEASE ORAL NIGHTLY
Qty: 90 CAPSULE | Refills: 1 | Status: SHIPPED | OUTPATIENT
Start: 2020-09-30 | End: 2021-03-31 | Stop reason: SDUPTHER

## 2020-09-30 RX ORDER — PRIMIDONE 50 MG/1
50 TABLET ORAL 3 TIMES DAILY
Qty: 270 TABLET | Refills: 1 | Status: SHIPPED | OUTPATIENT
Start: 2020-09-30 | End: 2021-03-31 | Stop reason: SDUPTHER

## 2020-09-30 RX ORDER — MIDODRINE HYDROCHLORIDE 5 MG/1
5 TABLET ORAL
Qty: 90 TABLET | Refills: 1 | Status: SHIPPED | OUTPATIENT
Start: 2020-09-30 | End: 2020-12-30 | Stop reason: SDUPTHER

## 2020-09-30 RX ORDER — HYDROCODONE BITARTRATE AND ACETAMINOPHEN 7.5; 325 MG/1; MG/1
.5-1 TABLET ORAL 3 TIMES DAILY PRN
Qty: 90 TABLET | Refills: 0 | Status: SHIPPED | OUTPATIENT
Start: 2020-09-30 | End: 2020-10-29 | Stop reason: SDUPTHER

## 2020-09-30 RX ORDER — PANTOPRAZOLE SODIUM 40 MG/1
40 TABLET, DELAYED RELEASE ORAL DAILY
Qty: 90 TABLET | Refills: 1 | Status: SHIPPED | OUTPATIENT
Start: 2020-09-30 | End: 2021-03-31 | Stop reason: SDUPTHER

## 2020-09-30 RX ORDER — GABAPENTIN 300 MG/1
300 CAPSULE ORAL 3 TIMES DAILY
Qty: 270 CAPSULE | Refills: 0 | Status: SHIPPED | OUTPATIENT
Start: 2020-09-30 | End: 2021-03-31 | Stop reason: SDUPTHER

## 2020-09-30 RX ORDER — MIRTAZAPINE 15 MG/1
15 TABLET, FILM COATED ORAL NIGHTLY
Qty: 90 TABLET | Refills: 1 | Status: SHIPPED | OUTPATIENT
Start: 2020-09-30 | End: 2021-03-19 | Stop reason: SDUPTHER

## 2020-09-30 RX ORDER — ATORVASTATIN CALCIUM 10 MG/1
10 TABLET, FILM COATED ORAL DAILY
Qty: 90 TABLET | Refills: 1 | Status: SHIPPED | OUTPATIENT
Start: 2020-09-30 | End: 2021-03-31 | Stop reason: SDUPTHER

## 2020-09-30 RX ORDER — FUROSEMIDE 20 MG/1
20 TABLET ORAL SEE ADMIN INSTRUCTIONS
Qty: 45 TABLET | Refills: 1 | Status: SHIPPED | OUTPATIENT
Start: 2020-09-30 | End: 2021-08-10 | Stop reason: SDUPTHER

## 2020-09-30 NOTE — LETTER
reduced coughing, which is especially dangerous for patients with lung disease. Overdose or dangerous interactions with alcohol and other medications may occur, leading to death. Hyperalgesia may develop, which means patients receiving opioids for the treatment of pain may become more sensitive to certain painful stimuli, and in some cases, experience pain from ordinarily non-painful stimuli. Women between the ages of 14-53 who could become pregnant should carefully weigh the risks and benefits of opioids with their physicians, as these medications increase the risk of pregnancy complications, including miscarriage,  delivery and stillbirth. It is also possible for babies to be born addicted to opioids. Opioid dependence withdrawal symptoms may include; feelings of uneasiness, increased pain, irritability, belly pain, diarrhea, sweats and goose-flesh. Benzodiazepines and non-benzodiazepine sleep medications: These medications can lead to problems such as addiction/dependence, sedation, fatigue, lightheadedness, dizziness, incoordination, falls, depression, hallucinations, and impaired judgment, memory and concentration. The ability to drive and operate machinery may also be affected. Abnormal sleep-related behaviors have been reported, including sleepwalking, driving, making telephone calls, eating, or having sex while not fully awake. These medications can suppress breathing and worsen sleep apnea, particularly when combined with alcohol or other sedating medications, potentially leading to death. Dependence withdrawal symptoms may include tremors, anxiety, hallucinations and seizures.   Stimulants:  Common adverse effects include addiction/dependence, increased blood  pressure and heart rate, decreased appetite, nausea, involuntary weight loss, insomnia,                                                                                                                     Initials:_______ irritability, and headaches. These risks may increase when these medications are combined with other stimulants, such as caffeine pills or energy drinks, certain weight loss supplements and oral decongestants. Dependence withdrawal symptoms may include depressed mood, loss of interest, suicidal thoughts, anxiety, fatigue, appetite changes and agitation. Testosterone replacement therapy:  Potential side effects include increased risk of stroke and heart attack, blood clots, increased blood pressure, increased cholesterol, enlarged prostate, sleep apnea, irritability/aggression and other mood disorders, and decreased fertility. I agree and understand that I and my prescriber have the following rights and responsibilities regarding my treatment plan:     1. MY RIGHTS:  To be informed of my treatment and medication plan. To be an active participant in my health and wellbeing. 2. MY RESPONSIBILITY AND UNDERSTANDING FOR USE OF MEDICATIONS  ? I will take medications at the dose and frequency as directed. For my safety, I will not increase or change how I take my medications without the recommendation of my healthcare provider. ? I will actively participate in any program recommended by my provider which may improve function, including social, physical, psychological programs. ? I will not take my medications with alcohol or other drugs not prescribed to me. I understand that drinking alcohol with my medications increases the chances of side effects, including reduced breathing rate and could lead to personal injury when operating machinery. ? I understand that if I have a history of substance use disorders, including alcohol or other illicit drugs, that I may be at increased risk of addiction to my medications. ? I agree to notify my provider immediately if I should become pregnant so that my treatment plan can be adjusted.   ? I agree and understand that I shall only receive controlled substance

## 2020-09-30 NOTE — PROGRESS NOTES
Vaccine Information Sheet, \"Influenza - Inactivated\"  given to Purvi Qamar, or parent/legal guardian of  Purvi Qamar and verbalized understanding. Patient responses:    Have you ever had a reaction to a flu vaccine? No  Do you have any current illness? No  Have you ever had [place] Syndrome? No  Do you have a serious allergy to any of the follow: Neomycin, Polymyxin, Thimerosal, eggs or egg products? No    Flu vaccine given per order. Please see immunization tab. Risks and benefits explained. Current VIS given.

## 2020-10-01 NOTE — PROGRESS NOTES
9/30/2020    Evy Ray    Chief Complaint   Patient presents with    3 Month Follow-Up       HPI    Boom Rick is a 78 y.o. female who presents today with acute symptoms of pain at her right scapula. Durations unclear probably a few weeks. Patient is done a lot of knitting recently. She has not tried anything for it. Patient denies hypoglycemia. She is not checking her sugars. They have been pretty good for quite a while. Patient notes that her eating is better. She is no longer losing weight. Her weight is the same. We verified her history of hydrocephalus. That is a chronic condition unchanging. Patient notes that she still needs her pain medicine for chronic low back pain. Currently no radicular symptoms. She denies side effect of her medication. Patient feels her breathing is at baseline.     REVIEW OF SYSTEMS    Constitutional:  Denies fever, chills, weight loss or weakness  Eyes:  no photophobia or discharge  ENT:  no sore throat or ear pain  Cardiovascular:  Denies chest pain, palpitations or swelling  Respiratory:  Denies cough or shortness of breath  GI:  no abdominal pain, nausea, vomiting, or diarrhea  Musculoskeletal: Upper back pain on the right  Skin:  No rashes  Neurologic:  no headache, focal weakness, or sensory changes  Endocrine:  no polyuria or polydipsia      PAST MEDICAL HISTORY  Past Medical History:   Diagnosis Date    Acute diastolic CHF (congestive heart failure) ([facility] [address] 12/23/2014    Acute urinary tract infection 7/20/2019    Single Kidney    Anemia     Chronic low back pain     s/p epidural steroids    COPD (chronic obstructive pulmonary disease) (HCC)     moderate to severe    Depression     Family history of cardiac disorder     Father, Brother    GERD (gastroesophageal reflux disease)     Goiter     s/p radioactive Iodine/ Low T4    H/O blood clots     right leg after miscarriage    H/O cardiovascular stress test 6/25/13 6/13-WNL EF 70%    H/O cardiovascular stress test 08/10/2017    Normal study EF 70%    H/O: CVA (cerebrovascular accident) 7/20/2019    Heart murmur     History of blood transfusion     2017    History of LIMITED Echocardiogram 11/08/2019    EF 55-60%, Normal left ventricle structure and systolic function , no regional wall motion abnormalities were detected    [employer] (hard of hearing)     hearing aides    Hx of blood clots     Hx of echocardiogram 07/11/2013    EF>55%, mild MR, mild TR, abn lVSFx stage 1     HX OTHER MEDICAL     PCP is Dr Julisa Londono; Cardiologist is Dr Kvng Tanner 7/30/13    Peripheral Angiogram.  Sluggish flow RLE, med mgmt.      Hydrocephalus ([facility] [address] 7/20/2019    Hydrocephalus, adult ([facility] [address]     shunt    Hyperlipidemia     Hyperthyroidism 7/20/2019    Kidney stone     \"had stone last summer 2016- passed it\"    Memory loss     Old MI (myocardial infarction) 2001    Osteoarthritis     Pneumonia     recurrent    Solitary kidney     Unspecified cerebral artery occlusion with cerebral infarction     \"they discovered I had stroke in 1993- with CT scan of head but never knew I had it\"       FAMILY HISTORY  Family History   Problem Relation Age of Onset    Stroke Mother     Heart Disease Mother     Asthma Mother     Diabetes Mother     Cancer Mother         uterine    Emphysema Father     Stroke Father     Heart Disease Father     Heart Defect Father         hardening of arteries    Diabetes Sister     Diabetes Brother     Heart Disease Brother     Cancer Maternal Grandfather         stomach    Diabetes Paternal Grandmother     Stroke Paternal Grandmother     Diabetes Brother     Alcohol Abuse Maternal Aunt        SOCIAL HISTORY  Social History     Socioeconomic History    Marital status:      Spouse name: Not on file    Number of children: 11    Years of education: Not on file    Highest education level: Not on file   Occupational History    Occupation: mental health counselor retired   Social Needs    Financial resource strain: Not on file    Food insecurity     Worry: Not on file     Inability: Not on file   [employer] needs     Medical: Not on file     Non-medical: Not on file   Tobacco Use    Smoking status: Former Smoker     Years: 59.00     Types: Cigarettes     Start date: 10/7/1960    Smokeless tobacco: Never Used    Tobacco comment: pt quit 11/4/19   Substance and Sexual Activity    Alcohol use: Yes     Comment: rarely/ average \"2-3 times per year'    Drug use: No    Sexual activity: Not on file   Lifestyle    Physical activity     Days per week: Not on file     Minutes per session: Not on file    Stress: Not on file   Relationships    Social connections     Talks on phone: Not on file     Gets together: Not on file     Attends [employer] service: Not on file     Active member of club or organization: Not on file     Attends meetings of clubs or organizations: Not on file     Relationship status: Not on file    Intimate partner violence     Fear of current or ex partner: Not on file     Emotionally abused: Not on file     Physically abused: Not on file     Forced sexual activity: Not on file   Other Topics Concern    Not on file   Social History Narrative    Do you donate blood or plasma? no    Caffeine intake? Moderate    Advance directive? yes    Is blood transfusion acceptable in an emergency?  yes         SURGICAL HISTORY  Past Surgical History:   Procedure Laterality Date    BACK SURGERY  1997    disc ruptured    BRAIN SURGERY  2001    right shunt    CHEST SURGERY  3112    reconstruction of breast bone and rib-\"pige[place] chest\"    CHOLECYSTECTOMY  1990    COLONOSCOPY  7/2011    F/U 7/2016    CORONARY ANGIOPLASTY WITH STENT PLACEMENT  2001    CSF SHUNT      ENDOSCOPY, COLON, DIAGNOSTIC      per old chart pt had EGD done with admission 2/17/2017    HYSTERECTOMY  1978    VERONIQUE BSO-dub/fibroids    KIDNEY SURGERY Right 1967    suspension    KNEE SURGERY  1997    right     PACEMAKER INSERTION Left 09/26/2019    [employer] Brandie XT DR ALEX Torres     ROTATOR CUFF REPAIR  1997    right    TUBAL LIGATION  1972       CURRENT MEDICATIONS  Current Outpatient Medications   Medication Sig Dispense Refill    HYDROcodone-acetaminophen (NORCO) 7.5-325 MG per tablet Take 0.5-1 tablets by mouth 3 times daily as needed for Pain (m54.5) for up to 30 days. 90 tablet 0    atorvastatin (LIPITOR) 10 MG tablet Take 1 tablet by mouth daily 90 tablet 1    metFORMIN (GLUCOPHAGE) 500 MG tablet Take 1 tablet by mouth nightly 90 tablet 1    montelukast (SINGULAIR) 10 MG tablet Take 1 tablet by mouth daily 90 tablet 1    pantoprazole (PROTONIX) 40 MG tablet Take 1 tablet by mouth daily 90 tablet 1    mirtazapine (REMERON) 15 MG tablet Take 1 tablet by mouth nightly 90 tablet 1    rivaroxaban (XARELTO) 15 MG TABS tablet Take 1 tablet by mouth daily 90 tablet 1    primidone (MYSOLINE) 50 MG tablet Take 1 tablet by mouth 3 times daily 270 tablet 1    donepezil (ARICEPT) 10 MG tablet Take 1 tablet by mouth nightly 90 tablet 1    DULoxetine (CYMBALTA) 60 MG extended release capsule Take 1 capsule by mouth nightly 90 capsule 1    gabapentin (NEURONTIN) 300 MG capsule Take 1 capsule by mouth 3 times daily for 90 days. 270 capsule 0    furosemide (LASIX) 20 MG tablet Take 1 tablet by mouth See Admin Instructions Take 1 tab as needed if legs start to swell up.  45 tablet 1    midodrine (PROAMATINE) 5 MG tablet Take 1 tablet by mouth 3 times daily (with meals) 90 tablet 1    ferrous sulfate (IRON 325) 325 (65 Fe) MG tablet Take 1 tablet by mouth nightly Until you start iv iron 30 tablet 3    albuterol (PROVENTIL) (5 MG/ML) 0.5% nebulizer solution Take 1 mL by nebulization 4 times daily 120 vial 5    aspirin 81 MG tablet Take 81 mg by mouth daily      docusate sodium (COLACE) 100 MG capsule Take 100 mg by mouth 2 times daily      FREESTYLE LANCETS MISC 1 each by Does not apply route daily      Glucose Blood (FREESTYLE LITE TEST VI) by In Vitro route      CPAP Machine MISC by Does not apply route       No current facility-administered medications for this visit. ALLERGIES  Allergies   Allergen Reactions    Codeine Itching    Bactrim [Sulfamethoxazole-Trimethoprim]      dizziness    Sulfamethoxazole-Trimethoprim        PHYSICAL EXAM    /62   Pulse 68   Temp 97.3 °F (36.3 °C)   Ht 5' 3\" (1.6 m)   Wt 132 lb (59.9 kg)   BMI 23.38 kg/m²     Constitutional:  Well developed, well nourished  HEENT:  Normocephalic, atraumatic, bilateral external ears normal, oropharynx moist, nose normal  Neck:  Normal range of motion, no tenderness, supple  Lymphatic:  No lymphadenopathy noted  Cardiovascular:  Normal heart rate, S1S2 nl  Thorax & Lungs:  Normal breath sounds, no respiratory distress, no wheezing  Skin:  Warm, dry, no erythema, no rash  Back:  straight  Extremities:  No edema, no tenderness, no cyanosis  Musculoskeletal: Palpable spasm and palpably tender right trapezius. Neurologic:  Alert & oriented X 3      ASSESSMENT & PLAN    1. Trapezius muscle spasm  Heat and stretching twice a day. Exercises shown patient to include range of motion of the shoulders and bilateral arm bar    2. Type 2 diabetes mellitus without complication, without long-term current use of insulin (HCC)  Issue is stable check labs today. Adjust medication off of lab results. - atorvastatin (LIPITOR) 10 MG tablet; Take 1 tablet by mouth daily  Dispense: 90 tablet; Refill: 1  - metFORMIN (GLUCOPHAGE) 500 MG tablet; Take 1 tablet by mouth nightly  Dispense: 90 tablet; Refill: 1  - Hemoglobin A1C; Future    3. Hydrocephalus, unspecified type ([facility] [address]  Symptoms stable. Diagnosis annotated    4. Chronic bilateral low back pain without sciatica  Issue controlled. Continue meds. Refilled meds. No sign of abuse or diversion  - HYDROcodone-acetaminophen (NORCO) 7.5-325 MG per tablet;  Take 0.5-1 tablets by mouth 3 times daily as needed for Pain (m54.5) for up to 30 days. Dispense: 90 tablet; Refill: 0    5. Chronic bronchitis, [facility]  Issue controlled. Continue meds. Refilled meds. - montelukast (SINGULAIR) 10 MG tablet; Take 1 tablet by mouth daily  Dispense: 90 tablet; Refill: 1    6. Flu vaccine need  - Influenza, Quadv, Adjunanted,, 65 yrs+ , IM, PF, Prefill syr, 0.5mL (FLUAD)    Follow-up 3 months. Patient [place]. I let her know she has to follow-up with some doctor in 3 months to get her pain medicine.        Electronically signed by Dixie Carey MD on 9/30/2020

## 2020-10-29 RX ORDER — HYDROCODONE BITARTRATE AND ACETAMINOPHEN 7.5; 325 MG/1; MG/1
.5-1 TABLET ORAL 3 TIMES DAILY PRN
Qty: 90 TABLET | Refills: 0 | Status: SHIPPED | OUTPATIENT
Start: 2020-10-29 | End: 2020-12-03 | Stop reason: SDUPTHER

## 2020-11-03 PROBLEM — R55 SYNCOPE AND COLLAPSE: Status: RESOLVED | Noted: 2020-11-03 | Resolved: 2020-11-03

## 2020-11-08 PROCEDURE — 93294 REM INTERROG EVL PM/LDLS PM: CPT | Performed by: INTERNAL MEDICINE

## 2020-11-08 PROCEDURE — 93296 REM INTERROG EVL PM/IDS: CPT | Performed by: INTERNAL MEDICINE

## 2020-11-09 ENCOUNTER — TELEPHONE (OUTPATIENT)
Dept: FAMILY MEDICINE CLINIC | Age: 80
End: 2020-11-09

## 2020-11-09 RX ORDER — VARENICLINE TARTRATE 0.5 MG/1
.5-1 TABLET, FILM COATED ORAL SEE ADMIN INSTRUCTIONS
Qty: 57 TABLET | Refills: 0 | Status: SHIPPED | OUTPATIENT
Start: 2020-11-09 | End: 2020-12-30

## 2020-11-09 RX ORDER — VARENICLINE TARTRATE 1 MG/1
1 TABLET, FILM COATED ORAL 2 TIMES DAILY
Qty: 60 TABLET | Refills: 4 | Status: SHIPPED | OUTPATIENT
Start: 2020-11-09 | End: 2020-12-30

## 2020-11-10 ENCOUNTER — TELEPHONE (OUTPATIENT)
Dept: ONCOLOGY | Age: 80
End: 2020-11-10

## 2020-11-10 RX ORDER — CYANOCOBALAMIN (VITAMIN B-12) 500 MCG
2 TABLET ORAL DAILY
Qty: 60 TABLET | Refills: 2 | Status: SHIPPED | OUTPATIENT
Start: 2020-11-10 | End: 2020-12-30 | Stop reason: SDUPTHER

## 2020-11-10 RX ORDER — CYANOCOBALAMIN (VITAMIN B-12) 500 MCG
2 TABLET ORAL DAILY
COMMUNITY
Start: 2020-10-01 | End: 2020-11-10 | Stop reason: SDUPTHER

## 2020-11-16 ENCOUNTER — PROCEDURE VISIT (OUTPATIENT)
Dept: CARDIOLOGY CLINIC | Age: 80
End: 2020-11-16
Payer: MEDICARE

## 2020-12-02 NOTE — TELEPHONE ENCOUNTER
Last 09/30/20   Next 12/30/20    Also needs a referral to get her toenails trimmed     Asking for Flonase, said her nose runs constantly, especially when she eats. Said she will only eat by herself now because of it.

## 2020-12-03 RX ORDER — HYDROCODONE BITARTRATE AND ACETAMINOPHEN 7.5; 325 MG/1; MG/1
.5-1 TABLET ORAL 3 TIMES DAILY PRN
Qty: 90 TABLET | Refills: 0 | Status: SHIPPED | OUTPATIENT
Start: 2020-12-03 | End: 2020-12-30 | Stop reason: SDUPTHER

## 2020-12-03 RX ORDER — IPRATROPIUM BROMIDE 42 UG/1
1 SPRAY, METERED NASAL 4 TIMES DAILY PRN
Qty: 1 BOTTLE | Refills: 3 | Status: SHIPPED | OUTPATIENT
Start: 2020-12-03 | End: 2020-12-30 | Stop reason: SDUPTHER

## 2020-12-30 ENCOUNTER — VIRTUAL VISIT (OUTPATIENT)
Dept: FAMILY MEDICINE CLINIC | Age: 80
End: 2020-12-30
Payer: MEDICARE

## 2020-12-30 ENCOUNTER — TELEPHONE (OUTPATIENT)
Dept: FAMILY MEDICINE CLINIC | Age: 80
End: 2020-12-30

## 2020-12-30 VITALS — BODY MASS INDEX: 23.39 KG/M2 | HEIGHT: 63 IN | WEIGHT: 132 LBS

## 2020-12-30 DIAGNOSIS — Z72.0 TOBACCO ABUSE: Chronic | ICD-10-CM

## 2020-12-30 DIAGNOSIS — J30.0 VASOMOTOR RHINITIS: ICD-10-CM

## 2020-12-30 DIAGNOSIS — M54.50 CHRONIC BILATERAL LOW BACK PAIN WITHOUT SCIATICA: ICD-10-CM

## 2020-12-30 DIAGNOSIS — E11.9 TYPE 2 DIABETES MELLITUS WITHOUT COMPLICATION, WITHOUT LONG-TERM CURRENT USE OF INSULIN (HCC): Primary | ICD-10-CM

## 2020-12-30 DIAGNOSIS — Z91.81 AT HIGH RISK FOR FALLS: ICD-10-CM

## 2020-12-30 DIAGNOSIS — G91.9 HYDROCEPHALUS, ADULT (HCC): ICD-10-CM

## 2020-12-30 DIAGNOSIS — I95.1 ORTHOSTASIS: ICD-10-CM

## 2020-12-30 DIAGNOSIS — I48.0 PAROXYSMAL ATRIAL FIBRILLATION (HCC): ICD-10-CM

## 2020-12-30 DIAGNOSIS — G89.29 CHRONIC BILATERAL LOW BACK PAIN WITHOUT SCIATICA: ICD-10-CM

## 2020-12-30 PROBLEM — I48.91 ATRIAL FIBRILLATION WITH RVR (HCC): Status: RESOLVED | Noted: 2020-01-21 | Resolved: 2020-12-30

## 2020-12-30 PROBLEM — J44.1 ACUTE EXACERBATION OF COPD WITH ASTHMA (HCC): Status: RESOLVED | Noted: 2017-01-29 | Resolved: 2020-12-30

## 2020-12-30 PROBLEM — J45.901 ACUTE EXACERBATION OF COPD WITH ASTHMA (HCC): Status: RESOLVED | Noted: 2017-01-29 | Resolved: 2020-12-30

## 2020-12-30 PROCEDURE — 4040F PNEUMOC VAC/ADMIN/RCVD: CPT | Performed by: FAMILY MEDICINE

## 2020-12-30 PROCEDURE — 99442 PR PHYS/QHP TELEPHONE EVALUATION 11-20 MIN: CPT | Performed by: FAMILY MEDICINE

## 2020-12-30 PROCEDURE — G0438 PPPS, INITIAL VISIT: HCPCS | Performed by: FAMILY MEDICINE

## 2020-12-30 PROCEDURE — 1123F ACP DISCUSS/DSCN MKR DOCD: CPT | Performed by: FAMILY MEDICINE

## 2020-12-30 RX ORDER — IPRATROPIUM BROMIDE 42 UG/1
1 SPRAY, METERED NASAL 4 TIMES DAILY PRN
Qty: 1 BOTTLE | Refills: 3 | Status: SHIPPED | OUTPATIENT
Start: 2020-12-30 | End: 2021-08-10 | Stop reason: SDUPTHER

## 2020-12-30 RX ORDER — LANCETS 28 GAUGE
1 EACH MISCELLANEOUS DAILY
Qty: 100 EACH | Refills: 2 | Status: SHIPPED | OUTPATIENT
Start: 2020-12-30 | End: 2022-05-25

## 2020-12-30 RX ORDER — CYANOCOBALAMIN (VITAMIN B-12) 500 MCG
2 TABLET ORAL DAILY
Qty: 180 TABLET | Refills: 1 | Status: SHIPPED | OUTPATIENT
Start: 2020-12-30 | End: 2021-08-10 | Stop reason: SDUPTHER

## 2020-12-30 RX ORDER — MIDODRINE HYDROCHLORIDE 5 MG/1
5 TABLET ORAL
Qty: 90 TABLET | Refills: 1 | Status: SHIPPED | OUTPATIENT
Start: 2020-12-30 | End: 2021-01-25 | Stop reason: SDUPTHER

## 2020-12-30 RX ORDER — BLOOD-GLUCOSE METER
1 KIT MISCELLANEOUS DAILY
Qty: 100 EACH | Refills: 2 | Status: SHIPPED | OUTPATIENT
Start: 2020-12-30 | End: 2022-05-25

## 2020-12-30 RX ORDER — HYDROCODONE BITARTRATE AND ACETAMINOPHEN 7.5; 325 MG/1; MG/1
.5-1 TABLET ORAL 3 TIMES DAILY PRN
Qty: 90 TABLET | Refills: 0 | Status: SHIPPED | OUTPATIENT
Start: 2020-12-30 | End: 2021-02-10 | Stop reason: SDUPTHER

## 2020-12-30 RX ORDER — FERROUS SULFATE 325(65) MG
325 TABLET ORAL NIGHTLY
Qty: 90 TABLET | Refills: 0 | Status: SHIPPED | OUTPATIENT
Start: 2020-12-30 | End: 2021-03-23 | Stop reason: SDUPTHER

## 2020-12-30 ASSESSMENT — LIFESTYLE VARIABLES
AUDIT TOTAL SCORE: 1
HAVE YOU OR SOMEONE ELSE BEEN INJURED AS A RESULT OF YOUR DRINKING: 0
HOW OFTEN DO YOU HAVE A DRINK CONTAINING ALCOHOL: 1
HOW OFTEN DURING THE LAST YEAR HAVE YOU NEEDED AN ALCOHOLIC DRINK FIRST THING IN THE MORNING TO GET YOURSELF GOING AFTER A NIGHT OF HEAVY DRINKING: 0
HOW MANY STANDARD DRINKS CONTAINING ALCOHOL DO YOU HAVE ON A TYPICAL DAY: 0
AUDIT-C TOTAL SCORE: 1
HOW OFTEN DURING THE LAST YEAR HAVE YOU BEEN UNABLE TO REMEMBER WHAT HAPPENED THE NIGHT BEFORE BECAUSE YOU HAD BEEN DRINKING: 0
HAS A RELATIVE, FRIEND, DOCTOR, OR ANOTHER HEALTH PROFESSIONAL EXPRESSED CONCERN ABOUT YOUR DRINKING OR SUGGESTED YOU CUT DOWN: 0
HOW OFTEN DO YOU HAVE SIX OR MORE DRINKS ON ONE OCCASION: 0
HOW OFTEN DURING THE LAST YEAR HAVE YOU HAD A FEELING OF GUILT OR REMORSE AFTER DRINKING: 0
HOW OFTEN DURING THE LAST YEAR HAVE YOU FOUND THAT YOU WERE NOT ABLE TO STOP DRINKING ONCE YOU HAD STARTED: 0
HOW OFTEN DURING THE LAST YEAR HAVE YOU FAILED TO DO WHAT WAS NORMALLY EXPECTED FROM YOU BECAUSE OF DRINKING: 0

## 2020-12-30 ASSESSMENT — PATIENT HEALTH QUESTIONNAIRE - PHQ9
2. FEELING DOWN, DEPRESSED OR HOPELESS: 1
1. LITTLE INTEREST OR PLEASURE IN DOING THINGS: 1
SUM OF ALL RESPONSES TO PHQ QUESTIONS 1-9: 2
SUM OF ALL RESPONSES TO PHQ QUESTIONS 1-9: 2
SUM OF ALL RESPONSES TO PHQ9 QUESTIONS 1 & 2: 2
SUM OF ALL RESPONSES TO PHQ QUESTIONS 1-9: 2

## 2020-12-30 NOTE — TELEPHONE ENCOUNTER
Will you please call Manjit Alston at [phone] to let her know if she should get the COVID vaccine and where?

## 2020-12-30 NOTE — TELEPHONE ENCOUNTER
LV  Today    [place]  3/31/21    [facility] on [address]     She forget to ask for her iron pills at her appt ----could this be called in so she can pick all of her meds together? Note:  Should she get the COVID vaccine when it is available to her?

## 2020-12-30 NOTE — TELEPHONE ENCOUNTER
Requested Prescriptions     Signed Prescriptions Disp Refills    ferrous sulfate (IRON 325) 325 (65 Fe) MG tablet 90 tablet 0     Sig: Take 1 tablet by mouth nightly Until you start iv iron     Authorizing Provider: Lachelle Rice     Ordering User: Marisel Rosenberg

## 2020-12-30 NOTE — PROGRESS NOTES
Medicare Annual Wellness Visit  Name: Cora Meigs Date: 2020   MRN: [id number] Sex: Female   Age: 78 y.o. Ethnicity: Non-/Non    : 1940 Race: [place] is here for Medicare AWV    Screenings for behavioral, psychosocial and functional/safety risks, and cognitive dysfunction are all negative except as indicated below. These results, as well as other patient data from the [address] [facility] form, are documented in Flowsheets linked to this Encounter. Allergies   Allergen Reactions    Codeine Itching    Bactrim [Sulfamethoxazole-Trimethoprim]      dizziness    Sulfamethoxazole-Trimethoprim        Prior to Visit Medications    Medication Sig Taking? Authorizing Provider   FreeStyle Lancets MISC 1 each by Does not apply route daily Yes Angela Segal MD   blood glucose test strips (FREESTYLE LITE) strip 1 each by In Vitro route daily Yes Angela Segal MD   Cyanocobalamin (B-12) 500 MCG TABS Take 2 tablets by mouth daily Yes Angela Segal MD   ipratropium (ATROVENT) 0.06 % nasal spray 1 spray by Each Nostril route 4 times daily as needed for Rhinitis Yes Angela Segal MD   midodrine (PROAMATINE) 5 MG tablet Take 1 tablet by mouth 3 times daily (with meals) Yes Angela Segal MD   HYDROcodone-acetaminophen (NORCO) 7.5-325 MG per tablet Take 0.5-1 tablets by mouth 3 times daily as needed for Pain (m54.5) for up to 30 days.  Yes Angela Segal MD   atorvastatin (LIPITOR) 10 MG tablet Take 1 tablet by mouth daily Yes Angela Segal MD   metFORMIN (GLUCOPHAGE) 500 MG tablet Take 1 tablet by mouth nightly Yes Angela Segal MD   montelukast (SINGULAIR) 10 MG tablet Take 1 tablet by mouth daily Yes Angela Segal MD   pantoprazole (PROTONIX) 40 MG tablet Take 1 tablet by mouth daily Yes Angela Segal MD mirtazapine (REMERON) 15 MG tablet Take 1 tablet by mouth nightly Yes June Pierre MD   rivaroxaban (XARELTO) 15 MG TABS tablet Take 1 tablet by mouth daily Yes June Pierre MD   primidone (MYSOLINE) 50 MG tablet Take 1 tablet by mouth 3 times daily Yes June Pierre MD   donepezil (ARICEPT) 10 MG tablet Take 1 tablet by mouth nightly Yes June Pierre MD   DULoxetine (CYMBALTA) 60 MG extended release capsule Take 1 capsule by mouth nightly Yes June Pierre MD   gabapentin (NEURONTIN) 300 MG capsule Take 1 capsule by mouth 3 times daily for 90 days. Yes June Pierre MD   furosemide (LASIX) 20 MG tablet Take 1 tablet by mouth See Admin Instructions Take 1 tab as needed if legs start to swell up.  Yes June Pierre MD   ferrous sulfate (IRON 325) 325 (65 Fe) MG tablet Take 1 tablet by mouth nightly Until you start iv iron Yes June Pierre MD   albuterol (PROVENTIL) (5 MG/ML) 0.5% nebulizer solution Take 1 mL by nebulization 4 times daily Yes June Pierre MD   aspirin 81 MG tablet Take 81 mg by mouth daily Yes Historical Provider, MD   docusate sodium (COLACE) 100 MG capsule Take 100 mg by mouth 2 times daily Yes Historical Provider, MD   CPAP Machine MISC by Does not apply route Yes Historical Provider, MD       Past Medical History:   Diagnosis Date    Acute diastolic CHF (congestive heart failure) ([facility] [address] 12/23/2014    Acute urinary tract infection 7/20/2019    Single Kidney    Anemia     Chronic low back pain     s/p epidural steroids    COPD [facility]ch[facility] obstructive pulmonary disease) ([facility]     moderate to severe    Depression     Family history of cardiac disorder     Father, Brother    GERD (gastroesophageal reflux disease)     Goiter     s/p radioactive Iodine/ Low T4    H/O blood clots     right leg after miscarriage    H/O cardiovascular stress test 6/25/13 6/13-WNL EF 70%  Cancer Mother         uterine    Emphysema Father     Stroke Father     Heart Disease Father     Heart Defect Father         hardening of arteries    Diabetes Sister     Diabetes Brother     Heart Disease Brother     Cancer Maternal Grandfather         stomach    Diabetes Paternal Grandmother     Stroke Paternal Grandmother     Diabetes Brother     Alcohol Abuse Maternal Aunt        CareTeam (Including outside providers/suppliers regularly involved in providing care):   Patient Care Team:  uMkesh Schaeffer MD as PCP - General (Family Medicine)  Mukesh Schaeffer MD as PCP - [facility] Empaneled Provider  Faviola Sebastian MD as Consulting Physician (Cardiology)    Wt Readings from Last 3 Encounters:   12/30/20 132 lb (59.9 kg)   09/30/20 132 lb (59.9 kg)   07/13/20 131 lb 12.8 oz (59.8 kg)     Vitals:    12/30/20 1531   Weight: 132 lb (59.9 kg)   Height: 5' 3\" (1.6 m)     Body mass index is 23.38 kg/m². Based upon direct observation of the patient, evaluation of cognition reveals remote memory intact, recent memory impaired. Patient's complete Health Risk Assessment and screening values have been reviewed and are found in Flowsheets. The following problems were reviewed today and where indicated follow up appointments were made and/or referrals ordered. Positive Risk Factor Screenings with Interventions:     Fall Risk:  2 or more falls in past year?: no  Fall with injury in past year?: (!) yes  Fall Risk Interventions:    · Patient declines any further evaluation/treatment for this issue    Cognitive:   Words recalled: 2 Words Recalled  Total Score Interpretation: Positive Mini-Cog  Did the patient refuse to take the cognition test?: No  Cognitive Impairment Interventions:  · Patient declines any further evaluation/treatment for cognitive impairment       General Health and ACP:  General  In general, how would you say your health is?: Good In the past 7 days, have you experienced any of the following? New or Increased Pain, New or Increased Fatigue, Loneliness, Social Isolation, Stress or Anger?: (!) New or Increased Fatigue, Loneliness, Social Isolation, Stress, Anger  Do you get the social and emotional support that you need?: Yes  Do you have a Living Will?: Yes  Advance Directives     Power of  Living Will ACP-Advance Directive ACP-Power of     Not on File Not on File Not on File Not on File      General Health Risk Interventions:  · Fatigue: Patient stated she tires easily  · Loneliness: patient's comments regarding inadequate social support: Patient stated she feels lonely due to COVID  · Social isolation: patient's comments regarding inadequate social support: Patient stated she feels isolated due to COVID  · Stress: patient's comments regarding reasons for stress and/or anger: Patient stated she is stressed due to her daughter recently taking her own life  · Anger: patient's comments regarding reasons for stress and/or anger: Patient stated she is very angry with her daughter who recently committed suicide.  A hotline number was given to patient to call for support, and suggested that she contact her  also, which she said she would do  · Patient also had a telephone visit with her PCP today    Health Habits/Nutrition:  Health Habits/Nutrition  Do you exercise for at least 20 minutes 2-3 times per week?: (!) No  Have you lost any weight without trying in the past 3 months?: No  Do you eat fewer than 2 meals per day?: No  Have you seen a dentist within the past year?: Yes  Body mass index: 23.38  Health Habits/Nutrition Interventions:  · Inadequate physical activity:  patient is not ready to increase his/her physical activity level at this time    Hearing/Vision:  No exam data present  Hearing/Vision  Do you or your family notice any trouble with your hearing?: (!) Yes Do you have difficulty driving, watching TV, or doing any of your daily activities because of your eyesight?: (!) Yes  Have you had an eye exam within the past year?: (!) No  Hearing/Vision Interventions:  · Hearing concerns:  patient declines any further evaluation/treatment for hearing issues  · Vision concerns:  Patient stated her eyes are worsening and has been getting injections   · Patient stated she does wear hearing aids, but one of them is broken     ADL:  ADLs  In the past 7 days, did you need help from others to perform any of the following everyday activities? Eating, dressing, grooming, bathing, toileting, or walking/balance?: (!) Walking/Balance  In the past 7 days, did you need help from others to take care of any of the following? Laundry, housekeeping, banking/finances, shopping, telephone use, food preparation, transportation, or taking medications?: [employer], Housekeeping, Food Preparation, Transportation, Shopping  ADL Interventions:  · Patient declines any further evaluation/treatment for this issue stating that her daughter helps steady her at times when she is walking and off balance.   · Patient stated she has family that take care of her ADLs    Personalized Preventive Plan   Current Health Maintenance Status  Immunization History   Administered Date(s) Administered    Influenza Virus Vaccine 10/01/2013, 12/23/2015    Influenza, High Dose (Fluzone 65 yrs and older) 11/02/2014, 10/23/2015, 10/21/2016, 11/10/2017, 10/22/2018    Influenza, Quadv, adjuvanted, 65 yrs +, IM, PF (Fluad) 09/30/2020    Influenza, Triv, inactivated, subunit, adjuvanted, IM (Fluad 65 yrs and older) 10/07/2019    Pneumococcal Conjugate 13-valent ([id number]) 12/18/2015    Pneumococcal Polysaccharide ([id number]) 12/20/2014    Tdap (Boostrix, Adacel) 05/16/2017        Health Maintenance   Topic Date Due    Shingles Vaccine (1 of 2) 02/13/1988    DEXA (modify frequency per FRAX score)  02/13/1993  Annual Wellness Visit (AWV)  06/23/2019    Lipid screen  09/27/2020    TSH testing  06/08/2021    Potassium monitoring  06/25/2021    Creatinine monitoring  06/25/2021    DTaP/Tdap/Td vaccine (2 - Td) 05/16/2027    Flu vaccine  Completed    Pneumococcal 65+ years Vaccine  Completed    Hepatitis A vaccine  Aged Out    Hib vaccine  Aged Out    Meningococcal (ACWY) vaccine  Aged Out     Recommendations for [employer] Due: see orders and patient instructions/AVS.    Unable to obtain 3 vital signs due to patient not having equipment to take temperature/BP. Recommended screening schedule for the next 5-10 years is provided to the patient in written form: see Patient Instructions/AVS.    Dario Sorensen is a 78 y.o. female being evaluated by a Virtual Visit (audio visit) encounter to address concerns as mentioned above. A caregiver was present when appropriate. Due to this being a TeleHealth encounter (During [facility] public health emergency), evaluation of the following organ systems was limited: Vitals/Constitutional/EENT/Resp/CV/GI//MS/Neuro/Skin/Heme-Lymph-Imm. Pursuant to the emergency declaration under the [facility], [address] [place] [address] authority and the [employer] Act, this Virtual Visit was conducted with patient's (and/or legal guardian's) consent, to reduce the patient's risk of exposure to COVID-19 and provide necessary medical care. The patient (and/or legal guardian) has also been advised to contact this office for worsening conditions or problems, and seek emergency medical treatment and/or call 911 if deemed necessary.      Patient identification was verified at the start of the visit: Yes    Total time spent for this encounter: Not billed by time Services were provided through a audio synchronous discussion virtually to substitute for in-person clinic visit. Patient and provider were located at their individual homes. --Maile Meza on 12/30/2020 at 3:57 PM    An electronic signature was used to authenticate this note. Essence Saini, 12/30/2020, performed the documented evaluation under the direct supervision of the attending physician. This encounter was performed under Ailin york MDs, direct supervision, 12/30/2020.

## 2020-12-31 NOTE — TELEPHONE ENCOUNTER
Yes, you should get the Covid vaccine. Read the paper or listen to the news as the health department will let you know when it is you return. This vaccine is not being given by doctors offices.

## 2021-01-04 ENCOUNTER — OFFICE VISIT (OUTPATIENT)
Dept: PRIMARY CARE CLINIC | Age: 81
End: 2021-01-04
Payer: MEDICARE

## 2021-01-04 ENCOUNTER — HOSPITAL ENCOUNTER (OUTPATIENT)
Age: 81
Setting detail: SPECIMEN
Discharge: HOME | End: 2021-01-04
Payer: MEDICARE

## 2021-01-04 VITALS — TEMPERATURE: 97.2 F | OXYGEN SATURATION: 94 % | HEART RATE: 69 BPM

## 2021-01-04 DIAGNOSIS — R68.89 FLU-LIKE SYMPTOMS: Primary | ICD-10-CM

## 2021-01-04 LAB
INFLUENZA A ANTIBODY: NEGATIVE
INFLUENZA B ANTIBODY: NEGATIVE

## 2021-01-04 PROCEDURE — G8400 PT W/DXA NO RESULTS DOC: HCPCS | Performed by: NURSE PRACTITIONER

## 2021-01-04 PROCEDURE — G8484 FLU IMMUNIZE NO ADMIN: HCPCS | Performed by: NURSE PRACTITIONER

## 2021-01-04 PROCEDURE — 87804 INFLUENZA ASSAY W/OPTIC: CPT | Performed by: NURSE PRACTITIONER

## 2021-01-04 PROCEDURE — 1090F PRES/ABSN URINE INCON ASSESS: CPT | Performed by: NURSE PRACTITIONER

## 2021-01-04 PROCEDURE — 1036F TOBACCO NON-USER: CPT | Performed by: NURSE PRACTITIONER

## 2021-01-04 PROCEDURE — 99213 OFFICE O/P EST LOW 20 MIN: CPT | Performed by: NURSE PRACTITIONER

## 2021-01-04 PROCEDURE — G8420 CALC BMI NORM PARAMETERS: HCPCS | Performed by: NURSE PRACTITIONER

## 2021-01-04 PROCEDURE — 1123F ACP DISCUSS/DSCN MKR DOCD: CPT | Performed by: NURSE PRACTITIONER

## 2021-01-04 PROCEDURE — G8427 DOCREV CUR MEDS BY ELIG CLIN: HCPCS | Performed by: NURSE PRACTITIONER

## 2021-01-04 PROCEDURE — 4040F PNEUMOC VAC/ADMIN/RCVD: CPT | Performed by: NURSE PRACTITIONER

## 2021-01-04 PROCEDURE — U0002 COVID-19 LAB TEST NON-CDC: HCPCS

## 2021-01-05 NOTE — PROGRESS NOTES
1/4/21  Aleena Askew  1940    FLU/COVID-19 CLINIC EVALUATION    HPI SYMPTOMS:    Employer: Retired  [x] Fevers  [x] Chills  [] Cough  [] Coughing up blood  [] Chest Congestion  [] Nasal Congestion  [] Feeling short of breath  [] Sometimes  [] Frequently  [] All the time  [] Chest pain  [x] Headaches  [x]Tolerable  [] Severe  [x] Sore throat  [x] Muscle aches  [] Nausea  [] Vomiting  []Unable to keep fluids down  [] Diarrhea  []Severe    [] OTHER SYMPTOMS:      Symptom Duration:   [] 1  [] 2   [] 3   [] 4    [x] 5   [] 6   [] 7   [] 8   [] 9   [] 10   [] 11   [] 12   [] 13   [] 14   [] Longer than 14 days    Symptom course:   [] Worsening     [x] Stable     [] Improving    RISK FACTORS:    [] Pregnant or possibly pregnant  [x] Age over 61  [x] Diabetes  [x] Heart disease  [x] Asthma  [x] COPD/Other chronic lung diseases  [] Active Cancer  [] On Chemotherapy  [] Taking oral steroids  [] History Lymphoma/Leukemia  [] Close contact with a lab confirmed COVID-19 patient within 14 days of symptom onset  [] History of travel from affected geographical areas within 14 days of symptom onset       VITALS:  There were no vitals filed for this visit. TESTS:    POCT FLU:  [] Positive     []Negative    ASSESSMENT:    [] Flu  [] Possible COVID-19  [] Strep    PLAN:    [] Discharge home with written instructions for:  [] Flu management  [] Possible COVID-19 infection with self-quarantine and management of symptoms  [] Follow-up with primary care physician or emergency department if worsens  [] Evaluation per physician/NP/PA in clinic  [] Sent to ER       An  electronic signature was used to authenticate this note.      --Anuel Sanders LPN on [birth year]1945 at 6:01 PM

## 2021-01-05 NOTE — PROGRESS NOTES
1/4/2021    HPI:  Chief complaint and history of present illness as per medical assistant/nurse documented today in the Flu/COVID-19 clinic. MEDICATIONS:  Prior to Visit Medications    Medication Sig Taking? Authorizing Provider   FreeStyle Lancets MISC 1 each by Does not apply route daily  Paula Lopez MD   blood glucose test strips (FREESTYLE LITE) strip 1 each by In Vitro route daily  Paula Certain, MD   Cyanocobalamin (B-12) 500 MCG TABS Take 2 tablets by mouth daily  Paula Certain, MD   ipratropium (ATROVENT) 0.06 % nasal spray 1 spray by Each Nostril route 4 times daily as needed for Rhinitis  Hope Certain, MD   midodrine (PROAMATINE) 5 MG tablet Take 1 tablet by mouth 3 times daily (with meals)  Hope Certain, MD   HYDROcodone-acetaminophen (NORCO) 7.5-325 MG per tablet Take 0.5-1 tablets by mouth 3 times daily as needed for Pain (m54.5) for up to 30 days.   Paula Certain, MD   ferrous sulfate (IRON 325) 325 (65 Fe) MG tablet Take 1 tablet by mouth nightly Until you start iv iron  Hope Certain, MD   atorvastatin (LIPITOR) 10 MG tablet Take 1 tablet by mouth daily  Hope Certain, MD   metFORMIN (GLUCOPHAGE) 500 MG tablet Take 1 tablet by mouth nightly  Hope Certain, MD   montelukast (SINGULAIR) 10 MG tablet Take 1 tablet by mouth daily  Hope Certain, MD   pantoprazole (PROTONIX) 40 MG tablet Take 1 tablet by mouth daily  Hope Certain, MD   mirtazapine (REMERON) 15 MG tablet Take 1 tablet by mouth nightly  Hope Certain, MD   rivaroxaban [place] [address]) 15 MG TABS tablet Take 1 tablet by mouth daily  Hope Certain, MD   primidone (MYSOLINE) 50 MG tablet Take 1 tablet by mouth 3 times daily  Hope Certain, MD   donepezil (ARICEPT) 10 MG tablet Take 1 tablet by mouth nightly  Paula Certain, MD   DULoxetine (CYMBALTA) 60 MG extended release capsule Take 1 capsule by mouth nightly  Smitha Balbuena Axel Lim MD   gabapentin (NEURONTIN) 300 MG capsule Take 1 capsule by mouth 3 times daily for 90 days. Baltazar Ornelas MD   furosemide (LASIX) 20 MG tablet Take 1 tablet by mouth See Admin Instructions Take 1 tab as needed if legs start to swell up.   Baltazar Ornelas MD   albuterol (PROVENTIL) (5 MG/ML) 0.5% nebulizer solution Take 1 mL by nebulization 4 times daily  Baltazar Ornelas MD   aspirin 81 MG tablet Take 81 mg by mouth daily  Historical Provider, MD   docusate sodium (COLACE) 100 MG capsule Take 100 mg by mouth 2 times daily  Historical Provider, MD   CPAP Machine MISC by Does not apply route  Historical Provider, MD       Allergies   Allergen Reactions    Codeine Itching    Bactrim [Sulfamethoxazole-Trimethoprim]      dizziness    Sulfamethoxazole-Trimethoprim    ,   Past Medical History:   Diagnosis Date    Acute diastolic CHF (congestive heart failure) ([facility] [address] 12/23/2014    Acute urinary tract infection 7/20/2019    Single Kidney    Anemia     Chronic low back pain     s/p epidural steroids    COPD [facility]chronic obstructive pulmonary disease) [facility]     moderate to severe    Depression     Family history of cardiac disorder     Father, Brother    GERD (gastroesophageal reflux disease)     Goiter     s/p radioactive Iodine/ Low T4    H/O blood clots     right leg after miscarriage    H/O cardiovascular stress test 6/25/13 6/13-WNL EF 70%    H/O cardiovascular stress test 08/10/2017    Normal study EF 70%    H/O: CVA (cerebrovascular accident) 7/20/2019    Heart murmur     History of blood transfusion     2017    History of LIMITED Echocardiogram 11/08/2019    EF 55-60%, Normal left ventricle structure and systolic function , no regional wall motion abnormalities were detected    [place] (hard of hearing)     hearing aides    Hx of blood clots     Hx of echocardiogram 07/11/2013    EF>55%, mild MR, mild TR, abn lVSFx stage 1     HX OTHER MEDICAL     PCP is  Ade Vicente; Cardiologist is Dr John Mcdowell 7/30/13    Peripheral Angiogram.  Sluggish flow RLE, med mgmt.      Hydrocephalus, adult ([address]     shunt    Hyperlipidemia     Hyperthyroidism 7/20/2019    Kidney stone     \"had stone last summer 2016- passed it\"    Memory loss     Old MI (myocardial infarction) 2001    Osteoarthritis     Pneumonia     recurrent    Solitary kidney     Unspecified cerebral artery occlusion with cerebral infarction     \"they discovered I had stroke in 1993- with CT scan of head but never knew I had it\"   ,   Past Surgical History:   Procedure Laterality Date    [address]    disc ruptured    BRAIN SURGERY  2001    right shunt    CHEST SURGERY  1953    reconstruction of breast bone and rib-\"pigeon chest\"    CHOLECYSTECTOMY  1990    COLONOSCOPY  7/2011    F/U 7/2016    CORONARY ANGIOPLASTY WITH STENT PLACEMENT  2001    CSF SHUNT      ENDOSCOPY, COLON, DIAGNOSTIC      per old chart pt had EGD done with admission 2/17/2017    HYSTERECTOMY  1978    VERONIQUE BSO-dub/fibroids    KIDNEY SURGERY Right 1967    suspension    KNEE SURGERY  1997    right     PACEMAKER INSERTION Left 09/26/2019    [employer] [place] XT DR ALEX Torres     ROTATOR CUFF REPAIR  1997    right    TUBAL LIGATION  1972   ,   Social History     Tobacco Use    Smoking status: Former Smoker     Years: 59.00     Types: Cigarettes     Start date: 10/7/1960    Smokeless tobacco: Never Used    Tobacco comment: pt quit 11/4/19   Substance Use Topics    Alcohol use: Yes     Comment: rarely/ average \"2-3 times per year'    Drug use: No   ,   Family History   Problem Relation Age of Onset   [place] Stroke Mother     Heart Disease Mother     Asthma Mother     Diabetes Mother     Cancer Mother         uterine    Emphysema Father     Stroke Father     Heart Disease Father     Heart Defect Father         hardening of arteries    Diabetes Sister     Diabetes Brother     Heart Disease Brother     Cancer Maternal Grandfather         stomach    Diabetes Paternal Grandmother     Stroke Paternal Grandmother     Diabetes Brother     Alcohol Abuse Maternal Aunt    ,   Immunization History   Administered Date(s) Administered    Influenza Virus Vaccine 10/01/2013, 12/23/2015    Influenza, High Dose (Fluzone 65 yrs and older) 11/02/2014, 10/23/2015, 10/21/2016, 11/10/2017, 10/22/2018    Influenza, Quadv, adjuvanted, 65 yrs +, IM, PF (Fluad) 09/30/2020    Influenza, Triv, inactivated, subunit, adjuvanted, IM (Fluad 65 yrs and older) 10/07/2019    Pneumococcal Conjugate 13-valent ([id number]) 12/18/2015    Pneumococcal Polysaccharide ([id number]) 12/20/2014    Tdap (Boostrix, Adacel) 05/16/2017   ,   Health Maintenance   Topic Date Due    Shingles Vaccine (1 of 2) 02/13/1988    DEXA (modify frequency per FRAX score)  02/13/1993    TSH testing  06/08/2021    Potassium monitoring  06/25/2021    Creatinine monitoring  06/25/2021    Annual Wellness Visit (AWV)  12/31/2021    DTaP/Tdap/Td vaccine (2 - Td) 05/16/2027    Flu vaccine  Completed    Pneumococcal 65+ years Vaccine  Completed    Hepatitis A vaccine  Aged Out    Hib vaccine  Aged Out    Meningococcal (ACWY) vaccine  Aged Out       PHYSICAL EXAM:  Physical Exam  Constitutional:       Appearance: Normal appearance. HENT:      Head: Normocephalic. Right Ear: Tympanic membrane, ear canal and external ear normal.      Left Ear: Tympanic membrane, ear canal and external ear normal.      Nose: Nose normal.      Mouth/Throat:      Lips: Pink. Mouth: Mucous membranes are moist.      Pharynx: Oropharynx is clear. Neck:      Musculoskeletal: Neck supple. Cardiovascular:      Rate and Rhythm: Normal rate and regular rhythm. Heart sounds: Normal heart sounds. Pulmonary:      Effort: Pulmonary effort is normal.      Breath sounds: Normal breath sounds. Skin:     General: Skin is warm and dry.    Neurological:      Mental Status: She is alert and oriented to person, place, and time. Psychiatric:         Mood and Affect: Mood normal.         Behavior: Behavior normal.         ASSESSMENT/PLAN:  1. Flu-like symptoms  Negative for influenza A and B. Your COVID 19 test can take 3-5 days for the results come back. We ask that you make a Mychart page and view your test results this way. You will need to Self quarantine until you know your results. Increase fluids rest  Saline nasal spray as directed  Warm salt gargles for throat discomfort  Monitor temperature twice a day  Tylenol for fevers and/or discomfort. If symptoms are worse -Go to the ER. Follow up with your primary doctor    To Whom it May Concern:    Judi Larsen has been tested for COVID on 01/04/21. They may NOT return to work until their lab test results back and they been fever free for 3 days. If test is positive they must stay home for 2 weeks or until they test negative or as directed by the [facility] Department. - COVID-19 Ambulatory  - POCT Influenza A/B      FOLLOW-UP:  Return if symptoms worsen or fail to improve.     In addition to other information, the printed after visit summary provided to the patient includes:  [x] COVID-19 Self care instructions  [x] COVID-19 General patient information

## 2021-01-06 LAB
SARS-COV-2: DETECTED
SOURCE: ABNORMAL

## 2021-01-07 ENCOUNTER — TELEPHONE (OUTPATIENT)
Dept: CARDIOLOGY CLINIC | Age: 81
End: 2021-01-07

## 2021-01-07 NOTE — TELEPHONE ENCOUNTER
Going through [facility] schedule and saw that this patient currently has Covid, dx with it on 1/4/21. Left message for daughter to call back and reschedule.

## 2021-01-21 ENCOUNTER — TELEPHONE (OUTPATIENT)
Dept: FAMILY MEDICINE CLINIC | Age: 81
End: 2021-01-21

## 2021-01-21 NOTE — TELEPHONE ENCOUNTER
Deandre Blanchard sleeping medicines are not recommended with your current medications and age. Can I set you up with a counselor to speak with about your significant loss?

## 2021-01-21 NOTE — TELEPHONE ENCOUNTER
Patient is not sleeping at night  Requesting medication. Patients daughter passed away in October.   [facility]

## 2021-01-25 DIAGNOSIS — I95.1 ORTHOSTASIS: ICD-10-CM

## 2021-01-25 RX ORDER — MIDODRINE HYDROCHLORIDE 5 MG/1
5 TABLET ORAL
Qty: 90 TABLET | Refills: 0 | Status: SHIPPED | OUTPATIENT
Start: 2021-01-25 | End: 2021-03-31 | Stop reason: SDUPTHER

## 2021-01-25 NOTE — TELEPHONE ENCOUNTER
Requested Prescriptions     Signed Prescriptions Disp Refills    midodrine (PROAMATINE) 5 MG tablet 90 tablet 0     Sig: Take 1 tablet by mouth 3 times daily (with meals)     Authorizing Provider: Margot Saenz     Ordering User: Mitch Bruce

## 2021-01-25 NOTE — TELEPHONE ENCOUNTER
Patient called and stated that she is waiting for Dr Nati De La Rosa office to call back with an appt

## 2021-02-10 DIAGNOSIS — M54.50 CHRONIC BILATERAL LOW BACK PAIN WITHOUT SCIATICA: ICD-10-CM

## 2021-02-10 DIAGNOSIS — G89.29 CHRONIC BILATERAL LOW BACK PAIN WITHOUT SCIATICA: ICD-10-CM

## 2021-02-10 RX ORDER — HYDROCODONE BITARTRATE AND ACETAMINOPHEN 7.5; 325 MG/1; MG/1
.5-1 TABLET ORAL 3 TIMES DAILY PRN
Qty: 90 TABLET | Refills: 0 | Status: SHIPPED | OUTPATIENT
Start: 2021-02-10 | End: 2021-03-19 | Stop reason: SDUPTHER

## 2021-02-20 PROCEDURE — 93294 REM INTERROG EVL PM/LDLS PM: CPT | Performed by: INTERNAL MEDICINE

## 2021-02-20 PROCEDURE — 93296 REM INTERROG EVL PM/IDS: CPT | Performed by: INTERNAL MEDICINE

## 2021-02-23 ENCOUNTER — PROCEDURE VISIT (OUTPATIENT)
Dept: CARDIOLOGY CLINIC | Age: 81
End: 2021-02-23
Payer: MEDICARE

## 2021-02-23 ENCOUNTER — TELEPHONE (OUTPATIENT)
Dept: CARDIOLOGY CLINIC | Age: 81
End: 2021-02-23

## 2021-02-23 DIAGNOSIS — Z95.0 CARDIAC PACEMAKER IN SITU: Primary | ICD-10-CM

## 2021-02-23 DIAGNOSIS — I49.5 SINUS NODE DYSFUNCTION (HCC): ICD-10-CM

## 2021-02-23 DIAGNOSIS — I44.0 AV BLOCK, 1ST DEGREE: ICD-10-CM

## 2021-02-23 NOTE — LETTER
Cardiology [address]  Phone: [phone]  Fax: [phone]    2/23/2021        Fabien Sharma  [address] [place] [id number]            Dear Vane Zuniga: This is your [facility] schedule. You can jessie your calendar with these dates. Remember that your device is wireless and should automatically do these checks while you are sleeping. If for any reason I do not get your transmission then I will call you and ask that you send a manual transmission. If you have any questions or concerns, please call and ask for Rabia Vogel at [phone]. Thank you.

## 2021-03-19 DIAGNOSIS — M54.50 CHRONIC BILATERAL LOW BACK PAIN WITHOUT SCIATICA: ICD-10-CM

## 2021-03-19 DIAGNOSIS — G89.29 CHRONIC BILATERAL LOW BACK PAIN WITHOUT SCIATICA: ICD-10-CM

## 2021-03-19 RX ORDER — MIRTAZAPINE 15 MG/1
15 TABLET, FILM COATED ORAL NIGHTLY
Qty: 90 TABLET | Refills: 1 | Status: SHIPPED | OUTPATIENT
Start: 2021-03-19 | End: 2021-08-10 | Stop reason: SDUPTHER

## 2021-03-19 RX ORDER — HYDROCODONE BITARTRATE AND ACETAMINOPHEN 7.5; 325 MG/1; MG/1
.5-1 TABLET ORAL 3 TIMES DAILY PRN
Qty: 90 TABLET | Refills: 0 | Status: SHIPPED | OUTPATIENT
Start: 2021-03-19 | End: 2021-04-21 | Stop reason: SDUPTHER

## 2021-03-23 RX ORDER — FERROUS SULFATE 325(65) MG
325 TABLET ORAL NIGHTLY
Qty: 90 TABLET | Refills: 1 | Status: SHIPPED | OUTPATIENT
Start: 2021-03-23 | End: 2021-08-10 | Stop reason: SDUPTHER

## 2021-03-31 ENCOUNTER — OFFICE VISIT (OUTPATIENT)
Dept: FAMILY MEDICINE CLINIC | Age: 81
End: 2021-03-31
Payer: MEDICARE

## 2021-03-31 VITALS
TEMPERATURE: 97.3 F | DIASTOLIC BLOOD PRESSURE: 74 MMHG | HEART RATE: 72 BPM | BODY MASS INDEX: 22.85 KG/M2 | WEIGHT: 129 LBS | SYSTOLIC BLOOD PRESSURE: 110 MMHG

## 2021-03-31 DIAGNOSIS — G91.9 HYDROCEPHALUS, UNSPECIFIED TYPE (HCC): ICD-10-CM

## 2021-03-31 DIAGNOSIS — J42 CHRONIC BRONCHITIS, UNSPECIFIED CHRONIC BRONCHITIS TYPE (HCC): Chronic | ICD-10-CM

## 2021-03-31 DIAGNOSIS — E11.9 TYPE 2 DIABETES MELLITUS WITHOUT COMPLICATION, WITHOUT LONG-TERM CURRENT USE OF INSULIN (HCC): ICD-10-CM

## 2021-03-31 DIAGNOSIS — D50.0 CHRONIC BLOOD LOSS ANEMIA: Primary | ICD-10-CM

## 2021-03-31 DIAGNOSIS — I95.1 ORTHOSTASIS: ICD-10-CM

## 2021-03-31 DIAGNOSIS — Z78.0 POSTMENOPAUSAL: ICD-10-CM

## 2021-03-31 DIAGNOSIS — Z13.31 POSITIVE DEPRESSION SCREENING: ICD-10-CM

## 2021-03-31 DIAGNOSIS — I50.32 CHRONIC DIASTOLIC CHF (CONGESTIVE HEART FAILURE) (HCC): ICD-10-CM

## 2021-03-31 PROCEDURE — 1123F ACP DISCUSS/DSCN MKR DOCD: CPT | Performed by: FAMILY MEDICINE

## 2021-03-31 PROCEDURE — 1036F TOBACCO NON-USER: CPT | Performed by: FAMILY MEDICINE

## 2021-03-31 PROCEDURE — 1090F PRES/ABSN URINE INCON ASSESS: CPT | Performed by: FAMILY MEDICINE

## 2021-03-31 PROCEDURE — G8484 FLU IMMUNIZE NO ADMIN: HCPCS | Performed by: FAMILY MEDICINE

## 2021-03-31 PROCEDURE — G8431 POS CLIN DEPRES SCRN F/U DOC: HCPCS | Performed by: FAMILY MEDICINE

## 2021-03-31 PROCEDURE — G8926 SPIRO NO PERF OR DOC: HCPCS | Performed by: FAMILY MEDICINE

## 2021-03-31 PROCEDURE — 3023F SPIROM DOC REV: CPT | Performed by: FAMILY MEDICINE

## 2021-03-31 PROCEDURE — G8420 CALC BMI NORM PARAMETERS: HCPCS | Performed by: FAMILY MEDICINE

## 2021-03-31 PROCEDURE — G8427 DOCREV CUR MEDS BY ELIG CLIN: HCPCS | Performed by: FAMILY MEDICINE

## 2021-03-31 PROCEDURE — 4040F PNEUMOC VAC/ADMIN/RCVD: CPT | Performed by: FAMILY MEDICINE

## 2021-03-31 PROCEDURE — G8400 PT W/DXA NO RESULTS DOC: HCPCS | Performed by: FAMILY MEDICINE

## 2021-03-31 PROCEDURE — 99214 OFFICE O/P EST MOD 30 MIN: CPT | Performed by: FAMILY MEDICINE

## 2021-03-31 RX ORDER — GABAPENTIN 300 MG/1
300 CAPSULE ORAL 3 TIMES DAILY
Qty: 270 CAPSULE | Refills: 0 | Status: SHIPPED | OUTPATIENT
Start: 2021-03-31 | End: 2021-08-10 | Stop reason: SDUPTHER

## 2021-03-31 RX ORDER — DULOXETIN HYDROCHLORIDE 60 MG/1
60 CAPSULE, DELAYED RELEASE ORAL NIGHTLY
Qty: 90 CAPSULE | Refills: 1 | Status: SHIPPED | OUTPATIENT
Start: 2021-03-31 | End: 2021-11-04 | Stop reason: SDUPTHER

## 2021-03-31 RX ORDER — ATORVASTATIN CALCIUM 10 MG/1
10 TABLET, FILM COATED ORAL DAILY
Qty: 90 TABLET | Refills: 1 | Status: SHIPPED | OUTPATIENT
Start: 2021-03-31 | End: 2021-11-22 | Stop reason: SDUPTHER

## 2021-03-31 RX ORDER — PRIMIDONE 50 MG/1
50 TABLET ORAL 3 TIMES DAILY
Qty: 270 TABLET | Refills: 1 | Status: SHIPPED | OUTPATIENT
Start: 2021-03-31 | End: 2021-08-10 | Stop reason: SDUPTHER

## 2021-03-31 RX ORDER — PANTOPRAZOLE SODIUM 40 MG/1
40 TABLET, DELAYED RELEASE ORAL 2 TIMES DAILY
Qty: 180 TABLET | Refills: 1 | Status: SHIPPED | OUTPATIENT
Start: 2021-03-31 | End: 2021-08-10 | Stop reason: SDUPTHER

## 2021-03-31 RX ORDER — MIDODRINE HYDROCHLORIDE 5 MG/1
5 TABLET ORAL
Qty: 90 TABLET | Refills: 1 | Status: SHIPPED | OUTPATIENT
Start: 2021-03-31 | End: 2021-07-21 | Stop reason: SDUPTHER

## 2021-03-31 RX ORDER — MONTELUKAST SODIUM 10 MG/1
10 TABLET ORAL DAILY
Qty: 90 TABLET | Refills: 1 | Status: SHIPPED | OUTPATIENT
Start: 2021-03-31 | End: 2021-08-10 | Stop reason: SDUPTHER

## 2021-03-31 RX ORDER — DONEPEZIL HYDROCHLORIDE 10 MG/1
10 TABLET, FILM COATED ORAL NIGHTLY
Qty: 90 TABLET | Refills: 1 | Status: SHIPPED | OUTPATIENT
Start: 2021-03-31 | End: 2021-08-10 | Stop reason: SDUPTHER

## 2021-03-31 SDOH — ECONOMIC STABILITY: FOOD INSECURITY: WITHIN THE PAST 12 MONTHS, YOU WORRIED THAT YOUR FOOD WOULD RUN OUT BEFORE YOU GOT MONEY TO BUY MORE.: NEVER TRUE

## 2021-03-31 SDOH — ECONOMIC STABILITY: TRANSPORTATION INSECURITY
IN THE PAST 12 MONTHS, HAS THE LACK OF TRANSPORTATION KEPT YOU FROM MEDICAL APPOINTMENTS OR FROM GETTING MEDICATIONS?: NO

## 2021-03-31 SDOH — ECONOMIC STABILITY: TRANSPORTATION INSECURITY
IN THE PAST 12 MONTHS, HAS LACK OF TRANSPORTATION KEPT YOU FROM MEETINGS, WORK, OR FROM GETTING THINGS NEEDED FOR DAILY LIVING?: NO

## 2021-03-31 ASSESSMENT — PATIENT HEALTH QUESTIONNAIRE - PHQ9
6. FEELING BAD ABOUT YOURSELF - OR THAT YOU ARE A FAILURE OR HAVE LET YOURSELF OR YOUR FAMILY DOWN: 3
1. LITTLE INTEREST OR PLEASURE IN DOING THINGS: 3
SUM OF ALL RESPONSES TO PHQ QUESTIONS 1-9: 19
5. POOR APPETITE OR OVEREATING: 3
SUM OF ALL RESPONSES TO PHQ QUESTIONS 1-9: 19
SUM OF ALL RESPONSES TO PHQ9 QUESTIONS 1 & 2: 6
9. THOUGHTS THAT YOU WOULD BE BETTER OFF DEAD, OR OF HURTING YOURSELF: 0
8. MOVING OR SPEAKING SO SLOWLY THAT OTHER PEOPLE COULD HAVE NOTICED. OR THE OPPOSITE, BEING SO FIGETY OR RESTLESS THAT YOU HAVE BEEN MOVING AROUND A LOT MORE THAN USUAL: 0

## 2021-03-31 ASSESSMENT — COLUMBIA-SUICIDE SEVERITY RATING SCALE - C-SSRS
2. HAVE YOU ACTUALLY HAD ANY THOUGHTS OF KILLING YOURSELF?: NO
1. WITHIN THE PAST MONTH, HAVE YOU WISHED YOU WERE DEAD OR WISHED YOU COULD GO TO SLEEP AND NOT WAKE UP?: NO

## 2021-04-02 NOTE — PROGRESS NOTES
 History of blood transfusion     2017    History of LIMITED Echocardiogram 11/08/2019    EF 55-60%, Normal left ventricle structure and systolic function , no regional wall motion abnormalities were detected    [place] (hard of hearing)     hearing aides    Hx of blood clots     Hx of echocardiogram 07/11/2013    EF>55%, mild MR, mild TR, abn lVSFx stage 1     HX OTHER MEDICAL     PCP is Dr Charlotet Anderson; Cardiologist is Dr Indu Morris 7/30/13    Peripheral Angiogram.  Sluggish flow RLE, med mgmt.      Hydrocephalus, adult ([facility] [address]     shunt    Hyperlipidemia     Hyperthyroidism 7/20/2019    Kidney stone     \"had stone last summer 2016- passed it\"    Memory loss     Old MI (myocardial infarction) 2001    Osteoarthritis     Pneumonia     recurrent    Solitary kidney     Unspecified cerebral artery occlusion with cerebral infarction     \"they discovered I had stroke in 1993- with CT scan of head but never knew I had it\"       FAMILY HISTORY  Family History   Problem Relation Age of Onset    Stroke Mother     Heart Disease Mother     Asthma Mother     Diabetes Mother     Cancer Mother         uterine    Emphysema Father     Stroke Father     Heart Disease Father     Heart Defect Father         hardening of arteries    Diabetes Sister     Diabetes Brother     Heart Disease Brother     Cancer Maternal Grandfather         stomach    Diabetes Paternal Grandmother     Stroke Paternal Grandmother     Diabetes Brother     Alcohol Abuse Maternal Aunt        SOCIAL HISTORY  Social History     Socioeconomic History    Marital status:      Spouse name: None    Number of children: 5    Years of education: None    Highest education level: None   Occupational History    Occupation: mental health counselor retired   Social Needs    Financial resource strain: Not hard at all   [address] insecurity     Worry: Never true     Inability: Never true   [employer] needs     Medical: No Non-medical: No   Tobacco Use    Smoking status: Former Smoker     Years: 59.00     Types: Cigarettes     Start date: 10/7/1960    Smokeless tobacco: Never Used    Tobacco comment: pt quit 11/4/19   Substance and Sexual Activity    Alcohol use: Yes     Comment: rarely/ average \"2-3 times per year'    Drug use: No    Sexual activity: None   Lifestyle    Physical activity     Days per week: None     Minutes per session: None    Stress: None   Relationships    Social connections     Talks on phone: None     Gets together: None     Attends Anabaptist service: None     Active member of club or organization: None     Attends meetings of clubs or organizations: None     Relationship status: None    Intimate partner violence     Fear of current or ex partner: None     Emotionally abused: None     Physically abused: None     Forced sexual activity: None   Other Topics Concern    None   Social History Narrative    Do you donate blood or plasma? no    Caffeine intake? Moderate    Advance directive? yes    Is blood transfusion acceptable in an emergency?  yes         SURGICAL HISTORY  Past Surgical History:   Procedure Laterality Date    BACK SURGERY  1997    disc ruptured    BRAIN SURGERY  2001    right shunt    CHEST SURGERY  1012    reconstruction of breast bone and rib-\"pigeon chest\"    CHOLECYSTECTOMY  1990    COLONOSCOPY  7/2011    F/U 7/2016    CORONARY ANGIOPLASTY WITH STENT PLACEMENT  2001    CSF SHUNT      ENDOSCOPY, COLON, DIAGNOSTIC      per old chart pt had EGD done with admission 2/17/2017    HYSTERECTOMY  1978    VERONIQUE BSO-dub/fibroids    KIDNEY SURGERY Right 1967    suspension    KNEE SURGERY  1997    right     PACEMAKER INSERTION Left 09/26/2019    [employer] [place] XT DR ALEX Torres     ROTATOR CUFF REPAIR  1997    right    TUBAL LIGATION  1972       CURRENT MEDICATIONS  Current Outpatient Medications   Medication Sig Dispense Refill    midodrine (PROAMATINE) 5 MG tablet Take 1 tablet by mouth 2 times daily      CPAP Machine MISC by Does not apply route       No current facility-administered medications for this visit. ALLERGIES  Allergies   Allergen Reactions    Codeine Itching    Bactrim [Sulfamethoxazole-Trimethoprim]      dizziness    Sulfamethoxazole-Trimethoprim        PHYSICAL EXAM  /74   Pulse 72   Temp 97.3 °F (36.3 °C)   Wt 129 lb (58.5 kg)   BMI 22.85 kg/m²     ASSESSMENT & PLAN    1. Chronic blood loss anemia  Recheck CBC on follow-up. Increased Protonix to twice daily. 2. Chronic diastolic CHF (congestive heart failure) (HCC)  Issue controlled. Continue meds. Refilled meds. 3. Chronic bronchitis, unspecified chronic bronchitis type ([address]  Issue controlled. Continue meds. Refilled meds. - montelukast (SINGULAIR) 10 MG tablet; Take 1 tablet by mouth daily  Dispense: 90 tablet; Refill: 1    4. Type 2 diabetes mellitus without complication, without long-term current use of insulin ([facility]  Reviewed recent labs. At goal.  Remain on same.  - atorvastatin (LIPITOR) 10 MG tablet; Take 1 tablet by mouth daily  Dispense: 90 tablet; Refill: 1  - metFORMIN (GLUCOPHAGE) 500 MG tablet; Take 1 tablet by mouth nightly  Dispense: 90 tablet; Refill: 1    5. Orthostasis  Needs still persist symptomatically. Continue the same.  - midodrine (PROAMATINE) 5 MG tablet; Take 1 tablet by mouth 3 times daily (with meals)  Dispense: 90 tablet; Refill: 1    6. Hydrocephalus, unspecified type ([facility] [address]  Issue controlled. Continue meds. Refilled meds. - primidone (MYSOLINE) 50 MG tablet; Take 1 tablet by mouth 3 times daily  Dispense: 270 tablet; Refill: 1  - donepezil (ARICEPT) 10 MG tablet; Take 1 tablet by mouth nightly  Dispense: 90 tablet; Refill: 1    7. Postmenopausal  - DEXA BONE DENSITY AXIAL SKELETON; Future    8. Positive depression screening  Empathy given. Continue current medications. Counseling offered. Patient to let us know if she becomes   interested.     - Positive Screen for Clinical Depression with a Documented Follow-up Plan     Follow-up 4 months       Electronically signed by Ben Camap MD on 4/2/2021

## 2021-04-21 DIAGNOSIS — G89.29 CHRONIC BILATERAL LOW BACK PAIN WITHOUT SCIATICA: ICD-10-CM

## 2021-04-21 DIAGNOSIS — M54.50 CHRONIC BILATERAL LOW BACK PAIN WITHOUT SCIATICA: ICD-10-CM

## 2021-04-21 RX ORDER — HYDROCODONE BITARTRATE AND ACETAMINOPHEN 7.5; 325 MG/1; MG/1
.5-1 TABLET ORAL 3 TIMES DAILY PRN
Qty: 90 TABLET | Refills: 0 | Status: SHIPPED | OUTPATIENT
Start: 2021-04-21 | End: 2021-05-28 | Stop reason: SDUPTHER

## 2021-04-28 ENCOUNTER — OFFICE VISIT (OUTPATIENT)
Dept: ONCOLOGY | Age: 81
End: 2021-04-28
Payer: MEDICARE

## 2021-04-28 DIAGNOSIS — K90.49 MALABSORPTION DUE TO INTOLERANCE, NOT ELSEWHERE CLASSIFIED: ICD-10-CM

## 2021-04-28 DIAGNOSIS — D50.9 IRON DEFICIENCY ANEMIA, UNSPECIFIED IRON DEFICIENCY ANEMIA TYPE: ICD-10-CM

## 2021-04-28 PROCEDURE — 99422 OL DIG E/M SVC 11-20 MIN: CPT | Performed by: INTERNAL MEDICINE

## 2021-04-28 NOTE — PROGRESS NOTES
Patient Name: Sonam Choudhury  Patient : 1940  Patient MRN: [id number]     Primary Oncologist: Tyler Tinsley MD  Referring Physician: Sheba Urena    Televisit     Date of Service: 2021      Chief Complaint:   Chief Complaint   Patient presents with    Discuss Labs        Active Problem list  1. Iron deficiency anemia, unspecified iron deficiency anemia type           HPI:        HPI  75 yo w. f. with hx CVA on blood thinners with recent admissions for anemia and GI bleed. She had a balloon enteroscopy performed by Dr. Kellen Tinajero on 17 and at that time she had an AVM and underwent argon plasma coagulation. She has been placed on iron and has had dark stools due to the iron pills. 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 Her pletal was stopped and she was continued on her xarelto and asa. GI would like input in regards to IV iron since oral iron clouds the picture of her active bleeding and transfusion input. She has been getting transfusion monthly for the past few months      had capsule by Dr. Dean HENRIQUEZM ? with oozing    2019: Ct abdomen and pelvis: 1. No acute intra-process identified. 2. No evidence for bowel obstruction. 3. Redemonstration of left staghorn calculus similar to previous exam with  stable atrophy of the left kidney. 4. Mild atrophy pancreas otherwise appears unremarkable. No stranding seen  surrounding the pancreas. 19: RP U/S:1. Staghorn calculus again seen in the left kidney  2. Mild left renal atrophy  3. Unremarkable appearing right kidney    19: U/S soft tissue neck:Impression  Somewhat dilated left internal jugular vein, with presumed slow flow. If  there is a concern for internal jugular vein thrombosis, consider dedicated  upper extremity vascular study. Heterogeneous thyroid gland. No significant hypervascularity.      pacemaker placed    2020 CBC WBC of 6.5 hemoglobin of 11.1 hematocrit 39.5 MCV of 103.7 platelets of 798 differential otherwise within normal limits PT 11.3 PTT 34 phosphorus 3.9 magnesium 2.3 BMP with creatinine 1.3 otherwise within normal limits. Past Medical History  Anemia  acute diastolic CHF  anxiety  asthma  CAD  chronic low back pain s/p epidural  COPD  Depression  GERD  Goiter s/p radioactive  hx of DVT right leg after miscarriage  shunt for NPH  HTN  hx of kidney stone  SUE? hx of atrophic kidney  DM  CVA in 1993    Surgical History  as above  s/p VERONIQUE/BSO    Allergies  No known medication allergies    Medications  Acetaminophen Oral 500 mg capsule   Albuterol-Ipratropium Nebulized 3 mg (2.5 mg base)-0.5 mg/3 mL   Amiodarone Oral 200 mg tablet   Aspirin Oral 81 mg tablet,delayed release (DR/EC)   Azelastine Intranasal 137 mcg/SPRAY (0.1 %)   Digoxin Oral Tablet 125 mcg tablet   Diltiazem Oral 24 hr Cap 180 mg capsule,extended release 24 hr   Donepezil Oral 10 mg tablet   Duloxetine Oral Delayed Release 60 mg capsule,delayed release(DR/EC)   Estradiol Oral 0.5 mg tablet   Ferrous Sulfate Oral 325 mg (65 mg iron) tablet   Fluticasone Nasal Spray 50 mcg/actuation   Gabapentin Oral 300 mg capsule   Metformin Oral 500 mg tablet   Miscellaneous Drug CPAP Machine   Pantoprazole (Sodium) Oral Delayed Release 40 mg tablet,delayed release (DR/EC)   Ranitidine Oral 150 mg tablet   Rivaroxaban Oral 15 mg tablet      Social History  Smoking Status Former smoker  quit smoking 1 year ago  drinks on special occassions  lives with her youngest daughter     Family History  daughter had breast cancer treated by DR. April Mccabe  son has hodgkins lymphoma  has an aunt with bowel cancer      Interval History  4/28/2021: She reported that she has been feeling very weak  And tired. Feels cold all the time. Reported PICA sx. Reported chronic lower abdominal pain. Reported alternating diarrhea and constipation.  Reported Black stools but she has been on oral iron supplements. No other overt bleeding. No chest pain. Increased sob and palpitations and dizziness at times. Review of Systems   Per interval history; otherwise 10 point ROS is negative              Vital Signs: There were no vitals taken for this visit.   Televisit  Labs:    Hematology:  Lab Results   Component Value Date    WBC 6.9 06/25/2020    RBC 4.10 (L) 06/25/2020    HGB 13.0 06/25/2020    HCT 40.5 06/25/2020    MCV 98.8 06/25/2020    MCH 31.7 (H) 06/25/2020    MCHC 32.1 06/25/2020    RDW 14.5 06/25/2020     06/25/2020    MPV 12.8 (H) 06/25/2020    BANDSPCT 5 01/12/2015    SEGSPCT 58.7 06/25/2020    EOSRELPCT 4.2 (H) 06/25/2020    BASOPCT 0.3 06/25/2020    LYMPHOPCT 25.4 06/25/2020    MONOPCT 11.4 (H) 06/25/2020    BANDABS 0.44 01/12/2015    SEGSABS 4.1 06/25/2020    EOSABS 0.3 06/25/2020    BASOSABS 0.0 06/25/2020    LYMPHSABS 1.8 06/25/2020    MONOSABS 0.8 06/25/2020    DIFFTYPE AUTOMATED DIFFERENTIAL 06/25/2020    ANISOCYTOSIS 1+ 01/28/2017    POLYCHROM 1+ 03/05/2017    PLTM  03/05/2017     PLATELETS APPEAR INCREASED   A FEW LARGE PLATELETS NOTED     No results found for: ESR    Chemistry:  Lab Results   Component Value Date     06/25/2020    K 5.0 06/25/2020     06/25/2020    CO2 33 (H) 06/25/2020    BUN 35 (H) 06/25/2020    CREATININE 1.4 (H) 06/25/2020    GLUCOSE 137 (H) 06/25/2020    CALCIUM 10.4 06/25/2020    PROT 6.9 06/25/2020    LABALBU 4.3 06/25/2020    BILITOT 0.1 06/25/2020    ALKPHOS 123 06/25/2020    AST 15 06/25/2020    ALT 10 06/25/2020    LABGLOM 36 (L) 06/25/2020    GFRAA 44 (L) 06/25/2020    PHOS 3.9 01/06/2020    MG 2.3 01/06/2020    POCGLU 97 01/09/2020     No results found for: MMA, LDH, HOMOCYSTEINE  No components found for: LD  Lab Results   Component Value Date    TSHHS 0.297 06/08/2020    T4FREE 1.01 06/08/2020    FT3 2.1 (L) 06/08/2020       Immunology:  Lab Results   Component Value Date    PROT 6.9 06/25/2020     No results found for: Brock Nelson,

## 2021-05-27 DIAGNOSIS — M54.50 CHRONIC BILATERAL LOW BACK PAIN WITHOUT SCIATICA: ICD-10-CM

## 2021-05-27 DIAGNOSIS — I95.1 ORTHOSTASIS: ICD-10-CM

## 2021-05-27 DIAGNOSIS — G89.29 CHRONIC BILATERAL LOW BACK PAIN WITHOUT SCIATICA: ICD-10-CM

## 2021-05-28 ENCOUNTER — OFFICE VISIT (OUTPATIENT)
Dept: CARDIOLOGY CLINIC | Age: 81
End: 2021-05-28
Payer: MEDICARE

## 2021-05-28 VITALS
BODY MASS INDEX: 23.57 KG/M2 | HEART RATE: 69 BPM | DIASTOLIC BLOOD PRESSURE: 72 MMHG | SYSTOLIC BLOOD PRESSURE: 122 MMHG | HEIGHT: 63 IN | WEIGHT: 133 LBS

## 2021-05-28 DIAGNOSIS — I48.0 PAROXYSMAL ATRIAL FIBRILLATION (HCC): ICD-10-CM

## 2021-05-28 DIAGNOSIS — G47.33 OSA (OBSTRUCTIVE SLEEP APNEA): ICD-10-CM

## 2021-05-28 DIAGNOSIS — I25.10 CORONARY ARTERY DISEASE INVOLVING NATIVE CORONARY ARTERY OF NATIVE HEART WITHOUT ANGINA PECTORIS: Primary | ICD-10-CM

## 2021-05-28 DIAGNOSIS — I20.0 UNSTABLE ANGINA PECTORIS (HCC): ICD-10-CM

## 2021-05-28 DIAGNOSIS — E11.9 TYPE 2 DIABETES MELLITUS WITHOUT COMPLICATION, WITHOUT LONG-TERM CURRENT USE OF INSULIN (HCC): ICD-10-CM

## 2021-05-28 DIAGNOSIS — E78.2 MIXED HYPERLIPIDEMIA: Chronic | ICD-10-CM

## 2021-05-28 DIAGNOSIS — R06.89 DYSPNEA AND RESPIRATORY ABNORMALITIES: ICD-10-CM

## 2021-05-28 DIAGNOSIS — Z98.890 S/P ATRIOVENTRICULAR NODAL ABLATION: ICD-10-CM

## 2021-05-28 DIAGNOSIS — I10 ESSENTIAL HYPERTENSION: ICD-10-CM

## 2021-05-28 DIAGNOSIS — R06.00 DYSPNEA AND RESPIRATORY ABNORMALITIES: ICD-10-CM

## 2021-05-28 DIAGNOSIS — Z95.0 PACEMAKER: ICD-10-CM

## 2021-05-28 PROCEDURE — 99214 OFFICE O/P EST MOD 30 MIN: CPT | Performed by: INTERNAL MEDICINE

## 2021-05-28 PROCEDURE — 1123F ACP DISCUSS/DSCN MKR DOCD: CPT | Performed by: INTERNAL MEDICINE

## 2021-05-28 PROCEDURE — 1090F PRES/ABSN URINE INCON ASSESS: CPT | Performed by: INTERNAL MEDICINE

## 2021-05-28 PROCEDURE — G8400 PT W/DXA NO RESULTS DOC: HCPCS | Performed by: INTERNAL MEDICINE

## 2021-05-28 PROCEDURE — 1036F TOBACCO NON-USER: CPT | Performed by: INTERNAL MEDICINE

## 2021-05-28 PROCEDURE — G8420 CALC BMI NORM PARAMETERS: HCPCS | Performed by: INTERNAL MEDICINE

## 2021-05-28 PROCEDURE — G8427 DOCREV CUR MEDS BY ELIG CLIN: HCPCS | Performed by: INTERNAL MEDICINE

## 2021-05-28 PROCEDURE — 4040F PNEUMOC VAC/ADMIN/RCVD: CPT | Performed by: INTERNAL MEDICINE

## 2021-05-28 RX ORDER — HYDROCODONE BITARTRATE AND ACETAMINOPHEN 7.5; 325 MG/1; MG/1
.5-1 TABLET ORAL 3 TIMES DAILY PRN
Qty: 90 TABLET | Refills: 0 | Status: SHIPPED | OUTPATIENT
Start: 2021-05-28 | End: 2021-06-23 | Stop reason: SDUPTHER

## 2021-05-28 NOTE — PROGRESS NOTES
OFFICE PROGRESS NOTES      Uvaldo Dallas is a 78 y.o. female who has    CHIEF COMPLAINT AS FOLLOWS:  CHEST PAIN:  Patient C/O chest pain but symptoms appear to be atypical.   SOB: Has SOB with exertion. No significant over previous. LEG EDEMA: No leg edema   PALPITATIONS: Denies any C/O Palpitations   DIZZINESS:  C/O Dizziness   SYNCOPE: None   OTHER: All symptoms started after COVID infection earlier this year. Getting better                                    HPI: Patient is here for F/U on her CAD, A-fib- Arrhythmia, PPM, HTN & Dyslipidemia problems. CAD: Patient has known Hx of  CAD. Had angioplasty / CABG in the past.  Arrhythmia: Patient has known Hx of A-Fib. HTN: Patient has known Hx of essential HTN. Has been treated with guideline recommended medical / physical/ diet therapy as stated below. Dyslipidemia: Patient has known Hx of mixed dyslipidemia. Has been treated with guideline recommended medical / physical/ diet therapy as stated below. Current Outpatient Medications   Medication Sig Dispense Refill    HYDROcodone-acetaminophen (NORCO) 7.5-325 MG per tablet Take 0.5-1 tablets by mouth 3 times daily as needed for Pain (m54.5) for up to 30 days.  90 tablet 0    midodrine (PROAMATINE) 5 MG tablet Take 1 tablet by mouth 3 times daily (with meals) 90 tablet 1    atorvastatin (LIPITOR) 10 MG tablet Take 1 tablet by mouth daily 90 tablet 1    metFORMIN (GLUCOPHAGE) 500 MG tablet Take 1 tablet by mouth nightly 90 tablet 1    montelukast (SINGULAIR) 10 MG tablet Take 1 tablet by mouth daily 90 tablet 1    pantoprazole (PROTONIX) 40 MG tablet Take 1 tablet by mouth 2 times daily 180 tablet 1    rivaroxaban (XARELTO) 15 MG TABS tablet Take 1 tablet by mouth daily 90 tablet 1    primidone (MYSOLINE) 50 MG tablet Take 1 tablet by mouth 3 times daily 270 tablet 1    donepezil (ARICEPT) 10 MG tablet Take 1 tablet by mouth nightly 90 tablet 1    DULoxetine (CYMBALTA) 60 MG extended release capsule Take 1 capsule by mouth nightly 90 capsule 1    gabapentin (NEURONTIN) 300 MG capsule Take 1 capsule by mouth 3 times daily for 90 days. 270 capsule 0    ferrous sulfate (IRON 325) 325 (65 Fe) MG tablet Take 1 tablet by mouth nightly Until you start iv iron 90 tablet 1    mirtazapine (REMERON) 15 MG tablet Take 1 tablet by mouth nightly 90 tablet 1    FreeStyle Lancets MISC 1 each by Does not apply route daily 100 each 2    blood glucose test strips (FREESTYLE LITE) strip 1 each by In Vitro route daily 100 each 2    Cyanocobalamin (B-12) 500 MCG TABS Take 2 tablets by mouth daily 180 tablet 1    ipratropium (ATROVENT) 0.06 % nasal spray 1 spray by Each Nostril route 4 times daily as needed for Rhinitis 1 Bottle 3    aspirin 81 MG tablet Take 81 mg by mouth daily      docusate sodium (COLACE) 100 MG capsule Take 100 mg by mouth 2 times daily      CPAP Machine MISC by Does not apply route      furosemide (LASIX) 20 MG tablet Take 1 tablet by mouth See Admin Instructions Take 1 tab as needed if legs start to swell up. 45 tablet 1    albuterol (PROVENTIL) (5 MG/ML) 0.5% nebulizer solution Take 1 mL by nebulization 4 times daily 120 vial 5     No current facility-administered medications for this visit.      Allergies: Codeine, Bactrim [sulfamethoxazole-trimethoprim], and Sulfamethoxazole-trimethoprim  Review of Systems:    Constitutional: Negative for diaphoresis and fatigue  Respiratory: Negative for shortness of breath  Cardiovascular: Negative for chest pain, dyspnea on exertion, claudication, edema, irregular heartbeat, murmur, palpitations or shortness of breath  Musculoskeletal: Negative for muscle pain, muscular weakness, negative for pain in arm and leg or swelling in foot and leg    Objective:  /72   Pulse 69   Ht 5' 3\" (1.6 m)   Wt 133 lb (60.3 kg)   BMI 23.56 kg/m²   Wt Readings from Last 3 Encounters:   05/28/21 133 lb (60.3 kg)   03/31/21 129 lb (58.5 kg) 12/30/20 132 lb (59.9 kg)     Body mass index is 23.56 kg/m². GENERAL - Alert, oriented, pleasant, in no apparent distress. EYES: No jaundice, no conjunctival pallor. Neck - Supple. No jugular venous distention noted. No carotid bruits. Cardiovascular - Normal S1 and S2 without obvious murmur or gallop. Extremities - No cyanosis, clubbing, or significant edema. Pulmonary - No respiratory distress. No wheezes or rales.       Lab Review   Lab Results   Component Value Date    TROPONINT <0.010 03/17/2020    TROPONINT <0.010 03/17/2020     Lab Results   Component Value Date    PROBNP 429.2 11/06/2019    PROBNP 666.0 09/22/2019     Lab Results   Component Value Date    INR 0.97 03/16/2020    INR 0.93 01/06/2020     Lab Results   Component Value Date    LABA1C 5.6 03/05/2017    LABA1C 6.1 02/02/2017     Lab Results   Component Value Date    WBC 6.9 06/25/2020    WBC 8.4 03/17/2020    HCT 40.5 06/25/2020    HCT 40.4 03/17/2020    MCV 98.8 06/25/2020    .4 (H) 03/17/2020     06/25/2020     03/17/2020     Lab Results   Component Value Date    CHOL 133 09/27/2019    CHOL 137 01/10/2014    TRIG 88 09/27/2019    TRIG 78 06/12/2016    HDL 62 09/27/2019    HDL 60 (L) 01/10/2014    LDLCALC 125 06/26/2013    LDLDIRECT 53 09/27/2019    LDLDIRECT 61 01/10/2014     Lab Results   Component Value Date    ALT 10 06/25/2020    ALT 15 03/16/2020    AST 15 06/25/2020    AST 33 03/16/2020     BMP:    Lab Results   Component Value Date     06/25/2020     03/17/2020    K 5.0 06/25/2020    K 5.0 03/17/2020     06/25/2020     03/17/2020    CO2 33 06/25/2020    CO2 29 03/17/2020    BUN 35 06/25/2020    BUN 24 03/17/2020    CREATININE 1.4 06/25/2020    CREATININE 1.3 03/17/2020     CMP:   Lab Results   Component Value Date     06/25/2020     03/17/2020    K 5.0 06/25/2020    K 5.0 03/17/2020     06/25/2020     03/17/2020    CO2 33 06/25/2020    CO2 29 03/17/2020 BUN 35 06/25/2020    BUN 24 03/17/2020    CREATININE 1.4 06/25/2020    CREATININE 1.3 03/17/2020    PROT 6.9 06/25/2020    PROT 6.1 03/16/2020     Lab Results   Component Value Date    TSH 0.20 06/26/2013    TSHHS 0.297 06/08/2020    TSHHS 0.012 09/23/2019     ECHO 3/2020   Left ventricular function is normal, EF is estimated at 55-60%. Moderate left ventricular hypertrophy. Grade II diastolic dysfunction. The mitral valve appears to slightly prolapse. Mild mitral regurgitation is present. Mild to moderate tricuspid regurgitation. PPM wire in the right side of the heart. No evidence of pericardial effusion. CARDIOLITE 8/2017    Normal perfusion study with normal distribution in all coronal, short, and    horizontal axis.    The observed defect is consistent with breast attenuation artifact.    Normal LV function & wall motion. LVEF is > 70 %. QUALITY MEASURES REVIEWED:  1.CAD:Patient is taking anti platelet agent:Yes  2. DYSLIPIDEMIA: Patient is on cholesterol lowering medication:Yes   3. Beta-Blocker therapy for CAD, if prior Myocardial Infarction:No   4. Counselled regarding smoking cessation. No   Patient does not Smoke. 5.Anticoagulation therapy (for A.Fib) Yes  6. Discussed weight management strategies. Assessment & Plan:    Primary / Secondary prevention is the goal by aggressive risk modification, healthy and therapeutic life style changes for cardiovascular risk reduction. Various goals are discussed and multiple questions answered. CAD:Yes   clinically stable. Patient is on optimal medical regimen ( see medication list above )  -   Patient is currently  asymptomatic from CAD.  Symptoms reported are atypical.           - changes in  treatment:   no           - Testing ordered:  no  [place] classification: 1  [place] RISK SCORE:  [place] RISK SCORE:  HTN:well controlled on current medical regimen, see list above.              - changes in  treatment:   no   CARDIOMYOPATHY: None known   CONGESTIVE HEART FAILURE:  HISTORY of in the past.      VHD: No significant VHD noted  DYSLIPIDEMIA: Patient's profile is at / near [facility] is low                                Patient is not on any medications                                See most recent Lab values in Labs section above.   Diabetes mellitis: .yes,                                      Managed by family MD                                     BS under good control yes,                                      Hgb A1c avilable yes,   OTHER RELEVANT DIAGNOSIS:as noted in patient's active problem list:  S/P PPM. > 11 years of battery still  TESTS ORDERED: None this visit                                    All previously ordered tests reviewed.  Aleena Yousif: known                                [id number] has H/O Atrial fibrillation                                She is on anticoagulation.    MEDICATIONS: CPM   Office f/u in six months. Device check per protocol.

## 2021-05-28 NOTE — LETTER
Bernardino Day  1940  [id number]    Have you had any Chest Pain that is not new? - Yes level 5 gas pain makes it worst , no tnderness  If Yes DO EKG - How does it feel - Tightness   How long does the pain last - 4 minutes    How long have you been having the pain - Years   Did you take a tums   And did it relieve the pain - yes     DO EKG IF: Patient has a Heart Rate above 100 or below 40     CAD (Coronary Artery Disease) patient should have one on file every 6 months        Have you had any Shortness of Breath - Yes  If Yes - When on exertion    Have you had any dizziness - Yes       Have you had any palpitations that are not new? - no    Is the patient on any of the following medications -   If Yes DO EKG - Needs done every 6 months    Do you have any edema - swelling in No      When did you have your last labs drawn   Where did you have them done   What doctor ordered     If we do not have these labs you are retrieve these labs for these providers!     Do you have a surgery or procedure scheduled in the near future - No       Ask patient if they [facility] to [facility] if they are not already signed up     Check to see if we have an E-MAIL on file for the patient     Check medication list thoroughly!!! AND RECONCILE OUTSIDE MEDICATIONS  If dose has changed change the entire order not just the MG  BE SURE TO ASK PATIENT IF THEY NEED MEDICATION REFILLS     At check out add to every patient's \"wrap up\" the following dot phrase AFTERHOURSEDUCATION and ensure we explain this to our patients

## 2021-05-28 NOTE — PATIENT INSTRUCTIONS
Please be informed that if you contact our office outside of normal business hours the physician on call cannot help with any scheduling or rescheduling issues, procedure instruction questions or any type of medication issue. We advise you for any urgent/emergency that you go to the nearest emergency room! PLEASE CALL OUR OFFICE DURING NORMAL BUSINESS HOURS    Monday - Friday   8 am to 5 pm    [place]: [phone]    [place]: [phone]    [place]:  [phone]    CAD:Yes   clinically stable. Patient is on optimal medical regimen ( see medication list above )  -   Patient is currently  asymptomatic from CAD. Symptoms reported are atypical.           - changes in  treatment:   no           - Testing ordered:  no  [place] classification: 1  [place] RISK SCORE:  [place]LIN RISK SCORE:  HTN:well controlled on current medical regimen, see list above.              - changes in  treatment:   no   CARDIOMYOPATHY: None known   CONGESTIVE HEART FAILURE:  HISTORY of in the past.      VHD: No significant VHD noted  DYSLIPIDEMIA: Patient's profile is at / near [employer] [facility] is low                                Patient is not on any medications                                See most recent Lab values in Labs section above.   Diabetes mellitis: .yes,                                      Managed by family MD                                     BS under good control yes,                                      Hgb A1c avilable yes,   OTHER RELEVANT DIAGNOSIS:as noted in patient's active problem list:  S/P PPM. > 11 years of battery still  TESTS ORDERED: None this visit                                    All previously ordered tests reviewed.  Noe Murry: known                                Patient has H/O Atrial fibrillation                                She is on anticoagulation.    MEDICATIONS: CPM   Office f/u in six months. Device check per protocol.

## 2021-05-28 NOTE — LETTER
2021    10:30    Patient Name: Dariusz Avalos  : 1940  MRN# [id number]    REASON FOR VISIT:  Hyperlipidemia  history of AVM (arteriovenous malformation)-duodenum  Coronary artery disease involving native coronary artery of native heart without angina pectoris  Paroxysmal atrial fibrillation (HCC)  Near syncope  Chest pain  Bradycardia  Essential hypertension  Chronic diastolic CHF (congestive heart failure)    Current Outpatient Medications   Medication Sig Dispense Refill    HYDROcodone-acetaminophen (NORCO) 7.5-325 MG per tablet Take 0.5-1 tablets by mouth 3 times daily as needed for Pain (m54.5) for up to 30 days. 90 tablet 0    midodrine (PROAMATINE) 5 MG tablet Take 1 tablet by mouth 3 times daily (with meals) 90 tablet 1    atorvastatin (LIPITOR) 10 MG tablet Take 1 tablet by mouth daily 90 tablet 1    metFORMIN (GLUCOPHAGE) 500 MG tablet Take 1 tablet by mouth nightly 90 tablet 1    montelukast (SINGULAIR) 10 MG tablet Take 1 tablet by mouth daily 90 tablet 1    pantoprazole (PROTONIX) 40 MG tablet Take 1 tablet by mouth 2 times daily 180 tablet 1    rivaroxaban (XARELTO) 15 MG TABS tablet Take 1 tablet by mouth daily 90 tablet 1    primidone (MYSOLINE) 50 MG tablet Take 1 tablet by mouth 3 times daily 270 tablet 1    donepezil (ARICEPT) 10 MG tablet Take 1 tablet by mouth nightly 90 tablet 1    DULoxetine (CYMBALTA) 60 MG extended release capsule Take 1 capsule by mouth nightly 90 capsule 1    gabapentin (NEURONTIN) 300 MG capsule Take 1 capsule by mouth 3 times daily for 90 days.  270 capsule 0    ferrous sulfate (IRON 325) 325 (65 Fe) MG tablet Take 1 tablet by mouth nightly Until you start iv iron 90 tablet 1    mirtazapine (REMERON) 15 MG tablet Take 1 tablet by mouth nightly 90 tablet 1    FreeStyle Lancets MISC 1 each by Does not apply route daily 100 each 2    blood glucose test strips (FREESTYLE LITE) strip 1 each by In Vitro route daily 100 each 2    Cyanocobalamin (B-12) 500 MCG TABS Take 2 tablets by mouth daily 180 tablet 1    ipratropium (ATROVENT) 0.06 % nasal spray 1 spray by Each Nostril route 4 times daily as needed for Rhinitis 1 Bottle 3    furosemide (LASIX) 20 MG tablet Take 1 tablet by mouth See Admin Instructions Take 1 tab as needed if legs start to swell up. 45 tablet 1    albuterol (PROVENTIL) (5 MG/ML) 0.5% nebulizer solution Take 1 mL by nebulization 4 times daily 120 vial 5    aspirin 81 MG tablet Take 81 mg by mouth daily      docusate sodium (COLACE) 100 MG capsule Take 100 mg by mouth 2 times daily      CPAP Machine MISC by Does not apply route       No current facility-administered medications for this visit. Smoke: What:                           How much:    Alcohol: How Much:     Caffeine: Pop:         Tea:            Coffee:                Chocolate:    Exercise:    Labs: Lipids:             CBC:       BMP/CMP:          TSH:              A1C:    Last Visit:  Complaints:  Changes:    Last EKG:      STRESS TEST:  8/10/2017  Normal perfusion study with normal distribution in all coronal, short, and    horizontal axis.    The observed defect is consistent with breast attenuation artifact.    Normal LV function & wall motion. LVEF is > 70 %. ECHO: 3/21/2020   Left ventricular function is normal, EF is estimated at 55-60%. Moderate left ventricular hypertrophy. Grade II diastolic dysfunction. The mitral valve appears to slightly prolapse. Mild mitral regurgitation is present. Mild to moderate tricuspid regurgitation. PPM wire in the right side of the heart.    No evidence of pericardial effusion    CAROTID: 3/17/2020  The right internal carotid artery demonstrates 0-50% stenosis .       The left internal carotid artery demonstrates 0-50% stenosis .       Bilateral vertebral arteries are patent with flow in the normal direction.           MUGA: NONE    LAST PACER CHECK: 2/23/2021    CARDIAC CATH: NONE       [place] Protocol:none    CHADS: [id number] Score for Atrial Fibrillation Stroke Risk   Risk   Factors  Component Value   C CHF Yes 1   H HTN Yes 1   A2 Age >= 76 Yes,  (78 y.o.) 2   D DM Yes 1   S2 Prior Stroke/TIA Yes 2   V Vascular Disease No 0   A Age 74-69 No,  (78 y.o.) 0   Sc Sex female 1    [id number]  Score  8   Score last updated 5/28/21 3:69 AM EDT    Click here for a link to the UpToDate guideline \"Atrial Fibrillation: Anticoagulation therapy to prevent embolization    Disclaimer: Risk Score calculation is dependent on accuracy of patient problem list and past encounter diagnosis.

## 2021-05-28 NOTE — LETTER
[facility]  [address] [address]  Phone: [phone]  Fax: [phone]    Arian Zhao MD    May 28, 2021     Jose Everett DO  [address] [address]    Patient: Inés Jean   MR Number: [id number]   YOB: 1940   Date of Visit: 5/28/2021       Dear Jose Everett: Thank you for referring Emily Bettencourt to me for evaluation/treatment. Below are the relevant portions of my assessment and plan of care. If you have questions, please do not hesitate to call me. I look forward to following Ninfa Eubanks along with you.     Sincerely,        Arian Zhao MD

## 2021-05-30 PROCEDURE — 93294 REM INTERROG EVL PM/LDLS PM: CPT | Performed by: INTERNAL MEDICINE

## 2021-05-30 PROCEDURE — 93296 REM INTERROG EVL PM/IDS: CPT | Performed by: INTERNAL MEDICINE

## 2021-06-04 ENCOUNTER — OFFICE VISIT (OUTPATIENT)
Dept: FAMILY MEDICINE CLINIC | Age: 81
End: 2021-06-04
Payer: MEDICARE

## 2021-06-04 VITALS
SYSTOLIC BLOOD PRESSURE: 118 MMHG | RESPIRATION RATE: 16 BRPM | HEART RATE: 93 BPM | DIASTOLIC BLOOD PRESSURE: 66 MMHG | OXYGEN SATURATION: 94 % | TEMPERATURE: 97.4 F | WEIGHT: 132.4 LBS | BODY MASS INDEX: 23.45 KG/M2

## 2021-06-04 DIAGNOSIS — H61.22 IMPACTED CERUMEN OF LEFT EAR: Primary | ICD-10-CM

## 2021-06-04 PROCEDURE — 1036F TOBACCO NON-USER: CPT | Performed by: NURSE PRACTITIONER

## 2021-06-04 PROCEDURE — G8400 PT W/DXA NO RESULTS DOC: HCPCS | Performed by: NURSE PRACTITIONER

## 2021-06-04 PROCEDURE — G8427 DOCREV CUR MEDS BY ELIG CLIN: HCPCS | Performed by: NURSE PRACTITIONER

## 2021-06-04 PROCEDURE — 1090F PRES/ABSN URINE INCON ASSESS: CPT | Performed by: NURSE PRACTITIONER

## 2021-06-04 PROCEDURE — 1123F ACP DISCUSS/DSCN MKR DOCD: CPT | Performed by: NURSE PRACTITIONER

## 2021-06-04 PROCEDURE — 99213 OFFICE O/P EST LOW 20 MIN: CPT | Performed by: NURSE PRACTITIONER

## 2021-06-04 PROCEDURE — 4040F PNEUMOC VAC/ADMIN/RCVD: CPT | Performed by: NURSE PRACTITIONER

## 2021-06-04 PROCEDURE — G8420 CALC BMI NORM PARAMETERS: HCPCS | Performed by: NURSE PRACTITIONER

## 2021-06-04 RX ORDER — ARIPIPRAZOLE 2 MG/1
TABLET ORAL
COMMUNITY
Start: 2021-05-17

## 2021-06-04 RX ORDER — METHIMAZOLE 5 MG/1
TABLET ORAL
COMMUNITY
Start: 2021-04-29 | End: 2022-05-25

## 2021-06-04 NOTE — PATIENT INSTRUCTIONS
Patient Education        Earwax Blockage: Care Instructions  Your Care Instructions     Earwax is a natural substance that protects the ear canal. Normally, earwax drains from the ears and does not cause problems. Sometimes earwax builds up and hardens. Earwax blockage (also called cerumen impaction) can cause some loss of hearing and pain. When wax is tightly packed, you will need to have your doctor remove it. Follow-up care is a key part of your treatment and safety. Be sure to make and go to all appointments, and call your doctor if you are having problems. It's also a good idea to know your test results and keep a list of the medicines you take. How can you care for yourself at home? · Do not try to remove earwax with cotton swabs, fingers, or other objects. This can make the blockage worse and damage the eardrum. · If your doctor recommends that you try to remove earwax at home:  ? Soften and loosen the earwax with warm mineral oil. You also can try hydrogen peroxide mixed with an equal amount of room temperature water. Place 2 drops of the fluid, warmed to body temperature, in the ear two times a day for up to 5 days. ? Once the wax is loose and soft, all that is usually needed to remove it from the ear canal is a gentle, warm shower. Direct the water into the ear, then tip your head to let the earwax drain out. Dry your ear thoroughly with a hair dryer set on low. Hold the dryer several inches from your ear. ? If the warm mineral oil and shower do not work, use an over-the-counter wax softener. Read and follow all instructions on the label. After using the wax softener, use an ear syringe to gently flush the ear. Make sure the flushing solution is body temperature. Cool or hot fluids in the ear can cause dizziness. When should you call for help?    Call your doctor now or seek immediate medical care if:    · Pus or blood drains from your ear.     · Your ears are ringing or feel full.     · You have a loss of hearing. Watch closely for changes in your health, and be sure to contact your doctor if:    · You have pain or reduced hearing after 1 week of home treatment.     · You have any new symptoms, such as nausea or balance problems. Where can you learn more? Go to [email or web address].[email or web address] org and sign in to your artandseekt account. Enter R558 in the [employer] box to learn more about \"Earwax Blockage: Care Instructions. \"     If you do not have an account, please click on the \"Sign Up Now\" link. Current as of: February 26, 2020               Content Version: 12.8  © 2596-3156 [employer]wise, [employer]. Care instructions adapted under license by [facility] ([facility]). If you have questions about a medical condition or this instruction, always ask your healthcare professional. [address] any warranty or liability for your use of this information.

## 2021-06-04 NOTE — PROGRESS NOTES
6/4/21    Chief Complaint   Patient presents with    Ear Fullness     bilateral ear check for was before hearing aide appt. Leon Gutierrez, (1940), is a 78 y.o. female, is here for evaluation of the following medical concerns:    HPI    She is being seen today through the [facility]. PCP: Dr. Sariah Donald:  She is here today requesting \"ear flush\" , states that she feels that she has impacted wax in left ear, possibly in right as well. She is concerned due to having an appointment with her hearing specialist this coming Tuesday for new hearing aides. Denies current ear pain, \"at times can be a little tender or full feeling\", denies fever, chills, dizziness. Previously + COVID January 2021. Review of Systems    See HPI    Prior to Visit Medications    Medication Sig Taking? Authorizing Provider   ARIPiprazole (ABILIFY) 2 MG tablet TAKE 1 TABLET BY MOUTH DAILY  Historical Provider, MD   methIMAzole (TAPAZOLE) 5 MG tablet   Historical Provider, MD   HYDROcodone-acetaminophen (NORCO) 7.5-325 MG per tablet Take 0.5-1 tablets by mouth 3 times daily as needed for Pain (m54.5) for up to 30 days.   Sinan Rico,    midodrine (PROAMATINE) 5 MG tablet Take 1 tablet by mouth 3 times daily (with meals)  Ben Campa MD   atorvastatin (LIPITOR) 10 MG tablet Take 1 tablet by mouth daily  Ben Campa MD   metFORMIN (GLUCOPHAGE) 500 MG tablet Take 1 tablet by mouth nightly  Ben Campa MD   montelukast (SINGULAIR) 10 MG tablet Take 1 tablet by mouth daily  Ben Campa MD   pantoprazole (PROTONIX) 40 MG tablet Take 1 tablet by mouth 2 times daily  Ben Campa MD   rivaroxaban Lucinda Foots) 15 MG TABS tablet Take 1 tablet by mouth daily  Ben Campa MD   primidone (MYSOLINE) 50 MG tablet Take 1 tablet by mouth 3 times daily  Ben Campa MD   donepezil (ARICEPT) 10 MG tablet Take 1 tablet by mouth nightly  Dima Alexander Silvia Quezada MD   DULoxetine (CYMBALTA) 60 MG extended release capsule Take 1 capsule by mouth nightly  Alexx Quiles MD   gabapentin (NEURONTIN) 300 MG capsule Take 1 capsule by mouth 3 times daily for 90 days. Alexx Quiles MD   ferrous sulfate (IRON 325) 325 (65 Fe) MG tablet Take 1 tablet by mouth nightly Until you start iv iron  Alexx Quiles MD   mirtazapine (REMERON) 15 MG tablet Take 1 tablet by mouth nightly  Alexx Quiles MD   FreeStyle Lancets MISC 1 each by Does not apply route daily  Alexx Quiles MD   blood glucose test strips (FREESTYLE LITE) strip 1 each by In Vitro route daily  Alexx Quiles MD   Cyanocobalamin (B-12) 500 MCG TABS Take 2 tablets by mouth daily  Alexx Quiles MD   ipratropium (ATROVENT) 0.06 % nasal spray 1 spray by Each Nostril route 4 times daily as needed for Rhinitis  Alexx Quiles MD   furosemide (LASIX) 20 MG tablet Take 1 tablet by mouth See Admin Instructions Take 1 tab as needed if legs start to swell up.   Alexx Quiles MD   albuterol (PROVENTIL) (5 MG/ML) 0.5% nebulizer solution Take 1 mL by nebulization 4 times daily  Alexx Quiles MD   aspirin 81 MG tablet Take 81 mg by mouth daily  Historical Provider, MD   docusate sodium (COLACE) 100 MG capsule Take 100 mg by mouth 2 times daily  Historical Provider, MD   CPAP Machine MISC by Does not apply route  Historical Provider, MD        Allergies   Allergen Reactions    Codeine Itching    Bactrim [Sulfamethoxazole-Trimethoprim]      dizziness    Sulfamethoxazole-Trimethoprim        Past Medical History:   Diagnosis Date    Acute diastolic CHF (congestive heart failure) ([facility] [address] 12/23/2014    Acute urinary tract infection 7/20/2019    Single Kidney    Anemia     Chronic low back pain     s/p epidural steroids    [facility]ch[facility] obstructive pulmonary disease) ([facility])     moderate to severe    Depression     Family history of cardiac disorder     Father, Brother    GERD (gastroesophageal reflux disease)     Goiter     s/p radioactive Iodine/ Low T4    H/O blood clots     right leg after miscarriage    H/O cardiovascular stress test 6/25/13 6/13-WNL EF 70%    H/O cardiovascular stress test 08/10/2017    Normal study EF 70%    H/O: CVA (cerebrovascular accident) 7/20/2019    Heart murmur     History of blood transfusion     2017    History of LIMITED Echocardiogram 11/08/2019    EF 55-60%, Normal left ventricle structure and systolic function , no regional wall motion abnormalities were detected    [place] (hard of hearing)     hearing aides    Hx of blood clots     Hx of echocardiogram 07/11/2013    EF>55%, mild MR, mild TR, abn lVSFx stage 1     HX OTHER MEDICAL     PCP is Dr Juan R Dominguez; Cardiologist is Dr Guillermina Brown 7/30/13    Peripheral Angiogram.  Sluggish flow RLE, med mgmt.      Hydrocephalus, adult ([address]     shunt    Hyperlipidemia     Hyperthyroidism 7/20/2019    Kidney stone     \"had stone last summer 2016- passed it\"    Memory loss     Old MI (myocardial infarction) 2001    Osteoarthritis     Pneumonia     recurrent    Solitary kidney     Unspecified cerebral artery occlusion with cerebral infarction     \"they discovered I had stroke in 1993- with CT scan of head but never knew I had it\"       Past Surgical History:   Procedure Laterality Date   [place]    disc ruptured    BRAIN SURGERY  2001    right shunt    CHEST SURGERY  1953    reconstruction of breast bone and rib-\"pigeon chest\"    CHOLECYSTECTOMY  1990    COLONOSCOPY  7/2011    F/U 7/2016    CORONARY ANGIOPLASTY WITH STENT PLACEMENT  2001    CSF SHUNT      ENDOSCOPY, COLON, DIAGNOSTIC      per old chart pt had EGD done with admission 2/17/2017    HYSTERECTOMY  1978    VERONIQUE BSO-dub/fibroids    KIDNEY SURGERY Right 1967    suspension    KNEE SURGERY  1997    right     PACEMAKER INSERTION Left 09/26/2019    [email or web address] Brandie XT DR ALEX Trores     ROTATOR CUFF REPAIR  1997    right    TUBAL LIGATION  1972       Social History     Tobacco Use    Smoking status: Former Smoker     Years: 59.00     Types: Cigarettes     Start date: 10/7/1960    Smokeless tobacco: Never Used    Tobacco comment: pt quit 11/4/19   Vaping Use    Vaping Use: Never used   Substance Use Topics    Alcohol use: Yes     Comment: rarely/ average \"2-3 times per year'    Drug use: No       Family History   Problem Relation Age of Onset   Bernardo Swanson Stroke Mother     Heart Disease Mother     Asthma Mother     Diabetes Mother     Cancer Mother         uterine    Emphysema Father     Stroke Father     Heart Disease Father     Heart Defect Father         hardening of arteries    Diabetes Sister     Diabetes Brother     Heart Disease Brother     Cancer Maternal Grandfather         stomach    Diabetes Paternal Grandmother     Stroke Paternal Grandmother     Diabetes Brother     Alcohol Abuse Maternal Aunt        Lab Results   Component Value Date    WBC 6.9 06/25/2020    HGB 13.0 06/25/2020    HCT 40.5 06/25/2020    MCV 98.8 06/25/2020     06/25/2020     Lab Results   Component Value Date    LABA1C 5.6 03/05/2017     Lab Results   Component Value Date    TSH 0.20 06/26/2013     Lab Results   Component Value Date    CHOL 133 09/27/2019    TRIG 88 09/27/2019    HDL 62 09/27/2019    [facility] 125 06/26/2013         No results found for this visit on 06/04/21. Wt Readings from Last 3 Encounters:   06/04/21 132 lb 6.4 oz (60.1 kg)   05/28/21 133 lb (60.3 kg)   03/31/21 129 lb (58.5 kg)     . FLOWAMB[6   BP Readings from Last 3 Encounters:   06/04/21 118/66   05/28/21 122/72   03/31/21 110/74     Pulse Readings from Last 3 Encounters:   06/04/21 93   05/28/21 69   03/31/21 72        Physical Exam  Constitutional:       General: She is not in acute distress. Appearance: Normal appearance. She is well-developed.  She is not ill-appearing, toxic-appearing or diaphoretic. HENT:      Head: Normocephalic and atraumatic. Right Ear: Tympanic membrane, ear canal and external ear normal. Decreased hearing noted. No drainage or tenderness. No middle ear effusion. There is no impacted cerumen. No mastoid tenderness. Tympanic membrane is not injected, perforated, erythematous, retracted or bulging. Left Ear: Tympanic membrane, ear canal and external ear normal. Decreased hearing noted. No drainage or tenderness. No middle ear effusion. There is impacted cerumen. No mastoid tenderness. Tympanic membrane is not injected, perforated, erythematous, retracted or bulging. Ears:      Comments: Reassessed bilateral ears after lavage, no injury or trauma noted. Tolerated well     Nose: Nose normal.      Mouth/Throat:      Mouth: Mucous membranes are moist.   Eyes:      Extraocular Movements: Extraocular movements intact. Conjunctiva/sclera: Conjunctivae normal.      Pupils: Pupils are equal, round, and reactive to light. Neck:      Thyroid: No thyroid mass or thyromegaly. Trachea: Trachea normal.   Cardiovascular:      Rate and Rhythm: Normal rate and regular rhythm. Pulses: Normal pulses. Heart sounds: S1 normal and S2 normal.   Pulmonary:      Effort: Pulmonary effort is normal. No accessory muscle usage or respiratory distress. Breath sounds: Normal breath sounds. No stridor. No wheezing, rhonchi or rales. Musculoskeletal:      Right shoulder: No swelling, deformity, tenderness, bony tenderness or crepitus. Normal range of motion. Cervical back: Full passive range of motion without pain, normal range of motion and neck supple. Lymphadenopathy:      Cervical: No cervical adenopathy. Skin:     General: Skin is warm and dry. Findings: No rash. Nails: There is no clubbing. Neurological:      Mental Status: She is alert and oriented to person, place, and time.    Psychiatric:         Speech: Speech normal.         Behavior: Behavior normal.         Thought Content: Thought content normal.         Judgment: Judgment normal.         ASSESSMENT AND PLAN:    1. Impacted cerumen of left ear  Reassessed bilateral ears after lavage, no injury or trauma noted. Tolerated well. Denies any other issues or concerns. - Ear wax removal        Return if symptoms worsen or fail to improve.     Yaritza Aldana, APRN - CNP

## 2021-06-07 ENCOUNTER — PROCEDURE VISIT (OUTPATIENT)
Dept: CARDIOLOGY CLINIC | Age: 81
End: 2021-06-07
Payer: MEDICARE

## 2021-06-07 DIAGNOSIS — I44.0 AV BLOCK, 1ST DEGREE: ICD-10-CM

## 2021-06-07 DIAGNOSIS — I49.5 SINUS NODE DYSFUNCTION (HCC): ICD-10-CM

## 2021-06-07 DIAGNOSIS — Z95.0 CARDIAC PACEMAKER IN SITU: Primary | ICD-10-CM

## 2021-06-23 DIAGNOSIS — G89.29 CHRONIC BILATERAL LOW BACK PAIN WITHOUT SCIATICA: ICD-10-CM

## 2021-06-23 DIAGNOSIS — M54.50 CHRONIC BILATERAL LOW BACK PAIN WITHOUT SCIATICA: ICD-10-CM

## 2021-06-23 RX ORDER — HYDROCODONE BITARTRATE AND ACETAMINOPHEN 7.5; 325 MG/1; MG/1
.5-1 TABLET ORAL 3 TIMES DAILY PRN
Qty: 90 TABLET | Refills: 0 | Status: SHIPPED | OUTPATIENT
Start: 2021-06-23 | End: 2021-08-04 | Stop reason: SDUPTHER

## 2021-06-25 ENCOUNTER — TELEPHONE (OUTPATIENT)
Dept: FAMILY MEDICINE CLINIC | Age: 81
End: 2021-06-25

## 2021-06-25 NOTE — TELEPHONE ENCOUNTER
.lmtrc to pt  Re xarelto nonformulary need to change to eliquis 2.5 mg 1 bid.  To wg n [place]    Unable to lm home ph   Lm cell  Lm poa ph

## 2021-07-02 ENCOUNTER — HOSPITAL ENCOUNTER (OUTPATIENT)
Dept: INFUSION THERAPY | Age: 81
Discharge: HOME | End: 2021-07-02
Payer: MEDICARE

## 2021-07-02 DIAGNOSIS — K90.49 MALABSORPTION DUE TO INTOLERANCE, NOT ELSEWHERE CLASSIFIED: ICD-10-CM

## 2021-07-02 DIAGNOSIS — D50.9 IRON DEFICIENCY ANEMIA, UNSPECIFIED IRON DEFICIENCY ANEMIA TYPE: ICD-10-CM

## 2021-07-02 LAB
ALBUMIN SERPL-MCNC: 4.4 GM/DL (ref 3.4–5)
ALP BLD-CCNC: 116 IU/L (ref 40–129)
ALT SERPL-CCNC: 6 U/L (ref 10–40)
ANION GAP SERPL CALCULATED.3IONS-SCNC: 9 MMOL/L (ref 4–16)
AST SERPL-CCNC: 12 IU/L (ref 15–37)
BASOPHILS ABSOLUTE: 0.1 K/CU MM
BASOPHILS RELATIVE PERCENT: 0.8 % (ref 0–1)
BILIRUB SERPL-MCNC: 0.1 MG/DL (ref 0–1)
BUN BLDV-MCNC: 26 MG/DL (ref 6–23)
CALCIUM SERPL-MCNC: 9.9 MG/DL (ref 8.3–10.6)
CHLORIDE BLD-SCNC: 98 MMOL/L (ref 99–110)
CO2: 32 MMOL/L (ref 21–32)
CREAT SERPL-MCNC: 1.2 MG/DL (ref 0.6–1.1)
DIFFERENTIAL TYPE: ABNORMAL
EOSINOPHILS ABSOLUTE: 0.3 K/CU MM
EOSINOPHILS RELATIVE PERCENT: 4 % (ref 0–3)
FERRITIN: 71 NG/ML (ref 15–150)
FOLATE: 13.2 NG/ML (ref 3.1–17.5)
GFR AFRICAN AMERICAN: 52 ML/MIN/1.73M2
GFR NON-AFRICAN AMERICAN: 43 ML/MIN/1.73M2
GLUCOSE BLD-MCNC: 131 MG/DL (ref 70–99)
HCT VFR BLD CALC: 43.6 % (ref 37–47)
HEMOGLOBIN: 13.5 GM/DL (ref 12.5–16)
IRON: 56 UG/DL (ref 37–145)
LYMPHOCYTES ABSOLUTE: 1.8 K/CU MM
LYMPHOCYTES RELATIVE PERCENT: 29.1 % (ref 24–44)
MCH RBC QN AUTO: 31.5 PG (ref 27–31)
MCHC RBC AUTO-ENTMCNC: 31 % (ref 32–36)
MCV RBC AUTO: 101.6 FL (ref 78–100)
MONOCYTES ABSOLUTE: 0.8 K/CU MM
MONOCYTES RELATIVE PERCENT: 13.4 % (ref 0–4)
PCT TRANSFERRIN: 22 % (ref 10–44)
PDW BLD-RTO: 12.8 % (ref 11.7–14.9)
PLATELET # BLD: 205 K/CU MM (ref 140–440)
PMV BLD AUTO: 12.3 FL (ref 7.5–11.1)
POTASSIUM SERPL-SCNC: 4.8 MMOL/L (ref 3.5–5.1)
RBC # BLD: 4.29 M/CU MM (ref 4.2–5.4)
SEGMENTED NEUTROPHILS ABSOLUTE COUNT: 3.3 K/CU MM
SEGMENTED NEUTROPHILS RELATIVE PERCENT: 52.7 % (ref 36–66)
SODIUM BLD-SCNC: 139 MMOL/L (ref 135–145)
TOTAL IRON BINDING CAPACITY: 257 UG/DL (ref 250–450)
TOTAL PROTEIN: 6.2 GM/DL (ref 6.4–8.2)
UNSATURATED IRON BINDING CAPACITY: 201 UG/DL (ref 110–370)
VITAMIN B-12: >2000 PG/ML (ref 211–911)
WBC # BLD: 6.3 K/CU MM (ref 4–10.5)

## 2021-07-02 PROCEDURE — 85025 COMPLETE CBC W/AUTO DIFF WBC: CPT

## 2021-07-02 PROCEDURE — 83540 ASSAY OF IRON: CPT

## 2021-07-02 PROCEDURE — 82607 VITAMIN B-12: CPT

## 2021-07-02 PROCEDURE — 36415 COLL VENOUS BLD VENIPUNCTURE: CPT

## 2021-07-02 PROCEDURE — 80053 COMPREHEN METABOLIC PANEL: CPT

## 2021-07-02 PROCEDURE — 82728 ASSAY OF FERRITIN: CPT

## 2021-07-02 PROCEDURE — 83550 IRON BINDING TEST: CPT

## 2021-07-02 PROCEDURE — 82746 ASSAY OF FOLIC ACID SERUM: CPT

## 2021-07-07 ENCOUNTER — HOSPITAL ENCOUNTER (OUTPATIENT)
Dept: INFUSION THERAPY | Age: 81
Discharge: HOME | End: 2021-07-07
Payer: MEDICARE

## 2021-07-07 ENCOUNTER — OFFICE VISIT (OUTPATIENT)
Dept: ONCOLOGY | Age: 81
End: 2021-07-07
Payer: MEDICARE

## 2021-07-07 VITALS
HEIGHT: 63 IN | OXYGEN SATURATION: 91 % | TEMPERATURE: 97.7 F | DIASTOLIC BLOOD PRESSURE: 48 MMHG | WEIGHT: 129.2 LBS | BODY MASS INDEX: 22.89 KG/M2 | SYSTOLIC BLOOD PRESSURE: 115 MMHG | HEART RATE: 74 BPM

## 2021-07-07 DIAGNOSIS — K90.49 MALABSORPTION DUE TO INTOLERANCE, NOT ELSEWHERE CLASSIFIED: ICD-10-CM

## 2021-07-07 DIAGNOSIS — D50.9 IRON DEFICIENCY ANEMIA, UNSPECIFIED IRON DEFICIENCY ANEMIA TYPE: Primary | ICD-10-CM

## 2021-07-07 PROCEDURE — G8427 DOCREV CUR MEDS BY ELIG CLIN: HCPCS | Performed by: INTERNAL MEDICINE

## 2021-07-07 PROCEDURE — G8400 PT W/DXA NO RESULTS DOC: HCPCS | Performed by: INTERNAL MEDICINE

## 2021-07-07 PROCEDURE — 99211 OFF/OP EST MAY X REQ PHY/QHP: CPT

## 2021-07-07 PROCEDURE — 99213 OFFICE O/P EST LOW 20 MIN: CPT | Performed by: INTERNAL MEDICINE

## 2021-07-07 PROCEDURE — 1090F PRES/ABSN URINE INCON ASSESS: CPT | Performed by: INTERNAL MEDICINE

## 2021-07-07 PROCEDURE — 1123F ACP DISCUSS/DSCN MKR DOCD: CPT | Performed by: INTERNAL MEDICINE

## 2021-07-07 PROCEDURE — 4040F PNEUMOC VAC/ADMIN/RCVD: CPT | Performed by: INTERNAL MEDICINE

## 2021-07-07 PROCEDURE — 1036F TOBACCO NON-USER: CPT | Performed by: INTERNAL MEDICINE

## 2021-07-07 PROCEDURE — G8420 CALC BMI NORM PARAMETERS: HCPCS | Performed by: INTERNAL MEDICINE

## 2021-07-07 NOTE — PROGRESS NOTES
MA Rooming Questions  Patient: Jesi Gipson  MRN: [id number]    Date: 7/7/2021        1. Do you have any new issues? yes - Her insurance is not covering her Xarelto any longer         2. Do you need any refills on medications?    no    3. Have you had any imaging done since your last visit?   no    4. Have you been hospitalized or seen in the emergency room since your last visit here?   no    5. Did the patient have a depression screening completed today?  No    No data recorded     PHQ-9 Given to (if applicable):               PHQ-9 Score (if applicable):                     [] Positive     []  Negative              Does question #9 need addressed (if applicable)                     [] Yes    []  No               Gerson Yadav CMA

## 2021-07-07 NOTE — PROGRESS NOTES
Patient Name: Beckie Valdez  Patient : 1940  Patient MRN: [id number]     Primary Oncologist: Golden Mcgowan MD  Referring Physician: Chadwick Murphy     Date of Service: 2021      Chief Complaint:   Chief Complaint   Patient presents with    Follow-up    Discuss Labs        Active Problem list  1. Iron deficiency anemia, unspecified iron deficiency anemia type    2. Malabsorption due to intolerance, not elsewhere classified           HPI:        HPI  77 yo w. f. with hx CVA on blood thinners with recent admissions for anemia and GI bleed. She had a balloon enteroscopy performed by Dr. Romel Wolf on 17 and at that time she had an AVM and underwent argon plasma coagulation. She has been placed on iron and has had dark stools due to the iron pills. 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 Her pletal was stopped and she was continued on her xarelto and asa. GI would like input in regards to IV iron since oral iron clouds the picture of her active bleeding and transfusion input. She has been getting transfusion monthly for the past few months     - had capsule by Dr. Arslan HENRIQUEZM ? with oozing    2019: Ct abdomen and pelvis: 1. No acute intra-process identified. 2. No evidence for bowel obstruction. 3. Redemonstration of left staghorn calculus similar to previous exam with  stable atrophy of the left kidney. 4. Mild atrophy pancreas otherwise appears unremarkable. No stranding seen  surrounding the pancreas. 19: RP U/S:1. Staghorn calculus again seen in the left kidney  2. Mild left renal atrophy  3. Unremarkable appearing right kidney    19: U/S soft tissue neck:Impression  Somewhat dilated left internal jugular vein, with presumed slow flow. If  there is a concern for internal jugular vein thrombosis, consider dedicated  upper extremity vascular study. Heterogeneous thyroid gland.  No significant hypervascularity. 9-2019 pacemaker placed    1/6/2020 CBC WBC of 6.5 hemoglobin of 11.1 hematocrit 39.5 MCV of 103.7 platelets of 599 differential otherwise within normal limits PT 11.3 PTT 34 phosphorus 3.9 magnesium 2.3 BMP with creatinine 1.3 otherwise within normal limits. 3/16/21: Ct abdomen and pelvis:  1. Mild right hydroureter and hydronephrosis with no discrete obstructing   stone identified.  Findings could reflect recently passed stone or possible   infection.  Correlate clinically with urinalysis. 2. Redemonstration of nonobstructing left renal stone. 3. No bowel obstruction.         1. No acute intracranial hemorrhage, intra-axial mass, or acute territorial   infarct   2. Ventricular shunt catheter in place   3. Generalized atrophy again noted     Degenerative changes involving the cervical spine, without evidence of an   acute fracture or traumatic malalignment     The right internal carotid artery demonstrates 0-50% stenosis .       The left internal carotid artery demonstrates 0-50% stenosis .       Bilateral vertebral arteries are patent with flow in the normal direction.         1. No acute intracranial abnormality.       2. Unchanged position of the right frontal approach ventriculostomy catheter. Unchanged size and configuration of the ventricles since examination.       3. Chronic small vessel ischemic white matter disease and diffuse cerebral   volume loss.           Past Medical History  Anemia  acute diastolic CHF  anxiety  asthma  CAD  chronic low back pain s/p epidural  COPD  Depression  GERD  Goiter s/p radioactive  hx of DVT right leg after miscarriage  shunt for NPH  HTN  hx of kidney stone  SEU?   hx of atrophic kidney  DM  CVA in 1993    Surgical History  as above  s/p VERONIQUE/BSO    Allergies  No known medication allergies    Medications  Acetaminophen Oral 500 mg capsule   Albuterol-Ipratropium Nebulized 3 mg (2.5 mg base)-0.5 mg/3 mL   Amiodarone Oral 200 mg tablet   Aspirin Oral 81 mg tablet,delayed release (DR/EC)   Azelastine Intranasal 137 mcg/SPRAY (0.1 %)   Digoxin Oral Tablet 125 mcg tablet   Diltiazem Oral 24 hr Cap 180 mg capsule,extended release 24 hr   Donepezil Oral 10 mg tablet   Duloxetine Oral Delayed Release 60 mg capsule,delayed release(DR/EC)   Estradiol Oral 0.5 mg tablet   Ferrous Sulfate Oral 325 mg (65 mg iron) tablet   Fluticasone Nasal Spray 50 mcg/actuation   Gabapentin Oral 300 mg capsule   Metformin Oral 500 mg tablet   Miscellaneous Drug CPAP Machine   Pantoprazole (Sodium) Oral Delayed Release 40 mg tablet,delayed release (DR/EC)   Ranitidine Oral 150 mg tablet   Rivaroxaban Oral 15 mg tablet      Social History  Smoking Status Former smoker  quit smoking 1 year ago  drinks on special occassions  lives with her youngest daughter     Family History  daughter had breast cancer treated by DR. Fernanda Vieira  son has hodgkins lymphoma  has an aunt with bowel cancer      Interval History  7/7/2021:Arrived with her daughter to the clinic today. Daughter passed away in October 2020. Feels weak. SOB lately. Has been sleeping a lot. Ever since she had COVID in jan 2021. Intermittent chest pain, left side. No breast mass. No weight loss. No blood loss. Reported constipation alternating with diarrhea. No abdominal pain. No  sx. No PICA sx.      Review of Systems   Per interval history; otherwise 10 point ROS is negative              Vital Signs:  BP (!) 115/48 (Site: Right Upper Arm, Position: Sitting, Cuff Size: Medium Adult)   Pulse 74   Temp 97.7 °F (36.5 °C) (Infrared)   Ht 5' 3\" (1.6 m)   Wt 129 lb 3.2 oz (58.6 kg)   SpO2 91%   BMI 22.89 kg/m²   aao x 3, tired appearing  No palor  ctab  s1s2   Soft ntnd bs pos  Both breast with no mass, lad, nipple discharge    Labs:    Hematology:  Lab Results   Component Value Date    WBC 6.3 07/02/2021    RBC 4.29 07/02/2021    HGB 13.5 07/02/2021    HCT 43.6 07/02/2021    .6 (H) 07/02/2021    MCH 31.5 (H) 07/02/2021 MCHC 31.0 (L) 07/02/2021    RDW 12.8 07/02/2021     07/02/2021    MPV 12.3 (H) 07/02/2021    BANDSPCT 5 01/12/2015    SEGSPCT 52.7 07/02/2021    EOSRELPCT 4.0 (H) 07/02/2021    BASOPCT 0.8 07/02/2021    LYMPHOPCT 29.1 07/02/2021    MONOPCT 13.4 (H) 07/02/2021    BANDABS 0.44 01/12/2015    SEGSABS 3.3 07/02/2021    EOSABS 0.3 07/02/2021    BASOSABS 0.1 07/02/2021    LYMPHSABS 1.8 07/02/2021    MONOSABS 0.8 07/02/2021    DIFFTYPE AUTOMATED DIFFERENTIAL 07/02/2021    ANISOCYTOSIS 1+ 01/28/2017    POLYCHROM 1+ 03/05/2017    PLTM  03/05/2017     PLATELETS APPEAR INCREASED   A FEW LARGE PLATELETS NOTED     No results found for: ESR    Chemistry:  Lab Results   Component Value Date     07/02/2021    K 4.8 07/02/2021    CL 98 (L) 07/02/2021    CO2 32 07/02/2021    BUN 26 (H) 07/02/2021    CREATININE 1.2 (H) 07/02/2021    GLUCOSE 131 (H) 07/02/2021    CALCIUM 9.9 07/02/2021    PROT 6.2 (L) 07/02/2021    LABALBU 4.4 07/02/2021    BILITOT 0.1 07/02/2021    ALKPHOS 116 07/02/2021    AST 12 (L) 07/02/2021    ALT 6 (L) 07/02/2021    LABGLOM 43 (L) 07/02/2021    GFRAA 52 (L) 07/02/2021    PHOS 3.9 01/06/2020    MG 2.3 01/06/2020    POCGLU 97 01/09/2020     No results found for: MMA, LDH, HOMOCYSTEINE  No components found for: LD  Lab Results   Component Value Date    TSHHS 0.297 06/08/2020    T4FREE 1.01 06/08/2020    FT3 2.1 (L) 06/08/2020       Immunology:  Lab Results   Component Value Date    PROT 6.2 (L) 07/02/2021     No results found for: Matt Perez, KLFLCR  No results found for: B2M    Coagulation Panel:  Lab Results   Component Value Date    PROTIME 11.7 03/16/2020    INR 0.97 03/16/2020    APTT 33.0 03/16/2020    DDIMER 222 12/21/2014       Anemia Panel:  Lab Results   Component Value Date    [id number] >2000 (H) 07/02/2021    FOLATE 13.2 07/02/2021       Tumor Markers:  Lab Results   Component Value Date     6 09/27/2019      09/27/2019     (NOTE)  INTERPRETIVE INFORMATION: Cancer

## 2021-07-09 NOTE — TELEPHONE ENCOUNTER
Spoke with pt's dtr pedro. Informed stopping xarelto d/t ins not covering switching to eliquis 2.5 mg 1 bid.  dtr agreed & voiced understanding

## 2021-07-21 DIAGNOSIS — I95.1 ORTHOSTASIS: ICD-10-CM

## 2021-07-21 RX ORDER — MIDODRINE HYDROCHLORIDE 5 MG/1
5 TABLET ORAL
Qty: 90 TABLET | Refills: 2 | Status: SHIPPED | OUTPATIENT
Start: 2021-07-21 | End: 2021-08-10 | Stop reason: SDUPTHER

## 2021-07-21 NOTE — TELEPHONE ENCOUNTER
PT LEAVING FOR FLA EARLY ON FRI. CAN THIS MED BE CALLED IN BY THUR BY 5?  THANKS  LV-3/31  NA-8/10    OSMAN MOSLEY

## 2021-08-04 DIAGNOSIS — G89.29 CHRONIC BILATERAL LOW BACK PAIN WITHOUT SCIATICA: ICD-10-CM

## 2021-08-04 DIAGNOSIS — M54.50 CHRONIC BILATERAL LOW BACK PAIN WITHOUT SCIATICA: ICD-10-CM

## 2021-08-04 RX ORDER — HYDROCODONE BITARTRATE AND ACETAMINOPHEN 7.5; 325 MG/1; MG/1
.5-1 TABLET ORAL 3 TIMES DAILY PRN
Qty: 90 TABLET | Refills: 0 | Status: SHIPPED | OUTPATIENT
Start: 2021-08-04 | End: 2021-08-10 | Stop reason: SDUPTHER

## 2021-08-10 ENCOUNTER — OFFICE VISIT (OUTPATIENT)
Dept: FAMILY MEDICINE CLINIC | Age: 81
End: 2021-08-10
Payer: MEDICARE

## 2021-08-10 VITALS
HEART RATE: 69 BPM | WEIGHT: 132 LBS | OXYGEN SATURATION: 97 % | BODY MASS INDEX: 23.39 KG/M2 | SYSTOLIC BLOOD PRESSURE: 122 MMHG | DIASTOLIC BLOOD PRESSURE: 65 MMHG | HEIGHT: 63 IN | RESPIRATION RATE: 16 BRPM

## 2021-08-10 DIAGNOSIS — F02.80 ALZHEIMER'S DEMENTIA WITHOUT BEHAVIORAL DISTURBANCE, UNSPECIFIED TIMING OF DEMENTIA ONSET: ICD-10-CM

## 2021-08-10 DIAGNOSIS — E11.9 TYPE 2 DIABETES MELLITUS WITHOUT COMPLICATION, WITHOUT LONG-TERM CURRENT USE OF INSULIN (HCC): ICD-10-CM

## 2021-08-10 DIAGNOSIS — F32.A DEPRESSION, UNSPECIFIED DEPRESSION TYPE: Primary | ICD-10-CM

## 2021-08-10 DIAGNOSIS — G89.29 CHRONIC BILATERAL LOW BACK PAIN WITHOUT SCIATICA: ICD-10-CM

## 2021-08-10 DIAGNOSIS — I95.1 ORTHOSTASIS: ICD-10-CM

## 2021-08-10 DIAGNOSIS — G91.9 HYDROCEPHALUS, UNSPECIFIED TYPE (HCC): ICD-10-CM

## 2021-08-10 DIAGNOSIS — G47.33 OSA (OBSTRUCTIVE SLEEP APNEA): ICD-10-CM

## 2021-08-10 DIAGNOSIS — D64.9 NORMOCYTIC ANEMIA: ICD-10-CM

## 2021-08-10 DIAGNOSIS — K21.9 GASTROESOPHAGEAL REFLUX DISEASE, UNSPECIFIED WHETHER ESOPHAGITIS PRESENT: ICD-10-CM

## 2021-08-10 DIAGNOSIS — M54.50 CHRONIC BILATERAL LOW BACK PAIN WITHOUT SCIATICA: ICD-10-CM

## 2021-08-10 DIAGNOSIS — I48.0 PAROXYSMAL ATRIAL FIBRILLATION (HCC): ICD-10-CM

## 2021-08-10 DIAGNOSIS — J42 CHRONIC BRONCHITIS, UNSPECIFIED CHRONIC BRONCHITIS TYPE (HCC): Chronic | ICD-10-CM

## 2021-08-10 DIAGNOSIS — J30.0 VASOMOTOR RHINITIS: ICD-10-CM

## 2021-08-10 DIAGNOSIS — I50.32 CHRONIC DIASTOLIC CHF (CONGESTIVE HEART FAILURE) (HCC): ICD-10-CM

## 2021-08-10 DIAGNOSIS — G30.9 ALZHEIMER'S DEMENTIA WITHOUT BEHAVIORAL DISTURBANCE, UNSPECIFIED TIMING OF DEMENTIA ONSET: ICD-10-CM

## 2021-08-10 PROCEDURE — 99214 OFFICE O/P EST MOD 30 MIN: CPT | Performed by: STUDENT IN AN ORGANIZED HEALTH CARE EDUCATION/TRAINING PROGRAM

## 2021-08-10 RX ORDER — GABAPENTIN 300 MG/1
300 CAPSULE ORAL 3 TIMES DAILY
Qty: 270 CAPSULE | Refills: 0 | Status: SHIPPED | OUTPATIENT
Start: 2021-08-10 | End: 2021-11-22 | Stop reason: SDUPTHER

## 2021-08-10 RX ORDER — FERROUS SULFATE 325(65) MG
325 TABLET ORAL NIGHTLY
Qty: 90 TABLET | Refills: 1 | Status: SHIPPED | OUTPATIENT
Start: 2021-08-10 | End: 2022-02-22 | Stop reason: SDUPTHER

## 2021-08-10 RX ORDER — CYANOCOBALAMIN (VITAMIN B-12) 500 MCG
2 TABLET ORAL DAILY
Qty: 180 TABLET | Refills: 1 | Status: SHIPPED | OUTPATIENT
Start: 2021-08-10 | End: 2022-01-31

## 2021-08-10 RX ORDER — FUROSEMIDE 20 MG/1
20 TABLET ORAL SEE ADMIN INSTRUCTIONS
Qty: 45 TABLET | Refills: 1 | Status: ON HOLD | OUTPATIENT
Start: 2021-08-10 | End: 2021-09-08 | Stop reason: SDUPTHER

## 2021-08-10 RX ORDER — MIDODRINE HYDROCHLORIDE 5 MG/1
5 TABLET ORAL
Qty: 90 TABLET | Refills: 2 | Status: SHIPPED | OUTPATIENT
Start: 2021-08-10 | End: 2021-11-04 | Stop reason: SDUPTHER

## 2021-08-10 RX ORDER — MIRTAZAPINE 15 MG/1
15 TABLET, FILM COATED ORAL NIGHTLY
Qty: 90 TABLET | Refills: 1 | Status: SHIPPED | OUTPATIENT
Start: 2021-08-10 | End: 2021-12-01

## 2021-08-10 RX ORDER — MONTELUKAST SODIUM 10 MG/1
10 TABLET ORAL DAILY
Qty: 90 TABLET | Refills: 1 | Status: SHIPPED | OUTPATIENT
Start: 2021-08-10 | End: 2022-01-13 | Stop reason: SDUPTHER

## 2021-08-10 RX ORDER — HYDROCODONE BITARTRATE AND ACETAMINOPHEN 7.5; 325 MG/1; MG/1
.5-1 TABLET ORAL 3 TIMES DAILY PRN
Qty: 90 TABLET | Refills: 0 | Status: SHIPPED | OUTPATIENT
Start: 2021-08-10 | End: 2021-10-08 | Stop reason: SDUPTHER

## 2021-08-10 RX ORDER — IPRATROPIUM BROMIDE 42 UG/1
1 SPRAY, METERED NASAL 4 TIMES DAILY PRN
Qty: 1 BOTTLE | Refills: 3 | Status: SHIPPED | OUTPATIENT
Start: 2021-08-10

## 2021-08-10 RX ORDER — PANTOPRAZOLE SODIUM 40 MG/1
40 TABLET, DELAYED RELEASE ORAL 2 TIMES DAILY
Qty: 180 TABLET | Refills: 1 | Status: SHIPPED | OUTPATIENT
Start: 2021-08-10 | End: 2022-02-17

## 2021-08-10 RX ORDER — DONEPEZIL HYDROCHLORIDE 10 MG/1
10 TABLET, FILM COATED ORAL NIGHTLY
Qty: 90 TABLET | Refills: 1 | Status: SHIPPED | OUTPATIENT
Start: 2021-08-10 | End: 2022-02-22 | Stop reason: SDUPTHER

## 2021-08-10 RX ORDER — PRIMIDONE 50 MG/1
50 TABLET ORAL 3 TIMES DAILY
Qty: 270 TABLET | Refills: 1 | Status: SHIPPED | OUTPATIENT
Start: 2021-08-10 | End: 2022-06-15 | Stop reason: SDUPTHER

## 2021-08-10 ASSESSMENT — ENCOUNTER SYMPTOMS
ABDOMINAL PAIN: 0
SHORTNESS OF BREATH: 0
SORE THROAT: 0
NAUSEA: 0
WHEEZING: 0

## 2021-08-10 NOTE — PROGRESS NOTES
8/22/2021    Jesi Gipson    Chief Complaint   Patient presents with    Hypertension       HPI  rGeg Bonilla is a 78 y.o. female with a PMHx as listed below who presents today for  Follow up on chronic conditions. No acute complaints. Compliant with medications. BP good control today    Hx. Suresh does not use cpap as uncomfortable. Chronic back pain stable. Follows with Dr. Kathe Velez hx. Of GI bleed on a/c xarelto. Currenlty on 81asa only    Recently had COVID in January SOB now back to near baseline  . Follows with Dr. Keesha Sierra [facility] who recently added abilify to regimen. Patient daugther recently committed suicide, patient struggling with grief. Follows with Dr. Neisha Guan for hypothyroidism      1. Depression, unspecified depression type    2. Vasomotor rhinitis    3. Hydrocephalus, unspecified type ([address]    4. Type 2 diabetes mellitus without complication, without long-term current use of insulin ([address]    5. Chronic bronchitis, unspecified chronic bronchitis type ([address]    6. Orthostasis    7. Chronic bilateral low back pain without sciatica    8. Paroxysmal atrial fibrillation (HCC)    9. Chronic diastolic CHF (congestive heart failure) ([address]    10. SURESH (obstructive sleep apnea)    11. Gastroesophageal reflux disease, unspecified whether esophagitis present    12. Normocytic anemia    13. Alzheimer's dementia without behavioral disturbance, unspecified timing of dementia onset ([address]             REVIEW OF SYMPTOMS    Review of Systems   Constitutional: Negative for chills and fatigue. HENT: Negative for congestion and sore throat. Respiratory: Negative for shortness of breath and wheezing. Cardiovascular: Negative for chest pain and palpitations. Gastrointestinal: Negative for abdominal pain and nausea. Genitourinary: Negative for frequency and urgency. Neurological: Negative for light-headedness.        PAST MEDICAL HISTORY  Past Medical History:   Diagnosis Date    [facility]congest[facility] failure) [facility] 12/23/2014    Acute urinary tract infection 7/20/2019    Single Kidney    Anemia     Chronic low back pain     s/p epidural steroids    COPD (chronic obstructive pulmonary disease) ([facility]     moderate to severe    Depression     Family history of cardiac disorder     Father, Brother    GERD (gastroesophageal reflux disease)     Goiter     s/p radioactive Iodine/ Low T4    H/O blood clots     right leg after miscarriage    H/O cardiovascular stress test 6/25/13 6/13-WNL EF 70%    H/O cardiovascular stress test 08/10/2017    Normal study EF 70%    H/O: CVA (cerebrovascular accident) 7/20/2019    Heart murmur     History of blood transfusion     2017    History of LIMITED Echocardiogram 11/08/2019    EF 55-60%, Normal left ventricle structure and systolic function , no regional wall motion abnormalities were detected    [place] (hard of hearing)     hearing aides    Hx of blood clots     Hx of echocardiogram 07/11/2013    EF>55%, mild MR, mild TR, abn lVSFx stage 1     HX OTHER MEDICAL     PCP is Dr Mendoza Ghotra; Cardiologist is Dr Jill Monk 7/30/13    Peripheral Angiogram.  Sluggish flow RLE, med mgmt.      Hydrocephalus, adult ([facility]     shunt    Hyperlipidemia     Hyperthyroidism 7/20/2019    Kidney stone     \"had stone last summer 2016- passed it\"    Memory loss     Old MI (myocardial infarction) 2001    Osteoarthritis     Pneumonia     recurrent    Solitary kidney     Unspecified cerebral artery occlusion with cerebral infarction     \"they discovered I had stroke in 1993- with CT scan of head but never knew I had it\"       FAMILY HISTORY  Family History   Problem Relation Age of Onset    Stroke Mother     Heart Disease Mother     Asthma Mother     Diabetes Mother     Cancer Mother         uterine    Emphysema Father     Stroke Father     Heart Disease Father     Heart Defect Father         hardening of arteries    Diabetes Sister  Diabetes Brother     Heart Disease Brother     Cancer Maternal Grandfather         stomach    Diabetes Paternal Grandmother     Stroke Paternal Grandmother     Diabetes Brother     Alcohol Abuse Maternal Aunt        SOCIAL HISTORY  Social History     Socioeconomic History    Marital status:      Spouse name: None    Number of children: 11    Years of education: None    Highest education level: None   Occupational History    Occupation: mental health counselor retired   Tobacco Use    Smoking status: Former Smoker     Years: 59.00     Types: Cigarettes     Start date: 10/7/1960    Smokeless tobacco: Never Used    Tobacco comment: pt quit 11/4/19   Vaping Use    Vaping Use: Never used   Substance and Sexual Activity    Alcohol use: Yes     Comment: rarely/ average \"2-3 times per year'    Drug use: No    Sexual activity: None   Other Topics Concern    None   Social History Narrative    Do you donate blood or plasma? no    Caffeine intake? Moderate    Advance directive? yes    Is blood transfusion acceptable in an emergency? yes      Social Determinants of Health     Financial Resource Strain: Low Risk     Difficulty of Paying Living Expenses: Not hard at all   Food Insecurity: No Food Insecurity    Worried About Running Out of Food in the Last Year: Never true    Lemuel of Food in the Last Year: Never true   Transportation Needs: No Transportation Needs    Lack of Transportation (Medical): No    Lack of Transportation (Non-Medical):  No   Physical Activity:     Days of Exercise per Week:     Minutes of Exercise per Session:    Stress:     Feeling of Stress :    Social Connections:     Frequency of Communication with Friends and Family:     Frequency of Social Gatherings with Friends and Family:     Attends Yarsanism Services:     Active Member of Clubs or Organizations:     Attends Club or Organization Meetings:     Marital Status:    Intimate Partner Violence:     Fear of Current or Ex-Partner:     Emotionally Abused:     Physically Abused:     Sexually Abused:         SURGICAL HISTORY  Past Surgical History:   Procedure Laterality Date   [place]    disc ruptured    BRAIN SURGERY  2001    right shunt    CHEST SURGERY  2377    reconstruction of breast bone and rib-\"[place] chest\"    CHOLECYSTECTOMY  1990    COLONOSCOPY  7/2011    F/U 7/2016    CORONARY ANGIOPLASTY WITH STENT PLACEMENT  2001    CSF SHUNT      ENDOSCOPY, COLON, DIAGNOSTIC      per old chart pt had EGD done with admission 2/17/2017    HYSTERECTOMY  1978    VERONIQUE BSO-dub/fibroids    KIDNEY SURGERY Right 1967    suspension    KNEE SURGERY  1997    right     PACEMAKER INSERTION Left 09/26/2019    [employer] [place] XT DR [facility] [employer]     ROTATOR CUFF REPAIR  1997    right    TUBAL LIGATION  1972                 CURRENT MEDICATIONS  Current Outpatient Medications   Medication Sig Dispense Refill    furosemide (LASIX) 20 MG tablet Take 1 tablet by mouth See Admin Instructions Take 1 tab as needed if legs start to swell up. 45 tablet 1    Cyanocobalamin (B-12) 500 MCG TABS Take 2 tablets by mouth daily 180 tablet 1    ipratropium (ATROVENT) 0.06 % nasal spray 1 spray by Each Nostril route 4 times daily as needed for Rhinitis 1 Bottle 3    mirtazapine (REMERON) 15 MG tablet Take 1 tablet by mouth nightly 90 tablet 1    ferrous sulfate (IRON 325) 325 (65 Fe) MG tablet Take 1 tablet by mouth nightly Until you start iv iron 90 tablet 1    donepezil (ARICEPT) 10 MG tablet Take 1 tablet by mouth nightly 90 tablet 1    gabapentin (NEURONTIN) 300 MG capsule Take 1 capsule by mouth 3 times daily for 90 days.  270 capsule 0    metFORMIN (GLUCOPHAGE) 500 MG tablet Take 1 tablet by mouth nightly 90 tablet 1    montelukast (SINGULAIR) 10 MG tablet Take 1 tablet by mouth daily 90 tablet 1    pantoprazole (PROTONIX) 40 MG tablet Take 1 tablet by mouth 2 times daily 180 tablet 1    primidone (MYSOLINE) 50 MG tablet Take 1 tablet by mouth 3 times daily 270 tablet 1    midodrine (PROAMATINE) 5 MG tablet Take 1 tablet by mouth 3 times daily (with meals) 90 tablet 2    HYDROcodone-acetaminophen (NORCO) 7.5-325 MG per tablet Take 0.5-1 tablets by mouth 3 times daily as needed for Pain (m54.5) for up to 30 days. 90 tablet 0    ARIPiprazole (ABILIFY) 2 MG tablet TAKE 1 TABLET BY MOUTH DAILY      methIMAzole (TAPAZOLE) 5 MG tablet       atorvastatin (LIPITOR) 10 MG tablet Take 1 tablet by mouth daily (Patient not taking: Reported on 7/7/2021) 90 tablet 1    DULoxetine (CYMBALTA) 60 MG extended release capsule Take 1 capsule by mouth nightly 90 capsule 1    FreeStyle Lancets MISC 1 each by Does not apply route daily 100 each 2    blood glucose test strips (FREESTYLE LITE) strip 1 each by In Vitro route daily 100 each 2    albuterol (PROVENTIL) (5 MG/ML) 0.5% nebulizer solution Take 1 mL by nebulization 4 times daily 120 vial 5    aspirin 81 MG tablet Take 81 mg by mouth daily      docusate sodium (COLACE) 100 MG capsule Take 100 mg by mouth 2 times daily      CPAP Machine MISC by Does not apply route       No current facility-administered medications for this visit. ALLERGIES  Allergies   Allergen Reactions    Codeine Itching    Bactrim [Sulfamethoxazole-Trimethoprim]      dizziness    Sulfamethoxazole-Trimethoprim        PHYSICAL EXAM    /65   Pulse 69   Resp 16   Ht 5' 3\" (1.6 m)   Wt 132 lb (59.9 kg)   SpO2 97%   BMI 23.38 kg/m²     Physical Exam  Constitutional:       Appearance: Normal appearance. HENT:      Head: Normocephalic and atraumatic. Eyes:      Extraocular Movements: Extraocular movements intact. Pupils: Pupils are equal, round, and reactive to light. Cardiovascular:      Rate and Rhythm: Normal rate and regular rhythm. Pulses: Normal pulses. Heart sounds: No murmur heard. No friction rub. No gallop.     Pulmonary:      Effort: Pulmonary effort is normal. Breath sounds: Normal breath sounds. Musculoskeletal:      Cervical back: Neck supple. Skin:     General: Skin is warm and dry. Neurological:      General: No focal deficit present. Mental Status: She is alert. Psychiatric:         Mood and Affect: Mood normal.         Behavior: Behavior normal.         ASSESSMENT & PLAN    1. Depression, unspecified depression type  -some improvement. Following with Dr. Max Casarez, per patient now on abilify  - mirtazapine (REMERON) 15 MG tablet; Take 1 tablet by mouth nightly  Dispense: 90 tablet; Refill: 1    2. Vasomotor rhinitis  - ipratropium (ATROVENT) 0.06 % nasal spray; 1 spray by Each Nostril route 4 times daily as needed for Rhinitis  Dispense: 1 Bottle; Refill: 3    3. Hydrocephalus, unspecified type ([facility]  -stable  - donepezil (ARICEPT) 10 MG tablet; Take 1 tablet by mouth nightly  Dispense: 90 tablet; Refill: 1  - primidone (MYSOLINE) 50 MG tablet; Take 1 tablet by mouth 3 times daily  Dispense: 270 tablet; Refill: 1    4. Type 2 diabetes mellitus without complication, without long-term current use of insulin (HCC)  -followed by Dr. Abdirashid Mares, stable  - gabapentin (NEURONTIN) 300 MG capsule; Take 1 capsule by mouth 3 times daily for 90 days. Dispense: 270 capsule; Refill: 0  - metFORMIN (GLUCOPHAGE) 500 MG tablet; Take 1 tablet by mouth nightly  Dispense: 90 tablet; Refill: 1    5. Chronic bronchitis, unspecified chronic bronchitis type ([facility]  -stable current regimen  - montelukast (SINGULAIR) 10 MG tablet; Take 1 tablet by mouth daily  Dispense: 90 tablet; Refill: 1    6. Orthostasis  -continue current regimen  - midodrine (PROAMATINE) 5 MG tablet; Take 1 tablet by mouth 3 times daily (with meals)  Dispense: 90 tablet; Refill: 2    7. Chronic bilateral low back pain without sciatica  -fair control on current regimen. No signs of misuse/diversion  - gabapentin (NEURONTIN) 300 MG capsule; Take 1 capsule by mouth 3 times daily for 90 days.   Dispense: 270 capsule; Refill: 0  - HYDROcodone-acetaminophen (NORCO) 7.5-325 MG per tablet; Take 0.5-1 tablets by mouth 3 times daily as needed for Pain (m54.5) for up to 30 days. Dispense: 90 tablet; Refill: 0    8. Paroxysmal atrial fibrillation (HCC)  -rate controlled continue 81asa  -hx. Of GI bleed on xarelto per patient    9. Chronic diastolic CHF (congestive heart failure) (HCC)  -stable  - furosemide (LASIX) 20 MG tablet; Take 1 tablet by mouth See Admin Instructions Take 1 tab as needed if legs start to swell up. Dispense: 45 tablet; Refill: 1    10. SUE (obstructive sleep apnea)  -hx. Of SUE however patient currently not using CPAP. Will send to sleep center for further discussion/eval  - [address]    11. Gastroesophageal reflux disease, unspecified whether esophagitis present  -stable, hx. Gi bleed  - pantoprazole (PROTONIX) 40 MG tablet; Take 1 tablet by mouth 2 times daily  Dispense: 180 tablet; Refill: 1    12. Normocytic anemia  - Cyanocobalamin (B-12) 500 MCG TABS; Take 2 tablets by mouth daily  Dispense: 180 tablet; Refill: 1  - ferrous sulfate (IRON 325) 325 (65 Fe) MG tablet; Take 1 tablet by mouth nightly Until you start iv iron  Dispense: 90 tablet; Refill: 1    13. Alzheimer's dementia without behavioral disturbance, unspecified timing of dementia onset ([facility]  -continue current regimen. Will need to discuss further next appt. Baseline status      Return in about 3 months (around 11/10/2021).          Electronically signed by Millicent Love DO on 8/22/2021

## 2021-09-02 ENCOUNTER — TELEPHONE (OUTPATIENT)
Dept: FAMILY MEDICINE CLINIC | Age: 81
End: 2021-09-02

## 2021-09-02 DIAGNOSIS — G89.29 CHRONIC BILATERAL LOW BACK PAIN WITHOUT SCIATICA: ICD-10-CM

## 2021-09-02 DIAGNOSIS — M54.50 CHRONIC BILATERAL LOW BACK PAIN WITHOUT SCIATICA: ICD-10-CM

## 2021-09-02 RX ORDER — HYDROCODONE BITARTRATE AND ACETAMINOPHEN 7.5; 325 MG/1; MG/1
.5-1 TABLET ORAL 3 TIMES DAILY PRN
Qty: 90 TABLET | Refills: 0 | Status: CANCELLED | OUTPATIENT
Start: 2021-09-02 | End: 2021-10-02

## 2021-09-05 ENCOUNTER — HOSPITAL ENCOUNTER (INPATIENT)
Age: 81
LOS: 1 days | Discharge: HOME HEALTH | DRG: 313 | End: 2021-09-08
Attending: INTERNAL MEDICINE | Admitting: INTERNAL MEDICINE
Payer: MEDICARE

## 2021-09-05 ENCOUNTER — APPOINTMENT (OUTPATIENT)
Dept: GENERAL RADIOLOGY | Age: 81
DRG: 313 | End: 2021-09-05
Payer: MEDICARE

## 2021-09-05 DIAGNOSIS — R79.89 ELEVATED BRAIN NATRIURETIC PEPTIDE (BNP) LEVEL: ICD-10-CM

## 2021-09-05 DIAGNOSIS — R07.9 CHEST PAIN, UNSPECIFIED TYPE: Primary | ICD-10-CM

## 2021-09-05 DIAGNOSIS — I50.32 CHRONIC DIASTOLIC CHF (CONGESTIVE HEART FAILURE) (HCC): ICD-10-CM

## 2021-09-05 LAB
ALBUMIN SERPL-MCNC: 3.7 GM/DL (ref 3.4–5)
ALP BLD-CCNC: 115 IU/L (ref 40–129)
ALT SERPL-CCNC: 11 U/L (ref 10–40)
ANION GAP SERPL CALCULATED.3IONS-SCNC: 7 MMOL/L (ref 4–16)
AST SERPL-CCNC: 14 IU/L (ref 15–37)
BACTERIA: NEGATIVE /HPF
BASOPHILS ABSOLUTE: 0.1 K/CU MM
BASOPHILS RELATIVE PERCENT: 0.5 % (ref 0–1)
BILIRUB SERPL-MCNC: 0.1 MG/DL (ref 0–1)
BILIRUBIN URINE: NEGATIVE MG/DL
BLOOD, URINE: NEGATIVE
BUN BLDV-MCNC: 26 MG/DL (ref 6–23)
CALCIUM SERPL-MCNC: 9 MG/DL (ref 8.3–10.6)
CHLORIDE BLD-SCNC: 101 MMOL/L (ref 99–110)
CLARITY: CLEAR
CO2: 31 MMOL/L (ref 21–32)
COLOR: YELLOW
CREAT SERPL-MCNC: 1.2 MG/DL (ref 0.6–1.1)
DIFFERENTIAL TYPE: ABNORMAL
EOSINOPHILS ABSOLUTE: 0.1 K/CU MM
EOSINOPHILS RELATIVE PERCENT: 1.1 % (ref 0–3)
GFR AFRICAN AMERICAN: 52 ML/MIN/1.73M2
GFR NON-AFRICAN AMERICAN: 43 ML/MIN/1.73M2
GLUCOSE BLD-MCNC: 102 MG/DL (ref 70–99)
GLUCOSE BLD-MCNC: 193 MG/DL (ref 70–99)
GLUCOSE, URINE: NEGATIVE MG/DL
HCT VFR BLD CALC: 40.6 % (ref 37–47)
HEMOGLOBIN: 12.4 GM/DL (ref 12.5–16)
IMMATURE NEUTROPHIL %: 0.5 % (ref 0–0.43)
KETONES, URINE: NEGATIVE MG/DL
LEUKOCYTE ESTERASE, URINE: ABNORMAL
LIPASE: 42 IU/L (ref 13–60)
LYMPHOCYTES ABSOLUTE: 1.7 K/CU MM
LYMPHOCYTES RELATIVE PERCENT: 12.8 % (ref 24–44)
MACROCYTES: ABNORMAL
MCH RBC QN AUTO: 32.1 PG (ref 27–31)
MCHC RBC AUTO-ENTMCNC: 30.5 % (ref 32–36)
MCV RBC AUTO: 105.2 FL (ref 78–100)
MONOCYTES ABSOLUTE: 1.5 K/CU MM
MONOCYTES RELATIVE PERCENT: 12 % (ref 0–4)
NITRITE URINE, QUANTITATIVE: NEGATIVE
PDW BLD-RTO: 13.9 % (ref 11.7–14.9)
PH, URINE: 6 (ref 5–8)
PLATELET # BLD: 185 K/CU MM (ref 140–440)
PLT MORPHOLOGY: ABNORMAL
PMV BLD AUTO: 13 FL (ref 7.5–11.1)
POTASSIUM SERPL-SCNC: 4.4 MMOL/L (ref 3.5–5.1)
PRO-BNP: 1187 PG/ML
PROTEIN UA: 30 MG/DL
RBC # BLD: 3.86 M/CU MM (ref 4.2–5.4)
RBC URINE: <1 /HPF (ref 0–6)
SEGMENTED NEUTROPHILS ABSOLUTE COUNT: 9.4 K/CU MM
SEGMENTED NEUTROPHILS RELATIVE PERCENT: 73.1 % (ref 36–66)
SODIUM BLD-SCNC: 139 MMOL/L (ref 135–145)
SPECIFIC GRAVITY UA: 1.02 (ref 1–1.03)
SQUAMOUS EPITHELIAL: <1 /HPF
TOTAL IMMATURE NEUTOROPHIL: 0.06 K/CU MM
TOTAL PROTEIN: 6.4 GM/DL (ref 6.4–8.2)
TRICHOMONAS: ABNORMAL /HPF
TROPONIN T: <0.01 NG/ML
TSH HIGH SENSITIVITY: 0.81 UIU/ML (ref 0.27–4.2)
UROBILINOGEN, URINE: NEGATIVE MG/DL (ref 0.2–1)
WBC # BLD: 12.9 K/CU MM (ref 4–10.5)
WBC UA: 3 /HPF (ref 0–5)

## 2021-09-05 PROCEDURE — 84443 ASSAY THYROID STIM HORMONE: CPT

## 2021-09-05 PROCEDURE — 85025 COMPLETE CBC W/AUTO DIFF WBC: CPT

## 2021-09-05 PROCEDURE — 84484 ASSAY OF TROPONIN QUANT: CPT

## 2021-09-05 PROCEDURE — 82962 GLUCOSE BLOOD TEST: CPT

## 2021-09-05 PROCEDURE — 96374 THER/PROPH/DIAG INJ IV PUSH: CPT

## 2021-09-05 PROCEDURE — 36415 COLL VENOUS BLD VENIPUNCTURE: CPT

## 2021-09-05 PROCEDURE — 93005 ELECTROCARDIOGRAM TRACING: CPT | Performed by: PHYSICIAN ASSISTANT

## 2021-09-05 PROCEDURE — 99285 EMERGENCY DEPT VISIT HI MDM: CPT

## 2021-09-05 PROCEDURE — 81001 URINALYSIS AUTO W/SCOPE: CPT

## 2021-09-05 PROCEDURE — G0378 HOSPITAL OBSERVATION PER HR: HCPCS

## 2021-09-05 PROCEDURE — 2700000000 HC OXYGEN THERAPY PER DAY

## 2021-09-05 PROCEDURE — 71045 X-RAY EXAM CHEST 1 VIEW: CPT

## 2021-09-05 PROCEDURE — 94640 AIRWAY INHALATION TREATMENT: CPT

## 2021-09-05 PROCEDURE — 6370000000 HC RX 637 (ALT 250 FOR IP): Performed by: INTERNAL MEDICINE

## 2021-09-05 PROCEDURE — 83690 ASSAY OF LIPASE: CPT

## 2021-09-05 PROCEDURE — 6360000002 HC RX W HCPCS: Performed by: INTERNAL MEDICINE

## 2021-09-05 PROCEDURE — C9113 INJ PANTOPRAZOLE SODIUM, VIA: HCPCS | Performed by: INTERNAL MEDICINE

## 2021-09-05 PROCEDURE — 83880 ASSAY OF NATRIURETIC PEPTIDE: CPT

## 2021-09-05 PROCEDURE — 2580000003 HC RX 258: Performed by: INTERNAL MEDICINE

## 2021-09-05 PROCEDURE — 6370000000 HC RX 637 (ALT 250 FOR IP): Performed by: PHYSICIAN ASSISTANT

## 2021-09-05 PROCEDURE — 80053 COMPREHEN METABOLIC PANEL: CPT

## 2021-09-05 RX ORDER — FERROUS SULFATE 325(65) MG
325 TABLET ORAL NIGHTLY
Status: DISCONTINUED | OUTPATIENT
Start: 2021-09-05 | End: 2021-09-08 | Stop reason: HOSPADM

## 2021-09-05 RX ORDER — ARIPIPRAZOLE 2 MG/1
2 TABLET ORAL DAILY
Status: DISCONTINUED | OUTPATIENT
Start: 2021-09-06 | End: 2021-09-08 | Stop reason: HOSPADM

## 2021-09-05 RX ORDER — SODIUM CHLORIDE 9 MG/ML
25 INJECTION, SOLUTION INTRAVENOUS PRN
Status: DISCONTINUED | OUTPATIENT
Start: 2021-09-05 | End: 2021-09-08 | Stop reason: HOSPADM

## 2021-09-05 RX ORDER — GABAPENTIN 300 MG/1
300 CAPSULE ORAL 3 TIMES DAILY
Status: DISCONTINUED | OUTPATIENT
Start: 2021-09-05 | End: 2021-09-08 | Stop reason: HOSPADM

## 2021-09-05 RX ORDER — DEXTROSE MONOHYDRATE 50 MG/ML
100 INJECTION, SOLUTION INTRAVENOUS PRN
Status: DISCONTINUED | OUTPATIENT
Start: 2021-09-05 | End: 2021-09-08 | Stop reason: HOSPADM

## 2021-09-05 RX ORDER — PANTOPRAZOLE SODIUM 40 MG/10ML
40 INJECTION, POWDER, LYOPHILIZED, FOR SOLUTION INTRAVENOUS DAILY
Status: DISCONTINUED | OUTPATIENT
Start: 2021-09-05 | End: 2021-09-08 | Stop reason: HOSPADM

## 2021-09-05 RX ORDER — ATORVASTATIN CALCIUM 40 MG/1
40 TABLET, FILM COATED ORAL NIGHTLY
Status: DISCONTINUED | OUTPATIENT
Start: 2021-09-05 | End: 2021-09-08 | Stop reason: HOSPADM

## 2021-09-05 RX ORDER — ONDANSETRON 4 MG/1
4 TABLET, ORALLY DISINTEGRATING ORAL EVERY 8 HOURS PRN
Status: DISCONTINUED | OUTPATIENT
Start: 2021-09-05 | End: 2021-09-08 | Stop reason: HOSPADM

## 2021-09-05 RX ORDER — PRIMIDONE 50 MG/1
50 TABLET ORAL 3 TIMES DAILY
Status: DISCONTINUED | OUTPATIENT
Start: 2021-09-05 | End: 2021-09-08 | Stop reason: HOSPADM

## 2021-09-05 RX ORDER — DEXTROSE MONOHYDRATE 25 G/50ML
12.5 INJECTION, SOLUTION INTRAVENOUS PRN
Status: DISCONTINUED | OUTPATIENT
Start: 2021-09-05 | End: 2021-09-08 | Stop reason: HOSPADM

## 2021-09-05 RX ORDER — MIDODRINE HYDROCHLORIDE 5 MG/1
5 TABLET ORAL
Status: DISCONTINUED | OUTPATIENT
Start: 2021-09-05 | End: 2021-09-08 | Stop reason: HOSPADM

## 2021-09-05 RX ORDER — DONEPEZIL HYDROCHLORIDE 5 MG/1
10 TABLET, FILM COATED ORAL NIGHTLY
Status: DISCONTINUED | OUTPATIENT
Start: 2021-09-05 | End: 2021-09-08 | Stop reason: HOSPADM

## 2021-09-05 RX ORDER — NICOTINE POLACRILEX 4 MG
15 LOZENGE BUCCAL PRN
Status: DISCONTINUED | OUTPATIENT
Start: 2021-09-05 | End: 2021-09-08 | Stop reason: HOSPADM

## 2021-09-05 RX ORDER — POLYETHYLENE GLYCOL 3350 17 G/17G
17 POWDER, FOR SOLUTION ORAL DAILY PRN
Status: DISCONTINUED | OUTPATIENT
Start: 2021-09-05 | End: 2021-09-08 | Stop reason: HOSPADM

## 2021-09-05 RX ORDER — SODIUM CHLORIDE 0.9 % (FLUSH) 0.9 %
5-40 SYRINGE (ML) INJECTION EVERY 12 HOURS SCHEDULED
Status: DISCONTINUED | OUTPATIENT
Start: 2021-09-05 | End: 2021-09-08 | Stop reason: HOSPADM

## 2021-09-05 RX ORDER — MIRTAZAPINE 15 MG/1
15 TABLET, FILM COATED ORAL NIGHTLY
Status: DISCONTINUED | OUTPATIENT
Start: 2021-09-05 | End: 2021-09-08 | Stop reason: HOSPADM

## 2021-09-05 RX ORDER — HYDROCODONE BITARTRATE AND ACETAMINOPHEN 5; 325 MG/1; MG/1
1 TABLET ORAL EVERY 6 HOURS PRN
Status: DISCONTINUED | OUTPATIENT
Start: 2021-09-05 | End: 2021-09-06 | Stop reason: SDUPTHER

## 2021-09-05 RX ORDER — ACETAMINOPHEN 325 MG/1
650 TABLET ORAL EVERY 6 HOURS PRN
Status: DISCONTINUED | OUTPATIENT
Start: 2021-09-05 | End: 2021-09-08 | Stop reason: HOSPADM

## 2021-09-05 RX ORDER — ASPIRIN 81 MG/1
81 TABLET, CHEWABLE ORAL DAILY
Status: DISCONTINUED | OUTPATIENT
Start: 2021-09-06 | End: 2021-09-08 | Stop reason: HOSPADM

## 2021-09-05 RX ORDER — METHIMAZOLE 10 MG/1
5 TABLET ORAL DAILY
Status: DISCONTINUED | OUTPATIENT
Start: 2021-09-06 | End: 2021-09-08 | Stop reason: HOSPADM

## 2021-09-05 RX ORDER — SODIUM CHLORIDE 0.9 % (FLUSH) 0.9 %
5-40 SYRINGE (ML) INJECTION PRN
Status: DISCONTINUED | OUTPATIENT
Start: 2021-09-05 | End: 2021-09-08 | Stop reason: HOSPADM

## 2021-09-05 RX ORDER — ALBUTEROL SULFATE 90 UG/1
2 AEROSOL, METERED RESPIRATORY (INHALATION) EVERY 6 HOURS PRN
Status: DISCONTINUED | OUTPATIENT
Start: 2021-09-05 | End: 2021-09-08 | Stop reason: HOSPADM

## 2021-09-05 RX ORDER — ONDANSETRON 2 MG/ML
4 INJECTION INTRAMUSCULAR; INTRAVENOUS EVERY 6 HOURS PRN
Status: DISCONTINUED | OUTPATIENT
Start: 2021-09-05 | End: 2021-09-08 | Stop reason: HOSPADM

## 2021-09-05 RX ORDER — DOCUSATE SODIUM 100 MG/1
100 CAPSULE, LIQUID FILLED ORAL 2 TIMES DAILY
Status: DISCONTINUED | OUTPATIENT
Start: 2021-09-05 | End: 2021-09-08 | Stop reason: HOSPADM

## 2021-09-05 RX ORDER — LANOLIN ALCOHOL/MO/W.PET/CERES
1000 CREAM (GRAM) TOPICAL DAILY
Status: DISCONTINUED | OUTPATIENT
Start: 2021-09-05 | End: 2021-09-08 | Stop reason: HOSPADM

## 2021-09-05 RX ORDER — MONTELUKAST SODIUM 10 MG/1
10 TABLET ORAL DAILY
Status: DISCONTINUED | OUTPATIENT
Start: 2021-09-05 | End: 2021-09-08 | Stop reason: HOSPADM

## 2021-09-05 RX ORDER — ASPIRIN 325 MG
325 TABLET ORAL ONCE
Status: COMPLETED | OUTPATIENT
Start: 2021-09-05 | End: 2021-09-05

## 2021-09-05 RX ORDER — ACETAMINOPHEN 650 MG/1
650 SUPPOSITORY RECTAL EVERY 6 HOURS PRN
Status: DISCONTINUED | OUTPATIENT
Start: 2021-09-05 | End: 2021-09-08 | Stop reason: HOSPADM

## 2021-09-05 RX ADMIN — ATORVASTATIN CALCIUM 40 MG: 40 TABLET, FILM COATED ORAL at 21:04

## 2021-09-05 RX ADMIN — PANTOPRAZOLE SODIUM 40 MG: 40 INJECTION, POWDER, FOR SOLUTION INTRAVENOUS at 19:44

## 2021-09-05 RX ADMIN — ASPIRIN 325 MG: 325 TABLET ORAL at 13:09

## 2021-09-05 RX ADMIN — SODIUM CHLORIDE, PRESERVATIVE FREE 10 ML: 5 INJECTION INTRAVENOUS at 21:04

## 2021-09-05 RX ADMIN — CYANOCOBALAMIN TAB 1000 MCG 1000 MCG: 1000 TAB at 21:03

## 2021-09-05 RX ADMIN — FERROUS SULFATE TAB 325 MG (65 MG ELEMENTAL FE) 325 MG: 325 (65 FE) TAB at 19:43

## 2021-09-05 RX ADMIN — GABAPENTIN 300 MG: 300 CAPSULE ORAL at 21:03

## 2021-09-05 RX ADMIN — DOCUSATE SODIUM 100 MG: 100 CAPSULE, LIQUID FILLED ORAL at 19:43

## 2021-09-05 RX ADMIN — MONTELUKAST 10 MG: 10 TABLET, FILM COATED ORAL at 19:43

## 2021-09-05 RX ADMIN — DONEPEZIL HYDROCHLORIDE 10 MG: 5 TABLET, FILM COATED ORAL at 19:42

## 2021-09-05 RX ADMIN — MIRTAZAPINE 15 MG: 15 TABLET, FILM COATED ORAL at 19:43

## 2021-09-05 RX ADMIN — PRIMIDONE 50 MG: 50 TABLET ORAL at 21:23

## 2021-09-05 RX ADMIN — ALBUTEROL SULFATE 2 PUFF: 90 AEROSOL, METERED RESPIRATORY (INHALATION) at 22:11

## 2021-09-05 ASSESSMENT — PAIN SCALES - GENERAL
PAINLEVEL_OUTOF10: 0
PAINLEVEL_OUTOF10: 10

## 2021-09-05 ASSESSMENT — PAIN DESCRIPTION - PAIN TYPE: TYPE: ACUTE PAIN

## 2021-09-05 ASSESSMENT — HEART SCORE: ECG: 0

## 2021-09-05 ASSESSMENT — PAIN DESCRIPTION - LOCATION: LOCATION: CHEST

## 2021-09-05 NOTE — ED TRIAGE NOTES
Patient to triage with c/o chest pain and pressure that started approx. 4-5 days ago. Patient has pacemaker implanted. Denies any other symptoms. resps even and unlabored.

## 2021-09-05 NOTE — ED PROVIDER NOTES
EMERGENCY DEPARTMENT ENCOUNTER    [facility] EMERGENCY DEPARTMENT        TRIAGE CHIEF COMPLAINT:   Chest Pain (states started approx. 4-5 days ago. denies any other symptoms. resps even and unlabored. )      [place]:  Edward Bray is a 78 y.o. female that presents with family for chest pain shortness of breath. Onset is prior travel, x4 to 5 days ago. Context patient states that began having heavy anterior retrosternal chest pressure, nonradiating without preceding trauma or injury. States the pain worsens with certain movements especially leaning forward and with short periods of activity. Also has associated shortness of breath without cough or hemoptysis. Patient does have a history of coronary artery disease, follows with local cardiology at the [facility]. Also has a pacemaker defibrillator, sees electrophysiology. Does take Xarelto secondary to history of A. fib but denying any palpitations. She states at rest at this time, pain is a 2/10 that worsens with 5/10 with leaning forward. No recent URI cough or cold complaints or known COVID-19 contacts. Patient is vaccinated for COVID-19. She does state has been several years since her last cardiac work-up with stress test and heart catheterization. Past medical history includes COPD, coronary disease, hypertension, hyperlipidemia, GERD, CHF. Denying any associated nausea vomiting diaphoresis dizziness or lightheadedness. No numbness tingling weakness rating down the upper extremities. Denying any abdominal urinary complaints.     ROS:  General:  No fevers, no chills   Cardiovascular:  See HPI   Respiratory: See HPI  Gastrointestinal:  No pain, no nausea, no vomiting, no diarrhea  Musculoskeletal:  No muscle pain, no joint pain  Skin:  No rash, no pruritis, no easy bruising  Neurologic:  No speech problems, no headache, no extremity numbness, no extremity tingling, no extremity weakness  Psychiatric:  No anxiety  Genitourinary:  No dysuria, no hematuria  Extremities:  No edema    Past Medical History:   Diagnosis Date    Acute diastolic CHF (congestive heart failure) ([facility] [address] 12/23/2014    Acute urinary tract infection 7/20/2019    Single Kidney    Anemia     Chronic low back pain     s/p epidural steroids    COPD (chronic obstructive pulmonary disease) ([facility])     moderate to severe    Depression     Family history of cardiac disorder     Father, Brother    GERD (gastroesophageal reflux disease)     Goiter     s/p radioactive Iodine/ Low T4    H/O blood clots     right leg after miscarriage    H/O cardiovascular stress test 6/25/13 6/13-WNL EF 70%    H/O cardiovascular stress test 08/10/2017    Normal study EF 70%    H/O: CVA (cerebrovascular accident) 7/20/2019    Heart murmur     History of blood transfusion     2017    History of LIMITED Echocardiogram 11/08/2019    EF 55-60%, Normal left ventricle structure and systolic function , no regional wall motion abnormalities were detected    [place] (hard of hearing)     hearing aides    Hx of blood clots     Hx of echocardiogram 07/11/2013    EF>55%, mild MR, mild TR, abn lVSFx stage 1     HX OTHER MEDICAL     PCP is Dr Kartik Vyas; Cardiologist is Dr Chris Matthews 7/30/13    Peripheral Angiogram.  Sluggish flow RLE, med mgmt.      Hydrocephalus, adult ([facility] [address]     shunt    Hyperlipidemia     Hyperthyroidism 7/20/2019    Kidney stone     \"had stone last summer 2016- passed it\"    Memory loss     Old MI (myocardial infarction) 2001    Osteoarthritis     Pneumonia     recurrent    Solitary kidney     Unspecified cerebral artery occlusion with cerebral infarction     \"they discovered I had stroke in 1993- with CT scan of head but never knew I had it\"     Past Surgical History:   Procedure Laterality Date   [place]    disc ruptured    BRAIN SURGERY  2001    right shunt    CHEST SURGERY  1953    reconstruction of breast bone and rib-\"[place] chest\"    CHOLECYSTECTOMY  1990    COLONOSCOPY  7/2011    F/U 7/2016    CORONARY ANGIOPLASTY WITH STENT PLACEMENT  2001    CSF SHUNT      ENDOSCOPY, COLON, DIAGNOSTIC      per old chart pt had EGD done with admission 2/17/2017    HYSTERECTOMY  1978    VERONIQUE BSO-dub/fibroids    KIDNEY SURGERY Right 1967    suspension    KNEE SURGERY  1997    right     PACEMAKER INSERTION Left 09/26/2019    [employer] [place] XT DR ALEX Torres     ROTATOR CUFF REPAIR  1997    right    TUBAL LIGATION  1972     Family History   Problem Relation Age of Onset    Stroke Mother     Heart Disease Mother     Asthma Mother     Diabetes Mother     Cancer Mother         uterine    Emphysema Father     Stroke Father     Heart Disease Father     Heart Defect Father         hardening of arteries    Diabetes Sister     Diabetes Brother     Heart Disease Brother     Cancer Maternal Grandfather         stomach    Diabetes Paternal Grandmother     Stroke Paternal Grandmother     Diabetes Brother     Alcohol Abuse Maternal Aunt      Social History     Socioeconomic History    Marital status:      Spouse name: Not on file    Number of children: 11    Years of education: Not on file    Highest education level: Not on file   Occupational History    Occupation: mental health counselor retired   Tobacco Use    Smoking status: Former Smoker     Years: 59.00     Types: Cigarettes     Start date: 10/7/1960    Smokeless tobacco: Never Used    Tobacco comment: pt quit 11/4/19   Vaping Use    Vaping Use: Never used   Substance and Sexual Activity    Alcohol use: Yes     Comment: rarely/ average \"2-3 times per year'    Drug use: No    Sexual activity: Not on file   Other Topics Concern    Not on file   Social History Narrative    Do you donate blood or plasma? no    Caffeine intake? Moderate    Advance directive? yes    Is blood transfusion acceptable in an emergency?  yes      Social Determinants of Health Financial Resource Strain: Low Risk     Difficulty of Paying Living Expenses: Not hard at all   Food Insecurity: No Food Insecurity    Worried About Running Out of Food in the Last Year: Never true    Lemuel of Food in the Last Year: Never true   Transportation Needs: No Transportation Needs    Lack of Transportation (Medical): No    Lack of Transportation (Non-Medical): No   Physical Activity:     Days of Exercise per Week:     Minutes of Exercise per Session:    Stress:     Feeling of Stress :    Social Connections:     Frequency of Communication with Friends and Family:     Frequency of Social Gatherings with Friends and Family:     Attends Confucianism Services:     Active Member of Clubs or Organizations:     Attends Club or Organization Meetings:     Marital Status:    Intimate Partner Violence:     Fear of Current or Ex-Partner:     Emotionally Abused:     Physically Abused:     Sexually Abused:      No current facility-administered medications for this encounter. Current Outpatient Medications   Medication Sig Dispense Refill    furosemide (LASIX) 20 MG tablet Take 1 tablet by mouth See Admin Instructions Take 1 tab as needed if legs start to swell up. 45 tablet 1    Cyanocobalamin (B-12) 500 MCG TABS Take 2 tablets by mouth daily 180 tablet 1    ipratropium (ATROVENT) 0.06 % nasal spray 1 spray by Each Nostril route 4 times daily as needed for Rhinitis 1 Bottle 3    mirtazapine (REMERON) 15 MG tablet Take 1 tablet by mouth nightly 90 tablet 1    ferrous sulfate (IRON 325) 325 (65 Fe) MG tablet Take 1 tablet by mouth nightly Until you start iv iron 90 tablet 1    donepezil (ARICEPT) 10 MG tablet Take 1 tablet by mouth nightly 90 tablet 1    gabapentin (NEURONTIN) 300 MG capsule Take 1 capsule by mouth 3 times daily for 90 days.  270 capsule 0    metFORMIN (GLUCOPHAGE) 500 MG tablet Take 1 tablet by mouth nightly 90 tablet 1    montelukast (SINGULAIR) 10 MG tablet Take 1 tablet by mouth daily 90 tablet 1    pantoprazole (PROTONIX) 40 MG tablet Take 1 tablet by mouth 2 times daily 180 tablet 1    primidone (MYSOLINE) 50 MG tablet Take 1 tablet by mouth 3 times daily 270 tablet 1    midodrine (PROAMATINE) 5 MG tablet Take 1 tablet by mouth 3 times daily (with meals) 90 tablet 2    HYDROcodone-acetaminophen (NORCO) 7.5-325 MG per tablet Take 0.5-1 tablets by mouth 3 times daily as needed for Pain (m54.5) for up to 30 days. 90 tablet 0    ARIPiprazole (ABILIFY) 2 MG tablet TAKE 1 TABLET BY MOUTH DAILY      methIMAzole (TAPAZOLE) 5 MG tablet       atorvastatin (LIPITOR) 10 MG tablet Take 1 tablet by mouth daily (Patient not taking: Reported on 7/7/2021) 90 tablet 1    DULoxetine (CYMBALTA) 60 MG extended release capsule Take 1 capsule by mouth nightly 90 capsule 1    FreeStyle Lancets MISC 1 each by Does not apply route daily 100 each 2    blood glucose test strips (FREESTYLE LITE) strip 1 each by In Vitro route daily 100 each 2    albuterol (PROVENTIL) (5 MG/ML) 0.5% nebulizer solution Take 1 mL by nebulization 4 times daily 120 vial 5    aspirin 81 MG tablet Take 81 mg by mouth daily      docusate sodium (COLACE) 100 MG capsule Take 100 mg by mouth 2 times daily      CPAP Machine MISC by Does not apply route       Allergies   Allergen Reactions    Codeine Itching    Bactrim [Sulfamethoxazole-Trimethoprim]      dizziness    Sulfamethoxazole-Trimethoprim        Nursing Notes Reviewed  PHYSICAL EXAM    VITAL SIGNS: BP (!) 147/59   Pulse 85   Temp 98.4 °F (36.9 °C) (Oral)   Resp 30   Ht 5' 3\" (1.6 m)   Wt 130 lb (59 kg)   SpO2 94%   BMI 23.03 kg/m²    Constitutional:  Well developed, Well nourished, In no acute distress  Head:  Normocephalic, Atraumatic  Eyes: PERRL. EOMI. Sclera clear. Conjunctiva normal, No discharge.    Neck/Lymphatics: Supple, no JVD, No lymphadenopathy  Cardiovascular:  RRR, Normal S1 & S2.  Palpation of the anterior chest without crepitus or step-offs. Increased pain with leaning forward. Peripheral Vascular: Distal pulses 2+, Capillary refill <2seconds  Respiratory:  Respirations nonnlabored, 91% on room air. Diminished air exchange bilaterally, no rales or rhonchi. No retractions or accessory muscle use. Abdomen: Bowel sounds normal in all quadrants, Soft, Non tender/Nondistended, No palpable abdominal masses. Musculoskeletal: BUE/BLE symmetrical without atrophy or deformities. Calves are supple nontender. No pretibial pitting edema. Integument:  Warm, Dry, Intact, Skin turgor and texture normal  Neurologic:  Alert & oriented x3 , No focal deficits noted. Cranial nerves II through XII grossly intact. No slurred speech. No facial droop.     Psychiatric:  Affect appropriate      I have reviewed and interpreted all of the currently available lab results from this visit (if applicable):  Results for orders placed or performed during the hospital encounter of 09/05/21   CBC Auto Differential   Result Value Ref Range    WBC 12.9 (H) 4.0 - 10.5 K/CU MM    RBC 3.86 (L) 4.2 - 5.4 M/CU MM    Hemoglobin 12.4 (L) 12.5 - 16.0 GM/DL    Hematocrit 40.6 37 - 47 %    .2 (H) 78 - 100 FL    MCH 32.1 (H) 27 - 31 PG    MCHC 30.5 (L) 32.0 - 36.0 %    RDW 13.9 11.7 - 14.9 %    Platelets 269 231 - 203 K/CU MM    MPV 13.0 (H) 7.5 - 11.1 FL    Immature Neutrophil % 0.5 (H) 0 - 0.43 %    Segs Relative 73.1 (H) 36 - 66 %    Eosinophils % 1.1 0 - 3 %    Basophils % 0.5 0 - 1 %    Lymphocytes % 12.8 (L) 24 - 44 %    Monocytes % 12.0 (H) 0 - 4 %    Total Immature Neutrophil 0.06 K/CU MM    Segs Absolute 9.4 K/CU MM    Eosinophils Absolute 0.1 K/CU MM    Basophils Absolute 0.1 K/CU MM    Lymphocytes Absolute 1.7 K/CU MM    Monocytes Absolute 1.5 K/CU MM    Differential Type       AUTOMATED DIFF RESULTS CONFIRMED BY SMEAR REVIEW  AUTOMATED DIFFERENTIAL      Macrocytes 1+     PLT Morphology SEVERAL LARGE PLATELETS    Comprehensive Metabolic Panel   Result Value Ref Range    Sodium 139 135 - 145 MMOL/L    Potassium 4.4 3.5 - 5.1 MMOL/L    Chloride 101 99 - 110 mMol/L    CO2 31 21 - 32 MMOL/L    BUN 26 (H) 6 - 23 MG/DL    CREATININE 1.2 (H) 0.6 - 1.1 MG/DL    Glucose 102 (H) 70 - 99 MG/DL    Calcium 9.0 8.3 - 10.6 MG/DL    Albumin 3.7 3.4 - 5.0 GM/DL    Total Protein 6.4 6.4 - 8.2 GM/DL    Total Bilirubin 0.1 0.0 - 1.0 MG/DL    ALT 11 10 - 40 U/L    AST 14 (L) 15 - 37 IU/L    Alkaline Phosphatase 115 40 - 129 IU/L    GFR Non- 43 (L) >60 mL/min/1.73m2    GFR  52 (L) >60 mL/min/1.73m2    Anion Gap 7 4 - 16   Troponin   Result Value Ref Range    Troponin T <0.010 <0.01 NG/ML   Brain Natriuretic Peptide   Result Value Ref Range    Pro-BNP 1,187 (H) <300 PG/ML   EKG 12 Lead   Result Value Ref Range    Ventricular Rate 76 BPM    Atrial Rate 76 BPM    P-R Interval 180 ms    QRS Duration 148 ms    Q-T Interval 430 ms    QTc Calculation (Bazett) 483 ms    P Axis 78 degrees    R Axis 270 degrees    T Axis 81 degrees    Diagnosis       Normal sinus rhythm  Possible Left atrial enlargement  Right bundle branch block  Inferior infarct , age undetermined  Anterior infarct , age undetermined  Abnormal ECG  When compared with ECG of 16-MAR-2020 18:24,  Sinus rhythm has replaced Electronic ventricular pacemaker          Radiographs (if obtained):  [] The following radiograph was interpreted by myself in the absence of a radiologist:   [] Radiologist's Report Reviewed:  XR CHEST PORTABLE   Final Result   No evidence for acute cardiopulmonary process. XR CHEST PORTABLE (Preliminary result)  Result time 09/05/21 13:32:51  Preliminary result by Sherie Rangel MD (09/05/21 13:32:51)                Impression:    No evidence for acute cardiopulmonary process. Narrative:    EXAMINATION:   ONE XRAY VIEW OF THE CHEST     9/5/2021 12:26 pm     COMPARISON:   03/16/2020.      HISTORY:   ORDERING SYSTEM PROVIDED HISTORY: chest pain   TECHNOLOGIST PROVIDED HISTORY:   Reason for exam:->chest pain   Reason for Exam: chest pain   Acuity: Acute   Type of Exam: Initial     FINDINGS:   Overlying items external to the patient somewhat limit evaluation.  Pacer   device redemonstrated overlying left hemithorax.  Shunt catheter tubing   redemonstrated to the right neck, chest and upper abdomen.  Visualized   portions of the catheter tubing appear to be intact and without definite   evidence for kinking. Lungs are clear.  Cardiac and mediastinal silhouettes are within normal   limits.  No pneumothoraces.  Bony structures appear intact.                 Preliminary result by Sanya Alicia MD (09/05/21 13:32:16)                Impression:    No evidence for acute cardiopulmonary process.                       EKG Interpretation  Please see ED physician's note - Dr. Sebas Saez - for EKG interpretation        Chart review shows recent radiographs:  XR CHEST PORTABLE    Result Date: 9/5/2021  EXAMINATION: ONE XRAY VIEW OF THE CHEST 9/5/2021 12:26 pm COMPARISON: 03/16/2020. HISTORY: ORDERING SYSTEM PROVIDED HISTORY: chest pain TECHNOLOGIST PROVIDED HISTORY: Reason for exam:->chest pain Reason for Exam: chest pain Acuity: Acute Type of Exam: Initial FINDINGS: Overlying items external to the patient somewhat limit evaluation. Pacer device redemonstrated overlying left hemithorax. Shunt catheter tubing redemonstrated to the right neck, chest and upper abdomen. Visualized portions of the catheter tubing appear to be intact and without definite evidence for kinking. Lungs are clear. Cardiac and mediastinal silhouettes are within normal limits. No pneumothoraces. Bony structures appear intact. No evidence for acute cardiopulmonary process. ED COURSE & MEDICAL DECISION MAKING       Vital signs and nursing notes reviewed during ED course. I have independently evaluated this patient .  Supervising physician - Dr. Sebas Saez - was present in ED and available for consultation throughout entirety of patient's care. All pertinent Lab data and radiographic results reviewed with patient at bedside. The patient and/or the family were informed of the results of any tests/labs/imaging, the treatment plan, and time was allotted to answer questions. Clinical Impression:  1. Chest pain, unspecified type    2. Elevated brain natriuretic peptide (BNP) level        Patient presents with chest pain progressive worsening shortness of breath x4 to 5 days. On exam, pleasant well-appearing 42-year-old female, afebrile nontoxic, in no acute respiratory distress. States pain is a 2/10 at this time. 91% on room air. Diminished air exchange without rales or rhonchi. No pitting edema in the lower extremities peer reproducible tenderness along the anterior chest wall to palpation without palpable chest wall abnormalities. Abdominal exam is nonsurgical.  Patient is given aspirin, placed on to telemetry and continuous pulse ox monitoring. Labs are reassuring. CBC with leukocytosis 12.1 with left shift. Globin is 12.4, slightly downtrending compared to previous. CMP shows stable chronic kidney disease with BUN/creatinine 26/1.2. No electrolyte disturbance. Normal troponin. BNP is elevated at 1187 however patient is not appear clinically fluid overloaded on exam. X-ray shows no evidence of acute cardiopulmonary process consolidation or vascular congestion. On chart review, last echocardiogram 2020 showed normal LV function with an EF of 55 to 60%, moderate left ventricular hypertrophy over the grade 2 diastolic dysfunction and slight prolapse of mitral valve. She has not had a stress test since 2017. Given age, history and risk factors, heart score is 5 and I do feel patient would benefit from admission for ACS/chest pain rule out for further labs and work-up.  While in the ED, patient did well, she states she continued to have some intermittent brief episodes of chest pressure which was spontaneously resolved. Patient's pacemaker/defibrillator was interrogated by ED charge nurse. I did speak with Yoel, [employer] representative states that the pacemaker defibrillator is functioning appropriately, patient is 100% atrial paced, did have an episode of paroxysmal atrial fibrillation that ended at 10:34 AM this morning but patient is not currently in A. fib. No other malignant tachyarrhythmias, shocks or episodes of V. tach. Initially, patient was declining admission as she was concerned for payment of an observation stay with her insurance. Upon further discussion including risks of a developing malignant cardiac event and her history and risk factors and intermittent persistent chest pain now at rest which is concerning for unstable angina, patient is now amenable with plan for admission. I did consult with Dr. Maurice Montaño - hospitalist - and discussed patient's history, ED presentation/course including any pertinent laboratory findings and imaging study findings. He/she agrees to hospital admission. Patient is admitted to the hospital in stable condition. In consideration of current COVID19 pandemic, with effort to minimize unnecessary provider exposure, this patient was seen at bedside by me independently. However, in compliance with current hospital PHYLLIS/ED protocol, prior to admission I did discuss this patient case with emergency department physician, Dr. [place], who did agree with ED workup/evaluation and plan for admission. Of note, this Pt was NOT admitted to the ICU. Diagnosis and plan discussed in detail with patient who understands and agrees     Disposition referral (if applicable):  No follow-up provider specified.     Disposition medications (if applicable):  New Prescriptions    No medications on file         (Please note that portions of this note may have been completed with a voice recognition program. Efforts were made to edit the dictations but occasionally words are mis-transcribed.)          Zuleika Juarez PA-C  09/05/21 [id number]

## 2021-09-05 NOTE — ED PROVIDER NOTES
12 lead EKG per my interpretation:  AV dual paced at 76  Axis is   Left axis deviation  QTc is  slightly prolonged at 483    Prior EKG to compare with was available and paced rhythm seen on prior with no clinically significant change nor morphology.     MD Asim House MD  09/05/21 [id number]

## 2021-09-05 NOTE — ED NOTES
Pt ambulated to bathroom with gait steady. Pt assisted back to bed and placed back on cardiac monitor. O2 sat 88% on room air.  After patient sitting in bed for short amt of time her O2 sat increased to 93% on room air     Adriano Tang RN  09/05/21 [id number]

## 2021-09-05 NOTE — ED NOTES
Pt states she does not want to be adm for observation.  Korinne,P.A. notified and will be in to speak with patient     Belkis Sawyer RN  09/05/21 2004

## 2021-09-05 NOTE — H&P
History and Physical      Name:  Abdelrahman Reza /Age/Sex: 1940  (78 y.o. female)   MRN & CSN:  [id number] & [id number] Admission Date/Time: 2021 12:09 PM   Location:  ED15/ED-15 PCP: Kassy Awad DO       Hospital Day: 1    Assessment and Plan:   79 y/o F with PMH of CAD s/p CABG in past, atrial fibrillation s/p pacer, Hydrocephalus s/p VPP shunt in past, prior CVA, HTN, HLD, DM, Alzheimer's dementia, Depression, HFpEF, SUE (does not wear CPAP), CKD, COPD, Orthostatic Hypotension, Hyperthyroidism, Chronic bilateral lower back pain that is presenting with chest pain.     Chest Pain   Hx CAD s/p CABG in Past   Hx Afib, s/p Pacer  Hx HFpEF  - Case d/w ED; initial trop <0.010; pacer interrogated in ED with no events noted  - EKG reviewed with no acute ischemic changes  - CXR with no acute pathology  - Not on AC due to prior hx of GIB  - ASA/Statin  - Trend troponin  - EKG in AM  - Cardiology consulted, appreciate recs  - Given episodes of chest pain with swallowing would also benefit from outpatient GI w/u as well  - Tele  - Will monitor    Leukocytosis  - Afebrile  - UA ordered  - Repeat in AM    Chronic Medical Conditions: Home Medications Resumed Unless Contraindicated  SUE-Does not wear CPAP at home  COPD  Hyperthyroidism  Alzheimer's Dementia  Depression  Orthostatic Hypotension  Chronic Bilateral Lower Back Pain  CKD- Cr at baseline  DM- SSI, hypoglycemia protocol  HLD  Iron Deficiency Anemia  Hydrocephalus s/p  Shunt in Past  CVA    Diet No diet orders on file   DVT Prophylaxis [] Lovenox, []  Heparin, [] SCDs, [] Ambulation   GI Prophylaxis [] PPI,  [] H2 Blocker,  [] Carafate,  [] Diet/Tube Feeds   Code Status Prior   Disposition Patient requires continued admission due to    MDM [] Low, [] Moderate,[]  High  Patient's risk as above due to      History of Present Illness:   79 y/o F with PMH of CAD s/p CABG in past, atrial fibrillation, s/p pacer, HTN, HLD, DM, Alzheimer's dementia, Depression, HFpEF, SUE (does not wear CPAP), CKD, COPD, Orthostatic Hypotension, Hyperthyroidism, Chronic bilateral lower back pain that is presenting with chest pain. States it has been going on for 4 days. Lower center of chest, feels like a sharp and at times pressure sensation. Occasional radiation to the back. Some associated SOB but patient states this is normal for her ever since she had COVID in January. No nausea or vomiting. Sometimes worse with swallowing and sometimes worse with ambulation. No recent fevers or chills. No wheezing and denies any lower extremity edema or orthopnea. Ten point ROS reviewed negative, unless as noted above. In ED patient was AFVSS. Labs significant for WBC of 12.9, Troponin <0.010, EKG with no acute ischemic changes, CXR with no acute pathology. Objective:   No intake or output data in the 24 hours ending 09/05/21 1700   Vitals:   Vitals:    09/05/21 1600   BP: (!) 147/59   Pulse: 85   Resp: 30   Temp:    SpO2: 94%     Physical Exam:   GEN Awake female, sitting upright in bed in no apparent distress. Appears given age. EYES Pupils are equally round. No scleral erythema, discharge, or conjunctivitis. HENT Mucous membranes are moist.   NECK Supple, no apparent thyromegaly or masses. RESP Clear to auscultation, no wheezes, rales or rhonchi. Symmetric chest movement while on room air. CARDIO/VASC S1/S2 auscultated. Regular rate without appreciable murmurs, rubs, or gallops. GI Abdomen is soft without significant tenderness, masses, or guarding   No costovertebral angle tenderness  HEME/LYMPH No petechiae or ecchymoses. MSK No gross joint deformities. SKIN Normal coloration, warm, dry. NEURO Cranial nerves appear grossly intact, normal speech  PSYCH Awake, alert, oriented, Affect appropriate.     Past Medical History:      Past Medical History:   Diagnosis Date    Acute diastolic CHF (congestive heart failure) ([facility] [address] 12/23/2014    Acute urinary tract infection 7/20/2019    Single Kidney    Anemia     Chronic low back pain     s/p epidural steroids    [facility]ch[facility])     moderate to severe    Depression     Family history of cardiac disorder     Father, Brother    GERD (gastroesophageal reflux disease)     Goiter     s/p radioactive Iodine/ Low T4    H/O blood clots     right leg after miscarriage    H/O cardiovascular stress test 6/25/13 6/13-WNL EF 70%    H/O cardiovascular stress test 08/10/2017    Normal study EF 70%    H/O: CVA (cerebrovascular accident) 7/20/2019    Heart murmur     History of blood transfusion     2017    History of LIMITED Echocardiogram 11/08/2019    EF 55-60%, Normal left ventricle structure and systolic function , no regional wall motion abnormalities were detected    [place] (hard of hearing)     hearing aides    Hx of blood clots     Hx of echocardiogram 07/11/2013    EF>55%, mild MR, mild TR, abn lVSFx stage 1     HX OTHER MEDICAL     PCP is Dr Zainab Aguirre; Cardiologist is Dr Tanya Zamarripa 7/30/13    Peripheral Angiogram.  Sluggish flow RLE, med mgmt.  Hydrocephalus, adult ([address]     shunt    Hyperlipidemia     Hyperthyroidism 7/20/2019    Kidney stone     \"had stone last summer 2016- passed it\"    Memory loss     Old MI (myocardial infarction) 2001    Osteoarthritis     Pneumonia     recurrent    Solitary kidney     Unspecified cerebral artery occlusion with cerebral infarction     \"they discovered I had stroke in 1993- with CT scan of head but never knew I had it\"     PSHX:  has a past surgical history that includes Hysterectomy (1978); Chest surgery (9580); Kidney surgery (Right, 1967); Tubal ligation (1972); Rotator cuff repair (1997); back surgery (1997); Cholecystectomy (1990); knee surgery (1997); brain surgery (2001); Coronary angioplasty with stent (2001);  Colonoscopy (7/2011); csf shunt; Endoscopy, colon, diagnostic; and Pacemaker insertion (Left,  Feeling of Stress :    Social Connections:     Frequency of Communication with Friends and Family:     Frequency of Social Gatherings with Friends and Family:     Attends Religion Services:     Active Member of Clubs or Organizations:     Attends Club or Organization Meetings:     Marital Status:    Intimate Partner Violence:     Fear of Current or Ex-Partner:     Emotionally Abused:     Physically Abused:     Sexually Abused:        Medications:   Medications:    Infusions:   PRN Meds:       Electronically signed by Huan Rubi MD on 9/5/2021 at 5:00 PM

## 2021-09-05 NOTE — ED NOTES
Report given to Angela Ray and pt transferred to observation room [address] [place] [address]  09/05/21 5790

## 2021-09-06 LAB
GLUCOSE BLD-MCNC: 123 MG/DL (ref 70–99)
GLUCOSE BLD-MCNC: 217 MG/DL (ref 70–99)
GLUCOSE BLD-MCNC: 220 MG/DL (ref 70–99)
GLUCOSE BLD-MCNC: 96 MG/DL (ref 70–99)
HCT VFR BLD CALC: 44.4 % (ref 37–47)
HEMOGLOBIN: 13 GM/DL (ref 12.5–16)
MCH RBC QN AUTO: 31.1 PG (ref 27–31)
MCHC RBC AUTO-ENTMCNC: 29.3 % (ref 32–36)
MCV RBC AUTO: 106.2 FL (ref 78–100)
PDW BLD-RTO: 14.3 % (ref 11.7–14.9)
PLATELET # BLD: 179 K/CU MM (ref 140–440)
PMV BLD AUTO: 13.1 FL (ref 7.5–11.1)
RBC # BLD: 4.18 M/CU MM (ref 4.2–5.4)
WBC # BLD: 14.2 K/CU MM (ref 4–10.5)

## 2021-09-06 PROCEDURE — 94640 AIRWAY INHALATION TREATMENT: CPT

## 2021-09-06 PROCEDURE — 6370000000 HC RX 637 (ALT 250 FOR IP): Performed by: NURSE PRACTITIONER

## 2021-09-06 PROCEDURE — 99222 1ST HOSP IP/OBS MODERATE 55: CPT | Performed by: INTERNAL MEDICINE

## 2021-09-06 PROCEDURE — 96376 TX/PRO/DX INJ SAME DRUG ADON: CPT

## 2021-09-06 PROCEDURE — 85027 COMPLETE CBC AUTOMATED: CPT

## 2021-09-06 PROCEDURE — 6360000002 HC RX W HCPCS: Performed by: INTERNAL MEDICINE

## 2021-09-06 PROCEDURE — 2580000003 HC RX 258: Performed by: INTERNAL MEDICINE

## 2021-09-06 PROCEDURE — G0378 HOSPITAL OBSERVATION PER HR: HCPCS

## 2021-09-06 PROCEDURE — 6370000000 HC RX 637 (ALT 250 FOR IP): Performed by: INTERNAL MEDICINE

## 2021-09-06 PROCEDURE — 93005 ELECTROCARDIOGRAM TRACING: CPT | Performed by: INTERNAL MEDICINE

## 2021-09-06 PROCEDURE — 82962 GLUCOSE BLOOD TEST: CPT

## 2021-09-06 PROCEDURE — C9113 INJ PANTOPRAZOLE SODIUM, VIA: HCPCS | Performed by: INTERNAL MEDICINE

## 2021-09-06 RX ORDER — HYDROCODONE BITARTRATE AND ACETAMINOPHEN 7.5; 325 MG/1; MG/1
1 TABLET ORAL EVERY 6 HOURS PRN
Status: DISCONTINUED | OUTPATIENT
Start: 2021-09-06 | End: 2021-09-08 | Stop reason: HOSPADM

## 2021-09-06 RX ADMIN — ASPIRIN 81 MG: 81 TABLET, CHEWABLE ORAL at 08:21

## 2021-09-06 RX ADMIN — METHIMAZOLE 5 MG: 10 TABLET ORAL at 08:20

## 2021-09-06 RX ADMIN — MIDODRINE HYDROCHLORIDE 5 MG: 5 TABLET ORAL at 18:43

## 2021-09-06 RX ADMIN — FERROUS SULFATE TAB 325 MG (65 MG ELEMENTAL FE) 325 MG: 325 (65 FE) TAB at 20:37

## 2021-09-06 RX ADMIN — MIDODRINE HYDROCHLORIDE 5 MG: 5 TABLET ORAL at 14:56

## 2021-09-06 RX ADMIN — MONTELUKAST 10 MG: 10 TABLET, FILM COATED ORAL at 08:21

## 2021-09-06 RX ADMIN — LIDOCAINE HYDROCHLORIDE: 20 SOLUTION ORAL; TOPICAL at 18:45

## 2021-09-06 RX ADMIN — PRIMIDONE 50 MG: 50 TABLET ORAL at 20:37

## 2021-09-06 RX ADMIN — INSULIN LISPRO 4 UNITS: 100 INJECTION, SOLUTION INTRAVENOUS; SUBCUTANEOUS at 18:40

## 2021-09-06 RX ADMIN — RIVAROXABAN 2.5 MG: 2.5 TABLET, FILM COATED ORAL at 20:39

## 2021-09-06 RX ADMIN — ARIPIPRAZOLE 2 MG: 2 TABLET ORAL at 08:21

## 2021-09-06 RX ADMIN — ATORVASTATIN CALCIUM 40 MG: 40 TABLET, FILM COATED ORAL at 20:37

## 2021-09-06 RX ADMIN — PANTOPRAZOLE SODIUM 40 MG: 40 INJECTION, POWDER, FOR SOLUTION INTRAVENOUS at 08:19

## 2021-09-06 RX ADMIN — HYDROCODONE BITARTRATE AND ACETAMINOPHEN 1 TABLET: 7.5; 325 TABLET ORAL at 19:16

## 2021-09-06 RX ADMIN — ALBUTEROL SULFATE 2 PUFF: 90 AEROSOL, METERED RESPIRATORY (INHALATION) at 04:11

## 2021-09-06 RX ADMIN — DONEPEZIL HYDROCHLORIDE 10 MG: 5 TABLET, FILM COATED ORAL at 20:37

## 2021-09-06 RX ADMIN — ALBUTEROL SULFATE 2 PUFF: 90 AEROSOL, METERED RESPIRATORY (INHALATION) at 10:01

## 2021-09-06 RX ADMIN — MIDODRINE HYDROCHLORIDE 5 MG: 5 TABLET ORAL at 08:20

## 2021-09-06 RX ADMIN — MIRTAZAPINE 15 MG: 15 TABLET, FILM COATED ORAL at 20:37

## 2021-09-06 RX ADMIN — SODIUM CHLORIDE, PRESERVATIVE FREE 10 ML: 5 INJECTION INTRAVENOUS at 20:37

## 2021-09-06 RX ADMIN — CYANOCOBALAMIN TAB 1000 MCG 1000 MCG: 1000 TAB at 08:21

## 2021-09-06 RX ADMIN — RIVAROXABAN 2.5 MG: 2.5 TABLET, FILM COATED ORAL at 10:20

## 2021-09-06 RX ADMIN — HYDROCODONE BITARTRATE AND ACETAMINOPHEN 1 TABLET: 5; 325 TABLET ORAL at 10:20

## 2021-09-06 RX ADMIN — GABAPENTIN 300 MG: 300 CAPSULE ORAL at 14:56

## 2021-09-06 RX ADMIN — DOCUSATE SODIUM 100 MG: 100 CAPSULE, LIQUID FILLED ORAL at 20:37

## 2021-09-06 RX ADMIN — GABAPENTIN 300 MG: 300 CAPSULE ORAL at 08:21

## 2021-09-06 RX ADMIN — GABAPENTIN 300 MG: 300 CAPSULE ORAL at 20:37

## 2021-09-06 RX ADMIN — PRIMIDONE 50 MG: 50 TABLET ORAL at 08:20

## 2021-09-06 RX ADMIN — DOCUSATE SODIUM 100 MG: 100 CAPSULE, LIQUID FILLED ORAL at 08:21

## 2021-09-06 RX ADMIN — SODIUM CHLORIDE, PRESERVATIVE FREE 10 ML: 5 INJECTION INTRAVENOUS at 08:22

## 2021-09-06 RX ADMIN — PRIMIDONE 50 MG: 50 TABLET ORAL at 14:56

## 2021-09-06 ASSESSMENT — PAIN DESCRIPTION - DESCRIPTORS
DESCRIPTORS: ACHING

## 2021-09-06 ASSESSMENT — PAIN DESCRIPTION - PAIN TYPE
TYPE: ACUTE PAIN
TYPE: ACUTE PAIN
TYPE: ACUTE PAIN;CHRONIC PAIN
TYPE: ACUTE PAIN
TYPE: ACUTE PAIN

## 2021-09-06 ASSESSMENT — PAIN DESCRIPTION - LOCATION
LOCATION: CHEST
LOCATION: BACK
LOCATION: CHEST

## 2021-09-06 ASSESSMENT — PAIN DESCRIPTION - FREQUENCY: FREQUENCY: CONTINUOUS

## 2021-09-06 ASSESSMENT — PAIN SCALES - GENERAL
PAINLEVEL_OUTOF10: 4
PAINLEVEL_OUTOF10: 2
PAINLEVEL_OUTOF10: 2
PAINLEVEL_OUTOF10: 7
PAINLEVEL_OUTOF10: 0
PAINLEVEL_OUTOF10: 0
PAINLEVEL_OUTOF10: 2
PAINLEVEL_OUTOF10: 7

## 2021-09-06 ASSESSMENT — PAIN SCALES - WONG BAKER: WONGBAKER_NUMERICALRESPONSE: 0

## 2021-09-06 ASSESSMENT — ENCOUNTER SYMPTOMS
NAUSEA: 0
COUGH: 0
VOMITING: 0
SHORTNESS OF BREATH: 0

## 2021-09-06 NOTE — PROGRESS NOTES
Progress Note  Date:2021       Room:OBS 06/CKE01-43  Patient Kalina Rodrigues     YOB: 1940     Age:81 y.o. Patient seen and examined in the room. Stated that she still have chest pain but not worse than yesterday. Reported absence of fever, chills, or night sweats. No shortness of breath, palpitation, or lightheadedness. Subjective    Subjective:  Symptoms:  Stable. She reports chest pain. No shortness of breath, malaise, cough, weakness, headache, chest pressure, anorexia or anxiety. Diet:  Adequate intake. No nausea or vomiting. Activity level: Normal.    Pain:  She complains of pain that is moderate. She reports pain is unchanged. Pain is well controlled. Review of Systems   Constitutional: Negative for fever. Respiratory: Negative for cough and shortness of breath. Cardiovascular: Positive for chest pain. Gastrointestinal: Negative for anorexia, nausea and vomiting. Neurological: Negative for weakness. Objective         Vitals Last 24 Hours:  TEMPERATURE:  Temp  Av.1 °F (36.7 °C)  Min: 97.7 °F (36.5 °C)  Max: 98.4 °F (36.9 °C)  RESPIRATIONS RANGE: Resp  Av.1  Min: 16  Max: 30  PULSE OXIMETRY RANGE: SpO2  Av.8 %  Min: 90 %  Max: 99 %  PULSE RANGE: Pulse  Av.2  Min: 70  Max: 94  BLOOD PRESSURE RANGE: Systolic (97MPS), DXT:270 , Min:92 , WJO:736   ; Diastolic (76QVF), MELANIE:28, Min:46, Max:71    I/O (24Hr): Intake/Output Summary (Last 24 hours) at 2021 0930  Last data filed at 2021  Gross per 24 hour   Intake 120 ml   Output --   Net 120 ml     Objective:  General Appearance: In no acute distress. Vital signs: (most recent): Blood pressure (!) 114/46, pulse 70, temperature 97.9 °F (36.6 °C), temperature source Oral, resp. rate 24, height 5' 3\" (1.6 m), weight 130 lb (59 kg), SpO2 99 %, not currently breastfeeding. Vital signs are normal.  No fever. Output: Producing urine and producing stool.     Lungs:  Normal effort and normal respiratory rate. Breath sounds clear to auscultation. Heart: Normal rate. Regular rhythm. Abdomen: Abdomen is soft. Bowel sounds are normal.   There is no abdominal tenderness. Neurological: Patient is alert and oriented to person, place and time. Skin:  Warm. Labs/Imaging/Diagnostics    Labs:  CBC:  Recent Labs     09/05/21  1246 09/06/21  0610   WBC 12.9* 14.2*   RBC 3.86* 4.18*   HGB 12.4* 13.0   HCT 40.6 44.4   .2* 106.2*   RDW 13.9 14.3    179     CHEMISTRIES:  Recent Labs     09/05/21  1246      K 4.4      CO2 31   BUN 26*   CREATININE 1.2*   GLUCOSE 102*     PT/INR:No results for input(s): PROTIME, INR in the last 72 hours. APTT:No results for input(s): APTT in the last 72 hours. LIVER PROFILE:  Recent Labs     09/05/21  1246   AST 14*   ALT 11   BILITOT 0.1   ALKPHOS 115       Imaging Last 24 Hours:  XR CHEST PORTABLE    Result Date: 9/5/2021  EXAMINATION: ONE XRAY VIEW OF THE CHEST 9/5/2021 12:26 pm COMPARISON: 03/16/2020. HISTORY: ORDERING SYSTEM PROVIDED HISTORY: chest pain TECHNOLOGIST PROVIDED HISTORY: Reason for exam:->chest pain Reason for Exam: chest pain Acuity: Acute Type of Exam: Initial FINDINGS: Overlying items external to the patient somewhat limit evaluation. Pacer device redemonstrated overlying left hemithorax. Shunt catheter tubing redemonstrated to the right neck, chest and upper abdomen. Visualized portions of the catheter tubing appear to be intact and without definite evidence for kinking. Lungs are clear. Cardiac and mediastinal silhouettes are within normal limits. No pneumothoraces. Bony structures appear intact. No evidence for acute cardiopulmonary process.      Assessment//Plan           Hospital Problems         Last Modified POA    Chest pain 9/5/2021 Yes        Assessment & Plan  77 y/o F with PMH of CAD s/p CABG in past, atrial fibrillation s/p pacer, Hydrocephalus s/p  shunt in past, prior CVA, HTN, HLD, DM, Alzheimer's dementia, Depression, HFpEF, SUE (does not wear CPAP), CKD, COPD, Orthostatic Hypotension, Hyperthyroidism, Chronic bilateral lower back pain that is presenting with chest pain.     #  Chest Pain   Troponin negative x3. EKG showed paced rhythm. Patient stated her chest pain is more like heartburn. Chest x-ray has no acute pathology. Cardiology consulted, recommended stress test in the morning. Continue telemetry monitoring. #  Leukocytosis  WBC 12-14. No evidence of infection. UA is negative for UTI. Continue monitoring. #  Chronic atrial fibrillation status post pacer  Rate at 70 paced rhythm. Not on AC due to history of GI bleeding. However, patient stated she is still taking Xarelto 15 mg daily. #  CKD stage III  Creatinine at the baseline, continue monitoring. #  Other chronic comorbidities including CAD, HFpEF, COPD, diabetes mellitus, and hyperlipidemia  Stable, continue current treatment.      Electronically signed by FREDERICK Rivas CNP on 9/6/21 at 9:30 AM EDT

## 2021-09-06 NOTE — PROGRESS NOTES
Pt up ambulated self with sure and steady gait to the restroom. Pt utilized walker. Pt back to bed tolerated well.

## 2021-09-06 NOTE — CONSULTS
Family history of cardiac disorder, GERD (gastroesophageal reflux disease), Goiter, H/O blood clots, H/O cardiovascular stress test, H/O cardiovascular stress test, H/O: CVA (cerebrovascular accident), Heart murmur, History of blood transfusion, History of LIMITED Echocardiogram, [place] (hard of hearing), Hx of blood clots, Hx of echocardiogram, HX OTHER MEDICAL, HX OTHER MEDICAL, Hydrocephalus, adult ([address] Hyperlipidemia, Hyperthyroidism, Kidney stone, Memory loss, Old MI (myocardial infarction), Osteoarthritis, Pneumonia, Solitary kidney, and Unspecified cerebral artery occlusion with cerebral infarction. Past surgical history:   has a past surgical history that includes Hysterectomy (1978); Chest surgery (1220); Kidney surgery (Right, 1967); Tubal ligation (1972); Rotator cuff repair (1997); back surgery (1997); Cholecystectomy (1990); knee surgery (1997); brain surgery (2001); Coronary angioplasty with stent (2001); Colonoscopy (7/2011); csf shunt; Endoscopy, colon, diagnostic; and Pacemaker insertion (Left, 09/26/2019). Social History:   reports that she has quit smoking. Her smoking use included cigarettes. She started smoking about 60 years ago. She quit after 59.00 years of use. She has never used smokeless tobacco. She reports current alcohol use. She reports that she does not use drugs.   Family history:   no family history of CAD, STROKE of DM    Allergies   Allergen Reactions    Codeine Itching    Bactrim [Sulfamethoxazole-Trimethoprim]      dizziness    Sulfamethoxazole-Trimethoprim        sodium chloride flush 0.9 % injection 5-40 mL, 2 times per day  sodium chloride flush 0.9 % injection 5-40 mL, PRN  0.9 % sodium chloride infusion, PRN  ondansetron (ZOFRAN-ODT) disintegrating tablet 4 mg, Q8H PRN   Or  ondansetron (ZOFRAN) injection 4 mg, Q6H PRN  acetaminophen (TYLENOL) tablet 650 mg, Q6H PRN   Or  acetaminophen (TYLENOL) suppository 650 mg, Q6H PRN  polyethylene glycol (GLYCOLAX) packet 17 g, Daily PRN Colin Ramirez MD        aspirin chewable tablet 81 mg  81 mg Oral Daily Colin Ramirez MD   81 mg at 09/06/21 9610    atorvastatin (LIPITOR) tablet 40 mg  40 mg Oral Nightly Colin Ramirez MD   40 mg at 09/05/21 2104    insulin lispro (HUMALOG) injection vial 0-12 Units  0-12 Units SubCUTAneous TID WC Colin Ramirez MD        insulin lispro (HUMALOG) injection vial 0-6 Units  0-6 Units SubCUTAneous Nightly Colin Ramirez MD   1 Units at 09/05/21 2120    glucose (GLUTOSE) 40 % oral gel 15 g  15 g Oral PRN Colin Ramirez MD        dextrose 50 % IV solution  12.5 g IntraVENous PRN Colin Ramirez MD        glucagon (rDNA) injection 1 mg  1 mg IntraMUSCular PRN Colin Ramirez MD        dextrose 5 % solution  100 mL/hr IntraVENous PRN Colin Ramirez MD        enoxaparin (LOVENOX) injection 30 mg  30 mg SubCUTAneous Daily Colin Ramirez MD        pantoprazole (PROTONIX) injection 40 mg  40 mg IntraVENous Daily Colin Ramirez MD   40 mg at 09/06/21 0819    albuterol sulfate  (90 Base) MCG/ACT inhaler 2 puff  2 puff Inhalation Q6H PRN Colin Ramirez MD   2 puff at 09/06/21 0411    ARIPiprazole (ABILIFY) tablet 2 mg  2 mg Oral Daily Colin Ramirez MD   2 mg at 09/06/21 1918    vitamin B-12 (CYANOCOBALAMIN) tablet 1,000 mcg  1,000 mcg Oral Daily Colin Ramirez MD   1,000 mcg at 09/06/21 3303    docusate sodium (COLACE) capsule 100 mg  100 mg Oral BID Colin Ramirez MD   100 mg at 09/06/21 9621    donepezil (ARICEPT) tablet 10 mg  10 mg Oral Nightly Colin Ramirez MD   10 mg at 09/05/21 1942    ferrous sulfate (IRON 325) tablet 325 mg  325 mg Oral Nightly Colin Ramirez MD   325 mg at 09/05/21 1943    gabapentin (NEURONTIN) capsule 300 mg  300 mg Oral TID Colin Ramirez MD   300 mg at 09/06/21 0821    HYDROcodone-acetaminophen (NORCO) 7.5-325 MG per tablet 1 tablet  1 tablet Oral Q6H PRN Colin Ramirez MD        midodrine (PROAMATINE) tablet 5 mg  5 mg Oral TID  Eron Bowie MD Sarah   5 mg at 09/06/21 0820    mirtazapine (REMERON) tablet 15 mg  15 mg Oral Nightly Fer Simpson MD   15 mg at 09/05/21 1943    montelukast (SINGULAIR) tablet 10 mg  10 mg Oral Daily Fer Simpson MD   10 mg at 09/06/21 5980    primidone (MYSOLINE) tablet 50 mg  50 mg Oral TID Fer Simpson MD   50 mg at 09/06/21 0820    methIMAzole (TAPAZOLE) tablet 5 mg  5 mg Oral Daily Fer Simpson MD   5 mg at 09/06/21 0820         Review of Systems:     · Constitutional: No Fever or Weight Loss   · Eyes: No Decreased Vision  · ENT: No Headaches, Hearing Loss or Vertigo  · Cardiovascular: + chest pain,  + dyspnea on exertion, no palpitations or loss of consciousness  · Respiratory: No cough or wheezing    · Gastrointestinal: No abdominal pain, appetite loss, blood in stools, constipation, diarrhea or heartburn  · Genitourinary: No dysuria, trouble voiding, or hematuria  · Musculoskeletal:  No gait disturbance, weakness or joint complaints  · Integumentary: No rash or pruritis  · Neurological: No TIA or stroke symptoms  · Psychiatric: No anxiety or depression  · Endocrine: No malaise, fatigue or temperature intolerance  · Hematologic/Lymphatic: No bleeding problems, blood clots or swollen lymph nodes  · Allergic/Immunologic: No nasal congestion or hives    All other systems were reviewed and were negative otherwise. Physical Examination:      Vitals:    09/06/21 0800   BP: (!) 114/46   Pulse: 70   Resp: 24   Temp: 97.9 °F (36.6 °C)   SpO2: 99%      Wt Readings from Last 3 Encounters:   09/05/21 130 lb (59 kg)   08/10/21 132 lb (59.9 kg)   07/07/21 129 lb 3.2 oz (58.6 kg)     Body mass index is 23.03 kg/m². General Appearance:  No distress, conversant  Constitutional:  Well developed, Well nourished  HEENT:  Normocephalic, Atraumatic, Oropharynx moist, No oral exudates,   Nose normal. Neck Supple Carotid: no carotid bruit  Eyes:  Conjunctiva normal, No discharge.    Respiratory:    Normal breath sounds, No respiratory distress, No wheezing, no use of accessory muscles, diaphragm movement is normal  No chest Tenderness  Cardiovascular: S1-S2 No murmurs auscultated. No rubs, thrills or gallops. Normal  rhythm. Pedal pulses are normal. No pedal edema  GI:  Soft Non tender, non distended. :  No CVA tenderness. Musculoskeletal:   No tenderness, No cyanosis, No clubbing. Integument:  Warm, Dry, No erythema, No rash. Lymphatic:  No lymphadenopathy noted. Neurologic:  Alert & oriented x 3  No focal deficits noted. Psychiatric:  Affect normal, Judgment normal, Mood normal.       Lab Review     Recent Labs     09/06/21  0610   WBC 14.2*   HGB 13.0   HCT 44.4         Recent Labs     09/05/21  1246      K 4.4      CO2 31   BUN 26*   CREATININE 1.2*     Recent Labs     09/05/21  1246   AST 14*   ALT 11   BILITOT 0.1   ALKPHOS 115     No results for input(s): TROPONINI in the last 72 hours. No results found for: BNP  Lab Results   Component Value Date    INR 0.97 03/16/2020    PROTIME 11.7 03/16/2020         All labs, images, EKGs were personally reviewed      Assessment: 78 y. o.year old with PMH of  has a past medical history of Acute diastolic CHF (congestive heart failure) ([facility] [address] Acute urinary tract infection, Anemia, Chronic low back pain, COPD (chronic obstructive pulmonary disease) ([address] Depression, Family history of cardiac disorder, GERD (gastroesophageal reflux disease), Goiter, H/O blood clots, H/O cardiovascular stress test, H/O cardiovascular stress test, H/O: CVA (cerebrovascular accident), Heart murmur, History of blood transfusion, History of LIMITED Echocardiogram, [place] (hard of hearing), Hx of blood clots, Hx of echocardiogram, HX OTHER MEDICAL, HX OTHER MEDICAL, Hydrocephalus, adult ([address] Hyperlipidemia, Hyperthyroidism, Kidney stone, Memory loss, Old MI (myocardial infarction), Osteoarthritis, Pneumonia, Solitary kidney, and Unspecified cerebral artery occlusion with cerebral infarction. Recommendations:      1. Chest Pain  2. History of coronary artery disease s/p CABG    Obtain Lexiscan MPI for risk stratification  Obtain echocardiogram to rule out structural heart disease  Continue with aspirin  Continue with Lipitor  Patient not on beta-blocker due to history of chronic hypotension      3. History of syncope and orthostatic hypotension: On midodrine. Continue. Of beta-blockers  4. Hyperlipidemia: Continue with statins Lipitor 40 mg daily  5. History of atrial fibrillation, AV fany ablation in 2019 s/p perm pacemaker.   Patient on low dose xarelto at home , low dose - will resume      Thank you for the consult    Dr. Munroe   9/6/2021 9:07 AM

## 2021-09-06 NOTE — PROGRESS NOTES
Pt up ambulates to the restroom with walker. Tolerating well. Pt reports that she rested well. Call light in reach.  Pt watching tv

## 2021-09-06 NOTE — PROGRESS NOTES
Pt requesting \"breathing treatment\" states that after receiving it this morning her chest pain was better. Respiratory called and currently at bedside.

## 2021-09-06 NOTE — PROGRESS NOTES
Pt called out and states that she is having \"chest Pain\". Pt took a sip of water and states that she feels like her esophagus is the cause of her discomfort. Pt medicated with GI cocktail.  Will continue to monitor

## 2021-09-07 ENCOUNTER — APPOINTMENT (OUTPATIENT)
Dept: NUCLEAR MEDICINE | Age: 81
DRG: 313 | End: 2021-09-07
Payer: MEDICARE

## 2021-09-07 LAB
ANION GAP SERPL CALCULATED.3IONS-SCNC: 5 MMOL/L (ref 4–16)
BUN BLDV-MCNC: 30 MG/DL (ref 6–23)
CALCIUM SERPL-MCNC: 10.2 MG/DL (ref 8.3–10.6)
CHLORIDE BLD-SCNC: 99 MMOL/L (ref 99–110)
CO2: 33 MMOL/L (ref 21–32)
CREAT SERPL-MCNC: 1.1 MG/DL (ref 0.6–1.1)
EKG ATRIAL RATE: 71 BPM
EKG ATRIAL RATE: 76 BPM
EKG DIAGNOSIS: NORMAL
EKG DIAGNOSIS: NORMAL
EKG P AXIS: 78 DEGREES
EKG P-R INTERVAL: 180 MS
EKG Q-T INTERVAL: 424 MS
EKG Q-T INTERVAL: 430 MS
EKG QRS DURATION: 144 MS
EKG QRS DURATION: 148 MS
EKG QTC CALCULATION (BAZETT): 457 MS
EKG QTC CALCULATION (BAZETT): 483 MS
EKG R AXIS: -88 DEGREES
EKG R AXIS: 270 DEGREES
EKG T AXIS: 76 DEGREES
EKG T AXIS: 81 DEGREES
EKG VENTRICULAR RATE: 70 BPM
EKG VENTRICULAR RATE: 76 BPM
GFR AFRICAN AMERICAN: 57 ML/MIN/1.73M2
GFR NON-AFRICAN AMERICAN: 47 ML/MIN/1.73M2
GLUCOSE BLD-MCNC: 106 MG/DL (ref 70–99)
GLUCOSE BLD-MCNC: 112 MG/DL (ref 70–99)
GLUCOSE BLD-MCNC: 137 MG/DL (ref 70–99)
LV EF: 58 %
LV EF: 70 %
LVEF MODALITY: NORMAL
LVEF MODALITY: NORMAL
POTASSIUM SERPL-SCNC: 5.1 MMOL/L (ref 3.5–5.1)
SODIUM BLD-SCNC: 137 MMOL/L (ref 135–145)

## 2021-09-07 PROCEDURE — 6360000002 HC RX W HCPCS: Performed by: INTERNAL MEDICINE

## 2021-09-07 PROCEDURE — 80048 BASIC METABOLIC PNL TOTAL CA: CPT

## 2021-09-07 PROCEDURE — G0378 HOSPITAL OBSERVATION PER HR: HCPCS

## 2021-09-07 PROCEDURE — C9113 INJ PANTOPRAZOLE SODIUM, VIA: HCPCS | Performed by: INTERNAL MEDICINE

## 2021-09-07 PROCEDURE — 99232 SBSQ HOSP IP/OBS MODERATE 35: CPT | Performed by: INTERNAL MEDICINE

## 2021-09-07 PROCEDURE — 93306 TTE W/DOPPLER COMPLETE: CPT

## 2021-09-07 PROCEDURE — 93010 ELECTROCARDIOGRAM REPORT: CPT | Performed by: INTERNAL MEDICINE

## 2021-09-07 PROCEDURE — 94640 AIRWAY INHALATION TREATMENT: CPT

## 2021-09-07 PROCEDURE — 6370000000 HC RX 637 (ALT 250 FOR IP): Performed by: INTERNAL MEDICINE

## 2021-09-07 PROCEDURE — 3430000000 HC RX DIAGNOSTIC RADIOPHARMACEUTICAL: Performed by: INTERNAL MEDICINE

## 2021-09-07 PROCEDURE — 82962 GLUCOSE BLOOD TEST: CPT

## 2021-09-07 PROCEDURE — 1200000000 HC SEMI PRIVATE

## 2021-09-07 PROCEDURE — 96376 TX/PRO/DX INJ SAME DRUG ADON: CPT

## 2021-09-07 PROCEDURE — 93017 CV STRESS TEST TRACING ONLY: CPT

## 2021-09-07 PROCEDURE — 6370000000 HC RX 637 (ALT 250 FOR IP): Performed by: NURSE PRACTITIONER

## 2021-09-07 PROCEDURE — 2580000003 HC RX 258: Performed by: INTERNAL MEDICINE

## 2021-09-07 PROCEDURE — A9500 TC99M SESTAMIBI: HCPCS | Performed by: INTERNAL MEDICINE

## 2021-09-07 PROCEDURE — 78452 HT MUSCLE IMAGE SPECT MULT: CPT

## 2021-09-07 PROCEDURE — 94761 N-INVAS EAR/PLS OXIMETRY MLT: CPT

## 2021-09-07 RX ORDER — SUCRALFATE 1 G/1
1 TABLET ORAL EVERY 6 HOURS SCHEDULED
Status: DISCONTINUED | OUTPATIENT
Start: 2021-09-07 | End: 2021-09-08 | Stop reason: HOSPADM

## 2021-09-07 RX ADMIN — ALBUTEROL SULFATE 2 PUFF: 90 AEROSOL, METERED RESPIRATORY (INHALATION) at 01:20

## 2021-09-07 RX ADMIN — MIRTAZAPINE 15 MG: 15 TABLET, FILM COATED ORAL at 20:52

## 2021-09-07 RX ADMIN — FERROUS SULFATE TAB 325 MG (65 MG ELEMENTAL FE) 325 MG: 325 (65 FE) TAB at 20:53

## 2021-09-07 RX ADMIN — MIDODRINE HYDROCHLORIDE 5 MG: 5 TABLET ORAL at 10:27

## 2021-09-07 RX ADMIN — LIDOCAINE HYDROCHLORIDE: 20 SOLUTION ORAL; TOPICAL at 10:30

## 2021-09-07 RX ADMIN — PANTOPRAZOLE SODIUM 40 MG: 40 INJECTION, POWDER, FOR SOLUTION INTRAVENOUS at 10:26

## 2021-09-07 RX ADMIN — RIVAROXABAN 2.5 MG: 2.5 TABLET, FILM COATED ORAL at 20:53

## 2021-09-07 RX ADMIN — CYANOCOBALAMIN TAB 1000 MCG 1000 MCG: 1000 TAB at 10:27

## 2021-09-07 RX ADMIN — GABAPENTIN 300 MG: 300 CAPSULE ORAL at 18:07

## 2021-09-07 RX ADMIN — RIVAROXABAN 2.5 MG: 2.5 TABLET, FILM COATED ORAL at 10:26

## 2021-09-07 RX ADMIN — PRIMIDONE 50 MG: 50 TABLET ORAL at 20:54

## 2021-09-07 RX ADMIN — PRIMIDONE 50 MG: 50 TABLET ORAL at 10:27

## 2021-09-07 RX ADMIN — HYDROCODONE BITARTRATE AND ACETAMINOPHEN 1 TABLET: 7.5; 325 TABLET ORAL at 18:07

## 2021-09-07 RX ADMIN — KIT FOR THE PREPARATION OF TECHNETIUM TC99M SESTAMIBI 10 MILLICURIE: 1 INJECTION, POWDER, LYOPHILIZED, FOR SOLUTION PARENTERAL at 13:10

## 2021-09-07 RX ADMIN — SODIUM CHLORIDE, PRESERVATIVE FREE 10 ML: 5 INJECTION INTRAVENOUS at 10:28

## 2021-09-07 RX ADMIN — ASPIRIN 81 MG: 81 TABLET, CHEWABLE ORAL at 10:26

## 2021-09-07 RX ADMIN — PRIMIDONE 50 MG: 50 TABLET ORAL at 18:07

## 2021-09-07 RX ADMIN — HYDROCODONE BITARTRATE AND ACETAMINOPHEN 1 TABLET: 7.5; 325 TABLET ORAL at 02:20

## 2021-09-07 RX ADMIN — DONEPEZIL HYDROCHLORIDE 10 MG: 5 TABLET, FILM COATED ORAL at 20:51

## 2021-09-07 RX ADMIN — SUCRALFATE 1 G: 1 TABLET ORAL at 18:07

## 2021-09-07 RX ADMIN — METHIMAZOLE 5 MG: 10 TABLET ORAL at 10:26

## 2021-09-07 RX ADMIN — DOCUSATE SODIUM 100 MG: 100 CAPSULE, LIQUID FILLED ORAL at 20:50

## 2021-09-07 RX ADMIN — ARIPIPRAZOLE 2 MG: 2 TABLET ORAL at 10:26

## 2021-09-07 RX ADMIN — KIT FOR THE PREPARATION OF TECHNETIUM TC99M SESTAMIBI 30 MILLICURIE: 1 INJECTION, POWDER, LYOPHILIZED, FOR SOLUTION PARENTERAL at 14:45

## 2021-09-07 RX ADMIN — ATORVASTATIN CALCIUM 40 MG: 40 TABLET, FILM COATED ORAL at 20:52

## 2021-09-07 RX ADMIN — HYDROCODONE BITARTRATE AND ACETAMINOPHEN 1 TABLET: 7.5; 325 TABLET ORAL at 10:27

## 2021-09-07 RX ADMIN — GABAPENTIN 300 MG: 300 CAPSULE ORAL at 20:51

## 2021-09-07 RX ADMIN — REGADENOSON 0.4 MG: 0.08 INJECTION, SOLUTION INTRAVENOUS at 14:37

## 2021-09-07 RX ADMIN — SODIUM CHLORIDE, PRESERVATIVE FREE 10 ML: 5 INJECTION INTRAVENOUS at 20:54

## 2021-09-07 RX ADMIN — MONTELUKAST 10 MG: 10 TABLET, FILM COATED ORAL at 10:27

## 2021-09-07 RX ADMIN — DOCUSATE SODIUM 100 MG: 100 CAPSULE, LIQUID FILLED ORAL at 10:27

## 2021-09-07 RX ADMIN — GABAPENTIN 300 MG: 300 CAPSULE ORAL at 10:27

## 2021-09-07 ASSESSMENT — PAIN DESCRIPTION - PAIN TYPE
TYPE: CHRONIC PAIN
TYPE: CHRONIC PAIN

## 2021-09-07 ASSESSMENT — PAIN DESCRIPTION - LOCATION
LOCATION: BACK

## 2021-09-07 ASSESSMENT — PAIN SCALES - GENERAL
PAINLEVEL_OUTOF10: 7
PAINLEVEL_OUTOF10: 5
PAINLEVEL_OUTOF10: 2
PAINLEVEL_OUTOF10: 2
PAINLEVEL_OUTOF10: 7
PAINLEVEL_OUTOF10: 7
PAINLEVEL_OUTOF10: 8

## 2021-09-07 ASSESSMENT — PAIN DESCRIPTION - DESCRIPTORS
DESCRIPTORS: ACHING

## 2021-09-07 ASSESSMENT — PAIN DESCRIPTION - FREQUENCY: FREQUENCY: CONTINUOUS

## 2021-09-07 ASSESSMENT — PAIN SCALES - WONG BAKER
WONGBAKER_NUMERICALRESPONSE: 0
WONGBAKER_NUMERICALRESPONSE: 0

## 2021-09-07 NOTE — PROGRESS NOTES
CARDIOLOGY PROGRESS NOTE                                                  Name:  Richard Vizcaino /Age/Sex: 1940  (78 y.o. female)   MRN & CSN:  [id number] & [id number] Admission Date/Time: 2021 12:09 PM   Location:  OBS  PCP: Millicent Love DO         Admit Date:  2021  Hospital Day: 3      SUBJECTIVE:   Seen patient as follow up as consultation for CP  No chest pain today   No shortness of breath  No palpations    TELEMETRY: Sinus       Intake/Output Summary (Last 24 hours) at 2021 0857  Last data filed at 2021  Gross per 24 hour   Intake 240 ml   Output --   Net 240 ml       Assessment/Plan:      1. Chest Pain  2. History of coronary artery disease s/p CABG     Obtain Lexiscan MPI for risk stratification  Obtain echocardiogram to rule out structural heart disease  Continue with aspirin  Continue with Lipitor  Patient not on beta-blocker due to history of chronic hypotension        3. History of syncope and orthostatic hypotension: On midodrine. Continue. Off beta-blockers  4. Hyperlipidemia: Continue with statins Lipitor 40 mg daily  5. History of atrial fibrillation, AV fany ablation in 2019 s/p perm pacemaker.   Patient on low dose xarelto at home , low dose - will resume    Past medical history:    has a past medical history of Acute diastolic CHF (congestive heart failure) ([facility] [address] Acute urinary tract infection, Anemia, Chronic low back pain, COPD (chronic obstructive pulmonary disease) ([facility] [address] Depression, Family history of cardiac disorder, GERD (gastroesophageal reflux disease), Goiter, H/O blood clots, H/O cardiovascular stress test, H/O cardiovascular stress test, H/O: CVA (cerebrovascular accident), Heart murmur, History of blood transfusion, History of LIMITED Echocardiogram, [place] (hard of hearing), Hx of blood clots, Hx of echocardiogram, HX OTHER MEDICAL, HX OTHER MEDICAL, Hydrocephalus, adult ([facility] [address] Hyperlipidemia, Hyperthyroidism, Kidney stone, Memory loss, Old MI (myocardial infarction), Osteoarthritis, Pneumonia, Solitary kidney, and Unspecified cerebral artery occlusion with cerebral infarction. Past surgical history:   has a past surgical history that includes Hysterectomy (1978); Chest surgery (0598); Kidney surgery (Right, 1967); Tubal ligation (1972); Rotator cuff repair (1997); back surgery (1997); Cholecystectomy (1990); knee surgery (1997); brain surgery (2001); Coronary angioplasty with stent (2001); Colonoscopy (7/2011); csf shunt; Endoscopy, colon, diagnostic; and Pacemaker insertion (Left, 09/26/2019). Social History:   reports that she has quit smoking. Her smoking use included cigarettes. She started smoking about 60 years ago. She quit after 59.00 years of use. She has never used smokeless tobacco. She reports current alcohol use. She reports that she does not use drugs. Family history:  family history includes Alcohol Abuse in her maternal aunt; Asthma in her mother; Cancer in her maternal grandfather and mother; Diabetes in her brother, brother, mother, paternal grandmother, and sister; Emphysema in her father; Heart Defect in her father; Heart Disease in her brother, father, and mother; Stroke in her father, mother, and paternal grandmother. OBJECTIVE:     BP (!) 110/54   Pulse 70   Temp 98.4 °F (36.9 °C) (Oral)   Resp 21   Ht 5' 3\" (1.6 m)   Wt 130 lb (59 kg)   SpO2 100%   BMI 23.03 kg/m²       Intake/Output Summary (Last 24 hours) at 9/7/2021 0857  Last data filed at 9/6/2021 2056  Gross per 24 hour   Intake 240 ml   Output --   Net 240 ml       Physical Exam:    Constitutional:  Well developed, Well nourished, No acute distress, Non-toxic appearance. HENT:  Normocephalic, Atraumatic, Bilateral external ears normal, Oropharynx moist, No oral exudates, Nose normal. Neck- Normal range of motion, No tenderness, Supple, No stridor. Eyes:  EOMI, Conjunctiva normal, No discharge.    Respiratory:  Normal breath sounds, No respiratory distress, No wheezing, No chest tenderness. ,no use of accessory muscles, diaphragm movement is normal  Cardiovascular S1-S2 No Murmurs, added sounds. Normal rate rhythm. No rubs gallops. Carotid pulses and amplitude are normal no bruit noted. Pedal pulses normal femoral pulses normal.  No pedal edema  GI:  Bowel sounds normal, Soft, No tenderness  : No CVA tenderness. Musculoskeletal: No edema, No tenderness, No cyanosis, No clubbing. Back- No tenderness. Integument:  Warm, Dry, No erythema, No rash. Lymphatic:  No lymphadenopathy noted. Neurologic:  Alert & oriented x 3, No focal deficits noted.    Psychiatric:  Affect normal, Judgment normal, Mood normal.           MEDICATIONS:     GI cocktail   Oral Once    rivaroxaban  2.5 mg Oral BID    sodium chloride flush  5-40 mL IntraVENous 2 times per day    aspirin  81 mg Oral Daily    atorvastatin  40 mg Oral Nightly    insulin lispro  0-12 Units SubCUTAneous TID WC    insulin lispro  0-6 Units SubCUTAneous Nightly    pantoprazole  40 mg IntraVENous Daily    ARIPiprazole  2 mg Oral Daily    vitamin B-12  1,000 mcg Oral Daily    docusate sodium  100 mg Oral BID    donepezil  10 mg Oral Nightly    ferrous sulfate  325 mg Oral Nightly    gabapentin  300 mg Oral TID    midodrine  5 mg Oral TID WC    mirtazapine  15 mg Oral Nightly    montelukast  10 mg Oral Daily    primidone  50 mg Oral TID    methIMAzole  5 mg Oral Daily      sodium chloride      dextrose       GI cocktail, HYDROcodone-acetaminophen, sodium chloride flush, sodium chloride, ondansetron **OR** ondansetron, acetaminophen **OR** acetaminophen, polyethylene glycol, glucose, dextrose, glucagon (rDNA), dextrose, albuterol sulfate HFA  Allergies   Allergen Reactions    Codeine Itching    Bactrim [Sulfamethoxazole-Trimethoprim]      dizziness    Sulfamethoxazole-Trimethoprim        Lab Data:  CBC:   Recent Labs     09/05/21  1246 09/06/21  0610   WBC 12.9* 14.2* HGB 12.4* 13.0   HCT 40.6 44.4   .2* 106.2*    179     BMP:   Recent Labs     09/05/21  1246      K 4.4      CO2 31   BUN 26*   CREATININE 1.2*     LIVER PROFILE:   Recent Labs     09/05/21  1246 09/05/21 1955   AST 14*  --    ALT 11  --    LIPASE  --  42   BILITOT 0.1  --    ALKPHOS 115  --           Ale Marques MD, MD 9/7/2021 8:57 AM

## 2021-09-07 NOTE — PROGRESS NOTES
Hospitalist Progress Note      Name:  Jesus Weathers /Age/Sex: 1940  (78 y.o. female)   MRN & CSN:  [id number] & [id number] Admission Date/Time: 2021 12:09 PM   Location:  OBS [id number] PCP: Dylon Salazar 56 Day: 3    Assessment and Plan:   Jesus Weathers is a 78 y.o.  female  who presents with chest pain     1. Chest Pain suspect GI but can not excluded ACS d/t hx.   1. Troponin neg x 3  2. CXR () neg  3. Consult Cardiology~stress test and echo pending ()  4. Continue baby asa   5. Continue GI cocktail     2. Afib         1. Pacer in place with rate in the 70's         2. Xarelto daily         3. Monitor     3. CKD stage III         1. Creat 1.2 ()         2. Monitor      4. DM type II         1. Hold metformin on admission          2. Monitor glucose levels     5. HFpEF. Last TTE (3/2020) EF 55-60%, G2DD. MV prolapse. MR/TR         1. On lasix at home, held on admission     6. Hyperlipidemia         1. Continue home statin    This patient was seen and examined autonomously  A hospitalist attending physician was available for questions/consultation as needed. Diet Diet NPO   DVT Prophylaxis [] Lovenox, []  Heparin, [] SCDs, [] Ambulation  Chronic AC   GI Prophylaxis [x] PPI,  [] H2 Blocker,  [] Carafate,  [] Diet/Tube Feeds   Code Status Full Code   Disposition obs     History of Present Illness:     Chief Complaint: chest pain   Jesus Weathers is a 78 y.o.  female  who presents with chest pain. Describes epigastric pain. Improved with GI cocktail      No significant events overnight     Ten point ROS reviewed negative, unless as noted above    Objective:        Intake/Output Summary (Last 24 hours) at 2021 1025  Last data filed at 2021  Gross per 24 hour   Intake 240 ml   Output --   Net 240 ml      Vitals:   Vitals:    21 0815   BP: (!) 110/54   Pulse: 70   Resp: 21   Temp: 98.4 °F (36.9 °C)   SpO2: Physical Exam:   GEN Awake female, sitting upright in bed in no apparent distress. Appears given age. EYES Pupils are equally round. No scleral erythema, discharge, or conjunctivitis. HENT Mucous membranes are moist. Oral pharynx without exudates, no evidence of thrush. NECK Supple, no apparent thyromegaly or masses. RESP Clear to auscultation, no wheezes, rales or rhonchi. Symmetric chest movement while on room air. CARDIO/VASC S1/S2 auscultated. Regular rate without appreciable murmurs, rubs, or gallops. No JVD or carotid bruits. Peripheral pulses equal bilaterally and palpable. No peripheral edema. GI Abdomen is soft without significant tenderness, masses, or guarding. Bowel sounds are normoactive. Rectal exam deferred.  No costovertebral angle tenderness. Normal appearing external genitalia. Otero catheter is not present. HEME/LYMPH No palpable cervical lymphadenopathy and no hepatosplenomegaly. No petechiae or ecchymoses. MSK No gross joint deformities. SKIN Normal coloration, warm, dry. NEURO Cranial nerves appear grossly intact, normal speech, no lateralizing weakness. PSYCH  Awake, alert, oriented x 4. Affect appropriate.     Medications:   Medications:    GI cocktail   Oral Once    rivaroxaban  2.5 mg Oral BID    sodium chloride flush  5-40 mL IntraVENous 2 times per day    aspirin  81 mg Oral Daily    atorvastatin  40 mg Oral Nightly    insulin lispro  0-12 Units SubCUTAneous TID     insulin lispro  0-6 Units SubCUTAneous Nightly    pantoprazole  40 mg IntraVENous Daily    ARIPiprazole  2 mg Oral Daily    vitamin B-12  1,000 mcg Oral Daily    docusate sodium  100 mg Oral BID    donepezil  10 mg Oral Nightly    ferrous sulfate  325 mg Oral Nightly    gabapentin  300 mg Oral TID    midodrine  5 mg Oral TID     mirtazapine  15 mg Oral Nightly    montelukast  10 mg Oral Daily    primidone  50 mg Oral TID    methIMAzole  5 mg Oral Daily      Infusions:    sodium

## 2021-09-08 ENCOUNTER — TELEPHONE (OUTPATIENT)
Dept: FAMILY MEDICINE CLINIC | Age: 81
End: 2021-09-08

## 2021-09-08 VITALS
RESPIRATION RATE: 18 BRPM | WEIGHT: 130 LBS | HEART RATE: 70 BPM | SYSTOLIC BLOOD PRESSURE: 107 MMHG | OXYGEN SATURATION: 97 % | TEMPERATURE: 97.4 F | BODY MASS INDEX: 23.04 KG/M2 | HEIGHT: 63 IN | DIASTOLIC BLOOD PRESSURE: 50 MMHG

## 2021-09-08 LAB — GLUCOSE BLD-MCNC: 109 MG/DL (ref 70–99)

## 2021-09-08 PROCEDURE — 6360000002 HC RX W HCPCS: Performed by: INTERNAL MEDICINE

## 2021-09-08 PROCEDURE — 82962 GLUCOSE BLOOD TEST: CPT

## 2021-09-08 PROCEDURE — 6370000000 HC RX 637 (ALT 250 FOR IP): Performed by: INTERNAL MEDICINE

## 2021-09-08 PROCEDURE — 99232 SBSQ HOSP IP/OBS MODERATE 35: CPT | Performed by: INTERNAL MEDICINE

## 2021-09-08 PROCEDURE — 2700000000 HC OXYGEN THERAPY PER DAY

## 2021-09-08 PROCEDURE — 2580000003 HC RX 258: Performed by: INTERNAL MEDICINE

## 2021-09-08 PROCEDURE — 94761 N-INVAS EAR/PLS OXIMETRY MLT: CPT

## 2021-09-08 PROCEDURE — C9113 INJ PANTOPRAZOLE SODIUM, VIA: HCPCS | Performed by: INTERNAL MEDICINE

## 2021-09-08 PROCEDURE — 6370000000 HC RX 637 (ALT 250 FOR IP): Performed by: NURSE PRACTITIONER

## 2021-09-08 PROCEDURE — 94618 PULMONARY STRESS TESTING: CPT

## 2021-09-08 RX ORDER — FUROSEMIDE 20 MG/1
20 TABLET ORAL EVERY OTHER DAY
Qty: 30 TABLET | Refills: 0
Start: 2021-09-08 | End: 2022-01-31

## 2021-09-08 RX ORDER — SUCRALFATE 1 G/1
1 TABLET ORAL 4 TIMES DAILY
Qty: 120 TABLET | Refills: 3 | Status: SHIPPED | OUTPATIENT
Start: 2021-09-08 | End: 2022-01-05 | Stop reason: SDUPTHER

## 2021-09-08 RX ADMIN — PRIMIDONE 50 MG: 50 TABLET ORAL at 14:25

## 2021-09-08 RX ADMIN — SUCRALFATE 1 G: 1 TABLET ORAL at 06:16

## 2021-09-08 RX ADMIN — METHIMAZOLE 5 MG: 10 TABLET ORAL at 08:52

## 2021-09-08 RX ADMIN — HYDROCODONE BITARTRATE AND ACETAMINOPHEN 1 TABLET: 7.5; 325 TABLET ORAL at 06:23

## 2021-09-08 RX ADMIN — MIDODRINE HYDROCHLORIDE 5 MG: 5 TABLET ORAL at 13:15

## 2021-09-08 RX ADMIN — CYANOCOBALAMIN TAB 1000 MCG 1000 MCG: 1000 TAB at 08:53

## 2021-09-08 RX ADMIN — SODIUM CHLORIDE, PRESERVATIVE FREE 10 ML: 5 INJECTION INTRAVENOUS at 08:54

## 2021-09-08 RX ADMIN — GABAPENTIN 300 MG: 300 CAPSULE ORAL at 08:53

## 2021-09-08 RX ADMIN — PRIMIDONE 50 MG: 50 TABLET ORAL at 08:53

## 2021-09-08 RX ADMIN — HYDROCODONE BITARTRATE AND ACETAMINOPHEN 1 TABLET: 7.5; 325 TABLET ORAL at 13:15

## 2021-09-08 RX ADMIN — DOCUSATE SODIUM 100 MG: 100 CAPSULE, LIQUID FILLED ORAL at 08:53

## 2021-09-08 RX ADMIN — ASPIRIN 81 MG: 81 TABLET, CHEWABLE ORAL at 08:53

## 2021-09-08 RX ADMIN — ARIPIPRAZOLE 2 MG: 2 TABLET ORAL at 08:53

## 2021-09-08 RX ADMIN — RIVAROXABAN 2.5 MG: 2.5 TABLET, FILM COATED ORAL at 08:53

## 2021-09-08 RX ADMIN — PANTOPRAZOLE SODIUM 40 MG: 40 INJECTION, POWDER, FOR SOLUTION INTRAVENOUS at 08:54

## 2021-09-08 RX ADMIN — SUCRALFATE 1 G: 1 TABLET ORAL at 01:52

## 2021-09-08 RX ADMIN — MONTELUKAST 10 MG: 10 TABLET, FILM COATED ORAL at 08:53

## 2021-09-08 RX ADMIN — MIDODRINE HYDROCHLORIDE 5 MG: 5 TABLET ORAL at 08:53

## 2021-09-08 RX ADMIN — SUCRALFATE 1 G: 1 TABLET ORAL at 13:15

## 2021-09-08 ASSESSMENT — PAIN SCALES - GENERAL
PAINLEVEL_OUTOF10: 7
PAINLEVEL_OUTOF10: 8

## 2021-09-08 NOTE — PROGRESS NOTES
Spoke to Fili Lopes at [facility]. Pt oxygen tank will be delivered soon. Delivery drive is on their way.

## 2021-09-08 NOTE — PROGRESS NOTES
Doctor Please copy and paste below in your progress note per DME requirements.     Patient was seen in hospital for COPD, CHF . I am prescribing oxygen because the diagnosis and testing requires the patient to have oxygen in the home. Conditions will improve or be benefited by oxygen use. The patient is able to perform good mobility and therefore requires the use of a portable oxygen system for ambulation.

## 2021-09-08 NOTE — PROGRESS NOTES
Pt qualified for home oxygen. Paperwork faxed to [employer]. Please do not discharge pt without oxygen. This testing will  and have to be repeated if pt has not discharged 48 hours from time testing was ordered. Please call [employer] @ [phone] if oxygen has not been delivered prior to pt discharging. Thanks.

## 2021-09-08 NOTE — PROGRESS NOTES
9/8/2021 10:58 AM  Patient Room #: OBS 06/[id number]  Patient Name: Abdelrahman Reza    (Step 1 Done by RN if possible otherwise call Pulmonary Diagnostics)  1. Place patient on room air at rest for at least 30 minutes. If patient falls below 88% before 30 minutes then you can record the level and stop. Record room air saturation level _88_ %. If patient is at 88% or below, they will qualify for home oxygen and you can stop. If level does not fall below 88%, fill in level above. If indicated continue to Step 2. Signature:Cecilia Snell, RRT_ Date: _09/08/2021__  (Step 2&3 Done by [facility])  2. Ambulate patient on room air until saturation falls below 89%. Record level of room air saturation with ambulation___ %. Next, place patient back on ___lpm oxygen and ambulate, record level __%. (Note:  this level must show improvement from room air level done with ambulation.)  If patients saturation on room air with ambulation is 88% or below AND patient shows improvement with oxygen during ambulation, they will qualify for home oxygen and you can stop. If patient does not drop below 89%, then patient should have an overnight oximetry trending on room air to see if level falls below 88%. Complete level in Step 3 below. 3. Room air overnight oximetry level 88 % for___  cumulative minutes. If patients room air oxygen level is < 89% for at least 5 cumulative minutes, patient will qualify for home oxygen and you can stop. (Attach Night Trending Report)    Complete order below: Diagnosis:_COPD , CHF__  Home oxygen at:  Length of Need: X Lifetime ?  3 Months     _2__lpm or __%   via  [x] nasal cannula  []mask  [] other:___         [x]continuous [x]  with activity  [x]  Nocturnal   [x] Portable Tanks [x]  Concentrator        Therapist Signature:__Cecilia Snell, RRT__     Date:  __09/08/2021_  Physician Signature:  __Electronically Signed in EMR_    Date:___  Physician Printed Name:  Alfredo Lundberg APRN__   NPI # ___    [x] Patient Qualifies      [] Patient Does NOT qualify      FREDERICK Hardwick - MARTHA  [phone]

## 2021-09-08 NOTE — PROGRESS NOTES
This nurse placed patient on room air and oxygen saturation dropped to 88%. Placed oxygen patient on 2L per NC, and oxygen saturation 97%. Hadley Gohsh NP notified.

## 2021-09-08 NOTE — DISCHARGE SUMMARY
Discharge Summary    Name:  Ann Lino /Age/Sex: 1940  (78 y.o. female)   MRN & CSN:  [id number] & [id number] Admission Date/Time: 2021 12:09 PM   Attending:  No att. providers found Discharging Provider Dino Anderson, [address] Course:   Ann Lino is a 78 y.o.  female  who presents with <principal problem not specified>    Hospital Course: As outlined below    1. Chest Pain suspect GI. ACS ruled out. 1. Troponin neg x 3  2. CXR () neg  3. Consult Cardiology~stress test ()-shows an EF of 70% with no infarct or ischemia noted  4. Echo () shows an EF of 55-60% Ppm wiring visualized in the right heart, mild to  Moderate tricuspid valve regurgitation   5. Continue baby asa   6. Start carafate-     2. Chronic Resp failure 2/2 COPD        1. Requiring supplemental oxygen to maintain oxygen saturation        2. Home oxygen study completed        3. Patient was seen in hospital for COPD, CHF . I am prescribing oxygen because the diagnosis and testing requires the patient to have oxygen in the home. Conditions will improve or be benefited by oxygen use. The patient is able to perform good mobility and therefore requires the use of a portable oxygen system for ambulation. 3.    Afib- currently sinus arrhythmia with PACs         1. Pacer in place with rate in the 70's         2. Xarelto daily         3. Monitor      4. CKD stage III         1. Creat 1.2 ()            5.    DM type II         1. Resume metformin           2. Monitor glucose levels      6. HFpEF. Last TTE (3/2020) EF 55-60%, G2DD. MV prolapse. MR/TR         1. Resume PO lasiz     7. Hyperlipidemia         1. Continue home statin       The patient expressed appropriate understanding of and agreement with the discharge recommendations, medications, and plan.      Consults this admission:  IP CONSULT TO CASE MANAGEMENT  IP CONSULT TO HOSPITALIST  IP CONSULT TO CARDIOLOGY  IP CONSULT TO HOME CARE NEEDS    Discharge Instruction:   Follow up appointments:   Cardiology 1-2 weeks  Primary care physician:  within 1-2 weeks    Diet:  regular diet   Activity: activity as tolerated  Disposition: Discharged to:   [x]Home, [x]HHC, []SNF, []Acute Rehab, []Hospice   Condition on discharge: Stable    Discharge Medications:      Román Jaramillo   [place] Medication Instructions [id number]    Printed on:09/08/21 1328   Medication Information                      albuterol (PROVENTIL) (5 MG/ML) 0.5% nebulizer solution  Take 1 mL by nebulization 4 times daily             ARIPiprazole (ABILIFY) 2 MG tablet  TAKE 1 TABLET BY MOUTH DAILY             aspirin 81 MG tablet  Take 81 mg by mouth daily             atorvastatin (LIPITOR) 10 MG tablet  Take 1 tablet by mouth daily             blood glucose test strips (FREESTYLE LITE) strip  1 each by In Vitro route daily             CPAP Machine MISC  by Does not apply route             Cyanocobalamin (B-12) 500 MCG TABS  Take 2 tablets by mouth daily             donepezil (ARICEPT) 10 MG tablet  Take 1 tablet by mouth nightly             DULoxetine (CYMBALTA) 60 MG extended release capsule  Take 1 capsule by mouth nightly             ferrous sulfate (IRON 325) 325 (65 Fe) MG tablet  Take 1 tablet by mouth nightly Until you start iv iron             FreeStyle Lancets MISC  1 each by Does not apply route daily             furosemide (LASIX) 20 MG tablet  Take 1 tablet by mouth every other day Takes every other day             gabapentin (NEURONTIN) 300 MG capsule  Take 1 capsule by mouth 3 times daily for 90 days. HYDROcodone-acetaminophen (NORCO) 7.5-325 MG per tablet  Take 0.5-1 tablets by mouth 3 times daily as needed for Pain (m54.5) for up to 30 days.              ipratropium (ATROVENT) 0.06 % nasal spray  1 spray by Each Nostril route 4 times daily as needed for Rhinitis             metFORMIN (GLUCOPHAGE) 500 MG tablet  Take 1 tablet by mouth nightly             methIMAzole (TAPAZOLE) 5 MG tablet               midodrine (PROAMATINE) 5 MG tablet  Take 1 tablet by mouth 3 times daily (with meals)             mirtazapine (REMERON) 15 MG tablet  Take 1 tablet by mouth nightly             montelukast (SINGULAIR) 10 MG tablet  Take 1 tablet by mouth daily             pantoprazole (PROTONIX) 40 MG tablet  Take 1 tablet by mouth 2 times daily             primidone (MYSOLINE) 50 MG tablet  Take 1 tablet by mouth 3 times daily             rivaroxaban (XARELTO) 2.5 MG TABS tablet  Take 1 tablet by mouth 2 times daily             sucralfate (CARAFATE) 1 GM tablet  Take 1 tablet by mouth 4 times daily                 Objective Findings at Discharge:     Vitals:    09/07/21 1800 09/07/21 2045 09/08/21 0834 09/08/21 1158   BP: (!) 128/93 (!) 111/50 (!) 107/50    Pulse: 84 73 70    Resp: 20 20 18    Temp:   97.4 °F (36.3 °C)    TempSrc:   Oral    SpO2:  97%  97%   Weight:       Height:                  Physical Exam: 09/08/21       Physical Exam:  Vitals:  BP (!) 107/50   Pulse 70   Temp 97.4 °F (36.3 °C) (Oral)   Resp 18   Ht 5' 3\" (1.6 m)   Wt 130 lb (59 kg)   SpO2 97%   BMI 23.03 kg/m²   General: Well appearing, and in no distress. Normal body habitus. Eyes: CARLOS ALBERTO. Normal conjunctiva. extraocular motors are intact conjunctiva clear, sclerae white, there is no injection no icterus. ENT/Mouth: Normal appearing jaw and neck, no cervical lymphadenopathy or sinus tenderness. Clear oropharynx with moist mucous membrane. Cardiovascular:  normal rate, regular rhythm, normal S1, S2, no murmurs, rubs, clicks or gallops. There is no tenderness to palpation over the chest wall or over ribs. No peripheral edema. Pedal pulses 2+ bilaterally. Respiratory:diminished but clear, symmetric air movement. Supplemental oxygen  Gastrointestinal: Soft, nontender, nondistended, no masses or organomegaly.  Suprapubic: there is no tenderness to palpation over Technical Quality: Fair visualization Indications:Chest pain. Patient Status: Routine Height: 63 inches Weight: 130 pounds BSA: 1.61 m2 BMI: 23.03 kg/m2 HR: 96 bpm BP: 109/50 mmHg  Conclusions   Summary  Left ventricular systolic function is normal.  Ejection fraction is visually estimated at 55-60%. Mild left ventricular hypertrophy. PPM wiring visualized in right heart. Mild to moderate tricuspid regurgitation; RVSP: 45 mmHg. No evidence of any pericardial effusion. Signature   ------------------------------------------------------------------  Electronically signed by Jerome Cheung MD (Interpreting  physician) on 09/07/2021 at 03:56 PM  ------------------------------------------------------------------   Findings   Left Ventricle  Left ventricular systolic function is normal.  Ejection fraction is visually estimated at 55-60%. Mild left ventricular hypertrophy. No regional wall motion abnormalites. Left ventricle size is normal.  Cannot determine diastolic function due to arrhythmia. Left Atrium  Essentially normal left atrium. Right Atrium  PPM wiring visualized in right atrium. Right Ventricle  PPM wiring visualized within the right ventricle. Aortic Valve  Sclerotic, but non-stenotic aortic valve. Mitral Valve  Mild mitral regurgitation. Tricuspid Valve  Mild to moderate tricuspid regurgitation; RVSP: 45 mmHg. Pulmonic Valve  The pulmonic valve was not well visualized. Pericardial Effusion  No evidence of any pericardial effusion. Pleural Effusion  No evidence of pleural effusion. Miscellaneous  IVC and abdominal aorta are within normal limits.   M-Mode/2D Measurements & Calculations   LV Diastolic Dimension:  LV Systolic Dimension:  LA Dimension: 2.7 cmAO Root  4 cm                     2.23 cm                 Dimension: 2.9 cmLA Area:  LV FS:44.3 %             LV Volume Diastolic: 25 2.53 cm2  LV PW Diastolic: 6.21 cm ml  LV PW Systolic: 7.09 cm  LV Volume Systolic: 8  Septum Diastolic: 1.2 cm ml  Septum Systolic: 3.37 cm LV EDV/LV EDV Index: 25 RV Diastolic Dimension:  CO: 6.22 l/min           ml/16 m2LV ESV/LV ESV   2.94 cm  CI: 2.2 l/m*m2           Index: 8 ml/5 m2                           EF Calculated (A4C): 68 LA/Aorta: 5.96  LV Area Diastolic: 96.1  %  cm2                      EF Calculated (2D): 76  LA volume/Index: 16 ml  LV Area Systolic: 6.3    %                       /10m2  cm2                           LV Length: 5.11 cm                            LVOT: 1.9 cm  Doppler Measurements & Calculations   MV Peak E-Wave: 101  AV Peak Velocity: 133 cm/s    LVOT Peak Velocity: 99.3  cm/s                 AV Peak Gradient: 7.08 mmHg   cm/s  MV Peak A-Wave: 160  AV Mean Velocity: 86.5 cm/s   LVOT Mean Velocity: 65.1  cm/s                 AV Mean Gradient: 3 mmHg      cm/s  MV E/A Ratio: 0.63   AV VTI: 20.4 cm               LVOT Peak Gradient: 4  MV Peak Gradient:    AV Area (Continuity):1.81 cm2 mmHgLVOT Mean Gradient: 2  4.08 mmHg                                          mmHg                       LVOT VTI: 13 cm               Estimated RVSP: 45 mmHg                                                     Estimated RAP:3 mmHg                       Estimated PASP: 39.24 mmHg                                                      TR Velocity:301 cm/s                                                     TR Gradient:36.24 mmHg      XR CHEST PORTABLE    Result Date: 9/5/2021  EXAMINATION: ONE XRAY VIEW OF THE CHEST 9/5/2021 12:26 pm COMPARISON: 03/16/2020. HISTORY: ORDERING SYSTEM PROVIDED HISTORY: chest pain TECHNOLOGIST PROVIDED HISTORY: Reason for exam:->chest pain Reason for Exam: chest pain Acuity: Acute Type of Exam: Initial FINDINGS: Overlying items external to the patient somewhat limit evaluation. Pacer device redemonstrated overlying left hemithorax. Shunt catheter tubing redemonstrated to the right neck, chest and upper abdomen.   Visualized portions of the catheter tubing appear to be intact and without definite evidence for kinking. Lungs are clear. Cardiac and mediastinal silhouettes are within normal limits. No pneumothoraces. Bony structures appear intact. No evidence for acute cardiopulmonary process. NM MYOCARDIAL SPECT REST EXERCISE OR RX    Result Date: 9/7/2021  Cardiac Perfusion Imaging   Demographics   Patient Name      [facility] SALLIE     Date of study        09/07/2021                    Jorge Expose   Date of Birth     1940         Gender               Female   Age               78 year(s)         Race                    Patient Number    [id number]         Room Number          OBS06   Visit Number      [id number]          Height               63 inches   Corporate ID      [id number]           Weight               130 pounds   Accession Number  [id number]                                        NM Technologist      Jennifer Jiménez  Physician         MD                 Cardiologist         MD   Conclusions   Summary  No infarct or ischemia noted. Normal tracer uptake in all segments of myocardium on stress and rest  images. No infarct or ischemia noted. Normal EF >70 % with normal ventricular contractility. Recommendation  Normal Lexiscan MPI   Signatures   ------------------------------------------------------------------  Electronically signed by Johnnie Sagastume MD (Interpreting  cardiologist) on 09/07/2021 at 15:54  ------------------------------------------------------------------  Procedure Procedure Type:   Nuclear Stress Test:Pharmacological, Myocardial Perfusion Imaging with  Pharm, NM MYOCARDIAL SPECT REST EXERCISE OR RX  Indications: Chest pain. Risk Factors   The patient risk factors include:prior PCI;Current - Frequency unknown  tobacco use, hypercholesterolemia, hypertension, diabetes mellitus and  chronic lung disease.   Stress Protocols   Resting ECG  Normal sinus rhythm. Resting HR:80 bpm  Resting BP:135/106 mmHg  Stress Protocol:Pharmacologic - Lexiscan  Peak HR:101 bpm                  HR/BP product:43041  Peak BP:135/106 mmHg  Predicted HR: 137 bpm  % of predicted HR: 74   Exercise duration: 01:15 min  Reason for termination:Completed   Symptoms  No symptoms with Lexiscan infusion. Stress Interpretation  Appropriate hemodynamic response to Lexiscan. No  significant ST T wave changes with adenosine. ECG  portion is negative for ischemia by diagnostic  criteria. Procedure Medications   - Lexiscan I.V. bolus (over 15sec.) 0.4 mg admininstered @ 09/07/2021 14:45. Imaging Protocols   Rest                             Stress   Isotope:Sestamibi 99mTc          Isotope: Sestamibi 99mTc  Isotope dose:8.6 mCi             Isotope dose:26.1 mCi  Administration route: I.V. Administration route: I.V. Injection Date:09/07/2021 13:10  Injection Date:09/07/2021 14:45  Scan Date:09/07/2021 13:55       Scan Date:09/07/2021 15:30   Technique:        SPECT          Technique:        Gated                                                     SPECT   Procedure Description   Upon patient arrival, the patient is identified using two identifiers and  the physician order is verified. An IV is established and 8-11mCi of 99mTc  Sestamibi is intravenously injected and followed with 10mL 0.9% Normal  Saline flush. A circulation period of 45 minutes occurs prior to resting  SPECT imaging. After imaging is complete the patient is escorted to the  stress lab. The patient is connected to the ECG and blood pressure is  measured. The RN starts the stress portion of the exam and rapidly  intravenously injects Lexiscan (regadenosine) 0.4mg over a period of 10 to15  seconds and follows with 5mL 0.9% Normal Saline flush. Immediately following  the Nuclear Technologist intravenously injects 22-33mCi of 99mTc Sestamibi  and 5mL 0.9% Normal Saline flush.  After completion, recovery, and removal of  the IV, the patient rests during the second circulation period of 45  minutes. Final stress SPECT gated imaging is performed. The patient may  return home or to their room after stress imaging. The images are processed  and final charting is completed and sent to the appropriate cardiologist for  interpretation and reporting. Perfusion Interpretation   Normal tracer uptake in all segments of myocardium on stress and rest  images. No infarct or ischemia noted. Normal EF >70 % with normal ventricular contractility. Imaging Results    Summed scores     - Summed stress score: 3     - Summed rest score: 1     - Summed difference score:    2   Rest ejection  Ejection fraction:89 %  EDV :36 ml  ESV :4 ml  Stroke volume :32 ml  Medical History   Accession#:  [id number]  Admission Data Admission date: 09/05/2021 Admission Time: 12:09 Hospital Status: Inpatient.       Discharge Time of < 30 minutes    Electronically signed by FREDERICK Manuel CNP on 9/8/2021 at 1:23 PM

## 2021-09-08 NOTE — CARE COORDINATION
Chart reviewed and met w/ pt to initiate discharge planning. CM introduced self and explained role. Pt is from home w/ her daughter. Pt has PCP and insurance with RX coverage. Pt asked about assistance with Xarelto but per pt conversation with Dr. Mark Sanchez, he informed pt that office staff would assist with her Xarelto at her follow up appointment. Pt states she has a walker, s/c, w/c and raised toilet seat at home. Pt would like [facility] at discharge. Pt states she has had [address] [place] [address] Rd previously and would like them again. Referral sent to [address] liaison. Pt declined any other needs from CM at this time. CM available should needs present.

## 2021-09-08 NOTE — PROGRESS NOTES
[facility] Liaison aware of discharge & will initiate Edel Yepez. Left msg for order with Louise at Dr Jamal Nam office.

## 2021-09-08 NOTE — TELEPHONE ENCOUNTER
[facility] CALLED. PATIENT WILL BE DISCHARGED TODAY. THEY NEED A VERBAL FOR HOME HEALTH.

## 2021-09-08 NOTE — PROGRESS NOTES
CARDIOLOGY PROGRESS NOTE                                                  Name:  Aram Flores /Age/Sex: 1940  (78 y.o. female)   MRN & CSN:  [id number] & [id number] Admission Date/Time: 2021 12:09 PM   Location:  OBS  PCP: Joshua Stacy DO         Admit Date:  2021  Hospital Day: 4      SUBJECTIVE:   Seen patient as follow up as consultation for CP  No chest pain today   No shortness of breath  No palpations    TELEMETRY: Sinus      Assessment/Plan:      1. Chest Pain  2. History of coronary artery disease s/p CABG     Lexiscan MPI for risk stratification: Negative for ischemia   Echocardiogram to rule out structural heart disease: Preserved LVEF, mild LVH, mild pulmonary hypertension  Continue with aspirin  Continue with Lipitor  Patient not on beta-blocker due to history of chronic hypotension        3. History of syncope and orthostatic hypotension: On midodrine. Continue. Off beta-blockers  4. Hyperlipidemia: Continue with statins Lipitor 40 mg daily  5. History of atrial fibrillation, AV fany ablation in 2019 s/p perm pacemaker. Patient on low dose xarelto at home , low dose - will resume    No further cardiac work-up planned at this point.   Patient can be discharged home in stable condition from cardiology standpoint  Follow-up as outpatient      Past medical history:    has a past medical history of Acute diastolic CHF (congestive heart failure) ([address] Acute urinary tract infection, Anemia, Chronic low back pain, COPD (chronic obstructive pulmonary disease) ([address] Depression, Family history of cardiac disorder, GERD (gastroesophageal reflux disease), Goiter, H/O blood clots, H/O cardiovascular stress test, H/O cardiovascular stress test, H/O: CVA (cerebrovascular accident), Heart murmur, History of blood transfusion, History of LIMITED Echocardiogram, [place] (hard of hearing), Hx of blood clots, Hx of echocardiogram, HX OTHER MEDICAL, HX OTHER MEDICAL, Hydrocephalus, adult ([facility] [address] Hyperlipidemia, Hyperthyroidism, Kidney stone, Memory loss, Old MI (myocardial infarction), Osteoarthritis, Pneumonia, Solitary kidney, and Unspecified cerebral artery occlusion with cerebral infarction. Past surgical history:   has a past surgical history that includes Hysterectomy (1978); Chest surgery (9862); Kidney surgery (Right, 1967); Tubal ligation (1972); Rotator cuff repair (1997); back surgery (1997); Cholecystectomy (1990); knee surgery (1997); brain surgery (2001); Coronary angioplasty with stent (2001); Colonoscopy (7/2011); csf shunt; Endoscopy, colon, diagnostic; and Pacemaker insertion (Left, 09/26/2019). Social History:   reports that she has quit smoking. Her smoking use included cigarettes. She started smoking about 60 years ago. She quit after 59.00 years of use. She has never used smokeless tobacco. She reports current alcohol use. She reports that she does not use drugs. Family history:  family history includes Alcohol Abuse in her maternal aunt; Asthma in her mother; Cancer in her maternal grandfather and mother; Diabetes in her brother, brother, mother, paternal grandmother, and sister; Emphysema in her father; Heart Defect in her father; Heart Disease in her brother, father, and mother; Stroke in her father, mother, and paternal grandmother. OBJECTIVE:     BP (!) 107/50   Pulse 70   Temp 97.4 °F (36.3 °C) (Oral)   Resp 18   Ht 5' 3\" (1.6 m)   Wt 130 lb (59 kg)   SpO2 97%   BMI 23.03 kg/m²     No intake or output data in the 24 hours ending 09/08/21 0938    Physical Exam:    Constitutional:  Well developed, Well nourished, No acute distress, Non-toxic appearance. HENT:  Normocephalic, Atraumatic, Bilateral external ears normal, Oropharynx moist, No oral exudates, Nose normal. Neck- Normal range of motion, No tenderness, Supple, No stridor. Eyes:  EOMI, Conjunctiva normal, No discharge.    Respiratory:  Normal breath sounds, No respiratory distress, No wheezing, No chest tenderness. ,no use of accessory muscles, diaphragm movement is normal  Cardiovascular S1-S2 No Murmurs, added sounds. Normal rate rhythm. No rubs gallops. Carotid pulses and amplitude are normal no bruit noted. Pedal pulses normal femoral pulses normal.  No pedal edema  GI:  Bowel sounds normal, Soft, No tenderness  : No CVA tenderness. Musculoskeletal: No edema, No tenderness, No cyanosis, No clubbing. Back- No tenderness. Integument:  Warm, Dry, No erythema, No rash. Lymphatic:  No lymphadenopathy noted. Neurologic:  Alert & oriented x 3, No focal deficits noted.    Psychiatric:  Affect normal, Judgment normal, Mood normal.           MEDICATIONS:     sucralfate  1 g Oral 4 times per day    rivaroxaban  2.5 mg Oral BID    sodium chloride flush  5-40 mL IntraVENous 2 times per day    aspirin  81 mg Oral Daily    atorvastatin  40 mg Oral Nightly    insulin lispro  0-12 Units SubCUTAneous TID WC    insulin lispro  0-6 Units SubCUTAneous Nightly    pantoprazole  40 mg IntraVENous Daily    ARIPiprazole  2 mg Oral Daily    vitamin B-12  1,000 mcg Oral Daily    docusate sodium  100 mg Oral BID    donepezil  10 mg Oral Nightly    ferrous sulfate  325 mg Oral Nightly    gabapentin  300 mg Oral TID    midodrine  5 mg Oral TID WC    mirtazapine  15 mg Oral Nightly    montelukast  10 mg Oral Daily    primidone  50 mg Oral TID    methIMAzole  5 mg Oral Daily      sodium chloride      dextrose       GI cocktail, HYDROcodone-acetaminophen, sodium chloride flush, sodium chloride, ondansetron **OR** ondansetron, acetaminophen **OR** acetaminophen, polyethylene glycol, glucose, dextrose, glucagon (rDNA), dextrose, albuterol sulfate HFA  Allergies   Allergen Reactions    Codeine Itching    Bactrim [Sulfamethoxazole-Trimethoprim]      dizziness    Sulfamethoxazole-Trimethoprim        Lab Data:  CBC:   Recent Labs     09/05/21  1246 09/06/21  0610   WBC 12.9* 14.2*   HGB 12.4* 13.0   HCT 40.6 44.4   .2* 106.2*    179     BMP:   Recent Labs     09/05/21  1246 09/07/21  1040    137   K 4.4 5.1    99   CO2 31 33*   BUN 26* 30*   CREATININE 1.2* 1.1     LIVER PROFILE:   Recent Labs     09/05/21  1246 09/05/21 1955   AST 14*  --    ALT 11  --    LIPASE  --  42   BILITOT 0.1  --    ALKPHOS 115  --           Grecia Rodriguez MD, MD 9/8/2021 9:38 AM

## 2021-09-09 ENCOUNTER — CARE COORDINATION (OUTPATIENT)
Dept: CASE MANAGEMENT | Age: 81
End: 2021-09-09

## 2021-09-10 ENCOUNTER — CARE COORDINATION (OUTPATIENT)
Dept: CASE MANAGEMENT | Age: 81
End: 2021-09-10

## 2021-09-10 ENCOUNTER — PROCEDURE VISIT (OUTPATIENT)
Dept: CARDIOLOGY CLINIC | Age: 81
End: 2021-09-10
Payer: MEDICARE

## 2021-09-10 DIAGNOSIS — I44.0 AV BLOCK, 1ST DEGREE: ICD-10-CM

## 2021-09-10 DIAGNOSIS — I49.5 SINUS NODE DYSFUNCTION (HCC): ICD-10-CM

## 2021-09-10 DIAGNOSIS — Z95.0 CARDIAC PACEMAKER IN SITU: Primary | ICD-10-CM

## 2021-09-10 PROCEDURE — 93294 REM INTERROG EVL PM/LDLS PM: CPT | Performed by: INTERNAL MEDICINE

## 2021-09-10 PROCEDURE — 93296 REM INTERROG EVL PM/IDS: CPT | Performed by: INTERNAL MEDICINE

## 2021-09-10 NOTE — CARE COORDINATION
[facility] Transitions Initial Follow Up Call    Call within 2 business days of discharge: Yes    Patient: Rachel Fothergill Patient : 1940   MRN: [id number]  Reason for Admission:  CP, A Fib  Discharge Date: 21 RARS: Readmission Risk Score: 21    Last Discharge [facility]       Complaint Diagnosis Description Type Department Provider    21 Chest Pain Chest pain, unspecified type . .. ED to Hosp-Admission (Discharged) (ADMITTED) Santy Lopez MD; Nate Hamm... 2nd unsuccessful attempt to reach for Care Transition discharge call. HIPAA compliant message left requesting call back and notification of 21 11am appt [facility]. Episode closed per protocol, no further outreach scheduled.        Elisha Anders RN

## 2021-09-10 NOTE — CARE COORDINATION
[facility] Transitions Initial Follow Up Call    Call within 2 business days of discharge: Yes    Patient: Carlos Gonzalez Patient : 1940   MRN: [id number]  Reason for Admission: CP, A Fib  Discharge Date: 21 RARS: Readmission Risk Score: 21    Last Discharge [facility]       Complaint Diagnosis Description Type Department Provider    21 Chest Pain Chest pain, unspecified type . .. ED to Hosp-Admission (Discharged) (ADMITTED) Jackson Caputo MD; Cesilia Leon... Scheduled 21 11am appt w/ Lissette RAMEY.   1st attempt to reach for Care Transition discharge call unsuccessful. HIPAA compliant message left requesting call back and notification of appt.          Suki Merritt RN

## 2021-09-13 ENCOUNTER — TELEPHONE (OUTPATIENT)
Dept: FAMILY MEDICINE CLINIC | Age: 81
End: 2021-09-13

## 2021-09-13 DIAGNOSIS — J42 CHRONIC BRONCHITIS, UNSPECIFIED CHRONIC BRONCHITIS TYPE (HCC): Primary | ICD-10-CM

## 2021-09-13 NOTE — TELEPHONE ENCOUNTER
----- Message from Sarmad Pichardo sent at 9/10/2021  4:30 PM EDT -----  Subject: Message to Provider    QUESTIONS  Information for Provider? Will from [facility] called   today and wanted to report Valerie Aragon started home health care today. Vitals   were good but had crackles in the left lower lobe of her lungs. No severe   shortness of breath. Will # [phone]   ---------------------------------------------------------------------------  --------------  Arpita To INFO  What is the best way for the office to contact you? OK to leave message on   voicemail  Preferred Call Back Phone Number? [phone]  ---------------------------------------------------------------------------  --------------  SCRIPT ANSWERS  Relationship to Patient?  Third Party  Representative Name? will

## 2021-09-13 NOTE — TELEPHONE ENCOUNTER
Patient improving prior CXR negative 9/5/21.  Patient requesting conservator fax to [facility]

## 2021-09-14 ENCOUNTER — TELEPHONE (OUTPATIENT)
Dept: FAMILY MEDICINE CLINIC | Age: 81
End: 2021-09-14

## 2021-09-14 NOTE — TELEPHONE ENCOUNTER
----- Message from Bryce Madrigal sent at 9/13/2021  1:23 PM EDT -----  Subject: Hospital Follow Up    QUESTIONS  What hospital was the Patient Discharged from? [facility]  Date of Discharge? 2021-09-08  Discharge Location? Home  Reason for hospitalization as patient stated? chest pain, is now on oxygen     What question does the patient have, if applicable? Requesting conserving   Device to be sent to [address] [place] [address]. Please contact Daughter   Danii Whitney.   ---------------------------------------------------------------------------  --------------  CALL BACK INFO  What is the best way for the office to contact you? OK to leave message on   [employer], OK to respond with electronic message via [employer] portal (only   for patients who have registered [employer] account)  Preferred Call Back Phone Number? [phone]  ---------------------------------------------------------------------------  --------------  SCRIPT ANSWERS  Relationship to Patient? Other  Representative Name? Danii Whitney- Daughter   Additional information verified (besides Name and Date of Birth)? Address  (Is the patient requesting to be seen urgently for their symptoms?)? No  Is this follow up request related to routine Diabetes Management? No  Are you having any new concerns about your existing condition? No  (Patient requests to see provider urgently. )? No  (Has the patient been discharged from the hospital within 2 business days   AND does not have a Telephone Encounter  Follow Up From [address] [place] [address]   documented in [address]?)? Yes  Do you have any questions for your primary care provider that need to be   answered prior to your appointment? (Use RN Triage if question pertains to   anything on the red flag list)?  No  (Patient needs follow up visit after hospital discharge) Book first   available appointment within 7 days OF DISCHARGE, if no appt, proceed to   book the next available time slot within 14 days OF DISCHARGE AND Send   Message to Provider. [address] Follow Up appointment cannot be booked   beyond 14 Days and should result in a Message to Provider. ?  Yes

## 2021-09-17 ENCOUNTER — HOSPITAL ENCOUNTER (OUTPATIENT)
Age: 81
Setting detail: SPECIMEN
Discharge: HOME | End: 2021-09-17
Payer: MEDICARE

## 2021-09-17 LAB
T3 FREE: 1.9 PG/ML (ref 2.3–4.2)
T4 FREE: 0.91 NG/DL (ref 0.9–1.8)
TSH HIGH SENSITIVITY: 0.97 UIU/ML (ref 0.27–4.2)

## 2021-09-17 PROCEDURE — 84439 ASSAY OF FREE THYROXINE: CPT

## 2021-09-17 PROCEDURE — 84443 ASSAY THYROID STIM HORMONE: CPT

## 2021-09-17 PROCEDURE — 84481 FREE ASSAY (FT-3): CPT

## 2021-10-06 ENCOUNTER — TELEPHONE (OUTPATIENT)
Dept: FAMILY MEDICINE CLINIC | Age: 81
End: 2021-10-06

## 2021-10-06 ENCOUNTER — VIRTUAL VISIT (OUTPATIENT)
Dept: FAMILY MEDICINE CLINIC | Age: 81
End: 2021-10-06
Payer: MEDICARE

## 2021-10-06 DIAGNOSIS — J44.1 COPD EXACERBATION (HCC): Primary | ICD-10-CM

## 2021-10-06 DIAGNOSIS — J06.9 URI WITH COUGH AND CONGESTION: ICD-10-CM

## 2021-10-06 PROCEDURE — 99443 PR PHYS/QHP TELEPHONE EVALUATION 21-30 MIN: CPT | Performed by: PHYSICIAN ASSISTANT

## 2021-10-06 RX ORDER — AZITHROMYCIN 250 MG/1
250 TABLET, FILM COATED ORAL SEE ADMIN INSTRUCTIONS
Qty: 6 TABLET | Refills: 0 | Status: SHIPPED | OUTPATIENT
Start: 2021-10-06 | End: 2021-10-11

## 2021-10-06 RX ORDER — PREDNISONE 20 MG/1
20 TABLET ORAL 2 TIMES DAILY
Qty: 10 TABLET | Refills: 0 | Status: SHIPPED | OUTPATIENT
Start: 2021-10-06 | End: 2021-10-11

## 2021-10-06 ASSESSMENT — ENCOUNTER SYMPTOMS
SHORTNESS OF BREATH: 0
COUGH: 1
WHEEZING: 1

## 2021-10-06 NOTE — TELEPHONE ENCOUNTER
Will from [facility] called. He feels she is having COPD exacerbation. She is wheezing , with rattle and diminished lung sounds. Will started Mucinex today. She has PRN orders for mucinex. This started yesterday. Vitals are stable. 02 sat currently 95 She has productive cough, clear drainage. Please advise.

## 2021-10-06 NOTE — TELEPHONE ENCOUNTER
Call was transferred to [facility] for   Dr Sage Friday do to the urgency of wheezing  Cough possible lung infection

## 2021-10-06 NOTE — PROGRESS NOTES
Julian Temple is a 78 y.o. female evaluated via telephone on 10/6/2021. Consent:  She and/or health care decision maker is aware that that she may receive a bill for this telephone service, depending on her insurance coverage, and has provided verbal consent to proceed: Yes      Documentation:  I communicated with the patient and/or health care decision maker about recent cough, that she and her daughter both have. Pt also with congestion and sore throat, Pt denies SOB, fever. Pulse ox is 87% on RA, 94% with oxygen. Pt is COPD pt with 2L nc at home. Pt grandson who lives with her was in contact with covid positive 2 weeks ago      Review of Systems   HENT: Positive for congestion. Respiratory: Positive for cough and wheezing. Negative for shortness of breath. Cardiovascular: Negative for chest pain. 1. COPD exacerbation ([address]  Likely due to virus,   - albuterol (PROVENTIL) (5 MG/ML) 0.5% nebulizer solution; Take 1 mL by nebulization 4 times daily  Dispense: 120 mL; Refill: 0  - Covid-19 Ambulatory; Future  - azithromycin (ZITHROMAX) 250 MG tablet; Take 1 tablet by mouth See Admin Instructions for 5 days 500mg on day 1 followed by 250mg on days 2 - 5  Dispense: 6 tablet; Refill: 0  - predniSONE (DELTASONE) 20 MG tablet; Take 1 tablet by mouth 2 times daily for 5 days  Dispense: 10 tablet; Refill: 0    2. URI with cough and congestion  Pt with possible covid exposure  - Covid-19 Ambulatory; Future        I affirm this is a Patient Initiated Episode with a Patient who has not had a related appointment within my department in the past 7 days or scheduled within the next 24 hours.     Patient identification was verified at the start of the visit: Yes    Total Time: minutes: 21-30 minutes    The visit was conducted pursuant to the emergency declaration under the [facility] waiver authority and the [employer] Appropriations Act. Patient identification was verified, and a caregiver was present when appropriate. The patient was located in a state where the provider was credentialed to provide care.     Note: not billable if this call serves to triage the patient into an appointment for the relevant concern      KAROLINE Francis

## 2021-10-08 DIAGNOSIS — M54.50 CHRONIC BILATERAL LOW BACK PAIN WITHOUT SCIATICA: ICD-10-CM

## 2021-10-08 DIAGNOSIS — G89.29 CHRONIC BILATERAL LOW BACK PAIN WITHOUT SCIATICA: ICD-10-CM

## 2021-10-08 RX ORDER — HYDROCODONE BITARTRATE AND ACETAMINOPHEN 7.5; 325 MG/1; MG/1
.5-1 TABLET ORAL 3 TIMES DAILY PRN
Qty: 90 TABLET | Refills: 0 | Status: SHIPPED | OUTPATIENT
Start: 2021-10-08 | End: 2021-11-18 | Stop reason: SDUPTHER

## 2021-10-12 DIAGNOSIS — J44.1 COPD EXACERBATION (HCC): ICD-10-CM

## 2021-10-12 DIAGNOSIS — J42 CHRONIC BRONCHITIS, UNSPECIFIED CHRONIC BRONCHITIS TYPE (HCC): Primary | ICD-10-CM

## 2021-10-12 NOTE — TELEPHONE ENCOUNTER
Patient called to see if she could get a new Nebulizer. Patient states hers Quit working. Patient also states she needs a new Refill of Albuterol. Patient has been have Asthma Attacks badly.

## 2021-10-13 ENCOUNTER — TELEPHONE (OUTPATIENT)
Dept: FAMILY MEDICINE CLINIC | Age: 81
End: 2021-10-13

## 2021-10-13 NOTE — TELEPHONE ENCOUNTER
Daryl from [facility] called and stated that patient finished her antibiotic 10-12-21 and finished the steroids today 10-13-21  Patient has nasty cough  rattling left upper and lower lung  No fever,vitals good,O2 stat good  Patient is using Musinex and does a breathing treatment  [facility] [address]  Call patient and advise

## 2021-10-14 DIAGNOSIS — R05.8 PRODUCTIVE COUGH: ICD-10-CM

## 2021-10-14 DIAGNOSIS — J42 CHRONIC BRONCHITIS, UNSPECIFIED CHRONIC BRONCHITIS TYPE (HCC): Primary | ICD-10-CM

## 2021-10-14 NOTE — TELEPHONE ENCOUNTER
Patient stated she did not get her covid test because her daughter has  the same symptoms as her and tested negative. I advised her Dr. Wendi Obrien wants her to get tested for covid as Jose ordered and if not better tomorrow chest xray. Gave info where she can go.

## 2021-10-20 ENCOUNTER — HOSPITAL ENCOUNTER (OUTPATIENT)
Age: 81
Setting detail: SPECIMEN
Discharge: HOME | End: 2021-10-20
Payer: MEDICARE

## 2021-10-20 PROCEDURE — U0005 INFEC AGEN DETEC AMPLI PROBE: HCPCS

## 2021-10-20 PROCEDURE — U0003 INFECTIOUS AGENT DETECTION BY NUCLEIC ACID (DNA OR RNA); SEVERE ACUTE RESPIRATORY SYNDROME CORONAVIRUS 2 (SARS-COV-2) (CORONAVIRUS DISEASE [COVID-19]), AMPLIFIED PROBE TECHNIQUE, MAKING USE OF HIGH THROUGHPUT TECHNOLOGIES AS DESCRIBED BY CMS-2020-01-R: HCPCS

## 2021-10-21 ENCOUNTER — HOSPITAL ENCOUNTER (OUTPATIENT)
Age: 81
Discharge: HOME | End: 2021-10-21
Payer: MEDICARE

## 2021-10-21 ENCOUNTER — HOSPITAL ENCOUNTER (OUTPATIENT)
Dept: GENERAL RADIOLOGY | Age: 81
Discharge: HOME | End: 2021-10-21
Payer: MEDICARE

## 2021-10-21 DIAGNOSIS — R05.8 PRODUCTIVE COUGH: ICD-10-CM

## 2021-10-21 DIAGNOSIS — J42 CHRONIC BRONCHITIS, UNSPECIFIED CHRONIC BRONCHITIS TYPE (HCC): ICD-10-CM

## 2021-10-21 LAB
SARS-COV-2: DETECTED
SOURCE: ABNORMAL

## 2021-10-21 PROCEDURE — 71046 X-RAY EXAM CHEST 2 VIEWS: CPT

## 2021-10-21 NOTE — TELEPHONE ENCOUNTER
Meds for nebulizer sent 10/6 roberto. Nebulizer machine also sent to [facility] as requested on 10/12. Nebulizer machine ordered to [facility] on 9/15/21. I left phone numbers for [facility]i Dee, and ours if any questions. She just needs to call and verify ready for . Called and left all this info on private VM.

## 2021-11-02 NOTE — RESULT ENCOUNTER NOTE
Please discuss with patient xr showing lower lobe pna.  She was given Rosie Bety just wondering if she is still having symptoms if so likely start 2nd antibiotic

## 2021-11-04 DIAGNOSIS — I95.1 ORTHOSTASIS: ICD-10-CM

## 2021-11-04 RX ORDER — MIDODRINE HYDROCHLORIDE 5 MG/1
5 TABLET ORAL
Qty: 90 TABLET | Refills: 2 | Status: SHIPPED | OUTPATIENT
Start: 2021-11-04 | End: 2021-11-22 | Stop reason: SDUPTHER

## 2021-11-04 RX ORDER — DULOXETIN HYDROCHLORIDE 60 MG/1
60 CAPSULE, DELAYED RELEASE ORAL NIGHTLY
Qty: 90 CAPSULE | Refills: 1 | Status: SHIPPED | OUTPATIENT
Start: 2021-11-04 | End: 2021-11-22 | Stop reason: SDUPTHER

## 2021-11-17 DIAGNOSIS — G89.29 CHRONIC BILATERAL LOW BACK PAIN WITHOUT SCIATICA: ICD-10-CM

## 2021-11-17 DIAGNOSIS — M54.50 CHRONIC BILATERAL LOW BACK PAIN WITHOUT SCIATICA: ICD-10-CM

## 2021-11-18 RX ORDER — HYDROCODONE BITARTRATE AND ACETAMINOPHEN 7.5; 325 MG/1; MG/1
.5-1 TABLET ORAL 3 TIMES DAILY PRN
Qty: 90 TABLET | Refills: 0 | Status: SHIPPED | OUTPATIENT
Start: 2021-11-18 | End: 2021-12-16 | Stop reason: SDUPTHER

## 2021-11-22 ENCOUNTER — OFFICE VISIT (OUTPATIENT)
Dept: FAMILY MEDICINE CLINIC | Age: 81
End: 2021-11-22
Payer: MEDICARE

## 2021-11-22 VITALS
OXYGEN SATURATION: 91 % | DIASTOLIC BLOOD PRESSURE: 68 MMHG | HEART RATE: 71 BPM | HEIGHT: 63 IN | BODY MASS INDEX: 24.13 KG/M2 | WEIGHT: 136.2 LBS | SYSTOLIC BLOOD PRESSURE: 136 MMHG

## 2021-11-22 DIAGNOSIS — M54.50 CHRONIC BILATERAL LOW BACK PAIN WITHOUT SCIATICA: ICD-10-CM

## 2021-11-22 DIAGNOSIS — I95.1 ORTHOSTASIS: ICD-10-CM

## 2021-11-22 DIAGNOSIS — E11.9 TYPE 2 DIABETES MELLITUS WITHOUT COMPLICATION, WITHOUT LONG-TERM CURRENT USE OF INSULIN (HCC): ICD-10-CM

## 2021-11-22 DIAGNOSIS — I10 ESSENTIAL HYPERTENSION: ICD-10-CM

## 2021-11-22 DIAGNOSIS — R25.2 LEG CRAMP: Primary | ICD-10-CM

## 2021-11-22 DIAGNOSIS — G89.29 CHRONIC BILATERAL LOW BACK PAIN WITHOUT SCIATICA: ICD-10-CM

## 2021-11-22 DIAGNOSIS — J41.0 SIMPLE CHRONIC BRONCHITIS (HCC): ICD-10-CM

## 2021-11-22 DIAGNOSIS — R25.2 LEG CRAMP: ICD-10-CM

## 2021-11-22 DIAGNOSIS — I48.0 PAROXYSMAL ATRIAL FIBRILLATION (HCC): ICD-10-CM

## 2021-11-22 LAB
ANION GAP SERPL CALCULATED.3IONS-SCNC: 9 MMOL/L (ref 3–16)
BUN BLDV-MCNC: 20 MG/DL (ref 7–20)
CALCIUM SERPL-MCNC: 9.9 MG/DL (ref 8.3–10.6)
CHLORIDE BLD-SCNC: 103 MMOL/L (ref 99–110)
CO2: 32 MMOL/L (ref 21–32)
CREAT SERPL-MCNC: 1.1 MG/DL (ref 0.6–1.2)
FOLATE: 6.34 NG/ML (ref 4.78–24.2)
GFR AFRICAN AMERICAN: 57
GFR NON-AFRICAN AMERICAN: 47
GLUCOSE BLD-MCNC: 81 MG/DL (ref 70–99)
MAGNESIUM: 2.7 MG/DL (ref 1.8–2.4)
POTASSIUM SERPL-SCNC: 5.5 MMOL/L (ref 3.5–5.1)
SODIUM BLD-SCNC: 144 MMOL/L (ref 136–145)
VITAMIN B-12: 1708 PG/ML (ref 211–911)

## 2021-11-22 PROCEDURE — G8926 SPIRO NO PERF OR DOC: HCPCS | Performed by: STUDENT IN AN ORGANIZED HEALTH CARE EDUCATION/TRAINING PROGRAM

## 2021-11-22 PROCEDURE — 1036F TOBACCO NON-USER: CPT | Performed by: STUDENT IN AN ORGANIZED HEALTH CARE EDUCATION/TRAINING PROGRAM

## 2021-11-22 PROCEDURE — G8400 PT W/DXA NO RESULTS DOC: HCPCS | Performed by: STUDENT IN AN ORGANIZED HEALTH CARE EDUCATION/TRAINING PROGRAM

## 2021-11-22 PROCEDURE — G8420 CALC BMI NORM PARAMETERS: HCPCS | Performed by: STUDENT IN AN ORGANIZED HEALTH CARE EDUCATION/TRAINING PROGRAM

## 2021-11-22 PROCEDURE — 3023F SPIROM DOC REV: CPT | Performed by: STUDENT IN AN ORGANIZED HEALTH CARE EDUCATION/TRAINING PROGRAM

## 2021-11-22 PROCEDURE — 4040F PNEUMOC VAC/ADMIN/RCVD: CPT | Performed by: STUDENT IN AN ORGANIZED HEALTH CARE EDUCATION/TRAINING PROGRAM

## 2021-11-22 PROCEDURE — 90694 VACC AIIV4 NO PRSRV 0.5ML IM: CPT | Performed by: STUDENT IN AN ORGANIZED HEALTH CARE EDUCATION/TRAINING PROGRAM

## 2021-11-22 PROCEDURE — 99214 OFFICE O/P EST MOD 30 MIN: CPT | Performed by: STUDENT IN AN ORGANIZED HEALTH CARE EDUCATION/TRAINING PROGRAM

## 2021-11-22 PROCEDURE — G0008 ADMIN INFLUENZA VIRUS VAC: HCPCS | Performed by: STUDENT IN AN ORGANIZED HEALTH CARE EDUCATION/TRAINING PROGRAM

## 2021-11-22 PROCEDURE — 1090F PRES/ABSN URINE INCON ASSESS: CPT | Performed by: STUDENT IN AN ORGANIZED HEALTH CARE EDUCATION/TRAINING PROGRAM

## 2021-11-22 PROCEDURE — 1123F ACP DISCUSS/DSCN MKR DOCD: CPT | Performed by: STUDENT IN AN ORGANIZED HEALTH CARE EDUCATION/TRAINING PROGRAM

## 2021-11-22 PROCEDURE — G8484 FLU IMMUNIZE NO ADMIN: HCPCS | Performed by: STUDENT IN AN ORGANIZED HEALTH CARE EDUCATION/TRAINING PROGRAM

## 2021-11-22 PROCEDURE — G8427 DOCREV CUR MEDS BY ELIG CLIN: HCPCS | Performed by: STUDENT IN AN ORGANIZED HEALTH CARE EDUCATION/TRAINING PROGRAM

## 2021-11-22 RX ORDER — MIDODRINE HYDROCHLORIDE 5 MG/1
5 TABLET ORAL
Qty: 270 TABLET | Refills: 1 | Status: SHIPPED | OUTPATIENT
Start: 2021-11-22 | End: 2022-02-22 | Stop reason: SDUPTHER

## 2021-11-22 RX ORDER — APIXABAN 2.5 MG/1
1 TABLET, FILM COATED ORAL 2 TIMES DAILY
COMMUNITY
Start: 2021-11-04 | End: 2022-01-05 | Stop reason: SDUPTHER

## 2021-11-22 RX ORDER — DULOXETIN HYDROCHLORIDE 60 MG/1
60 CAPSULE, DELAYED RELEASE ORAL NIGHTLY
Qty: 90 CAPSULE | Refills: 1 | Status: SHIPPED | OUTPATIENT
Start: 2021-11-22 | End: 2022-02-22 | Stop reason: SDUPTHER

## 2021-11-22 RX ORDER — ATORVASTATIN CALCIUM 10 MG/1
10 TABLET, FILM COATED ORAL DAILY
Qty: 90 TABLET | Refills: 1 | Status: SHIPPED | OUTPATIENT
Start: 2021-11-22 | End: 2022-02-22 | Stop reason: SDUPTHER

## 2021-11-22 RX ORDER — GABAPENTIN 300 MG/1
300 CAPSULE ORAL 3 TIMES DAILY
Qty: 270 CAPSULE | Refills: 0 | Status: SHIPPED | OUTPATIENT
Start: 2021-11-22 | End: 2022-02-22 | Stop reason: SDUPTHER

## 2021-11-22 NOTE — LETTER
CONTROLLED SUBSTANCE MEDICATION AGREEMENT     Patient Name: Wilbur Cage  Patient YOB: 1940   I understand, that controlled substance medications may be used to help better manage my symptoms and to improve my ability to function at home, work and in social settings. However, I also understand that these medications do have risks, which have been discussed with me, including possible development of physical or psychological dependence. I understand that successful treatment requires mutual trust and honesty between me and my provider. I understand and agree that following this Medication Agreement is necessary in continuing my provider-patient relationship and the success of my treatment plan. Explanation from my Provider: Benefits and Goals of Controlled Substance Medications: There are two potential goals for your treatment: (1) decreased pain and suffering (2) improved daily life functions. There are many possible treatments for your chronic condition(s). Alternatives such as physical therapy, yoga, massage, home daily exercise, meditation, relaxation techniques, injections, chiropractic manipulations, surgery, cognitive therapy, hypnosis and many medications that are not habit-forming may be used. Use of controlled substance medications may be helpful, but they are unlikely to resolve all symptoms or restore all function. Explanation from my Provider: Risks of Controlled Substance Medications:  Opioid pain medications: These medications can lead to problems such as addiction/dependence, sedation, lightheadedness/dizziness, memory issues, falls, constipation, nausea, or vomiting. They may also impair the ability to drive or operate machinery. Additionally, these medications may lower testosterone levels, leading to loss of bone strength, stamina and sex drive.   They may cause problems with breathing, sleep apnea and reduced coughing, which is especially dangerous for patients with lung disease. Overdose or dangerous interactions with alcohol and other medications may occur, leading to death. Hyperalgesia may develop, which means patients receiving opioids for the treatment of pain may become more sensitive to certain painful stimuli, and in some cases, experience pain from ordinarily non-painful stimuli. Women between the ages of 14-53 who could become pregnant should carefully weigh the risks and benefits of opioids with their physicians, as these medications increase the risk of pregnancy complications, including miscarriage,  delivery and stillbirth. It is also possible for babies to be born addicted to opioids. Opioid dependence withdrawal symptoms may include; feelings of uneasiness, increased pain, irritability, belly pain, diarrhea, sweats and goose-flesh. Benzodiazepines and non-benzodiazepine sleep medications: These medications can lead to problems such as addiction/dependence, sedation, fatigue, lightheadedness, dizziness, incoordination, falls, depression, hallucinations, and impaired judgment, memory and concentration. The ability to drive and operate machinery may also be affected. Abnormal sleep-related behaviors have been reported, including sleepwalking, driving, making telephone calls, eating, or having sex while not fully awake. These medications can suppress breathing and worsen sleep apnea, particularly when combined with alcohol or other sedating medications, potentially leading to death. Dependence withdrawal symptoms may include tremors, anxiety, hallucinations and seizures. Stimulants:  Common adverse effects include addiction/dependence, increased blood  pressure and heart rate, decreased appetite, nausea, involuntary weight loss, insomnia,                                                                                                                     Initials:_______   irritability, and headaches.   These risks may increase when these medications are combined with other stimulants, such as caffeine pills or energy drinks, certain weight loss supplements and oral decongestants. Dependence withdrawal symptoms may include depressed mood, loss of interest, suicidal thoughts, anxiety, fatigue, appetite changes and agitation. Testosterone replacement therapy:  Potential side effects include increased risk of stroke and heart attack, blood clots, increased blood pressure, increased cholesterol, enlarged prostate, sleep apnea, irritability/aggression and other mood disorders, and decreased fertility. I agree and understand that I and my prescriber have the following rights and responsibilities regarding my treatment plan:     1. MY RIGHTS:  To be informed of my treatment and medication plan. To be an active participant in my health and wellbeing. 2. MY RESPONSIBILITY AND UNDERSTANDING FOR USE OF MEDICATIONS   I will take medications at the dose and frequency as directed. For my safety, I will not increase or change how I take my medications without the recommendation of my healthcare provider.  I will actively participate in any program recommended by my provider which may improve function, including social, physical, psychological programs.  I will not take my medications with alcohol or other drugs not prescribed to me. I understand that drinking alcohol with my medications increases the chances of side effects, including reduced breathing rate and could lead to personal injury when operating machinery.  I understand that if I have a history of substance use disorders, including alcohol or other illicit drugs, that I may be at increased risk of addiction to my medications.  I agree to notify my provider immediately if I should become pregnant so that my treatment plan can be adjusted.    I agree and understand that I shall only receive controlled substance medications from the prescriber that signed this agreement unless there is written agreement among other prescribers of controlled substances outlining the responsibility of the medications being prescribed.  I understand that the if the controlled medication is not helping to achieve goals, the dosage may be tapered and no longer prescribed. 3. MY RESPONSIBILITY FOR COMMUNICATION / PRESCRIPTION RENEWALS   I agree that all controlled substance medications that I take will be prescribed only by my provider. If another healthcare provider prescribes me medication in an emergency, I will notify my provider within seventy-two (72) hours.  I will arrange for refills at the prescribed interval ONLY during regular office hours. I will not ask for refills earlier than agreed, after-hours, on holidays or weekends. Refills may take up to 72 hours for processing and prescriptions to reach the pharmacy.  I will inform my other health care providers that I am taking these medications and of the existence of this [address]. In the event of an emergency, I will provide the same information to the emergency department prescribers.  I will keep my provider updated on the pharmacy I am using for controlled medication prescription filling. Initials:_______  4. MY RESPONSIBILITY FOR PROTECTING MEDICATIONS   I will protect my prescriptions and medications. I understand that lost or misplaced prescriptions will not be replaced.  I will keep medications only for my own use and will not share them with others. I will keep all medications away from children.  I agree that if my medications are adjusted or discontinued, I will properly dispose of any remaining medications. I understand that I will be required to dispose of any remaining controlled medications as, directed by my prescriber, prior to being provided with any prescriptions for other controlled medications.   Medication drop box locations can be found at: [email or web address]    5. MY RESPONSIBILITY WITH ILLEGAL DRUGS    I will not use illegal or street drugs or another person's prescription medications not prescribed to me.  If there are identified addiction type symptoms, then referral to a program may be provided by my provider and I agree to follow through with this recommendation. 6. MY RESPONSIBILITY FOR COOPERATION WITH INVESTIGATIONS   I understand that my provider will comply with any applicable law and may discuss my use and/or possible misuse/abuse of controlled substances and alcohol, as appropriate, with any health care provider involved in my care, pharmacist, or legal authority.  I authorize my provider and pharmacy to cooperate fully with law enforcement agencies (as permitted by law) in the investigation of any possible misuse, sale, or other diversion of my controlled substances.  I agree to waive any applicable privilege or right of privacy or confidentiality with respect to these authorizations. 7. PROVIDERS RIGHT TO MONITOR FOR SAFETY: PRESCRIPTION MONITORING / DRUG TESTING   I consent to drug/toxicology screening and will submit to a drug screen upon my providers request to assure I am only taking the prescribed drugs for my safety monitoring. I understand that a drug screen is a laboratory test in which a sample of my urine, blood or saliva is checked to see what drugs I have been taking. This may entail an observed urine specimen, which means that a nurse or other health care provider may watch me provide urine, and I will cooperate if I am asked to provide an observed specimen.  I understand that my provider will check a copy of my State Prescription Monitoring Program () Report in order to safely prescribe medications.  Pill Counts: I consent to pill counts when requested.   I may be asked to bring all my prescribed controlled substance medications, in their original bottles, to all of my scheduled appointments. In addition, my provider may ask me to come to the practice at any time for a random pill count. 8. TERMINATION OF THIS AGREEMENT  For my safety, my prescriber has the right to stop prescribing controlled substance medications and may end this agreement. Initials:_______   Conditions that may result in termination of this agreement:  a. I do not show any improvement in pain, or my activity has not improved. b. I develop rapid tolerance or loss of improvement, as described in my treatment plan.  c. I develop significant side effects from the medication. d. My behavior is not consistent with the responsibilities outlined above, thereby causing safety concerns to continue prescribing controlled substance medications. e. I fail to follow the terms of this agreement. f. Other:____________________________       UNDERSTANDING THIS MEDICATION AGREEMENT:    I have read the above and have had all my questions answered. For chronic disease management, I know that my symptoms can be managed with many types of treatments. A chronic medication trial may be part of my treatment, but I must be an active participant in my care. Medication therapy is only one part of my symptom management plan. In some cases, there may be limited scientific evidence to support the chronic use of certain medications to improve symptoms and daily function. Furthermore, in certain circumstances, there may be scientific information that suggests that the use of chronic controlled substances may worsen my symptoms and increase my risk of unintentional death directly related to this medication therapy. I know that if my provider feels my risk from controlled medications is greater than my benefit, I will have my controlled substance medication(s) compassionately lowered or removed altogether.      I further agree to allow this office to contact my HIPAA contact if there are concerns about my safety and use of the controlled medications. I have agreed to use the prescribed controlled substance medications to me as instructed by my provider and as stated in this Medication Agreement. My initial on each page and my signature below shows that I have read each page and I have had the opportunity to ask questions with answers provided by my provider.     Patient Name (Printed): _____________________________________  Patient Signature:  ______________________   Date: _____________    Prescriber Name (Printed): ___________________________________  Prescriber Signature: _____________________  Date: _____________

## 2021-11-22 NOTE — PROGRESS NOTES
12/8/2021    [facility]    Chief Complaint   Patient presents with    3 Month Follow-Up     flu vaccine    Medication Refill    Leg Swelling     pt reports x's 1 month ,  pt reports leg cramping       HPI  History was obtained from patient. Sondra Guzman is a 78 y.o. female with a PMHx as listed below who presents today for   Follow up on chronic conditions. No acute complaints. Compliant with medications. COPD stable  Memory stable    HR stable, bp stable    1. Leg cramp    2. COPD exacerbation ([address]    3. Type 2 diabetes mellitus without complication, without long-term current use of insulin ([facility]    4. Chronic bilateral low back pain without sciatica    5. Orthostasis    6. Paroxysmal atrial fibrillation (HCC)    7. Essential hypertension             REVIEW OF SYMPTOMS    Review of Systems   Constitutional: Negative for chills and fatigue. HENT: Negative for congestion and sore throat. Respiratory: Negative for shortness of breath and wheezing. Cardiovascular: Negative for chest pain and palpitations. Gastrointestinal: Negative for abdominal pain and nausea. Genitourinary: Negative for frequency and urgency. Musculoskeletal:        +Cramps   Neurological: Negative for light-headedness.        PAST MEDICAL HISTORY  Past Medical History:   Diagnosis Date    Acute diastolic CHF (congestive heart failure) ([facility] 12/23/2014    Acute urinary tract infection 07/20/2019    Single Kidney    Anemia     Chronic low back pain     s/p epidural steroids    COPD (chronic obstructive pulmonary disease) (HCC)     moderate to severe    COVID 10/20/2021    Depression     Family history of cardiac disorder     Father, Brother    GERD (gastroesophageal reflux disease)     Goiter     s/p radioactive Iodine/ Low T4    H/O blood clots     right leg after miscarriage    H/O cardiovascular stress test 06/25/2013 6/13-WNL EF 70%    H/O cardiovascular stress test 08/10/2017    Normal study EF 70%    H/O: CVA (cerebrovascular accident) 07/20/2019    Heart murmur     History of blood transfusion     2017    History of LIMITED Echocardiogram 11/08/2019    EF 55-60%, Normal left ventricle structure and systolic function , no regional wall motion abnormalities were detected    [place] (hard of hearing)     hearing aides    Hx of blood clots     Hx of echocardiogram 07/11/2013    EF>55%, mild MR, mild TR, abn lVSFx stage 1     HX OTHER MEDICAL     PCP is Dr Flores Roberts; Cardiologist is Dr Antoinette Aase 07/30/2013    Peripheral Angiogram.  Sluggish flow RLE, med mgmt.      Hydrocephalus, adult ([address]     shunt    Hyperlipidemia     Hyperthyroidism 07/20/2019    Kidney stone     \"had stone last summer 2016- passed it\"    Memory loss     Old MI (myocardial infarction) 2001    Osteoarthritis     Pneumonia     recurrent    Solitary kidney     Unspecified cerebral artery occlusion with cerebral infarction     \"they discovered I had stroke in 1993- with CT scan of head but never knew I had it\"       FAMILY HISTORY  Family History   Problem Relation Age of Onset    Stroke Mother     Heart Disease Mother     Asthma Mother     Diabetes Mother     Cancer Mother         uterine    Emphysema Father     Stroke Father     Heart Disease Father     Heart Defect Father         hardening of arteries    Diabetes Sister     Diabetes Brother     Heart Disease Brother     Cancer Maternal Grandfather         stomach    Diabetes Paternal Grandmother     Stroke Paternal Grandmother     Diabetes Brother     Alcohol Abuse Maternal Aunt        SOCIAL HISTORY  Social History     Socioeconomic History    Marital status:      Spouse name: None    Number of children: 5    Years of education: None    Highest education level: None   Occupational History    Occupation: mental health counselor retired   Tobacco Use    Smoking status: Former Smoker     Years: 59.00     Types: Cigarettes     Start date: 10/7/1960    Smokeless tobacco: Never Used    Tobacco comment: pt quit 11/4/19   Vaping Use    Vaping Use: Never used   Substance and Sexual Activity    Alcohol use: Yes     Comment: rarely/ average \"2-3 times per year'    Drug use: No    Sexual activity: None   Other Topics Concern    None   Social History Narrative    Do you donate blood or plasma? no    Caffeine intake? Moderate    Advance directive? yes    Is blood transfusion acceptable in an emergency? yes      Social Determinants of Health     Financial Resource Strain: Low Risk     Difficulty of Paying Living Expenses: Not hard at all   Food Insecurity: No Food Insecurity    Worried About Running Out of Food in the Last Year: Never true    Lemuel of Food in the Last Year: Never true   Transportation Needs: No Transportation Needs    Lack of Transportation (Medical): No    Lack of Transportation (Non-Medical):  No   Physical Activity:     Days of Exercise per Week: Not on file    Minutes of Exercise per Session: Not on file   Stress:     Feeling of Stress : Not on file   Social Connections:     Frequency of Communication with Friends and Family: Not on file    Frequency of Social Gatherings with Friends and Family: Not on file    Attends Holiness Services: Not on file    Active Member of [address] [place] [address] or Organizations: Not on file    Attends Club or Organization Meetings: Not on file    Marital Status: Not on file   Intimate Partner Violence:     Fear of Current or Ex-Partner: Not on file    Emotionally Abused: Not on file    Physically Abused: Not on file    Sexually Abused: Not on file   Housing Stability:     Unable to Pay for Housing in the Last Year: Not on file    Number of [place] in the Last Year: Not on file    Unstable Housing in the Last Year: Not on file        SURGICAL HISTORY  Past Surgical History:   Procedure Laterality Date   [place]    disc ruptured   [address]  2001    right shunt    CHEST SURGERY  1953    reconstruction of breast bone and rib-\"p[place] chest\"    CHOLECYSTECTOMY  1990    COLONOSCOPY  7/2011    F/U 7/2016    CORONARY ANGIOPLASTY WITH STENT PLACEMENT  2001    CSF SHUNT      ENDOSCOPY, COLON, DIAGNOSTIC      per old chart pt had EGD done with admission 2/17/2017    HYSTERECTOMY  1978    [facility]-dub/fibroids    KIDNEY SURGERY Right 1967    suspension    KNEE SURGERY  1997    right     PACEMAKER INSERTION Left 09/26/2019    [employer] XT DR ALEX Torres     ROTATOR CUFF REPAIR  1997    right    TUBAL LIGATION  1972                 CURRENT MEDICATIONS  Current Outpatient Medications   Medication Sig Dispense Refill    ELIQUIS 2.5 MG TABS tablet Take 1 tablet by mouth 2 times daily      albuterol (PROVENTIL) (5 MG/ML) 0.5% nebulizer solution Take 1 mL by nebulization 4 times daily 120 mL 0    atorvastatin (LIPITOR) 10 MG tablet Take 1 tablet by mouth daily 90 tablet 1    DULoxetine (CYMBALTA) 60 MG extended release capsule Take 1 capsule by mouth nightly 90 capsule 1    gabapentin (NEURONTIN) 300 MG capsule Take 1 capsule by mouth 3 times daily for 90 days. 270 capsule 0    midodrine (PROAMATINE) 5 MG tablet Take 1 tablet by mouth 3 times daily (with meals) 270 tablet 1    HYDROcodone-acetaminophen (NORCO) 7.5-325 MG per tablet Take 0.5-1 tablets by mouth 3 times daily as needed for Pain (m54.5) for up to 30 days.  90 tablet 0    Nebulizers (COMPRESSOR/NEBULIZER) MISC 1 Device by Does not apply route 3 times daily as needed (for shortness of breath) 1 each 0    furosemide (LASIX) 20 MG tablet Take 1 tablet by mouth every other day Takes every other day 30 tablet 0    sucralfate (CARAFATE) 1 GM tablet Take 1 tablet by mouth 4 times daily 120 tablet 3    Cyanocobalamin (B-12) 500 MCG TABS Take 2 tablets by mouth daily (Patient taking differently: Take 1 tablet by mouth 2 times daily ) 180 tablet 1    ipratropium (ATROVENT) 0.06 % nasal spray 1 spray by Each Nostril route 4 times daily as needed for Rhinitis 1 Bottle 3    ferrous sulfate (IRON 325) 325 (65 Fe) MG tablet Take 1 tablet by mouth nightly Until you start iv iron 90 tablet 1    donepezil (ARICEPT) 10 MG tablet Take 1 tablet by mouth nightly 90 tablet 1    metFORMIN (GLUCOPHAGE) 500 MG tablet Take 1 tablet by mouth nightly 90 tablet 1    montelukast (SINGULAIR) 10 MG tablet Take 1 tablet by mouth daily 90 tablet 1    pantoprazole (PROTONIX) 40 MG tablet Take 1 tablet by mouth 2 times daily 180 tablet 1    primidone (MYSOLINE) 50 MG tablet Take 1 tablet by mouth 3 times daily 270 tablet 1    ARIPiprazole (ABILIFY) 2 MG tablet TAKE 1 TABLET BY MOUTH DAILY      FreeStyle Lancets MISC 1 each by Does not apply route daily 100 each 2    blood glucose test strips (FREESTYLE LITE) strip 1 each by In Vitro route daily 100 each 2    aspirin 81 MG tablet Take 81 mg by mouth daily      methIMAzole (TAPAZOLE) 5 MG tablet       CPAP Machine MISC by Does not apply route (Patient not taking: Reported on 11/22/2021)       No current facility-administered medications for this visit. ALLERGIES  Allergies   Allergen Reactions    Codeine Itching    Bactrim [Sulfamethoxazole-Trimethoprim]      dizziness    Sulfamethoxazole-Trimethoprim        PHYSICAL EXAM    /68   Pulse 71   Ht 5' 3\" (1.6 m)   Wt 136 lb 3.2 oz (61.8 kg)   SpO2 91%   BMI 24.13 kg/m²     Physical Exam  Constitutional:       Appearance: Normal appearance. HENT:      Head: Normocephalic and atraumatic. Eyes:      Extraocular Movements: Extraocular movements intact. Pupils: Pupils are equal, round, and reactive to light. Cardiovascular:      Rate and Rhythm: Normal rate and regular rhythm. Pulses: Normal pulses. Heart sounds: No murmur heard. No friction rub. No gallop. Pulmonary:      Effort: Pulmonary effort is normal.      Breath sounds: Normal breath sounds. Musculoskeletal:      Cervical back: Neck supple. Skin:     General: Skin is warm and dry. Neurological:      General: No focal deficit present. Mental Status: She is alert. Psychiatric:         Mood and Affect: Mood normal.         Behavior: Behavior normal.         ASSESSMENT & PLAN    1. COPD exacerbation (HCC)  -continue atrovent, singulair, albuterol PRN  - albuterol (PROVENTIL) (5 MG/ML) 0.5% nebulizer solution; Take 1 mL by nebulization 4 times daily  Dispense: 120 mL; Refill: 0    2. Type 2 diabetes mellitus without complication, without long-term current use of insulin ([facility])  -DM2 stable on current regimen  - atorvastatin (LIPITOR) 10 MG tablet; Take 1 tablet by mouth daily  Dispense: 90 tablet; Refill: 1  - gabapentin (NEURONTIN) 300 MG capsule; Take 1 capsule by mouth 3 times daily for 90 days. Dispense: 270 capsule; Refill: 0  - BASIC METABOLIC PANEL; Future  - Hemoglobin A1C; Future  - VITAMIN B12 & FOLATE; Future    3. Chronic bilateral low back pain without sciatica  -continue current regimen fair control  - gabapentin (NEURONTIN) 300 MG capsule; Take 1 capsule by mouth 3 times daily for 90 days. Dispense: 270 capsule; Refill: 0    4. Leg cramp  -leg cramps worsening, f/u on labs, consider neurontin  - DULoxetine (CYMBALTA) 60 MG extended release capsule; Take 1 capsule by mouth nightly  Dispense: 90 capsule; Refill: 1  - BASIC METABOLIC PANEL; Future  - MAGNESIUM; Future    5. Orthostasis  - midodrine (PROAMATINE) 5 MG tablet; Take 1 tablet by mouth 3 times daily (with meals)  Dispense: 270 tablet; Refill: 1    6. Paroxysmal atrial fibrillation (HCC)  -continue eliquis    7. Essential hypertension  -bp stable on current regimen    F/u labs worsening cramps  Discussed ok to increase lasix to 5 days a week, plann to follow with nephrology    Return in about 3 months (around 2/22/2022).          Electronically signed by Fanta Segundo DO on 12/8/2021

## 2021-11-23 LAB
ESTIMATED AVERAGE GLUCOSE: 102.5 MG/DL
HBA1C MFR BLD: 5.2 %

## 2021-12-01 ENCOUNTER — OFFICE VISIT (OUTPATIENT)
Dept: CARDIOLOGY CLINIC | Age: 81
End: 2021-12-01
Payer: MEDICARE

## 2021-12-01 VITALS
SYSTOLIC BLOOD PRESSURE: 136 MMHG | HEIGHT: 63 IN | DIASTOLIC BLOOD PRESSURE: 70 MMHG | BODY MASS INDEX: 24.98 KG/M2 | HEART RATE: 72 BPM | WEIGHT: 141 LBS

## 2021-12-01 DIAGNOSIS — I10 ESSENTIAL HYPERTENSION: ICD-10-CM

## 2021-12-01 DIAGNOSIS — Z95.0 PACEMAKER: ICD-10-CM

## 2021-12-01 DIAGNOSIS — I48.0 PAROXYSMAL ATRIAL FIBRILLATION (HCC): ICD-10-CM

## 2021-12-01 DIAGNOSIS — I25.10 CORONARY ARTERY DISEASE INVOLVING NATIVE CORONARY ARTERY OF NATIVE HEART WITHOUT ANGINA PECTORIS: Primary | ICD-10-CM

## 2021-12-01 DIAGNOSIS — G47.33 OSA (OBSTRUCTIVE SLEEP APNEA): ICD-10-CM

## 2021-12-01 DIAGNOSIS — Z98.890 S/P ATRIOVENTRICULAR NODAL ABLATION: ICD-10-CM

## 2021-12-01 DIAGNOSIS — E11.9 TYPE 2 DIABETES MELLITUS WITHOUT COMPLICATION, WITHOUT LONG-TERM CURRENT USE OF INSULIN (HCC): ICD-10-CM

## 2021-12-01 PROCEDURE — G8420 CALC BMI NORM PARAMETERS: HCPCS | Performed by: INTERNAL MEDICINE

## 2021-12-01 PROCEDURE — 4040F PNEUMOC VAC/ADMIN/RCVD: CPT | Performed by: INTERNAL MEDICINE

## 2021-12-01 PROCEDURE — G8427 DOCREV CUR MEDS BY ELIG CLIN: HCPCS | Performed by: INTERNAL MEDICINE

## 2021-12-01 PROCEDURE — 1036F TOBACCO NON-USER: CPT | Performed by: INTERNAL MEDICINE

## 2021-12-01 PROCEDURE — G8400 PT W/DXA NO RESULTS DOC: HCPCS | Performed by: INTERNAL MEDICINE

## 2021-12-01 PROCEDURE — 1090F PRES/ABSN URINE INCON ASSESS: CPT | Performed by: INTERNAL MEDICINE

## 2021-12-01 PROCEDURE — 1123F ACP DISCUSS/DSCN MKR DOCD: CPT | Performed by: INTERNAL MEDICINE

## 2021-12-01 PROCEDURE — 99213 OFFICE O/P EST LOW 20 MIN: CPT | Performed by: INTERNAL MEDICINE

## 2021-12-01 PROCEDURE — G8484 FLU IMMUNIZE NO ADMIN: HCPCS | Performed by: INTERNAL MEDICINE

## 2021-12-01 NOTE — LETTER
[address]  [address] [address]  Phone: [phone]  Fax: [phone]    Emiliano Meek MD    December 1, 2021     Abby Jackson DO  [address] [address]    Patient: Julian Temple   MR Number: [id number]   YOB: 1940   Date of Visit: 12/1/2021       Dear Abby Jackson: Thank you for referring Samaria Daly to me for evaluation/treatment. Below are the relevant portions of my assessment and plan of care. If you have questions, please do not hesitate to call me. I look forward to following Sondra Guzman along with you.     Sincerely,      Emiliano Meek MD 10-Gerson-2020 19:41

## 2021-12-01 NOTE — PATIENT INSTRUCTIONS
CORONARY ARTERY DISEASE:Yes   clinically stable. Patient is on optimal medical regimen ( see medication list above )  -   Patient is currently  asymptomatic from CAD. Symptoms reported are atypical.           - changes in  treatment: no. Patient is on ASA & Statin           - Testing ordered:  no  [place] classification: 1  [place] RISK SCORE:  [place] RISK SCORE:  HTN:well controlled on current medical regimen, see list above.              - changes in  treatment:  no   CARDIOMYOPATHY: None known   CONGESTIVE HEART FAILURE:  HISTORY of in the past.      VHD: No significant VHD noted  DYSLIPIDEMIA: Patient's profile is at / near [facility] is low                                Patient is not on any medications. Takes Lipitor                                See most recent Lab values in Labs section above.   Diabetes mellitis: .yes,                                      EYYZYBM by family MD                                     BS under good control yes,                                      Hgb A1c avilable yes,      ARRHYTHMIAS: known                                CWUJAYESHA has H/O Atrial fibrillation                                She is on anticoagulation. SUE: Does not wear C-Pap    PACEMAKER device check results reviewed & explained to the patient. > 10 years of battery still    ? CVI; patient has been wearing compression stockings. Will order venous US. TESTS ORDERED: Venous US     PREVIOUSLY ORDERED TESTS REVIEWED & DISCUSSED WITH THE PATIENT:     I personally reviewed & interpreted, all previously ordered tests as copied above. Latest Labs are pulled in to the note with dates. Labs, specially in Reference to Lipid profile, Cardiac testing in the form of Echo, stress tests & other relevant cardiac testing reviewed with patient & recommendations made based on assessment of the results.      MEDICATIONS: List of medications patient is currently taking is reviewed in detail with the patient & family member present. Discussed any side effects or problems taking the medication. Recommend Continue present management & medications as listed. AFFIRMATION: I spent at least 20 minutes of time reviewing patient's history, previous & current medical problems & all Labs + testing. This includes chart prep even prior to the vosit. Various goals are discussed and multiple questions answered. Relevant concelling performed. Office follow up in six months. Device check per protocol.

## 2021-12-01 NOTE — PROGRESS NOTES
OFFICE PROGRESS NOTES      Travis Franco is a 78 y.o. female who has    CHIEF COMPLAINT AS FOLLOWS:  CHEST PAIN: Patient denies any C/O chest pains at this time. SOB: No C/O SOB at this time. LEG EDEMA:    B/L Lower extremity edema is present, going on for a month or two. Also gets bad night time cramps. PALPITATIONS: Denies any C/O Palpitations   DIZZINESS: C/O positional Dizziness but no black out spells. SYNCOPE: None   OTHER:                                     HPI: Patient is here for F/U on her CAD, A-fib- Arrhythmia, PPM, HTN & Dyslipidemia problems. CAD: Patient has known CAD. Had angioplasty in the past.  Arrhythmia: Patient has known  A-fib. HTN: Patient has known essential HTN. Has been treated with guideline recommended medical / physical/ diet therapy as stated below. Dyslipidemia: Patient has known mixed dyslipidemia. Has been treated with guideline recommended medical / physical/ diet therapy as stated below. Current Outpatient Medications   Medication Sig Dispense Refill    ELIQUIS 2.5 MG TABS tablet Take 1 tablet by mouth 2 times daily      albuterol (PROVENTIL) (5 MG/ML) 0.5% nebulizer solution Take 1 mL by nebulization 4 times daily 120 mL 0    atorvastatin (LIPITOR) 10 MG tablet Take 1 tablet by mouth daily 90 tablet 1    DULoxetine (CYMBALTA) 60 MG extended release capsule Take 1 capsule by mouth nightly 90 capsule 1    gabapentin (NEURONTIN) 300 MG capsule Take 1 capsule by mouth 3 times daily for 90 days. 270 capsule 0    midodrine (PROAMATINE) 5 MG tablet Take 1 tablet by mouth 3 times daily (with meals) 270 tablet 1    HYDROcodone-acetaminophen (NORCO) 7.5-325 MG per tablet Take 0.5-1 tablets by mouth 3 times daily as needed for Pain (m54.5) for up to 30 days.  90 tablet 0    Nebulizers (COMPRESSOR/NEBULIZER) MISC 1 Device by Does not apply route 3 times daily as needed (for shortness of breath) 1 each 0    furosemide (LASIX) 20 MG tablet Take 1 tablet by mouth every other day Takes every other day 30 tablet 0    sucralfate (CARAFATE) 1 GM tablet Take 1 tablet by mouth 4 times daily 120 tablet 3    Cyanocobalamin (B-12) 500 MCG TABS Take 2 tablets by mouth daily (Patient taking differently: Take 1 tablet by mouth 2 times daily ) 180 tablet 1    ipratropium (ATROVENT) 0.06 % nasal spray 1 spray by Each Nostril route 4 times daily as needed for Rhinitis 1 Bottle 3    ferrous sulfate (IRON 325) 325 (65 Fe) MG tablet Take 1 tablet by mouth nightly Until you start iv iron 90 tablet 1    donepezil (ARICEPT) 10 MG tablet Take 1 tablet by mouth nightly 90 tablet 1    metFORMIN (GLUCOPHAGE) 500 MG tablet Take 1 tablet by mouth nightly 90 tablet 1    montelukast (SINGULAIR) 10 MG tablet Take 1 tablet by mouth daily 90 tablet 1    pantoprazole (PROTONIX) 40 MG tablet Take 1 tablet by mouth 2 times daily 180 tablet 1    primidone (MYSOLINE) 50 MG tablet Take 1 tablet by mouth 3 times daily 270 tablet 1    ARIPiprazole (ABILIFY) 2 MG tablet TAKE 1 TABLET BY MOUTH DAILY      methIMAzole (TAPAZOLE) 5 MG tablet       FreeStyle Lancets MISC 1 each by Does not apply route daily 100 each 2    blood glucose test strips (FREESTYLE LITE) strip 1 each by In Vitro route daily 100 each 2    aspirin 81 MG tablet Take 81 mg by mouth daily      CPAP Machine MISC by Does not apply route (Patient not taking: Reported on 11/22/2021)       No current facility-administered medications for this visit.      Allergies: Codeine, Bactrim [sulfamethoxazole-trimethoprim], and Sulfamethoxazole-trimethoprim  Review of Systems:    Constitutional: Negative for diaphoresis and fatigue  Respiratory: Negative for shortness of breath  Cardiovascular: Negative for chest pain, dyspnea on exertion, claudication, edema, irregular heartbeat, murmur, palpitations or shortness of breath  Musculoskeletal: Negative for muscle pain, muscular weakness, negative for pain in arm and leg or swelling in foot and leg    Objective:  /70   Pulse 72   Ht 5' 3\" (1.6 m)   Wt 141 lb (64 kg)   BMI 24.98 kg/m²   Wt Readings from Last 3 Encounters:   12/01/21 141 lb (64 kg)   11/22/21 136 lb 3.2 oz (61.8 kg)   09/05/21 130 lb (59 kg)     Body mass index is 24.98 kg/m². GENERAL - Alert, oriented, pleasant, in no apparent distress. EYES: No jaundice, no conjunctival pallor. Neck - Supple. No jugular venous distention noted. No carotid bruits. Cardiovascular - Normal S1 and S2 without obvious murmur or gallop. Extremities - No cyanosis, clubbing, there is significant edema of both Legs. Pulmonary - No respiratory distress. No wheezes or rales.       MEDICAL DECISION MAKING & DATA REVIEW:    Lab Review   Lab Results   Component Value Date    TROPONINT <0.010 09/05/2021    TROPONINT <0.010 09/05/2021     Lab Results   Component Value Date    PROBNP 1,187 09/05/2021    PROBNP 429.2 11/06/2019     Lab Results   Component Value Date    INR 0.97 03/16/2020    INR 0.93 01/06/2020     Lab Results   Component Value Date    LABA1C 5.2 11/22/2021    LABA1C 5.6 03/05/2017     Lab Results   Component Value Date    WBC 14.2 (H) 09/06/2021    WBC 12.9 (H) 09/05/2021    HCT 44.4 09/06/2021    HCT 40.6 09/05/2021    .2 (H) 09/06/2021    .2 (H) 09/05/2021     09/06/2021     09/05/2021     Lab Results   Component Value Date    CHOL 133 09/27/2019    CHOL 137 01/10/2014    TRIG 88 09/27/2019    TRIG 78 06/12/2016    HDL 62 09/27/2019    HDL 60 (L) 01/10/2014    LDLCALC 125 06/26/2013    LDLDIRECT 53 09/27/2019    LDLDIRECT 61 01/10/2014     Lab Results   Component Value Date    ALT 11 09/05/2021    ALT 6 (L) 07/02/2021    AST 14 (L) 09/05/2021    AST 12 (L) 07/02/2021     BMP:    Lab Results   Component Value Date     11/22/2021     09/07/2021    K 5.5 11/22/2021    K 5.1 09/07/2021     11/22/2021    CL 99 09/07/2021    CO2 32 11/22/2021    CO2 33 09/07/2021    BUN 20 11/22/2021 BUN 30 09/07/2021    CREATININE 1.1 11/22/2021    CREATININE 1.1 09/07/2021     CMP:   Lab Results   Component Value Date     11/22/2021     09/07/2021    K 5.5 11/22/2021    K 5.1 09/07/2021     11/22/2021    CL 99 09/07/2021    CO2 32 11/22/2021    CO2 33 09/07/2021    BUN 20 11/22/2021    BUN 30 09/07/2021    CREATININE 1.1 11/22/2021    CREATININE 1.1 09/07/2021    PROT 6.4 09/05/2021    PROT 6.2 07/02/2021     Lab Results   Component Value Date    TSH 0.20 06/26/2013    TSHHS 0.969 09/17/2021    TSHHS 0.805 09/05/2021     CARDIOLITE 9/2021    No infarct or ischemia noted.    Normal tracer uptake in all segments of myocardium on stress and rest    images.    No infarct or ischemia noted.    Normal EF >70 % with normal ventricular contractility. ECHO 9/2021    Left ventricular systolic function is normal.   Ejection fraction is visually estimated at 55-60%. Mild left ventricular hypertrophy. PPM wiring visualized in right heart. Mild to moderate tricuspid regurgitation; RVSP: 45 mmHg. No evidence of any pericardial effusion. QUALITY MEASURES REVIEWED:  1.CAD:Patient is taking anti platelet agent:Yes  2. DYSLIPIDEMIA: Patient is on cholesterol lowering medication:Yes  3. Beta-Blocker therapy for CAD, if prior Myocardial Infarction:No   4. Counselled regarding smoking cessation. No   Patient does not Smoke. 5.Anticoagulation therapy (for A.Fib) Yes  6. Discussed weight management strategies. Assessment & Plan:  Primary / Secondary prevention is the goal by aggressive risk modification, healthy and therapeutic life style changes for cardiovascular risk reduction. CORONARY ARTERY DISEASE:Yes   clinically stable. Patient is on optimal medical regimen ( see medication list above )  -   Patient is currently  asymptomatic from CAD.  Symptoms reported are atypical.           - changes in  treatment: no. Patient is on ASA & Statin           - Testing ordered: [place] This includes chart prep even prior to the vosit. Various goals are discussed and multiple questions answered. Relevant concelling performed. Office follow up in six months. Device check per protocol.

## 2021-12-08 ASSESSMENT — ENCOUNTER SYMPTOMS
NAUSEA: 0
WHEEZING: 0
SHORTNESS OF BREATH: 0
ABDOMINAL PAIN: 0
SORE THROAT: 0

## 2021-12-15 ENCOUNTER — PROCEDURE VISIT (OUTPATIENT)
Dept: CARDIOLOGY CLINIC | Age: 81
End: 2021-12-15
Payer: MEDICARE

## 2021-12-15 ENCOUNTER — TELEPHONE (OUTPATIENT)
Dept: CARDIOLOGY CLINIC | Age: 81
End: 2021-12-15

## 2021-12-15 DIAGNOSIS — Z95.0 CARDIAC PACEMAKER IN SITU: Primary | ICD-10-CM

## 2021-12-15 DIAGNOSIS — I49.5 SINUS NODE DYSFUNCTION (HCC): ICD-10-CM

## 2021-12-15 DIAGNOSIS — I44.0 AV BLOCK, 1ST DEGREE: ICD-10-CM

## 2021-12-15 PROCEDURE — 93294 REM INTERROG EVL PM/LDLS PM: CPT | Performed by: NURSE PRACTITIONER

## 2021-12-15 PROCEDURE — 93296 REM INTERROG EVL PM/IDS: CPT | Performed by: NURSE PRACTITIONER

## 2021-12-15 NOTE — LETTER
Cardiology [address]  Phone: [phone]  Fax: [phone]    12/15/2021        [place] [address] [address]            Dear Rubi Mcrae: This is your [facility] schedule. You can jessie your calendar with these dates. Remember that your device is wireless and should automatically do these checks while you are sleeping. If for any reason I do not get your transmission then I will call you and ask that you send a manual transmission. If you have any questions or concerns, please call and ask for Rosenda Vaughn at [phone]. Thank you.

## 2021-12-16 DIAGNOSIS — M54.50 CHRONIC BILATERAL LOW BACK PAIN WITHOUT SCIATICA: ICD-10-CM

## 2021-12-16 DIAGNOSIS — G89.29 CHRONIC BILATERAL LOW BACK PAIN WITHOUT SCIATICA: ICD-10-CM

## 2021-12-16 RX ORDER — HYDROCODONE BITARTRATE AND ACETAMINOPHEN 7.5; 325 MG/1; MG/1
.5-1 TABLET ORAL 3 TIMES DAILY PRN
Qty: 90 TABLET | Refills: 0 | Status: SHIPPED | OUTPATIENT
Start: 2021-12-16 | End: 2022-01-13 | Stop reason: SDUPTHER

## 2022-01-05 RX ORDER — SUCRALFATE 1 G/1
1 TABLET ORAL 4 TIMES DAILY
Qty: 120 TABLET | Refills: 3 | Status: SHIPPED | OUTPATIENT
Start: 2022-01-05 | End: 2022-04-25

## 2022-01-05 RX ORDER — APIXABAN 2.5 MG/1
2.5 TABLET, FILM COATED ORAL 2 TIMES DAILY
Qty: 180 TABLET | Refills: 1 | Status: SHIPPED | OUTPATIENT
Start: 2022-01-05 | End: 2022-02-22 | Stop reason: SDUPTHER

## 2022-01-12 ENCOUNTER — VIRTUAL VISIT (OUTPATIENT)
Dept: FAMILY MEDICINE CLINIC | Age: 82
End: 2022-01-12
Payer: MEDICARE

## 2022-01-12 DIAGNOSIS — Z00.00 ROUTINE GENERAL MEDICAL EXAMINATION AT A HEALTH CARE FACILITY: Primary | ICD-10-CM

## 2022-01-12 PROBLEM — F33.1 MODERATE EPISODE OF RECURRENT MAJOR DEPRESSIVE DISORDER (HCC): Status: ACTIVE | Noted: 2022-01-12

## 2022-01-12 PROCEDURE — 1123F ACP DISCUSS/DSCN MKR DOCD: CPT | Performed by: STUDENT IN AN ORGANIZED HEALTH CARE EDUCATION/TRAINING PROGRAM

## 2022-01-12 PROCEDURE — G0439 PPPS, SUBSEQ VISIT: HCPCS | Performed by: STUDENT IN AN ORGANIZED HEALTH CARE EDUCATION/TRAINING PROGRAM

## 2022-01-12 PROCEDURE — 4040F PNEUMOC VAC/ADMIN/RCVD: CPT | Performed by: STUDENT IN AN ORGANIZED HEALTH CARE EDUCATION/TRAINING PROGRAM

## 2022-01-12 ASSESSMENT — LIFESTYLE VARIABLES
HOW OFTEN DURING THE LAST YEAR HAVE YOU BEEN UNABLE TO REMEMBER WHAT HAPPENED THE NIGHT BEFORE BECAUSE YOU HAD BEEN DRINKING: 0
HOW OFTEN DO YOU HAVE SIX OR MORE DRINKS ON ONE OCCASION: 0
HOW OFTEN DURING THE LAST YEAR HAVE YOU NEEDED AN ALCOHOLIC DRINK FIRST THING IN THE MORNING TO GET YOURSELF GOING AFTER A NIGHT OF HEAVY DRINKING: 0
HOW OFTEN DURING THE LAST YEAR HAVE YOU FOUND THAT YOU WERE NOT ABLE TO STOP DRINKING ONCE YOU HAD STARTED: 0
HAS A RELATIVE, FRIEND, DOCTOR, OR ANOTHER HEALTH PROFESSIONAL EXPRESSED CONCERN ABOUT YOUR DRINKING OR SUGGESTED YOU CUT DOWN: 0
HAVE YOU OR SOMEONE ELSE BEEN INJURED AS A RESULT OF YOUR DRINKING: 0
HOW MANY STANDARD DRINKS CONTAINING ALCOHOL DO YOU HAVE ON A TYPICAL DAY: 0
HOW OFTEN DO YOU HAVE A DRINK CONTAINING ALCOHOL: 1
HOW OFTEN DURING THE LAST YEAR HAVE YOU HAD A FEELING OF GUILT OR REMORSE AFTER DRINKING: 0
HOW OFTEN DURING THE LAST YEAR HAVE YOU FAILED TO DO WHAT WAS NORMALLY EXPECTED FROM YOU BECAUSE OF DRINKING: 0
AUDIT TOTAL SCORE: 1
AUDIT-C TOTAL SCORE: 1

## 2022-01-12 ASSESSMENT — PATIENT HEALTH QUESTIONNAIRE - PHQ9
SUM OF ALL RESPONSES TO PHQ9 QUESTIONS 1 & 2: 6
SUM OF ALL RESPONSES TO PHQ QUESTIONS 1-9: 24
4. FEELING TIRED OR HAVING LITTLE ENERGY: 3
6. FEELING BAD ABOUT YOURSELF - OR THAT YOU ARE A FAILURE OR HAVE LET YOURSELF OR YOUR FAMILY DOWN: 3
5. POOR APPETITE OR OVEREATING: 3
7. TROUBLE CONCENTRATING ON THINGS, SUCH AS READING THE NEWSPAPER OR WATCHING TELEVISION: 3
SUM OF ALL RESPONSES TO PHQ QUESTIONS 1-9: 24
SUM OF ALL RESPONSES TO PHQ QUESTIONS 1-9: 24
1. LITTLE INTEREST OR PLEASURE IN DOING THINGS: 3
2. FEELING DOWN, DEPRESSED OR HOPELESS: 3
3. TROUBLE FALLING OR STAYING ASLEEP: 3
10. IF YOU CHECKED OFF ANY PROBLEMS, HOW DIFFICULT HAVE THESE PROBLEMS MADE IT FOR YOU TO DO YOUR WORK, TAKE CARE OF THINGS AT HOME, OR GET ALONG WITH OTHER PEOPLE: 2
9. THOUGHTS THAT YOU WOULD BE BETTER OFF DEAD, OR OF HURTING YOURSELF: 0
8. MOVING OR SPEAKING SO SLOWLY THAT OTHER PEOPLE COULD HAVE NOTICED. OR THE OPPOSITE, BEING SO FIGETY OR RESTLESS THAT YOU HAVE BEEN MOVING AROUND A LOT MORE THAN USUAL: 3
SUM OF ALL RESPONSES TO PHQ QUESTIONS 1-9: 24

## 2022-01-12 ASSESSMENT — COLUMBIA-SUICIDE SEVERITY RATING SCALE - C-SSRS
1. WITHIN THE PAST MONTH, HAVE YOU WISHED YOU WERE DEAD OR WISHED YOU COULD GO TO SLEEP AND NOT WAKE UP?: YES
2. HAVE YOU ACTUALLY HAD ANY THOUGHTS OF KILLING YOURSELF?: NO
6. HAVE YOU EVER DONE ANYTHING, STARTED TO DO ANYTHING, OR PREPARED TO DO ANYTHING TO END YOUR LIFE?: NO

## 2022-01-12 NOTE — PROGRESS NOTES
Medicare Annual Wellness Visit  Name: Markie Lr Date: 2022   MRN: [id number] Sex: Female   Age: 78 y.o. Ethnicity: Non- / Non    : 1940 Race: White (non-)      Amos Palacios is here for Medicare AWV    Screenings for behavioral, psychosocial and functional/safety risks, and cognitive dysfunction are all negative except as indicated below. These results, as well as other patient data from the [address] [facility] [place] form, are documented in Flowsheets linked to this Encounter. Allergies   Allergen Reactions    Codeine Itching    Bactrim [Sulfamethoxazole-Trimethoprim]      dizziness    Sulfamethoxazole-Trimethoprim        Prior to Visit Medications    Medication Sig Taking? Authorizing Provider   sucralfate (CARAFATE) 1 GM tablet Take 1 tablet by mouth 4 times daily Yes Sinan Rico, DO   ELIQUIS 2.5 MG TABS tablet Take 1 tablet by mouth 2 times daily Yes Sinan Rico, DO   HYDROcodone-acetaminophen (NORCO) 7.5-325 MG per tablet Take 0.5-1 tablets by mouth 3 times daily as needed for Pain (m54.5) for up to 30 days. Yes Sinan Rico, DO   atorvastatin (LIPITOR) 10 MG tablet Take 1 tablet by mouth daily Yes Sinan Rico, DO   DULoxetine (CYMBALTA) 60 MG extended release capsule Take 1 capsule by mouth nightly Yes Sinan Rico, DO   gabapentin (NEURONTIN) 300 MG capsule Take 1 capsule by mouth 3 times daily for 90 days.  Yes Sinan Rico, DO   midodrine (PROAMATINE) 5 MG tablet Take 1 tablet by mouth 3 times daily (with meals) Yes Sinan Rico, DO   Nebulizers (COMPRESSOR/NEBULIZER) MISC 1 Device by Does not apply route 3 times daily as needed (for shortness of breath) Yes KAROLINE Frost   Cyanocobalamin (B-12) 500 MCG TABS Take 2 tablets by mouth daily  Patient taking differently: Take 1 tablet by mouth 2 times daily  Yes Sinan Rico DO   ipratropium (ATROVENT) 0.06 % nasal spray 1 spray by Each Nostril route 4 times daily as needed for Rhinitis Yes Sinan Rico, DO   ARIPiprazole (ABILIFY) 2 MG tablet TAKE 1 TABLET BY MOUTH DAILY Yes Historical Provider, MD   FreeStyle Lancets MISC 1 each by Does not apply route daily Yes Caroline Marx MD   aspirin 81 MG tablet Take 81 mg by mouth daily Yes Historical Provider, MD   albuterol (PROVENTIL) (5 MG/ML) 0.5% nebulizer solution Take 1 mL by nebulization 4 times daily  Sinan Hollowaypa, DO   furosemide (LASIX) 20 MG tablet Take 1 tablet by mouth every other day Takes every other day  FREDERICK Olivo - CNP   ferrous sulfate (IRON 325) 325 (65 Fe) MG tablet Take 1 tablet by mouth nightly Until you start iv iron  Berclairet R Jacky, DO   donepezil (ARICEPT) 10 MG tablet Take 1 tablet by mouth nightly  Berclairet R Jacky, DO   metFORMIN (GLUCOPHAGE) 500 MG tablet Take 1 tablet by mouth nightly  Berclairet R Jacky, DO   montelukast (SINGULAIR) 10 MG tablet Take 1 tablet by mouth daily  Sinan R Jacky, DO   pantoprazole (PROTONIX) 40 MG tablet Take 1 tablet by mouth 2 times daily  Berclairet R Jacky, DO   primidone (MYSOLINE) 50 MG tablet Take 1 tablet by mouth 3 times daily  Negart R Jacky, DO   methIMAzole (TAPAZOLE) 5 MG tablet   Historical Provider, MD   blood glucose test strips (FREESTYLE LITE) strip 1 each by In Vitro route daily  Patient not taking: Reported on 1/12/2022  Caroline Marx MD   CPAP Machine MISC by Does not apply route  Patient not taking: Reported on 11/22/2021  Historical Provider, MD       Past Medical History:   Diagnosis Date    Acute diastolic CHF (congestive heart failure) ([facility] [address] 12/23/2014    Acute urinary tract infection 07/20/2019    Single Kidney    Anemia     Chronic low back pain     s/p epidural steroids    COPD (chronic obstructive pulmonary disease) ([facility] [address] [facility]     moderate to severe    COVID 10/20/2021    Depression     Family history of cardiac disorder     Father, Brother    GERD (gastroesophageal reflux disease)     Goiter     s/p radioactive Iodine/ Low T4    H/O blood clots     right leg after miscarriage    H/O cardiovascular stress test 06/25/2013 6/13-WNL EF 70%    H/O cardiovascular stress test 08/10/2017    Normal study EF 70%    H/O: CVA (cerebrovascular accident) 07/20/2019    Heart murmur     History of blood transfusion     2017    History of LIMITED Echocardiogram 11/08/2019    EF 55-60%, Normal left ventricle structure and systolic function , no regional wall motion abnormalities were detected    [place] (hard of hearing)     hearing aides    Hx of blood clots     Hx of echocardiogram 07/11/2013    EF>55%, mild MR, mild TR, abn lVSFx stage 1     HX OTHER MEDICAL     PCP is Dr Chano Bose; Cardiologist is Dr Laura Armijo 07/30/2013    Peripheral Angiogram.  Sluggish flow RLE, med mgmt.      Hydrocephalus, adult ([facility] [address]     shunt    Hyperlipidemia     Hyperthyroidism 07/20/2019    Kidney stone     \"had stone last summer 2016- passed it\"    Memory loss     Old MI (myocardial infarction) 2001    Osteoarthritis     Pneumonia     recurrent    Solitary kidney     Unspecified cerebral artery occlusion with cerebral infarction     \"they discovered I had stroke in 1993- with CT scan of head but never knew I had it\"       Past Surgical History:   Procedure Laterality Date   [place]    disc ruptured    BRAIN SURGERY  2001    right shunt    CHEST SURGERY  1953    reconstruction of breast bone and rib-\"pigeon chest\"    CHOLECYSTECTOMY  1990    COLONOSCOPY  7/2011    F/U 7/2016    CORONARY ANGIOPLASTY WITH STENT PLACEMENT  2001    CSF SHUNT      ENDOSCOPY, COLON, DIAGNOSTIC      per old chart pt had EGD done with admission 2/17/2017    HYSTERECTOMY  1978    VERONIQUE BSO-dub/fibroids    KIDNEY SURGERY Right 1967    suspension    KNEE SURGERY  1997    right     PACEMAKER INSERTION Left 09/26/2019    [employer] Brandie XT DR JOHNSON [employer]     ROTATOR CUFF REPAIR  1997    right    TUBAL LIGATION  1972       Family History   Problem she does not have suicidal ideation, that she would not do that. Patient and one of her friends are working on trying to find something she can do to keep her busy with her eyesight like it is. States she has an appointment with her Psychiatrist, Dr. Artem Seo next week, who she sees regularly. LPN advised patient to call their office to see if she can be seen sooner-patient voiced understanding. General Health and ACP:  General  In general, how would you say your health is?: Good  In the past 7 days, have you experienced any of the following? New or Increased Pain, New or Increased Fatigue, Loneliness, Social Isolation, Stress or Anger?: (!) Loneliness,Social Isolation (no longer drives)  Do you get the social and emotional support that you need?: Yes  Do you have a Living Will?: Yes  Advance Directives     Power of  Living Will ACP-Advance Directive ACP-Power of     Not on File Not on File Not on File Not on File      General Health Risk Interventions:  · Loneliness: patient's comments regarding inadequate social support: patient states she no longer drives so she can't get out much  · Social isolation: patient's comments regarding inadequate social support: States due to not driving any longer  · No Living Will: ACP documents already completed- patient asked to provide copy to the office     Hearing/Vision:  No exam data present  Hearing/Vision  Do you or your family notice any trouble with your hearing that hasn't been managed with hearing aids?: No  Do you have difficulty driving, watching TV, or doing any of your daily activities because of your eyesight?: (!) Yes (has macular degeneration)  Have you had an eye exam within the past year?: Yes (states she goes every couple of months)  Hearing/Vision Interventions:  · Vision concerns:  Patient has macular degeneration and has peripheral vision but not forward vision, so it makes it difficult.  States she sees her specialist every couple of months     ADL:  ADLs  In the past 7 days, did you need help from others to perform any of the following everyday activities? Eating, dressing, grooming, bathing, toileting, or walking/balance?: None  In the past 7 days, did you need help from others to take care of any of the following? Laundry, housekeeping, banking/finances, shopping, telephone use, food preparation, transportation, or taking medications?: (!) Shopping,Transportation,Food Preparation,Taking Medications  ADL Interventions:  · Patient declines any further evaluation/treatment for this issue, states her daughter helps her with ADLs.     Personalized Preventive Plan   Current Health Maintenance Status  Immunization History   Administered Date(s) Administered    COVID-19, Pfizer, PF, 30mcg/0.3mL 02/06/2021, 02/27/2021, 10/30/2021    Influenza A (B6H5-61) Vaccine PF IM 10/30/2009    Influenza Virus Vaccine 10/01/2013, 12/23/2015    Influenza, High Dose (Fluzone 65 yrs and older) 11/02/2014, 10/23/2015, 10/21/2016, 11/10/2017, 10/22/2018    Influenza, Quadv, adjuvanted, 65 yrs +, IM, PF (Fluad) 09/30/2020, 11/22/2021    Influenza, Triv, inactivated, subunit, adjuvanted, IM (Fluad 65 yrs and older) 10/07/2019    Pneumococcal Conjugate 13-valent (Rdfyrwe20) 12/18/2015    Pneumococcal Polysaccharide ([id number]) 12/20/2014    Tdap (Boostrix, Adacel) 05/16/2017        Health Maintenance   Topic Date Due    Shingles Vaccine (1 of 2) Never done    DEXA (modify frequency per FRAX score)  Never done    Lipid screen  09/27/2020    Annual Wellness Visit (AWV)  12/31/2021    Depression Monitoring  03/31/2022    TSH testing  09/17/2022    Potassium monitoring  11/22/2022    Creatinine monitoring  11/22/2022    DTaP/Tdap/Td vaccine (2 - Td or Tdap) 05/16/2027    Flu vaccine  Completed    Pneumococcal 65+ years Vaccine  Completed    COVID-19 Vaccine  Completed    Hepatitis A vaccine  Aged Out    Hib vaccine  Aged Out    Meningococcal (ACWY) vaccine  Aged Out     Recommendations for Mr. Youth Due: see orders and patient instructions/AVS. Patient states she will be getting the shingles vaccinations. Unable to obtain 3 vital signs due to patient not having equipment to take blood pressure/temperature. Recommended screening schedule for the next 5-10 years is provided to the patient in written form: see Patient Instructions/AVS.    Daljit WISEMAN LPN, 0/25/9642, performed the documented evaluation under the direct supervision of the attending physician. Joycelyn Murphy, was evaluated through a synchronous (real-time) audio encounter. The patient (or guardian if applicable) is aware that this is a billable service. Verbal consent to proceed has been obtained within the past 12 months. The visit was conducted pursuant to the emergency declaration under the [address] [place] [address], [address] [place] [address] authority and the  and [place] [employer] Act. Patient identification was verified, and a caregiver was present when appropriate. The patient was located in the state of [place], where the provider was credentialed to provide care. Total time spent for this encounter: Not billed by time    --Daljit Piedra LPN on 9/62/1493 at 6:69 PM    An electronic signature was used to authenticate this note. This encounter was performed under [place] DOs, direct supervision, 1/12/2022.

## 2022-01-12 NOTE — PATIENT INSTRUCTIONS
Personalized Preventive Plan for Nestor Hernández - 1/12/2022  Medicare offers a range of preventive health benefits. Some of the tests and screenings are paid in full while other may be subject to a deductible, co-insurance, and/or copay. Some of these benefits include a comprehensive review of your medical history including lifestyle, illnesses that may run in your family, and various assessments and screenings as appropriate. After reviewing your medical record and screening and assessments performed today your provider may have ordered immunizations, labs, imaging, and/or referrals for you. A list of these orders (if applicable) as well as your Preventive Care list are included within your After Visit Summary for your review. Other Preventive Recommendations:    · A preventive eye exam performed by an eye specialist is recommended every 1-2 years to screen for glaucoma; cataracts, macular degeneration, and other eye disorders. · A preventive dental visit is recommended every 6 months. · Try to get at least 150 minutes of exercise per week or 10,000 steps per day on a pedometer . · Order or download the FREE \"Exercise & Physical Activity: Your Everyday Guide\" from [employer]. Call 4-195.784.4594 or search [employer] online. · You need 9627-6581 mg of calcium and 9086-9646 IU of vitamin D per day. It is possible to meet your calcium requirement with diet alone, but a vitamin D supplement is usually necessary to meet this goal.  · When exposed to the sun, use a sunscreen that protects against both UVA and UVB radiation with an SPF of 30 or greater. Reapply every 2 to 3 hours or after sweating, drying off with a towel, or swimming. · Always wear a seat belt when traveling in a car. Always wear a helmet when riding a bicycle or motorcycle.

## 2022-01-13 DIAGNOSIS — F32.A DEPRESSION, UNSPECIFIED DEPRESSION TYPE: ICD-10-CM

## 2022-01-13 DIAGNOSIS — J42 CHRONIC BRONCHITIS, UNSPECIFIED CHRONIC BRONCHITIS TYPE (HCC): Chronic | ICD-10-CM

## 2022-01-13 DIAGNOSIS — M54.50 CHRONIC BILATERAL LOW BACK PAIN WITHOUT SCIATICA: ICD-10-CM

## 2022-01-13 DIAGNOSIS — G89.29 CHRONIC BILATERAL LOW BACK PAIN WITHOUT SCIATICA: ICD-10-CM

## 2022-01-13 RX ORDER — HYDROCODONE BITARTRATE AND ACETAMINOPHEN 7.5; 325 MG/1; MG/1
.5-1 TABLET ORAL 3 TIMES DAILY PRN
Qty: 90 TABLET | Refills: 0 | Status: CANCELLED | OUTPATIENT
Start: 2022-01-13 | End: 2022-02-12

## 2022-01-13 RX ORDER — MIRTAZAPINE 15 MG/1
15 TABLET, FILM COATED ORAL NIGHTLY
Qty: 90 TABLET | Refills: 1 | Status: CANCELLED | OUTPATIENT
Start: 2022-01-13 | End: 2022-04-13

## 2022-01-13 RX ORDER — HYDROCODONE BITARTRATE AND ACETAMINOPHEN 7.5; 325 MG/1; MG/1
.5-1 TABLET ORAL 3 TIMES DAILY PRN
Qty: 90 TABLET | Refills: 0 | Status: SHIPPED | OUTPATIENT
Start: 2022-01-13 | End: 2022-02-17 | Stop reason: SDUPTHER

## 2022-01-13 RX ORDER — MONTELUKAST SODIUM 10 MG/1
10 TABLET ORAL DAILY
Qty: 90 TABLET | Refills: 1 | Status: SHIPPED | OUTPATIENT
Start: 2022-01-13 | End: 2022-02-22 | Stop reason: SDUPTHER

## 2022-01-28 DIAGNOSIS — D64.9 NORMOCYTIC ANEMIA: ICD-10-CM

## 2022-01-28 DIAGNOSIS — R79.89 DECREASED THYROID STIMULATING HORMONE (TSH) LEVEL: Primary | ICD-10-CM

## 2022-01-28 DIAGNOSIS — K90.49 MALABSORPTION DUE TO INTOLERANCE, NOT ELSEWHERE CLASSIFIED: ICD-10-CM

## 2022-01-28 DIAGNOSIS — D50.9 IRON DEFICIENCY ANEMIA, UNSPECIFIED IRON DEFICIENCY ANEMIA TYPE: ICD-10-CM

## 2022-01-30 DIAGNOSIS — D64.9 NORMOCYTIC ANEMIA: ICD-10-CM

## 2022-01-30 DIAGNOSIS — I50.32 CHRONIC DIASTOLIC CHF (CONGESTIVE HEART FAILURE) (HCC): ICD-10-CM

## 2022-01-31 RX ORDER — FUROSEMIDE 20 MG/1
TABLET ORAL
Qty: 30 TABLET | Refills: 0 | Status: SHIPPED | OUTPATIENT
Start: 2022-01-31 | End: 2022-02-22 | Stop reason: SDUPTHER

## 2022-01-31 RX ORDER — CYANOCOBALAMIN (VITAMIN B-12) 500 MCG
2 TABLET ORAL DAILY
Qty: 180 TABLET | Refills: 1 | Status: SHIPPED | OUTPATIENT
Start: 2022-01-31 | End: 2022-02-22 | Stop reason: SDUPTHER

## 2022-02-17 DIAGNOSIS — M54.50 CHRONIC BILATERAL LOW BACK PAIN WITHOUT SCIATICA: ICD-10-CM

## 2022-02-17 DIAGNOSIS — K21.9 GASTROESOPHAGEAL REFLUX DISEASE, UNSPECIFIED WHETHER ESOPHAGITIS PRESENT: ICD-10-CM

## 2022-02-17 DIAGNOSIS — G89.29 CHRONIC BILATERAL LOW BACK PAIN WITHOUT SCIATICA: ICD-10-CM

## 2022-02-17 RX ORDER — PANTOPRAZOLE SODIUM 40 MG/1
TABLET, DELAYED RELEASE ORAL
Qty: 180 TABLET | Refills: 1 | Status: SHIPPED | OUTPATIENT
Start: 2022-02-17 | End: 2022-02-22 | Stop reason: SDUPTHER

## 2022-02-17 RX ORDER — HYDROCODONE BITARTRATE AND ACETAMINOPHEN 7.5; 325 MG/1; MG/1
.5-1 TABLET ORAL 3 TIMES DAILY PRN
Qty: 90 TABLET | Refills: 0 | Status: SHIPPED | OUTPATIENT
Start: 2022-02-17 | End: 2022-03-18 | Stop reason: SDUPTHER

## 2022-02-22 ENCOUNTER — TELEMEDICINE (OUTPATIENT)
Dept: FAMILY MEDICINE CLINIC | Age: 82
End: 2022-02-22
Payer: MEDICARE

## 2022-02-22 DIAGNOSIS — E11.9 TYPE 2 DIABETES MELLITUS WITHOUT COMPLICATION, WITHOUT LONG-TERM CURRENT USE OF INSULIN (HCC): ICD-10-CM

## 2022-02-22 DIAGNOSIS — I50.32 CHRONIC DIASTOLIC CHF (CONGESTIVE HEART FAILURE) (HCC): ICD-10-CM

## 2022-02-22 DIAGNOSIS — G91.9 HYDROCEPHALUS, UNSPECIFIED TYPE (HCC): ICD-10-CM

## 2022-02-22 DIAGNOSIS — I95.1 ORTHOSTASIS: ICD-10-CM

## 2022-02-22 DIAGNOSIS — R25.2 LEG CRAMP: Primary | ICD-10-CM

## 2022-02-22 DIAGNOSIS — J41.0 SIMPLE CHRONIC BRONCHITIS (HCC): ICD-10-CM

## 2022-02-22 DIAGNOSIS — G89.29 CHRONIC BILATERAL LOW BACK PAIN WITHOUT SCIATICA: ICD-10-CM

## 2022-02-22 DIAGNOSIS — K21.9 GASTROESOPHAGEAL REFLUX DISEASE, UNSPECIFIED WHETHER ESOPHAGITIS PRESENT: ICD-10-CM

## 2022-02-22 DIAGNOSIS — M54.50 CHRONIC BILATERAL LOW BACK PAIN WITHOUT SCIATICA: ICD-10-CM

## 2022-02-22 DIAGNOSIS — J42 CHRONIC BRONCHITIS, UNSPECIFIED CHRONIC BRONCHITIS TYPE (HCC): Chronic | ICD-10-CM

## 2022-02-22 DIAGNOSIS — R09.89 OTHER SPECIFIED SYMPTOMS AND SIGNS INVOLVING THE CIRCULATORY AND RESPIRATORY SYSTEMS: ICD-10-CM

## 2022-02-22 DIAGNOSIS — D64.9 NORMOCYTIC ANEMIA: ICD-10-CM

## 2022-02-22 DIAGNOSIS — I49.5 SINUS NODE DYSFUNCTION (HCC): ICD-10-CM

## 2022-02-22 DIAGNOSIS — F33.1 MODERATE EPISODE OF RECURRENT MAJOR DEPRESSIVE DISORDER (HCC): ICD-10-CM

## 2022-02-22 DIAGNOSIS — D50.8 OTHER IRON DEFICIENCY ANEMIA: ICD-10-CM

## 2022-02-22 DIAGNOSIS — I48.0 PAROXYSMAL ATRIAL FIBRILLATION (HCC): ICD-10-CM

## 2022-02-22 PROCEDURE — 1123F ACP DISCUSS/DSCN MKR DOCD: CPT | Performed by: STUDENT IN AN ORGANIZED HEALTH CARE EDUCATION/TRAINING PROGRAM

## 2022-02-22 PROCEDURE — 99214 OFFICE O/P EST MOD 30 MIN: CPT | Performed by: STUDENT IN AN ORGANIZED HEALTH CARE EDUCATION/TRAINING PROGRAM

## 2022-02-22 PROCEDURE — G8400 PT W/DXA NO RESULTS DOC: HCPCS | Performed by: STUDENT IN AN ORGANIZED HEALTH CARE EDUCATION/TRAINING PROGRAM

## 2022-02-22 PROCEDURE — 4040F PNEUMOC VAC/ADMIN/RCVD: CPT | Performed by: STUDENT IN AN ORGANIZED HEALTH CARE EDUCATION/TRAINING PROGRAM

## 2022-02-22 PROCEDURE — 1090F PRES/ABSN URINE INCON ASSESS: CPT | Performed by: STUDENT IN AN ORGANIZED HEALTH CARE EDUCATION/TRAINING PROGRAM

## 2022-02-22 PROCEDURE — G8427 DOCREV CUR MEDS BY ELIG CLIN: HCPCS | Performed by: STUDENT IN AN ORGANIZED HEALTH CARE EDUCATION/TRAINING PROGRAM

## 2022-02-22 RX ORDER — PANTOPRAZOLE SODIUM 40 MG/1
TABLET, DELAYED RELEASE ORAL
Qty: 180 TABLET | Refills: 1 | Status: SHIPPED | OUTPATIENT
Start: 2022-02-22 | End: 2022-07-01 | Stop reason: SDUPTHER

## 2022-02-22 RX ORDER — DONEPEZIL HYDROCHLORIDE 10 MG/1
10 TABLET, FILM COATED ORAL NIGHTLY
Qty: 90 TABLET | Refills: 1 | Status: SHIPPED | OUTPATIENT
Start: 2022-02-22 | End: 2022-07-01

## 2022-02-22 RX ORDER — CYANOCOBALAMIN (VITAMIN B-12) 500 MCG
2 TABLET ORAL DAILY
Qty: 180 TABLET | Refills: 1 | Status: SHIPPED | OUTPATIENT
Start: 2022-02-22

## 2022-02-22 RX ORDER — GABAPENTIN 400 MG/1
400 CAPSULE ORAL 3 TIMES DAILY
Qty: 270 CAPSULE | Refills: 0 | Status: SHIPPED | OUTPATIENT
Start: 2022-02-22 | End: 2022-05-27

## 2022-02-22 RX ORDER — DULOXETIN HYDROCHLORIDE 60 MG/1
60 CAPSULE, DELAYED RELEASE ORAL NIGHTLY
Qty: 90 CAPSULE | Refills: 1 | Status: SHIPPED | OUTPATIENT
Start: 2022-02-22 | End: 2022-08-21

## 2022-02-22 RX ORDER — ATORVASTATIN CALCIUM 10 MG/1
10 TABLET, FILM COATED ORAL DAILY
Qty: 90 TABLET | Refills: 1 | Status: SHIPPED | OUTPATIENT
Start: 2022-02-22 | End: 2022-07-01

## 2022-02-22 RX ORDER — GABAPENTIN 400 MG/1
400 CAPSULE ORAL 3 TIMES DAILY
Qty: 270 CAPSULE | Refills: 0 | Status: SHIPPED | OUTPATIENT
Start: 2022-02-22 | End: 2022-02-22 | Stop reason: SDUPTHER

## 2022-02-22 RX ORDER — MONTELUKAST SODIUM 10 MG/1
10 TABLET ORAL DAILY
Qty: 90 TABLET | Refills: 1 | Status: SHIPPED | OUTPATIENT
Start: 2022-02-22 | End: 2022-07-01

## 2022-02-22 RX ORDER — APIXABAN 2.5 MG/1
2.5 TABLET, FILM COATED ORAL 2 TIMES DAILY
Qty: 180 TABLET | Refills: 1 | Status: SHIPPED | OUTPATIENT
Start: 2022-02-22

## 2022-02-22 RX ORDER — FUROSEMIDE 20 MG/1
TABLET ORAL
Qty: 30 TABLET | Refills: 0 | Status: SHIPPED | OUTPATIENT
Start: 2022-02-22 | End: 2022-03-15

## 2022-02-22 RX ORDER — MIDODRINE HYDROCHLORIDE 5 MG/1
5 TABLET ORAL
Qty: 270 TABLET | Refills: 1 | Status: SHIPPED | OUTPATIENT
Start: 2022-02-22 | End: 2022-07-05

## 2022-02-22 RX ORDER — FERROUS SULFATE 325(65) MG
325 TABLET ORAL NIGHTLY
Qty: 90 TABLET | Refills: 1 | Status: SHIPPED | OUTPATIENT
Start: 2022-02-22 | End: 2022-07-01

## 2022-02-22 RX ORDER — FUROSEMIDE 20 MG/1
TABLET ORAL
Qty: 90 TABLET | OUTPATIENT
Start: 2022-02-22

## 2022-02-24 LAB
ABSOLUTE IMMATURE GRANULOCYTE: 0 K/UL (ref 0–0.1)
ALBUMIN/GLOBULIN RATIO: 2.2 RATIO (ref 0.8–2.6)
ALBUMIN: 4.3 G/DL (ref 3.5–5.2)
ALP BLD-CCNC: 119 U/L (ref 23–144)
ALT SERPL-CCNC: 9 U/L (ref 0–60)
AST SERPL-CCNC: 11 U/L (ref 0–55)
BASOPHILS ABSOLUTE: 0.1 K/UL (ref 0–0.3)
BASOPHILS RELATIVE PERCENT: 0.8 % (ref 0–2)
BILIRUB SERPL-MCNC: 0.2 MG/DL (ref 0–1.2)
BUN BLDV-MCNC: 28 MG/DL (ref 3–29)
BUN/CREAT BLD: 23 (ref 7–25)
CALCIUM SERPL-MCNC: 10.2 MG/DL (ref 8.5–10.5)
CHLORIDE BLD-SCNC: 101 MEQ/L (ref 96–110)
CO2: 32 MEQ/L (ref 19–32)
CREAT SERPL-MCNC: 1.2 MG/DL (ref 0.5–1.2)
DIFFERENTIAL TYPE: ABNORMAL
EOSINOPHILS ABSOLUTE: 0.3 K/UL (ref 0–0.5)
EOSINOPHILS RELATIVE PERCENT: 4.6 % (ref 0–5)
FOLATE: 8.7 NG/ML
GLOBULIN: 2 G/DL (ref 1.9–3.6)
GLOMERULAR FILTRATION RATE: 42 MLS/MIN/1.73M2
GLUCOSE BLD-MCNC: 110 MG/DL (ref 70–99)
HCT VFR BLD CALC: 40.9 % (ref 34–49)
HEMOGLOBIN: 13 G/DL (ref 11.2–15.7)
IMMATURE GRANULOCYTES: 0.5 %
LYMPHOCYTES ABSOLUTE: 1.6 K/UL (ref 0.9–4.1)
LYMPHOCYTES RELATIVE PERCENT: 24.3 % (ref 14–51)
MCH RBC QN AUTO: 30.4 PG (ref 26–34)
MCHC RBC AUTO-ENTMCNC: 31.8 G/DL (ref 30.7–35.5)
MCV RBC AUTO: 95.6 FL (ref 80–100)
MONOCYTES ABSOLUTE: 0.7 K/UL (ref 0.2–1)
MONOCYTES RELATIVE PERCENT: 10.8 % (ref 4–12)
NEUTROPHILS ABSOLUTE: 3.8 K/UL (ref 1.8–7.5)
NEUTROPHILS RELATIVE PERCENT: 59 % (ref 42–80)
NUCLEATED RBCS: 0 /100 WBC
PDW BLD-RTO: 11.9 %
PLATELET # BLD: 242 K/UL (ref 140–400)
PMV BLD AUTO: 12.7 FL (ref 7.2–11.7)
POTASSIUM SERPL-SCNC: 5.3 MEQ/L (ref 3.4–5.3)
RBC # BLD: 4.28 M/UL (ref 3.95–5.26)
SODIUM BLD-SCNC: 144 MEQ/L (ref 135–148)
STATUS: ABNORMAL
T3 FREE: 3 PG/ML (ref 2.3–4.2)
T4 FREE: 0.9 NG/DL (ref 0.8–1.8)
TOTAL PROTEIN: 6.3 G/DL (ref 6–8.3)
TSH SERPL DL<=0.05 MIU/L-ACNC: 1.51 MCIU/ML (ref 0.4–4.5)
VITAMIN B-12: >2000 PG/ML (ref 200–1100)
WBC: 6.5 K/UL (ref 3.5–10.9)

## 2022-03-02 ASSESSMENT — ENCOUNTER SYMPTOMS
SHORTNESS OF BREATH: 0
WHEEZING: 0
SORE THROAT: 0
NAUSEA: 0
ABDOMINAL PAIN: 0

## 2022-03-03 ENCOUNTER — OFFICE VISIT (OUTPATIENT)
Dept: ONCOLOGY | Age: 82
End: 2022-03-03
Payer: MEDICARE

## 2022-03-03 ENCOUNTER — HOSPITAL ENCOUNTER (OUTPATIENT)
Dept: INFUSION THERAPY | Age: 82
Discharge: HOME | End: 2022-03-03

## 2022-03-03 VITALS
TEMPERATURE: 98.2 F | HEIGHT: 63 IN | OXYGEN SATURATION: 93 % | HEART RATE: 80 BPM | WEIGHT: 140.6 LBS | DIASTOLIC BLOOD PRESSURE: 64 MMHG | SYSTOLIC BLOOD PRESSURE: 149 MMHG | BODY MASS INDEX: 24.91 KG/M2

## 2022-03-03 DIAGNOSIS — K90.49 MALABSORPTION DUE TO INTOLERANCE, NOT ELSEWHERE CLASSIFIED: ICD-10-CM

## 2022-03-03 DIAGNOSIS — R79.89 DECREASED THYROID STIMULATING HORMONE (TSH) LEVEL: Primary | ICD-10-CM

## 2022-03-03 DIAGNOSIS — D50.9 IRON DEFICIENCY ANEMIA, UNSPECIFIED IRON DEFICIENCY ANEMIA TYPE: ICD-10-CM

## 2022-03-03 PROCEDURE — G8484 FLU IMMUNIZE NO ADMIN: HCPCS | Performed by: INTERNAL MEDICINE

## 2022-03-03 PROCEDURE — 1123F ACP DISCUSS/DSCN MKR DOCD: CPT | Performed by: INTERNAL MEDICINE

## 2022-03-03 PROCEDURE — G8427 DOCREV CUR MEDS BY ELIG CLIN: HCPCS | Performed by: INTERNAL MEDICINE

## 2022-03-03 PROCEDURE — G8400 PT W/DXA NO RESULTS DOC: HCPCS | Performed by: INTERNAL MEDICINE

## 2022-03-03 PROCEDURE — 4040F PNEUMOC VAC/ADMIN/RCVD: CPT | Performed by: INTERNAL MEDICINE

## 2022-03-03 PROCEDURE — 1036F TOBACCO NON-USER: CPT | Performed by: INTERNAL MEDICINE

## 2022-03-03 PROCEDURE — 1090F PRES/ABSN URINE INCON ASSESS: CPT | Performed by: INTERNAL MEDICINE

## 2022-03-03 PROCEDURE — 99214 OFFICE O/P EST MOD 30 MIN: CPT | Performed by: INTERNAL MEDICINE

## 2022-03-03 PROCEDURE — G8420 CALC BMI NORM PARAMETERS: HCPCS | Performed by: INTERNAL MEDICINE

## 2022-03-03 NOTE — PROGRESS NOTES
MA Rooming Questions  Patient: Carlita Centeno  MRN: [id number]    Date: 3/3/2022        1. Do you have any new issues?   no         2. Do you need any refills on medications?    no    3. Have you had any imaging done since your last visit?   no    4. Have you been hospitalized or seen in the emergency room since your last visit here?   no    5. Did the patient have a depression screening completed today?  No    No data recorded     PHQ-9 Given to (if applicable):               PHQ-9 Score (if applicable):                     [] Positive     []  Negative              Does question #9 need addressed (if applicable)                     [] Yes    []  No               Ryan Bejarano MA

## 2022-03-03 NOTE — PROGRESS NOTES
[facility] Rooming Questions  Patient: Malissa Raphael  MRN: [id number]    Date: 3/3/2022        1. Do you have any new issues? yes - Her insurance is not covering her Xarelto any longer         2. Do you need any refills on medications?    no    3. Have you had any imaging done since your last visit?   no    4. Have you been hospitalized or seen in the emergency room since your last visit here?   no    5. Did the patient have a depression screening completed today? No    No data recorded     PHQ-9 Given to (if applicable):               PHQ-9 Score (if applicable):                     [] Positive     []  Negative              Does question #9 need addressed (if applicable)                     [] Yes    []  No               LAKISHA Correa Rooming Questions  Patient: Malissa Raphael  MRN: [id number]    Date: 3/3/2022        1. Do you have any new issues? yes - Her insurance is not covering her Xarelto any longer         2. Do you need any refills on medications?    no    3. Have you had any imaging done since your last visit?   no    4. Have you been hospitalized or seen in the emergency room since your last visit here?   no    5. Did the patient have a depression screening completed today? No    No data recorded     PHQ-9 Given to (if applicable):               PHQ-9 Score (if applicable):                     [] Positive     []  Negative              Does question #9 need addressed (if applicable)                     [] Yes    []  No               Jose Miguel Ribeiro MD  Patient Name: Malissa Raphael  Patient : 1940  Patient MRN: [id number]     Primary Oncologist: Jose Miguel Ribeiro MD  Referring Physician: Tito Plascencia     Date of Service: 3/3/2022      Chief Complaint:   Chief Complaint   Patient presents with    Follow-up        Active Problem list  No diagnosis found. HPI:        HPI  75 yo w. f. with hx CVA on blood thinners with recent admissions for anemia and GI bleed.  She had a balloon enteroscopy performed by Dr. Eugenie Foster on 8-22-17 and at that time she had an AVM and underwent argon plasma coagulation. She has been placed on iron and has had dark stools due to the iron pills. 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 Her pletal was stopped and she was continued on her xarelto and asa. GI would like input in regards to IV iron since oral iron clouds the picture of her active bleeding and transfusion input. She has been getting transfusion monthly for the past few months      had capsule by Dr. Coreas Leaks AVM ? with oozing    9/22/2019: Ct abdomen and pelvis: 1. No acute intra-process identified. 2. No evidence for bowel obstruction. 3. Redemonstration of left staghorn calculus similar to previous exam with  stable atrophy of the left kidney. 4. Mild atrophy pancreas otherwise appears unremarkable. No stranding seen  surrounding the pancreas. 9/24/19: RP U/S:1. Staghorn calculus again seen in the left kidney  2. Mild left renal atrophy  3. Unremarkable appearing right kidney    9/25/19: U/S soft tissue neck:Impression  Somewhat dilated left internal jugular vein, with presumed slow flow. If  there is a concern for internal jugular vein thrombosis, consider dedicated  upper extremity vascular study. Heterogeneous thyroid gland. No significant hypervascularity. 9-2019 pacemaker placed    1/6/2020 CBC WBC of 6.5 hemoglobin of 11.1 hematocrit 39.5 MCV of 103.7 platelets of 364 differential otherwise within normal limits PT 11.3 PTT 34 phosphorus 3.9 magnesium 2.3 BMP with creatinine 1.3 otherwise within normal limits. 3/16/21: Ct abdomen and pelvis:  1. Mild right hydroureter and hydronephrosis with no discrete obstructing   stone identified.  Findings could reflect recently passed stone or possible   infection.  Correlate clinically with urinalysis. 2. Redemonstration of nonobstructing left renal stone.    3. No bowel obstruction.         1. No acute intracranial hemorrhage, intra-axial mass, or acute territorial   infarct   2. Ventricular shunt catheter in place   3. Generalized atrophy again noted     Degenerative changes involving the cervical spine, without evidence of an   acute fracture or traumatic malalignment     The right internal carotid artery demonstrates 0-50% stenosis .       The left internal carotid artery demonstrates 0-50% stenosis .       Bilateral vertebral arteries are patent with flow in the normal direction.         1. No acute intracranial abnormality.       2. Unchanged position of the right frontal approach ventriculostomy catheter. Unchanged size and configuration of the ventricles since examination.       3. Chronic small vessel ischemic white matter disease and diffuse cerebral   volume loss.           Past Medical History  Anemia  acute diastolic CHF  anxiety  asthma  CAD  chronic low back pain s/p epidural  COPD  Depression  GERD  Goiter s/p radioactive  hx of DVT right leg after miscarriage  shunt for NPH  HTN  hx of kidney stone  SUE?   hx of atrophic kidney  DM  CVA in 1993    Surgical History  as above  s/p VERONIQUE/BSO    Allergies  No known medication allergies    Medications  Acetaminophen Oral 500 mg capsule   Albuterol-Ipratropium Nebulized 3 mg (2.5 mg base)-0.5 mg/3 mL   Amiodarone Oral 200 mg tablet   Aspirin Oral 81 mg tablet,delayed release (DR/EC)   Azelastine Intranasal 137 mcg/SPRAY (0.1 %)   Digoxin Oral Tablet 125 mcg tablet   Diltiazem Oral 24 hr Cap 180 mg capsule,extended release 24 hr   Donepezil Oral 10 mg tablet   Duloxetine Oral Delayed Release 60 mg capsule,delayed release(DR/EC)   Estradiol Oral 0.5 mg tablet   Ferrous Sulfate Oral 325 mg (65 mg iron) tablet   Fluticasone Nasal Spray 50 mcg/actuation   Gabapentin Oral 300 mg capsule   Metformin Oral 500 mg tablet   Miscellaneous Drug CPAP Machine   Pantoprazole (Sodium) Oral Delayed Release 40 mg tablet,delayed release (/EC)   Ranitidine Oral 150 mg tablet   Rivaroxaban Oral 15 mg tablet      Social History  Smoking Status Former smoker  quit smoking 1 year ago  drinks on special occassions  lives with her youngest daughter     Family History  daughter had breast cancer treated by DR. Stovall Level  son has hodgkins lymphoma  has an aunt with bowel cancer      Interval History  3/3/2022:Arrived with her daughter to the clinic today. Reported that she has gained weight but daughter reported that she has been eating lot of potato chips. Mild bilateral LE edema. Black stools. No other overt bleeding. fatigue. Is depressed, has vivid dreams. Does not sleep well. Right sided abdominal pain.      Review of Systems   Per interval history; otherwise 10 point ROS is negative              Vital Signs:  BP (!) 149/64 (Site: Right Upper Arm, Position: Sitting, Cuff Size: Medium Adult)   Pulse 80   Temp 98.2 °F (36.8 °C) (Infrared)   Ht 5' 3\" (1.6 m)   Wt 140 lb 9.6 oz (63.8 kg)   SpO2 93%   BMI 24.91 kg/m²   aao x 3, tired appearing  No palor  ctab  s1s2   Soft ntnd bs pos  Both breast with no mass, lad, nipple discharge    Labs:    Hematology:  Lab Results   Component Value Date    WBC 6.5 02/24/2022    RBC 4.28 02/24/2022    HGB 13.0 02/24/2022    HCT 40.9 02/24/2022    MCV 95.6 02/24/2022    MCH 30.4 02/24/2022    MCHC 31.8 02/24/2022    RDW 11.9 02/24/2022     02/24/2022    MPV 12.7 (H) 02/24/2022    BANDSPCT 5 01/12/2015    SEGSPCT 73.1 (H) 09/05/2021    EOSRELPCT 4.6 02/24/2022    BASOPCT 0.8 02/24/2022    LYMPHOPCT 24.3 02/24/2022    MONOPCT 10.8 02/24/2022    BANDABS 0.44 01/12/2015    SEGSABS 9.4 09/05/2021    EOSABS 0.3 02/24/2022    BASOSABS 0.1 02/24/2022    LYMPHSABS 1.6 02/24/2022    MONOSABS 0.7 02/24/2022    DIFFTYPE AUTOMATIC METHOD 02/24/2022    ANISOCYTOSIS 1+ 01/28/2017    POLYCHROM 1+ 03/05/2017    PLTM SEVERAL LARGE PLATELETS 03/30/7434     No results found for: ESR    Chemistry:  Lab Results   Component Value Date  02/24/2022    K 5.3 02/24/2022     02/24/2022    CO2 32 02/24/2022    BUN 28 02/24/2022    CREATININE 1.2 02/24/2022    GLUCOSE 110 (H) 02/24/2022    CALCIUM 10.2 02/24/2022    PROT 6.3 02/24/2022    LABALBU 4.3 02/24/2022    BILITOT 0.2 02/24/2022    ALKPHOS 119 02/24/2022    AST 11 02/24/2022    ALT 9 02/24/2022    LABGLOM 47 (A) 11/22/2021    GFRAA 57 (A) 11/22/2021    GLOB 2.0 02/24/2022    PHOS 3.9 01/06/2020    MG 2.70 (H) 11/22/2021    POCGLU 109 (H) 09/08/2021     No results found for: MMA, LDH, HOMOCYSTEINE  No components found for: LD  Lab Results   Component Value Date    TSHHS 0.969 09/17/2021    T4FREE 0.90 02/24/2022    FT3 3.0 02/24/2022       Immunology:  Lab Results   Component Value Date    PROT 6.3 02/24/2022     No results found for: ROOSEVELT Amaya  No results found for: B2M    Coagulation Panel:  Lab Results   Component Value Date    PROTIME 11.7 03/16/2020    INR 0.97 03/16/2020    APTT 33.0 03/16/2020    DDIMER 222 12/21/2014       Anemia Panel:  Lab Results   Component Value Date    RCONAKLT89 >2000 (H) 02/24/2022    FOLATE 8.7 02/24/2022       Tumor Markers:  Lab Results   Component Value Date     6 09/27/2019      09/27/2019     (NOTE)  INTERPRETIVE INFORMATION: Cancer Antigen-GI (CA 19-9)  This test uses Roche CA 19-9 electrochemiluminescent immunoassay. Results obtained with different test methods or kits cannot be   used interchangeably. CA 19-9 value is useful in monitoring   pancreatic, hepatobiliary, gastric, hepatocellular, and colorectal   cancer. CA 19-9 value, regardless of level, should not be   interpreted as absolute evidence of the presence or absence of   malignant disease. Performed by [facility] , [id number] [place] [address] [phone]  www. Gypsy Odell MD, Lab.  Director           Imaging: Reviewed     Pathology:Reviewed     Observations:  Performance Status: ECOG   Depression Status: No data recorded Assessment & Plan:  79 yo w. f. with hx of GI bleed due to AVMs s/p ablation x 2 requiring transfusion support on chronic anticoagulation  -is on oral iron supplementation, OD  -Adequate bowel regimen.  -parental iron as needed,none this time  -transfuse to keep hemoglobin > 8  -Also recommend oral B12 supplements as needed. Afib  -is on  xarelto 15mg po daily  -hx of CVA    Fatigue most probably sec to COVID/COPD/insomnia/depression. Thyroid dys: Is on synthroid, followed by Dr Kulwant Bangura. Thyroid panel  Feb seems to be normal.     continue other medical care    Discussed the above findings and plan with her and her daughter and she verbalized understanding    Discussed healthy LS including regular exercise as tolerated. Follow with Dr Gina Byrd. Dexa scan and mammogram still pending    RTC sep  2022  or earlier if new Sx    Please do not hesitate to contact us if you need any further information. I have recommended that the patient follow CDC guidelines for prevention of COVID-19 infection. Received COVID vaccine, booster and flu vaccine.      [address]

## 2022-03-04 ENCOUNTER — TELEPHONE (OUTPATIENT)
Dept: FAMILY MEDICINE CLINIC | Age: 82
End: 2022-03-04

## 2022-03-04 DIAGNOSIS — I10 ESSENTIAL HYPERTENSION: ICD-10-CM

## 2022-03-04 DIAGNOSIS — I95.1 ORTHOSTASIS: ICD-10-CM

## 2022-03-04 DIAGNOSIS — R25.2 LEG CRAMP: Primary | ICD-10-CM

## 2022-03-04 DIAGNOSIS — I25.10 CORONARY ARTERY DISEASE INVOLVING NATIVE CORONARY ARTERY OF NATIVE HEART WITHOUT ANGINA PECTORIS: ICD-10-CM

## 2022-03-04 DIAGNOSIS — E13.59 OTHER SPECIFIED DIABETES MELLITUS WITH OTHER CIRCULATORY COMPLICATIONS (HCC): ICD-10-CM

## 2022-03-04 RX ORDER — BUDESONIDE AND FORMOTEROL FUMARATE DIHYDRATE 160; 4.5 UG/1; UG/1
2 AEROSOL RESPIRATORY (INHALATION) 2 TIMES DAILY
Qty: 30.6 G | Refills: 3 | Status: SHIPPED | OUTPATIENT
Start: 2022-03-04

## 2022-03-04 RX ORDER — ALBUTEROL SULFATE 90 UG/1
2 AEROSOL, METERED RESPIRATORY (INHALATION) 4 TIMES DAILY PRN
Qty: 54 G | Refills: 3 | Status: SHIPPED | OUTPATIENT
Start: 2022-03-04

## 2022-03-04 NOTE — TELEPHONE ENCOUNTER
[facility] from [address] called and stated. The order for US of Ankle  flagging medical necessity   R25.2 does not work as the diagnosis please review  And re submit new order patient is scheduled for [email or web address]

## 2022-03-07 RX ORDER — SUCRALFATE 1 G/1
TABLET ORAL
Qty: 360 TABLET | OUTPATIENT
Start: 2022-03-07

## 2022-03-11 DIAGNOSIS — J41.0 SIMPLE CHRONIC BRONCHITIS (HCC): ICD-10-CM

## 2022-03-14 DIAGNOSIS — I50.32 CHRONIC DIASTOLIC CHF (CONGESTIVE HEART FAILURE) (HCC): ICD-10-CM

## 2022-03-15 RX ORDER — FUROSEMIDE 20 MG/1
TABLET ORAL
Qty: 30 TABLET | Refills: 0 | OUTPATIENT
Start: 2022-03-15

## 2022-03-15 RX ORDER — FUROSEMIDE 20 MG/1
TABLET ORAL
Qty: 30 TABLET | Refills: 5 | Status: SHIPPED | OUTPATIENT
Start: 2022-03-15

## 2022-03-17 DIAGNOSIS — G89.29 CHRONIC BILATERAL LOW BACK PAIN WITHOUT SCIATICA: ICD-10-CM

## 2022-03-17 DIAGNOSIS — M54.50 CHRONIC BILATERAL LOW BACK PAIN WITHOUT SCIATICA: ICD-10-CM

## 2022-03-18 RX ORDER — HYDROCODONE BITARTRATE AND ACETAMINOPHEN 7.5; 325 MG/1; MG/1
.5-1 TABLET ORAL 3 TIMES DAILY PRN
Qty: 90 TABLET | Refills: 0 | Status: SHIPPED | OUTPATIENT
Start: 2022-03-18 | End: 2022-04-19 | Stop reason: SDUPTHER

## 2022-03-20 DIAGNOSIS — I50.32 CHRONIC DIASTOLIC CHF (CONGESTIVE HEART FAILURE) (HCC): ICD-10-CM

## 2022-03-21 RX ORDER — FUROSEMIDE 20 MG/1
TABLET ORAL
Qty: 90 TABLET | OUTPATIENT
Start: 2022-03-21

## 2022-03-28 ENCOUNTER — TELEPHONE (OUTPATIENT)
Dept: FAMILY MEDICINE CLINIC | Age: 82
End: 2022-03-28

## 2022-03-28 ENCOUNTER — PROCEDURE VISIT (OUTPATIENT)
Dept: CARDIOLOGY CLINIC | Age: 82
End: 2022-03-28
Payer: MEDICARE

## 2022-03-28 DIAGNOSIS — I49.5 SINUS NODE DYSFUNCTION (HCC): ICD-10-CM

## 2022-03-28 DIAGNOSIS — G25.0 ESSENTIAL TREMOR: Primary | ICD-10-CM

## 2022-03-28 DIAGNOSIS — Z95.0 CARDIAC PACEMAKER IN SITU: Primary | ICD-10-CM

## 2022-03-28 DIAGNOSIS — I44.0 AV BLOCK, 1ST DEGREE: ICD-10-CM

## 2022-03-28 NOTE — TELEPHONE ENCOUNTER
----- Message from Seun Desir sent at 3/28/2022 11:30 AM EDT -----  Subject: Message to Provider    QUESTIONS  Information for Provider? Malgorzata John was calling in asking for a referral for   neuroblast she said they have had one but they doctor left practice and   needs new order please call back to assist.  ---------------------------------------------------------------------------  --------------  [address]  What is the best way for the office to contact you? OK to leave message on   voicemail  Preferred Call Back Phone Number? [phone]  ---------------------------------------------------------------------------  --------------  SCRIPT ANSWERS  Relationship to Patient? Other  Representative Name? tashia  Is the Representative on the appropriate HIPAA document in Epic?  Yes

## 2022-03-28 NOTE — TELEPHONE ENCOUNTER
Neurologist referral? Is there any specific reason for referral? Any new conditions patient is concerned of?

## 2022-04-07 ENCOUNTER — HOSPITAL ENCOUNTER (OUTPATIENT)
Age: 82
Discharge: HOME | End: 2022-04-07
Payer: MEDICARE

## 2022-04-07 ENCOUNTER — HOSPITAL ENCOUNTER (OUTPATIENT)
Dept: ULTRASOUND IMAGING | Age: 82
Discharge: HOME | End: 2022-04-07
Payer: MEDICARE

## 2022-04-07 DIAGNOSIS — K90.49 MALABSORPTION DUE TO INTOLERANCE, NOT ELSEWHERE CLASSIFIED: ICD-10-CM

## 2022-04-07 DIAGNOSIS — R25.2 LEG CRAMP: ICD-10-CM

## 2022-04-07 DIAGNOSIS — D50.9 IRON DEFICIENCY ANEMIA, UNSPECIFIED IRON DEFICIENCY ANEMIA TYPE: ICD-10-CM

## 2022-04-07 DIAGNOSIS — D50.8 OTHER IRON DEFICIENCY ANEMIA: ICD-10-CM

## 2022-04-07 DIAGNOSIS — R79.89 DECREASED THYROID STIMULATING HORMONE (TSH) LEVEL: ICD-10-CM

## 2022-04-07 DIAGNOSIS — D64.9 NORMOCYTIC ANEMIA: ICD-10-CM

## 2022-04-07 DIAGNOSIS — I10 ESSENTIAL HYPERTENSION: ICD-10-CM

## 2022-04-07 DIAGNOSIS — E13.59 OTHER SPECIFIED DIABETES MELLITUS WITH OTHER CIRCULATORY COMPLICATIONS (HCC): ICD-10-CM

## 2022-04-07 LAB
ALBUMIN SERPL-MCNC: 4 GM/DL (ref 3.4–5)
ALP BLD-CCNC: 119 IU/L (ref 40–128)
ALT SERPL-CCNC: 7 U/L (ref 10–40)
ANION GAP SERPL CALCULATED.3IONS-SCNC: 11 MMOL/L (ref 4–16)
AST SERPL-CCNC: 11 IU/L (ref 15–37)
BASOPHILS ABSOLUTE: 0.1 K/CU MM
BASOPHILS RELATIVE PERCENT: 0.9 % (ref 0–1)
BILIRUB SERPL-MCNC: 0.2 MG/DL (ref 0–1)
BUN BLDV-MCNC: 30 MG/DL (ref 6–23)
CALCIUM SERPL-MCNC: 9.7 MG/DL (ref 8.3–10.6)
CHLORIDE BLD-SCNC: 105 MMOL/L (ref 99–110)
CO2: 28 MMOL/L (ref 21–32)
CREAT SERPL-MCNC: 1 MG/DL (ref 0.6–1.1)
DIFFERENTIAL TYPE: ABNORMAL
EOSINOPHILS ABSOLUTE: 0.3 K/CU MM
EOSINOPHILS RELATIVE PERCENT: 3.9 % (ref 0–3)
FERRITIN: 31 NG/ML (ref 15–150)
GFR AFRICAN AMERICAN: >60 ML/MIN/1.73M2
GFR NON-AFRICAN AMERICAN: 53 ML/MIN/1.73M2
GLUCOSE BLD-MCNC: 108 MG/DL (ref 70–99)
HCT VFR BLD CALC: 36.8 % (ref 37–47)
HEMOGLOBIN: 10.6 GM/DL (ref 12.5–16)
IMMATURE NEUTROPHIL %: 0.5 % (ref 0–0.43)
IRON: 36 UG/DL (ref 37–145)
LYMPHOCYTES ABSOLUTE: 1.3 K/CU MM
LYMPHOCYTES RELATIVE PERCENT: 16 % (ref 24–44)
MCH RBC QN AUTO: 30.6 PG (ref 27–31)
MCHC RBC AUTO-ENTMCNC: 28.8 % (ref 32–36)
MCV RBC AUTO: 106.4 FL (ref 78–100)
MONOCYTES ABSOLUTE: 0.8 K/CU MM
MONOCYTES RELATIVE PERCENT: 10.2 % (ref 0–4)
NUCLEATED RBC %: 0 %
PCT TRANSFERRIN: 12 % (ref 10–44)
PDW BLD-RTO: 15.2 % (ref 11.7–14.9)
PLATELET # BLD: 301 K/CU MM (ref 140–440)
PMV BLD AUTO: 11.9 FL (ref 7.5–11.1)
POTASSIUM SERPL-SCNC: 5.6 MMOL/L (ref 3.5–5.1)
RBC # BLD: 3.46 M/CU MM (ref 4.2–5.4)
SEGMENTED NEUTROPHILS ABSOLUTE COUNT: 5.6 K/CU MM
SEGMENTED NEUTROPHILS RELATIVE PERCENT: 68.5 % (ref 36–66)
SODIUM BLD-SCNC: 144 MMOL/L (ref 135–145)
TOTAL IMMATURE NEUTOROPHIL: 0.04 K/CU MM
TOTAL IRON BINDING CAPACITY: 293 UG/DL (ref 250–450)
TOTAL NUCLEATED RBC: 0 K/CU MM
TOTAL PROTEIN: 6.1 GM/DL (ref 6.4–8.2)
UNSATURATED IRON BINDING CAPACITY: 257 UG/DL (ref 110–370)
WBC # BLD: 8.2 K/CU MM (ref 4–10.5)

## 2022-04-07 PROCEDURE — 83550 IRON BINDING TEST: CPT

## 2022-04-07 PROCEDURE — 80053 COMPREHEN METABOLIC PANEL: CPT

## 2022-04-07 PROCEDURE — 82607 VITAMIN B-12: CPT

## 2022-04-07 PROCEDURE — 83540 ASSAY OF IRON: CPT

## 2022-04-07 PROCEDURE — 36415 COLL VENOUS BLD VENIPUNCTURE: CPT

## 2022-04-07 PROCEDURE — 82728 ASSAY OF FERRITIN: CPT

## 2022-04-07 PROCEDURE — 85025 COMPLETE CBC W/AUTO DIFF WBC: CPT

## 2022-04-07 PROCEDURE — 82746 ASSAY OF FOLIC ACID SERUM: CPT

## 2022-04-07 NOTE — PLAN OF CARE
CHATA machine not working when patient arrived for test.  Patient informed and daughter with her. Will need to reschedule for later date. Unsure when machine will be up and running again.

## 2022-04-08 ENCOUNTER — CLINICAL DOCUMENTATION (OUTPATIENT)
Dept: ONCOLOGY | Age: 82
End: 2022-04-08

## 2022-04-08 NOTE — PROGRESS NOTES
Called patient at [phone] and spoke with daughter regarding patient's K 5.6 from 04/07/2022 lab results. Per Dr. Mayi Prado, follow up with PCP. Daughter aware and states she will contact. Also states patient has an appointment with PCP next week.

## 2022-04-13 ENCOUNTER — OFFICE VISIT (OUTPATIENT)
Dept: FAMILY MEDICINE CLINIC | Age: 82
End: 2022-04-13
Payer: MEDICARE

## 2022-04-13 VITALS
HEART RATE: 75 BPM | OXYGEN SATURATION: 82 % | DIASTOLIC BLOOD PRESSURE: 54 MMHG | HEIGHT: 63 IN | SYSTOLIC BLOOD PRESSURE: 124 MMHG | BODY MASS INDEX: 25.78 KG/M2 | WEIGHT: 145.5 LBS

## 2022-04-13 DIAGNOSIS — Z95.0 PACEMAKER: ICD-10-CM

## 2022-04-13 DIAGNOSIS — Q27.30 AVM (ARTERIOVENOUS MALFORMATION): Primary | Chronic | ICD-10-CM

## 2022-04-13 DIAGNOSIS — I10 ESSENTIAL HYPERTENSION: ICD-10-CM

## 2022-04-13 DIAGNOSIS — E87.5 HYPERKALEMIA: ICD-10-CM

## 2022-04-13 DIAGNOSIS — I25.10 CORONARY ARTERY DISEASE INVOLVING NATIVE CORONARY ARTERY OF NATIVE HEART WITHOUT ANGINA PECTORIS: ICD-10-CM

## 2022-04-13 PROCEDURE — G8417 CALC BMI ABV UP PARAM F/U: HCPCS | Performed by: STUDENT IN AN ORGANIZED HEALTH CARE EDUCATION/TRAINING PROGRAM

## 2022-04-13 PROCEDURE — G8400 PT W/DXA NO RESULTS DOC: HCPCS | Performed by: STUDENT IN AN ORGANIZED HEALTH CARE EDUCATION/TRAINING PROGRAM

## 2022-04-13 PROCEDURE — 99214 OFFICE O/P EST MOD 30 MIN: CPT | Performed by: STUDENT IN AN ORGANIZED HEALTH CARE EDUCATION/TRAINING PROGRAM

## 2022-04-13 PROCEDURE — 1123F ACP DISCUSS/DSCN MKR DOCD: CPT | Performed by: STUDENT IN AN ORGANIZED HEALTH CARE EDUCATION/TRAINING PROGRAM

## 2022-04-13 PROCEDURE — 1090F PRES/ABSN URINE INCON ASSESS: CPT | Performed by: STUDENT IN AN ORGANIZED HEALTH CARE EDUCATION/TRAINING PROGRAM

## 2022-04-13 PROCEDURE — 4040F PNEUMOC VAC/ADMIN/RCVD: CPT | Performed by: STUDENT IN AN ORGANIZED HEALTH CARE EDUCATION/TRAINING PROGRAM

## 2022-04-13 PROCEDURE — G8427 DOCREV CUR MEDS BY ELIG CLIN: HCPCS | Performed by: STUDENT IN AN ORGANIZED HEALTH CARE EDUCATION/TRAINING PROGRAM

## 2022-04-13 PROCEDURE — 1036F TOBACCO NON-USER: CPT | Performed by: STUDENT IN AN ORGANIZED HEALTH CARE EDUCATION/TRAINING PROGRAM

## 2022-04-13 NOTE — PROGRESS NOTES
5/10/2022    Gary Guardado    Chief Complaint   Patient presents with    Follow-up     muscle spasms in legs. Had to reschedule ultrasound, machine went down.  Other     gets very winded really easy. and potassum was high    Other     does she continue to keep taking?  Referral - General     neurologist and urology        HPI  History was obtained from patient. Accompanied daughter Kenny Eric is a 78 y.o. female with a PMHx as listed below who presents today for follow up on chronic conditions    K level high, patient has had high levels in the past, attributes to diet. Plan for CHATA, assessment. Leg pain has not improved. Gabapentin not helping, she still has a lot of muscle spasms. Patient s/p  shunt placed roughly 2001 however they are not sure last time it was recalibrated they need a referral followed with [facility] Neurosurgery in the past but requesting someone more local.     she is currently not following with Neurosurgery     1. history of AVM (arteriovenous malformation)-duodenum    2. Coronary artery disease involving native coronary artery of native heart without angina pectoris    3. Essential hypertension    4. Pacemaker    5. Hyperkalemia             REVIEW OF SYMPTOMS    Review of Systems   Constitutional: Negative for chills and fatigue. HENT: Negative for congestion and sore throat. Respiratory: Negative for shortness of breath and wheezing. Cardiovascular: Negative for chest pain and palpitations. Gastrointestinal: Negative for abdominal pain and nausea. Genitourinary: Negative for frequency and urgency. Neurological: Negative for light-headedness.        PAST MEDICAL HISTORY  Past Medical History:   Diagnosis Date    Acute diastolic CHF (congestive heart failure) ([facility] [address] 12/23/2014    Acute urinary tract infection 07/20/2019    Single Kidney    Anemia     Chronic low back pain     s/p epidural steroids    COPD [facility]ch[facility] obstructive [facility] disease) [facility]     moderate to severe    COVID 10/20/2021    Depression     Family history of cardiac disorder     Father, Brother    GERD (gastroesophageal reflux disease)     Goiter     s/p radioactive Iodine/ Low T4    H/O blood clots     right leg after miscarriage    H/O cardiovascular stress test 06/25/2013 6/13-WNL EF 70%    H/O cardiovascular stress test 08/10/2017    Normal study EF 70%    H/O: CVA (cerebrovascular accident) 07/20/2019    Heart murmur     History of blood transfusion     2017    History of LIMITED Echocardiogram 11/08/2019    EF 55-60%, Normal left ventricle structure and systolic function , no regional wall motion abnormalities were detected    [place] (hard of hearing)     hearing aides    Hx of blood clots     Hx of echocardiogram 07/11/2013    EF>55%, mild MR, mild TR, abn lVSFx stage 1     HX OTHER MEDICAL     PCP is Dr Giselle Wylie; Cardiologist is Dr Rondi Goltz 07/30/2013    Peripheral Angiogram.  Sluggish flow RLE, med mgmt.      Hydrocephalus, adult ([facility] [address]     shunt    Hyperlipidemia     Hyperthyroidism 07/20/2019    Kidney stone     \"had stone last summer 2016- passed it\"    Memory loss     Old MI (myocardial infarction) 2001    Osteoarthritis     Pneumonia     recurrent    Solitary kidney     Unspecified cerebral artery occlusion with cerebral infarction     \"they discovered I had stroke in 1993- with CT scan of head but never knew I had it\"       FAMILY HISTORY  Family History   Problem Relation Age of Onset    Stroke Mother     Heart Disease Mother     Asthma Mother     Diabetes Mother     Cancer Mother         uterine    Emphysema Father     Stroke Father     Heart Disease Father     Heart Defect Father         hardening of arteries    Diabetes Sister     Other Brother         pneumonia    Cancer Maternal Grandfather         stomach    Diabetes Paternal Grandmother     Stroke Paternal Grandmother     Diabetes Brother     Heart Disease Brother     Alcohol Abuse Maternal Aunt        SOCIAL HISTORY  Social History     Socioeconomic History    Marital status:      Spouse name: Not on file    Number of children: 11    Years of education: Not on file    Highest education level: Not on file   Occupational History    Occupation: mental health counselor retired   Tobacco Use    Smoking status: Former Smoker     Years: 59.00     Types: Cigarettes     Start date: 10/7/1960    Smokeless tobacco: Never Used    Tobacco comment: pt quit 11/4/19   Vaping Use    Vaping Use: Never used   Substance and Sexual Activity    Alcohol use: Yes     Comment: rarely/ average \"2-3 times per year'    Drug use: No    Sexual activity: Not on file   Other Topics Concern    Not on file   Social History Narrative    Do you donate blood or plasma? no    Caffeine intake? Moderate    Advance directive? yes    Is blood transfusion acceptable in an emergency? yes      Social Determinants of Health     Financial Resource Strain:     Difficulty of Paying Living Expenses: Not on file   Food Insecurity:     Worried About Running Out of Food in the Last Year: Not on file    Lemuel of Food in the Last Year: Not on file   Transportation Needs:     Lack of Transportation (Medical): Not on file    Lack of Transportation (Non-Medical):  Not on file   Physical Activity:     Days of Exercise per Week: Not on file    Minutes of Exercise per Session: Not on file   Stress:     Feeling of Stress : Not on file   Social Connections:     Frequency of Communication with Friends and Family: Not on file    Frequency of Social Gatherings with Friends and Family: Not on file    Attends Voodoo Services: Not on file    Active Member of Clubs or Organizations: Not on file    Attends Club or Organization Meetings: Not on file    Marital Status: Not on file   Intimate Partner Violence:     Fear of Current or Ex-Partner: Not on file    Emotionally Abused: Not on file   Oanh Wade Physically Abused: Not on file    Sexually Abused: Not on file   Housing Stability:     Unable to Pay for Housing in the Last Year: Not on file    Number of [place] in the Last Year: Not on file    Unstable Housing in the Last Year: Not on file        SURGICAL HISTORY  Past Surgical History:   Procedure Laterality Date   [place]    disc ruptured    BRAIN SURGERY  2001    right shunt   [place]    reconstruction of breast bone and rib-\"pigeon chest\"    CHOLECYSTECTOMY  1990    COLONOSCOPY  7/2011    F/U 7/2016    CORONARY ANGIOPLASTY WITH STENT PLACEMENT  2001    CSF SHUNT      ENDOSCOPY, COLON, DIAGNOSTIC      per old chart pt had EGD done with admission 2/17/2017    HYSTERECTOMY  1978    VERONIQUE BSO-dub/fibroids    KIDNEY SURGERY Right 1967    suspension    KNEE SURGERY  1997    right     PACEMAKER INSERTION Left 09/26/2019    [employer] XT DR ALEX Torres     ROTATOR CUFF REPAIR  1997    right    TUBAL LIGATION  1972                 CURRENT MEDICATIONS  Current Outpatient Medications   Medication Sig Dispense Refill    furosemide (LASIX) 20 MG tablet TAKE 1 TABLET BY MOUTH LEGS DAILY AS NEEDED IF LEGS START TO SWELL UP 30 tablet 5    albuterol (PROVENTIL) (5 MG/ML) 0.5% nebulizer solution Take 0.5 mLs by nebulization 4 times daily 300 each 5    albuterol sulfate HFA (VENTOLIN HFA) 108 (90 Base) MCG/ACT inhaler Inhale 2 puffs into the lungs 4 times daily as needed for Wheezing 54 g 3    budesonide-formoterol (SYMBICORT) 160-4.5 MCG/ACT AERO Inhale 2 puffs into the lungs 2 times daily 30.6 g 3    atorvastatin (LIPITOR) 10 MG tablet Take 1 tablet by mouth daily 90 tablet 1    Cyanocobalamin (B-12) 500 MCG TABS Take 2 tablets by mouth daily (Patient taking differently: Take 2 tablets by mouth daily Every other day) 180 tablet 1    donepezil (ARICEPT) 10 MG tablet Take 1 tablet by mouth nightly 90 tablet 1    DULoxetine (CYMBALTA) 60 MG extended release capsule Take 1 capsule by mouth nightly 90 capsule 1    ELIQUIS 2.5 MG TABS tablet Take 1 tablet by mouth 2 times daily 180 tablet 1    ferrous sulfate (IRON 325) 325 (65 Fe) MG tablet Take 1 tablet by mouth nightly Until you start iv iron 90 tablet 1    gabapentin (NEURONTIN) 400 MG capsule Take 1 capsule by mouth 3 times daily for 90 days. 270 capsule 0    metFORMIN (GLUCOPHAGE) 500 MG tablet Take 1 tablet by mouth nightly 90 tablet 1    midodrine (PROAMATINE) 5 MG tablet Take 1 tablet by mouth 3 times daily (with meals) 270 tablet 1    montelukast (SINGULAIR) 10 MG tablet Take 1 tablet by mouth daily 90 tablet 1    pantoprazole (PROTONIX) 40 MG tablet TAKE 1 TABLET BY MOUTH TWICE DAILY 180 tablet 1    Nebulizers (COMPRESSOR/NEBULIZER) MISC 1 Device by Does not apply route 3 times daily as needed (for shortness of breath) 1 each 0    ipratropium (ATROVENT) 0.06 % nasal spray 1 spray by Each Nostril route 4 times daily as needed for Rhinitis 1 Bottle 3    primidone (MYSOLINE) 50 MG tablet Take 1 tablet by mouth 3 times daily 270 tablet 1    ARIPiprazole (ABILIFY) 2 MG tablet TAKE 1 TABLET BY MOUTH DAILY      FreeStyle Lancets MISC 1 each by Does not apply route daily 100 each 2    aspirin 81 MG tablet Take 81 mg by mouth daily      sucralfate (CARAFATE) 1 GM tablet TAKE 1 TABLET BY MOUTH FOUR TIMES DAILY 120 tablet 5    HYDROcodone-acetaminophen (NORCO) 7.5-325 MG per tablet Take 0.5-1 tablets by mouth 3 times daily as needed for Pain (m54.5) for up to 30 days. 90 tablet 0    methIMAzole (TAPAZOLE) 5 MG tablet  (Patient not taking: Reported on 1/12/2022)      blood glucose test strips (FREESTYLE LITE) strip 1 each by In Vitro route daily (Patient not taking: Reported on 1/12/2022) 100 each 2    CPAP Machine MISC by Does not apply route (Patient not taking: Reported on 11/22/2021)       No current facility-administered medications for this visit.        ALLERGIES  Allergies   Allergen Reactions    Codeine Itching    Bactrim [Sulfamethoxazole-Trimethoprim]      dizziness    Sulfamethoxazole-Trimethoprim        PHYSICAL EXAM    BP (!) 124/54 (Site: Right Upper Arm, Position: Sitting, Cuff Size: Medium Adult)   Pulse 75   Ht 5' 3\" (1.6 m)   Wt 145 lb 8 oz (66 kg)   SpO2 (!) 82% Comment: on 2 liters around the clock  BMI 25.77 kg/m²     Physical Exam  Constitutional:       Appearance: Normal appearance. HENT:      Head: Normocephalic and atraumatic. Eyes:      Extraocular Movements: Extraocular movements intact. Pupils: Pupils are equal, round, and reactive to light. Cardiovascular:      Rate and Rhythm: Normal rate and regular rhythm. Pulses: Normal pulses. Heart sounds: No murmur heard. No friction rub. No gallop. Pulmonary:      Effort: Pulmonary effort is normal.      Breath sounds: Normal breath sounds. Skin:     General: Skin is warm and dry. Neurological:      General: No focal deficit present. Mental Status: She is alert. Psychiatric:         Mood and Affect: Mood normal.         Behavior: Behavior normal.         ASSESSMENT & PLAN    1. history of AVM (arteriovenous malformation)-duodenum  -patient requesting more local neurosurgeon will refer to Dr. Denese Lesches eval  shunt  - External Referral - Tracy Forde MD, Neurosurgery, [place]    2. Coronary artery disease involving native coronary artery of native heart without angina pectoris  Continue medical management following with cardiology    3. Essential hypertension  bp stable  - Basic Metabolic Panel; Future  - Magnesium; Future    4. Pacemaker    5. Hyperkalemia  -counseled on diet, repeat K levels in 2 weeks, consider nephrology referral pending repeat labs  - Basic Metabolic Panel; Future  - Magnesium; Future      Return in about 2 months (around 6/13/2022).          Electronically signed by Nikita Lara DO on 5/10/2022

## 2022-04-14 PROCEDURE — 93296 REM INTERROG EVL PM/IDS: CPT | Performed by: INTERNAL MEDICINE

## 2022-04-14 PROCEDURE — 93294 REM INTERROG EVL PM/LDLS PM: CPT | Performed by: INTERNAL MEDICINE

## 2022-04-19 DIAGNOSIS — G89.29 CHRONIC BILATERAL LOW BACK PAIN WITHOUT SCIATICA: ICD-10-CM

## 2022-04-19 DIAGNOSIS — M54.50 CHRONIC BILATERAL LOW BACK PAIN WITHOUT SCIATICA: ICD-10-CM

## 2022-04-19 RX ORDER — HYDROCODONE BITARTRATE AND ACETAMINOPHEN 7.5; 325 MG/1; MG/1
.5-1 TABLET ORAL 3 TIMES DAILY PRN
Qty: 90 TABLET | Refills: 0 | Status: SHIPPED | OUTPATIENT
Start: 2022-04-19 | End: 2022-05-20 | Stop reason: SDUPTHER

## 2022-04-24 LAB
FOLATE: 5.3 NG/ML (ref 3.1–17.5)
VITAMIN B-12: >2000 PG/ML (ref 211–911)

## 2022-04-25 RX ORDER — SUCRALFATE 1 G/1
TABLET ORAL
Qty: 120 TABLET | Refills: 5 | Status: SHIPPED | OUTPATIENT
Start: 2022-04-25

## 2022-05-06 DIAGNOSIS — R25.2 LEG CRAMP: ICD-10-CM

## 2022-05-06 RX ORDER — DULOXETIN HYDROCHLORIDE 60 MG/1
CAPSULE, DELAYED RELEASE ORAL
Qty: 90 CAPSULE | Refills: 1 | OUTPATIENT
Start: 2022-05-06

## 2022-05-10 ENCOUNTER — TELEPHONE (OUTPATIENT)
Dept: FAMILY MEDICINE CLINIC | Age: 82
End: 2022-05-10

## 2022-05-10 ASSESSMENT — ENCOUNTER SYMPTOMS
NAUSEA: 0
ABDOMINAL PAIN: 0
WHEEZING: 0
SORE THROAT: 0
SHORTNESS OF BREATH: 0

## 2022-05-10 NOTE — TELEPHONE ENCOUNTER
Patients daughter would like Neurosurgery referral to someone at [place]. Her shunt was put in in 2001 by someone there.

## 2022-05-11 ENCOUNTER — HOSPITAL ENCOUNTER (OUTPATIENT)
Age: 82
Discharge: HOME | End: 2022-05-11
Payer: MEDICARE

## 2022-05-11 ENCOUNTER — HOSPITAL ENCOUNTER (OUTPATIENT)
Dept: ULTRASOUND IMAGING | Age: 82
Discharge: HOME | End: 2022-05-11
Payer: MEDICARE

## 2022-05-11 DIAGNOSIS — Q27.30 AVM (ARTERIOVENOUS MALFORMATION): Primary | ICD-10-CM

## 2022-05-11 LAB
ALBUMIN SERPL-MCNC: 4.3 GM/DL (ref 3.4–5)
ALP BLD-CCNC: 140 IU/L (ref 40–128)
ALT SERPL-CCNC: 7 U/L (ref 10–40)
ANION GAP SERPL CALCULATED.3IONS-SCNC: 10 MMOL/L (ref 4–16)
AST SERPL-CCNC: 12 IU/L (ref 15–37)
BACTERIA: ABNORMAL /HPF
BASOPHILS ABSOLUTE: 0.1 K/CU MM
BASOPHILS RELATIVE PERCENT: 1 % (ref 0–1)
BILIRUB SERPL-MCNC: 0.2 MG/DL (ref 0–1)
BILIRUBIN URINE: ABNORMAL MG/DL
BLOOD, URINE: NEGATIVE
BUN BLDV-MCNC: 28 MG/DL (ref 6–23)
CALCIUM SERPL-MCNC: 9.6 MG/DL (ref 8.3–10.6)
CHLORIDE BLD-SCNC: 102 MMOL/L (ref 99–110)
CLARITY: ABNORMAL
CO2: 31 MMOL/L (ref 21–32)
COLOR: YELLOW
CREAT SERPL-MCNC: 1.1 MG/DL (ref 0.6–1.1)
CREATININE URINE: 121.2 MG/DL (ref 28–217)
DIFFERENTIAL TYPE: ABNORMAL
EOSINOPHILS ABSOLUTE: 0.5 K/CU MM
EOSINOPHILS RELATIVE PERCENT: 7.5 % (ref 0–3)
GFR AFRICAN AMERICAN: 57 ML/MIN/1.73M2
GFR NON-AFRICAN AMERICAN: 47 ML/MIN/1.73M2
GLUCOSE BLD-MCNC: 96 MG/DL (ref 70–99)
GLUCOSE, URINE: NEGATIVE MG/DL
HCT VFR BLD CALC: 43.8 % (ref 37–47)
HEMOGLOBIN: 12.6 GM/DL (ref 12.5–16)
HYALINE CASTS: 2 /LPF
IMMATURE NEUTROPHIL %: 0.3 % (ref 0–0.43)
KETONES, URINE: ABNORMAL MG/DL
LEUKOCYTE ESTERASE, URINE: ABNORMAL
LYMPHOCYTES ABSOLUTE: 2 K/CU MM
LYMPHOCYTES RELATIVE PERCENT: 28.2 % (ref 24–44)
MAGNESIUM: 2.4 MG/DL (ref 1.8–2.4)
MCH RBC QN AUTO: 30.7 PG (ref 27–31)
MCHC RBC AUTO-ENTMCNC: 28.8 % (ref 32–36)
MCV RBC AUTO: 106.6 FL (ref 78–100)
MONOCYTES ABSOLUTE: 0.7 K/CU MM
MONOCYTES RELATIVE PERCENT: 9.2 % (ref 0–4)
MUCUS: ABNORMAL HPF
NITRITE URINE, QUANTITATIVE: NEGATIVE
NUCLEATED RBC %: 0 %
PDW BLD-RTO: 15.9 % (ref 11.7–14.9)
PH, URINE: 5 (ref 5–8)
PHOSPHORUS: 3.6 MG/DL (ref 2.5–4.9)
PLATELET # BLD: 208 K/CU MM (ref 140–440)
PMV BLD AUTO: 13.4 FL (ref 7.5–11.1)
POTASSIUM SERPL-SCNC: 5.2 MMOL/L (ref 3.5–5.1)
PROT/CREAT RATIO, UR: 0.5
PROTEIN UA: ABNORMAL MG/DL
RBC # BLD: 4.11 M/CU MM (ref 4.2–5.4)
RBC URINE: 1 /HPF (ref 0–6)
SEGMENTED NEUTROPHILS ABSOLUTE COUNT: 3.8 K/CU MM
SEGMENTED NEUTROPHILS RELATIVE PERCENT: 53.8 % (ref 36–66)
SODIUM BLD-SCNC: 143 MMOL/L (ref 135–145)
SPECIFIC GRAVITY UA: 1.02 (ref 1–1.03)
SQUAMOUS EPITHELIAL: 3 /HPF
TOTAL IMMATURE NEUTOROPHIL: 0.02 K/CU MM
TOTAL NUCLEATED RBC: 0 K/CU MM
TOTAL PROTEIN: 6.3 GM/DL (ref 6.4–8.2)
TRANSITIONAL EPITHELIAL: 1 /HPF
TRICHOMONAS: ABNORMAL /HPF
URINE TOTAL PROTEIN: 66.4 MG/DL
UROBILINOGEN, URINE: 0.2 MG/DL (ref 0.2–1)
WBC # BLD: 7.1 K/CU MM (ref 4–10.5)
WBC UA: 14 /HPF (ref 0–5)

## 2022-05-11 PROCEDURE — 84100 ASSAY OF PHOSPHORUS: CPT

## 2022-05-11 PROCEDURE — 93922 UPR/L XTREMITY ART 2 LEVELS: CPT

## 2022-05-11 PROCEDURE — 85025 COMPLETE CBC W/AUTO DIFF WBC: CPT

## 2022-05-11 PROCEDURE — 82570 ASSAY OF URINE CREATININE: CPT

## 2022-05-11 PROCEDURE — 81001 URINALYSIS AUTO W/SCOPE: CPT

## 2022-05-11 PROCEDURE — 80053 COMPREHEN METABOLIC PANEL: CPT

## 2022-05-11 PROCEDURE — 84156 ASSAY OF PROTEIN URINE: CPT

## 2022-05-11 PROCEDURE — 83735 ASSAY OF MAGNESIUM: CPT

## 2022-05-11 PROCEDURE — 36415 COLL VENOUS BLD VENIPUNCTURE: CPT

## 2022-05-13 NOTE — RESULT ENCOUNTER NOTE
Please discuss with patient recent CHATA b/l LE normal. High K important she follows with Nephrology

## 2022-05-19 DIAGNOSIS — E11.9 TYPE 2 DIABETES MELLITUS WITHOUT COMPLICATION, WITHOUT LONG-TERM CURRENT USE OF INSULIN (HCC): ICD-10-CM

## 2022-05-20 DIAGNOSIS — G89.29 CHRONIC BILATERAL LOW BACK PAIN WITHOUT SCIATICA: ICD-10-CM

## 2022-05-20 DIAGNOSIS — M54.50 CHRONIC BILATERAL LOW BACK PAIN WITHOUT SCIATICA: ICD-10-CM

## 2022-05-20 RX ORDER — HYDROCODONE BITARTRATE AND ACETAMINOPHEN 7.5; 325 MG/1; MG/1
.5-1 TABLET ORAL 3 TIMES DAILY PRN
Qty: 90 TABLET | Refills: 0 | Status: SHIPPED | OUTPATIENT
Start: 2022-05-20 | End: 2022-06-29 | Stop reason: SDUPTHER

## 2022-05-25 PROBLEM — N18.30 CHRONIC RENAL DISEASE, STAGE III (HCC): Status: ACTIVE | Noted: 2022-05-25

## 2022-05-25 PROBLEM — N18.32 STAGE 3B CHRONIC KIDNEY DISEASE (HCC): Status: ACTIVE | Noted: 2022-05-25

## 2022-05-25 PROBLEM — E11.22 TYPE 2 DIABETES MELLITUS WITH CHRONIC KIDNEY DISEASE (HCC): Status: ACTIVE | Noted: 2022-05-25

## 2022-05-27 DIAGNOSIS — M54.50 CHRONIC BILATERAL LOW BACK PAIN WITHOUT SCIATICA: ICD-10-CM

## 2022-05-27 DIAGNOSIS — G89.29 CHRONIC BILATERAL LOW BACK PAIN WITHOUT SCIATICA: ICD-10-CM

## 2022-05-27 DIAGNOSIS — E11.9 TYPE 2 DIABETES MELLITUS WITHOUT COMPLICATION, WITHOUT LONG-TERM CURRENT USE OF INSULIN (HCC): ICD-10-CM

## 2022-05-27 RX ORDER — GABAPENTIN 400 MG/1
CAPSULE ORAL
Qty: 270 CAPSULE | Refills: 0 | Status: SHIPPED | OUTPATIENT
Start: 2022-05-27 | End: 2022-08-25

## 2022-06-15 ENCOUNTER — OFFICE VISIT (OUTPATIENT)
Dept: FAMILY MEDICINE CLINIC | Age: 82
End: 2022-06-15
Payer: MEDICARE

## 2022-06-15 VITALS
OXYGEN SATURATION: 91 % | SYSTOLIC BLOOD PRESSURE: 122 MMHG | DIASTOLIC BLOOD PRESSURE: 60 MMHG | HEIGHT: 63 IN | BODY MASS INDEX: 23.05 KG/M2 | RESPIRATION RATE: 16 BRPM | WEIGHT: 130.1 LBS | HEART RATE: 69 BPM

## 2022-06-15 DIAGNOSIS — R25.2 LEG CRAMP: ICD-10-CM

## 2022-06-15 DIAGNOSIS — G91.9 HYDROCEPHALUS, UNSPECIFIED TYPE (HCC): ICD-10-CM

## 2022-06-15 DIAGNOSIS — R10.13 EPIGASTRIC PAIN: Primary | ICD-10-CM

## 2022-06-15 DIAGNOSIS — R11.0 NAUSEA: ICD-10-CM

## 2022-06-15 PROCEDURE — 1090F PRES/ABSN URINE INCON ASSESS: CPT | Performed by: STUDENT IN AN ORGANIZED HEALTH CARE EDUCATION/TRAINING PROGRAM

## 2022-06-15 PROCEDURE — G8400 PT W/DXA NO RESULTS DOC: HCPCS | Performed by: STUDENT IN AN ORGANIZED HEALTH CARE EDUCATION/TRAINING PROGRAM

## 2022-06-15 PROCEDURE — 1123F ACP DISCUSS/DSCN MKR DOCD: CPT | Performed by: STUDENT IN AN ORGANIZED HEALTH CARE EDUCATION/TRAINING PROGRAM

## 2022-06-15 PROCEDURE — G8420 CALC BMI NORM PARAMETERS: HCPCS | Performed by: STUDENT IN AN ORGANIZED HEALTH CARE EDUCATION/TRAINING PROGRAM

## 2022-06-15 PROCEDURE — 1036F TOBACCO NON-USER: CPT | Performed by: STUDENT IN AN ORGANIZED HEALTH CARE EDUCATION/TRAINING PROGRAM

## 2022-06-15 PROCEDURE — 99214 OFFICE O/P EST MOD 30 MIN: CPT | Performed by: STUDENT IN AN ORGANIZED HEALTH CARE EDUCATION/TRAINING PROGRAM

## 2022-06-15 PROCEDURE — G8427 DOCREV CUR MEDS BY ELIG CLIN: HCPCS | Performed by: STUDENT IN AN ORGANIZED HEALTH CARE EDUCATION/TRAINING PROGRAM

## 2022-06-15 RX ORDER — PRIMIDONE 50 MG/1
50 TABLET ORAL 3 TIMES DAILY
Qty: 270 TABLET | Refills: 1 | Status: SHIPPED | OUTPATIENT
Start: 2022-06-15 | End: 2022-09-13

## 2022-06-15 ASSESSMENT — ENCOUNTER SYMPTOMS
SHORTNESS OF BREATH: 0
BLOOD IN STOOL: 0
DIARRHEA: 0
WHEEZING: 0
NAUSEA: 1
ABDOMINAL PAIN: 1
CONSTIPATION: 0
VOMITING: 0
SORE THROAT: 0

## 2022-06-15 NOTE — PROGRESS NOTES
6/15/2022    Emilio Koyanagi    Chief Complaint   Patient presents with    1 Month Follow-Up     2 month ck - all of last week and some of this week having pains like pancreatitis. Said it hurt to breath. Burping no mater what she drinks.  Other     bw and US results     Other     CT info given to family/ patient. [facility] at 08:00 arrival time for 09:30 CT. -DG       HPI  History was obtained from pateint. Lynda Guerrero is a 78 y.o. female with a PMHx as listed below who presents today for follow up. Patient has an acute complaint of nausea, gi pain today. She states it feels similar to her pancreatitis in 2019. No fever, tolerating thick liquids    K levels improved from prior    Tolerating meals    1. Epigastric pain    2. Hydrocephalus, unspecified type ([address]    3. Stage 3b chronic kidney disease ([address]    4. Type 2 diabetes mellitus with stage 3b chronic kidney disease, without long-term current use of insulin ([facility])             REVIEW OF SYMPTOMS    Review of Systems   Constitutional: Negative for chills and fatigue. HENT: Negative for congestion and sore throat. Respiratory: Negative for shortness of breath and wheezing. Cardiovascular: Negative for chest pain and palpitations. Gastrointestinal: Positive for abdominal pain and nausea. Negative for blood in stool, constipation, diarrhea and vomiting. Genitourinary: Negative for frequency and urgency. Neurological: Negative for light-headedness.        PAST MEDICAL HISTORY  Past Medical History:   Diagnosis Date    Acute diastolic CHF (congestive heart failure) ([address] 12/23/2014    Acute urinary tract infection 07/20/2019    Single Kidney    Anemia     Chronic low back pain     s/p epidural steroids    COPD (chronic obstructive pulmonary disease) ([facility])     moderate to severe    COVID 10/20/2021    Depression     Family history of cardiac disorder     Father, Brother    GERD (gastroesophageal reflux disease)     Goiter     s/p radioactive Iodine/ Low T4    H/O blood clots     right leg after miscarriage    H/O cardiovascular stress test 06/25/2013 6/13-WNL EF 70%    H/O cardiovascular stress test 08/10/2017    Normal study EF 70%    H/O: CVA (cerebrovascular accident) 07/20/2019    Heart murmur     History of blood transfusion     2017    History of LIMITED Echocardiogram 11/08/2019    EF 55-60%, Normal left ventricle structure and systolic function , no regional wall motion abnormalities were detected    [place] (hard of hearing)     hearing aides    Hx of blood clots     Hx of echocardiogram 07/11/2013    EF>55%, mild MR, mild TR, abn lVSFx stage 1     HX OTHER MEDICAL     PCP is Dr Rita Sood; Cardiologist is Dr Gildardo Marquez 07/30/2013    Peripheral Angiogram.  Sluggish flow RLE, med mgmt.      Hydrocephalus, adult ([facility] [address]     shunt    Hyperlipidemia     Hyperthyroidism 07/20/2019    Kidney stone     \"had stone last summer 2016- passed it\"    Memory loss     Old MI (myocardial infarction) 2001    Osteoarthritis     Pneumonia     recurrent    Solitary kidney     Unspecified cerebral artery occlusion with cerebral infarction     \"they discovered I had stroke in 1993- with CT scan of head but never knew I had it\"       FAMILY HISTORY  Family History   Problem Relation Age of Onset    Stroke Mother     Heart Disease Mother     Asthma Mother     Diabetes Mother     Cancer Mother         uterine    Emphysema Father     Stroke Father     Heart Disease Father     Heart Defect Father         hardening of arteries    Diabetes Sister     Other Brother         pneumonia    Cancer Maternal Grandfather         stomach    Diabetes Paternal Grandmother     Stroke Paternal Grandmother     Diabetes Brother     Heart Disease Brother     Alcohol Abuse Maternal Aunt        SOCIAL HISTORY  Social History     Socioeconomic History    Marital status:      Spouse name: None    Number of children: 5    Years of education: None    Highest education level: None   Occupational History    Occupation: mental health counselor retired   Tobacco Use    Smoking status: Former Smoker     Years: 59.00     Types: Cigarettes     Start date: 10/7/1960    Smokeless tobacco: Never Used    Tobacco comment: pt quit 11/4/19   Vaping Use    Vaping Use: Never used   Substance and Sexual Activity    Alcohol use: Yes     Comment: rarely/ average \"2-3 times per year'    Drug use: No    Sexual activity: None   Other Topics Concern    None   Social History Narrative    Do you donate blood or plasma? no    Caffeine intake? Moderate    Advance directive? yes    Is blood transfusion acceptable in an emergency? yes      Social Determinants of Health     Financial Resource Strain:     Difficulty of Paying Living Expenses: Not on file   Food Insecurity:     Worried About Running Out of Food in the Last Year: Not on file    Lemuel of Food in the Last Year: Not on file   Transportation Needs:     Lack of Transportation (Medical): Not on file    Lack of Transportation (Non-Medical):  Not on file   Physical Activity:     Days of Exercise per Week: Not on file    Minutes of Exercise per Session: Not on file   Stress:     Feeling of Stress : Not on file   Social Connections:     Frequency of Communication with Friends and Family: Not on file    Frequency of Social Gatherings with Friends and Family: Not on file    Attends Restorationist Services: Not on file    Active Member of Clubs or Organizations: Not on file    Attends Club or Organization Meetings: Not on file    Marital Status: Not on file   Intimate Partner Violence:     Fear of Current or Ex-Partner: Not on file    Emotionally Abused: Not on file    Physically Abused: Not on file    Sexually Abused: Not on file   Housing Stability:     Unable to Pay for Housing in the Last Year: Not on file    Number of [place] in the Last Year: Not on file    Unstable Housing in the Last Year: Not on file        SURGICAL HISTORY  Past Surgical History:   Procedure Laterality Date    BACK SURGERY  1997    disc ruptured    BRAIN SURGERY  2001    right shunt    CHEST SURGERY  2543    reconstruction of breast bone and rib-\"pigeon chest\"    CHOLECYSTECTOMY  1990    COLONOSCOPY  7/2011    F/U 7/2016    CORONARY ANGIOPLASTY WITH STENT PLACEMENT  2001    CSF SHUNT      ENDOSCOPY, COLON, DIAGNOSTIC      per old chart pt had EGD done with admission 2/17/2017    HYSTERECTOMY (CERVIX STATUS UNKNOWN)  1978    VERONIQUE BSO-dub/fibroids    KIDNEY SURGERY Right 1967    suspension    KNEE SURGERY  1997    right     PACEMAKER INSERTION Left 09/26/2019    [email or web address] XT DR ALEX Torres     ROTATOR CUFF REPAIR  1997    right    TUBAL LIGATION  1972                 CURRENT MEDICATIONS  Current Outpatient Medications   Medication Sig Dispense Refill    primidone (MYSOLINE) 50 MG tablet Take 1 tablet by mouth 3 times daily 270 tablet 1    gabapentin (NEURONTIN) 400 MG capsule TAKE 1 CAPSULE BY MOUTH THREE TIMES DAILY 270 capsule 0    XIIDRA 5 % SOLN INSTILL 1 DROP IN BOTH EYES TWICE DAILY      mirtazapine (REMERON) 15 MG tablet TAKE 1 TABLET BY MOUTH AT BEDTIME      HYDROcodone-acetaminophen (NORCO) 7.5-325 MG per tablet Take 0.5-1 tablets by mouth 3 times daily as needed for Pain (m54.5) for up to 30 days.  90 tablet 0    metFORMIN (GLUCOPHAGE) 500 MG tablet TAKE 1 TABLET BY MOUTH EVERY NIGHT 90 tablet 1    sucralfate (CARAFATE) 1 GM tablet TAKE 1 TABLET BY MOUTH FOUR TIMES DAILY 120 tablet 5    furosemide (LASIX) 20 MG tablet TAKE 1 TABLET BY MOUTH LEGS DAILY AS NEEDED IF LEGS START TO SWELL UP (Patient taking differently: Take 20 mg by mouth every other day ) 30 tablet 5    albuterol sulfate HFA (VENTOLIN HFA) 108 (90 Base) MCG/ACT inhaler Inhale 2 puffs into the lungs 4 times daily as needed for Wheezing 54 g 3    budesonide-formoterol (SYMBICORT) 160-4.5 MCG/ACT AERO Inhale 2 puffs into the lungs 2 times daily 30.6 g 3    atorvastatin (LIPITOR) 10 MG tablet Take 1 tablet by mouth daily 90 tablet 1    Cyanocobalamin (B-12) 500 MCG TABS Take 2 tablets by mouth daily (Patient taking differently: Take 2 tablets by mouth in the morning and at bedtime ) 180 tablet 1    donepezil (ARICEPT) 10 MG tablet Take 1 tablet by mouth nightly (Patient taking differently: Take 10 mg by mouth daily ) 90 tablet 1    DULoxetine (CYMBALTA) 60 MG extended release capsule Take 1 capsule by mouth nightly 90 capsule 1    ELIQUIS 2.5 MG TABS tablet Take 1 tablet by mouth 2 times daily 180 tablet 1    ferrous sulfate (IRON 325) 325 (65 Fe) MG tablet Take 1 tablet by mouth nightly Until you start iv iron 90 tablet 1    midodrine (PROAMATINE) 5 MG tablet Take 1 tablet by mouth 3 times daily (with meals) 270 tablet 1    montelukast (SINGULAIR) 10 MG tablet Take 1 tablet by mouth daily 90 tablet 1    pantoprazole (PROTONIX) 40 MG tablet TAKE 1 TABLET BY MOUTH TWICE DAILY 180 tablet 1    ipratropium (ATROVENT) 0.06 % nasal spray 1 spray by Each Nostril route 4 times daily as needed for Rhinitis 1 Bottle 3    ARIPiprazole (ABILIFY) 2 MG tablet TAKE 1 TABLET BY MOUTH DAILY      aspirin 81 MG tablet Take 81 mg by mouth daily      ketorolac (ACULAR) 0.5 % ophthalmic solution  (Patient not taking: Reported on 5/25/2022)      prednisoLONE acetate (PRED FORTE) 1 % ophthalmic suspension  (Patient not taking: Reported on 5/25/2022)       No current facility-administered medications for this visit. ALLERGIES  Allergies   Allergen Reactions    Codeine Itching    Bactrim [Sulfamethoxazole-Trimethoprim]      dizziness    Sulfamethoxazole-Trimethoprim        PHYSICAL EXAM    /60 (Site: Left Upper Arm, Position: Sitting, Cuff Size: Medium Adult)   Pulse 69   Resp 16   Ht 5' 3\" (1.6 m)   Wt 130 lb 1.6 oz (59 kg)   SpO2 91%   BMI 23.05 kg/m²     Physical Exam  Constitutional:       Appearance: Normal appearance. HENT:      Head: Normocephalic and atraumatic. Eyes:      Extraocular Movements: Extraocular movements intact. Pupils: Pupils are equal, round, and reactive to light. Cardiovascular:      Rate and Rhythm: Normal rate and regular rhythm. Pulses: Normal pulses. Heart sounds: No murmur heard. No friction rub. No gallop. Pulmonary:      Effort: Pulmonary effort is normal.      Breath sounds: Normal breath sounds. Abdominal:      General: Abdomen is flat. Bowel sounds are normal.      Palpations: Abdomen is soft. Tenderness: There is abdominal tenderness (epigastric). Skin:     General: Skin is warm and dry. Neurological:      General: No focal deficit present. Mental Status: She is alert. Psychiatric:         Mood and Affect: Mood normal.         Behavior: Behavior normal.         ASSESSMENT & PLAN    1. Epigastric pain  We will obtain CT and lab work STAT  -has followed with Dr. Rabia Narayanan in the past, hx. Pancreatitis in 2019  -instructed if fever, unable to tolerate liquids or worsening abdominal pain go to ED    - CT ABDOMEN PELVIS WO CONTRAST Additional Contrast? None; Future  - Sedimentation Rate; Future  - C-Reactive Protein; Future  - AFL - Cherri Montaño MD, Gastroenterology, [place]    2. Hydrocephalus, unspecified type (HCC)  - primidone (MYSOLINE) 50 MG tablet; Take 1 tablet by mouth 3 times daily  Dispense: 270 tablet; Refill: 1  - CBC with Auto Differential; Future  - Lipase;  Future        Return in about 4 weeks (around 7/13/2022) for can be virtual.         Electronically signed by Shannan Hall DO on 6/15/2022

## 2022-06-16 ENCOUNTER — TELEPHONE (OUTPATIENT)
Dept: FAMILY MEDICINE CLINIC | Age: 82
End: 2022-06-16

## 2022-06-16 ENCOUNTER — HOSPITAL ENCOUNTER (OUTPATIENT)
Age: 82
Discharge: HOME | End: 2022-06-16
Payer: MEDICARE

## 2022-06-16 ENCOUNTER — HOSPITAL ENCOUNTER (OUTPATIENT)
Dept: CT IMAGING | Age: 82
Discharge: HOME | End: 2022-06-16
Payer: MEDICARE

## 2022-06-16 DIAGNOSIS — R10.13 EPIGASTRIC PAIN: ICD-10-CM

## 2022-06-16 DIAGNOSIS — J18.9 COMMUNITY ACQUIRED PNEUMONIA OF LEFT LOWER LOBE OF LUNG: Primary | ICD-10-CM

## 2022-06-16 DIAGNOSIS — G89.29 CHRONIC BILATERAL LOW BACK PAIN WITHOUT SCIATICA: ICD-10-CM

## 2022-06-16 DIAGNOSIS — N20.0 KIDNEY STONE ON LEFT SIDE: ICD-10-CM

## 2022-06-16 DIAGNOSIS — N20.0 KIDNEY STONE ON LEFT SIDE: Primary | ICD-10-CM

## 2022-06-16 DIAGNOSIS — M54.50 CHRONIC BILATERAL LOW BACK PAIN WITHOUT SCIATICA: ICD-10-CM

## 2022-06-16 DIAGNOSIS — G91.9 HYDROCEPHALUS, UNSPECIFIED TYPE (HCC): ICD-10-CM

## 2022-06-16 LAB
ALBUMIN SERPL-MCNC: 4 GM/DL (ref 3.4–5)
ALP BLD-CCNC: 120 IU/L (ref 40–128)
ALT SERPL-CCNC: 9 U/L (ref 10–40)
ANION GAP SERPL CALCULATED.3IONS-SCNC: 9 MMOL/L (ref 4–16)
AST SERPL-CCNC: 13 IU/L (ref 15–37)
BASOPHILS ABSOLUTE: 0.1 K/CU MM
BASOPHILS RELATIVE PERCENT: 1.5 % (ref 0–1)
BILIRUB SERPL-MCNC: 0.2 MG/DL (ref 0–1)
BUN BLDV-MCNC: 24 MG/DL (ref 6–23)
CALCIUM SERPL-MCNC: 10 MG/DL (ref 8.3–10.6)
CHLORIDE BLD-SCNC: 98 MMOL/L (ref 99–110)
CO2: 34 MMOL/L (ref 21–32)
CREAT SERPL-MCNC: 1 MG/DL (ref 0.6–1.1)
DIFFERENTIAL TYPE: ABNORMAL
EOSINOPHILS ABSOLUTE: 0.3 K/CU MM
EOSINOPHILS RELATIVE PERCENT: 5.4 % (ref 0–3)
GFR AFRICAN AMERICAN: >60 ML/MIN/1.73M2
GFR NON-AFRICAN AMERICAN: 53 ML/MIN/1.73M2
GLUCOSE BLD-MCNC: 89 MG/DL (ref 70–99)
HCT VFR BLD CALC: 42.1 % (ref 37–47)
HEMOGLOBIN: 12.3 GM/DL (ref 12.5–16)
HIGH SENSITIVE C-REACTIVE PROTEIN: 30.8 MG/L
IMMATURE NEUTROPHIL %: 0.3 % (ref 0–0.43)
LIPASE: 33 IU/L (ref 13–60)
LYMPHOCYTES ABSOLUTE: 1.4 K/CU MM
LYMPHOCYTES RELATIVE PERCENT: 23.9 % (ref 24–44)
MCH RBC QN AUTO: 29.8 PG (ref 27–31)
MCHC RBC AUTO-ENTMCNC: 29.2 % (ref 32–36)
MCV RBC AUTO: 101.9 FL (ref 78–100)
MONOCYTES ABSOLUTE: 0.7 K/CU MM
MONOCYTES RELATIVE PERCENT: 12.4 % (ref 0–4)
NUCLEATED RBC %: 0 %
PDW BLD-RTO: 14.3 % (ref 11.7–14.9)
PLATELET # BLD: 361 K/CU MM (ref 140–440)
PMV BLD AUTO: 11.7 FL (ref 7.5–11.1)
POTASSIUM SERPL-SCNC: 4.9 MMOL/L (ref 3.5–5.1)
RBC # BLD: 4.13 M/CU MM (ref 4.2–5.4)
SEGMENTED NEUTROPHILS ABSOLUTE COUNT: 3.4 K/CU MM
SEGMENTED NEUTROPHILS RELATIVE PERCENT: 56.5 % (ref 36–66)
SODIUM BLD-SCNC: 141 MMOL/L (ref 135–145)
TOTAL IMMATURE NEUTOROPHIL: 0.02 K/CU MM
TOTAL NUCLEATED RBC: 0 K/CU MM
TOTAL PROTEIN: 6.6 GM/DL (ref 6.4–8.2)
WBC # BLD: 6 K/CU MM (ref 4–10.5)

## 2022-06-16 PROCEDURE — 85652 RBC SED RATE AUTOMATED: CPT

## 2022-06-16 PROCEDURE — 83690 ASSAY OF LIPASE: CPT

## 2022-06-16 PROCEDURE — 36415 COLL VENOUS BLD VENIPUNCTURE: CPT

## 2022-06-16 PROCEDURE — 80053 COMPREHEN METABOLIC PANEL: CPT

## 2022-06-16 PROCEDURE — 74176 CT ABD & PELVIS W/O CONTRAST: CPT

## 2022-06-16 PROCEDURE — 85025 COMPLETE CBC W/AUTO DIFF WBC: CPT

## 2022-06-16 PROCEDURE — 86141 C-REACTIVE PROTEIN HS: CPT

## 2022-06-16 RX ORDER — TAMSULOSIN HYDROCHLORIDE 0.4 MG/1
0.4 CAPSULE ORAL DAILY
Qty: 90 CAPSULE | OUTPATIENT
Start: 2022-06-16

## 2022-06-16 RX ORDER — AZITHROMYCIN 250 MG/1
250 TABLET, FILM COATED ORAL SEE ADMIN INSTRUCTIONS
Qty: 6 TABLET | Refills: 0 | Status: SHIPPED | OUTPATIENT
Start: 2022-06-16 | End: 2022-06-21

## 2022-06-16 RX ORDER — TAMSULOSIN HYDROCHLORIDE 0.4 MG/1
0.4 CAPSULE ORAL DAILY
Qty: 30 CAPSULE | Refills: 0 | Status: SHIPPED | OUTPATIENT
Start: 2022-06-16

## 2022-06-16 RX ORDER — HYDROCODONE BITARTRATE AND ACETAMINOPHEN 7.5; 325 MG/1; MG/1
.5-1 TABLET ORAL 3 TIMES DAILY PRN
Qty: 90 TABLET | Refills: 0 | Status: CANCELLED | OUTPATIENT
Start: 2022-06-16 | End: 2022-07-16

## 2022-06-16 RX ORDER — AMOXICILLIN AND CLAVULANATE POTASSIUM 875; 125 MG/1; MG/1
1 TABLET, FILM COATED ORAL 2 TIMES DAILY
Qty: 20 TABLET | Refills: 0 | Status: SHIPPED | OUTPATIENT
Start: 2022-06-16 | End: 2022-06-26

## 2022-06-16 NOTE — TELEPHONE ENCOUNTER
Call patietns daughter Girma Juan with Imaging results and lab results.  Needs to know if patient should be taken to the hospital. Call Girma Martinez and advise

## 2022-06-17 NOTE — TELEPHONE ENCOUNTER
[address] [place] [address] [address], [address] [facility]     DG    6/16/22 2:46 PM  Note  Called and spoke to Tameka Reynoso. There also was a kidney stone on the left side. He will call in flomax. Take Norco as previously directed for pain. If pain severe or can not eat ER. Per Dr. Silvia Norton called and spoke to daughter Tameka Reynoso. I gave her CT results. Dr. Silvia Norton states no pancreatitis, but CT did show pneumonia. He is calling in two abx. Per [employer].

## 2022-06-25 PROCEDURE — 93294 REM INTERROG EVL PM/LDLS PM: CPT | Performed by: NURSE PRACTITIONER

## 2022-06-25 PROCEDURE — 93296 REM INTERROG EVL PM/IDS: CPT | Performed by: NURSE PRACTITIONER

## 2022-06-28 ENCOUNTER — PROCEDURE VISIT (OUTPATIENT)
Dept: CARDIOLOGY CLINIC | Age: 82
End: 2022-06-28
Payer: MEDICARE

## 2022-06-28 DIAGNOSIS — I49.5 SINUS NODE DYSFUNCTION (HCC): ICD-10-CM

## 2022-06-28 DIAGNOSIS — I44.0 AV BLOCK, 1ST DEGREE: ICD-10-CM

## 2022-06-28 DIAGNOSIS — Z95.0 CARDIAC PACEMAKER IN SITU: Primary | ICD-10-CM

## 2022-06-29 DIAGNOSIS — G89.29 CHRONIC BILATERAL LOW BACK PAIN WITHOUT SCIATICA: ICD-10-CM

## 2022-06-29 DIAGNOSIS — M54.50 CHRONIC BILATERAL LOW BACK PAIN WITHOUT SCIATICA: ICD-10-CM

## 2022-06-29 RX ORDER — HYDROCODONE BITARTRATE AND ACETAMINOPHEN 7.5; 325 MG/1; MG/1
.5-1 TABLET ORAL 3 TIMES DAILY PRN
Qty: 90 TABLET | Refills: 0 | Status: SHIPPED | OUTPATIENT
Start: 2022-06-29 | End: 2022-07-29

## 2022-07-01 ENCOUNTER — OFFICE VISIT (OUTPATIENT)
Dept: FAMILY MEDICINE CLINIC | Age: 82
End: 2022-07-01
Payer: MEDICARE

## 2022-07-01 VITALS
HEART RATE: 76 BPM | BODY MASS INDEX: 22.32 KG/M2 | DIASTOLIC BLOOD PRESSURE: 64 MMHG | OXYGEN SATURATION: 91 % | HEIGHT: 63 IN | WEIGHT: 126 LBS | SYSTOLIC BLOOD PRESSURE: 114 MMHG

## 2022-07-01 DIAGNOSIS — J18.9 COMMUNITY ACQUIRED PNEUMONIA OF LEFT LOWER LOBE OF LUNG: ICD-10-CM

## 2022-07-01 DIAGNOSIS — R10.13 EPIGASTRIC PAIN: Primary | ICD-10-CM

## 2022-07-01 DIAGNOSIS — N20.0 KIDNEY STONE ON LEFT SIDE: ICD-10-CM

## 2022-07-01 DIAGNOSIS — K21.9 GASTROESOPHAGEAL REFLUX DISEASE, UNSPECIFIED WHETHER ESOPHAGITIS PRESENT: ICD-10-CM

## 2022-07-01 DIAGNOSIS — N20.0 NEPHROLITHIASIS: ICD-10-CM

## 2022-07-01 LAB
BILIRUBIN URINE: ABNORMAL
BLOOD, URINE: ABNORMAL
CLARITY: ABNORMAL
COLOR: ABNORMAL
COMMENT UA: ABNORMAL
EPITHELIAL CELLS, UA: ABNORMAL /HPF (ref 0–5)
GLUCOSE URINE: NEGATIVE MG/DL
HYALINE CASTS: ABNORMAL /LPF (ref 0–2)
KETONES, URINE: ABNORMAL MG/DL
LEUKOCYTE ESTERASE, URINE: ABNORMAL
MICROSCOPIC EXAMINATION: YES
NITRITE, URINE: NEGATIVE
PH UA: 5 (ref 5–8)
PROTEIN UA: 100 MG/DL
RBC UA: >100 /HPF (ref 0–4)
SPECIFIC GRAVITY UA: 1.03 (ref 1–1.03)
URINE TYPE: ABNORMAL
UROBILINOGEN, URINE: 1 E.U./DL
WBC UA: ABNORMAL /HPF (ref 0–5)

## 2022-07-01 PROCEDURE — 1090F PRES/ABSN URINE INCON ASSESS: CPT | Performed by: PHYSICIAN ASSISTANT

## 2022-07-01 PROCEDURE — 1123F ACP DISCUSS/DSCN MKR DOCD: CPT | Performed by: PHYSICIAN ASSISTANT

## 2022-07-01 PROCEDURE — G8427 DOCREV CUR MEDS BY ELIG CLIN: HCPCS | Performed by: PHYSICIAN ASSISTANT

## 2022-07-01 PROCEDURE — G8400 PT W/DXA NO RESULTS DOC: HCPCS | Performed by: PHYSICIAN ASSISTANT

## 2022-07-01 PROCEDURE — 99215 OFFICE O/P EST HI 40 MIN: CPT | Performed by: PHYSICIAN ASSISTANT

## 2022-07-01 PROCEDURE — 1036F TOBACCO NON-USER: CPT | Performed by: PHYSICIAN ASSISTANT

## 2022-07-01 PROCEDURE — G8420 CALC BMI NORM PARAMETERS: HCPCS | Performed by: PHYSICIAN ASSISTANT

## 2022-07-01 RX ORDER — FAMOTIDINE 20 MG/1
20 TABLET, FILM COATED ORAL 2 TIMES DAILY
Qty: 60 TABLET | Refills: 3 | Status: SHIPPED | OUTPATIENT
Start: 2022-07-01

## 2022-07-01 RX ORDER — PANTOPRAZOLE SODIUM 40 MG/1
TABLET, DELAYED RELEASE ORAL
Qty: 180 TABLET | Refills: 1 | Status: SHIPPED | OUTPATIENT
Start: 2022-07-01

## 2022-07-01 NOTE — PATIENT INSTRUCTIONS
Please get your chest xray at the [facility]. [address]  [address], [place], [address] [facility]  Monday through Friday 7:00am  5:00pm   Saturday 8:00am  12:00pm    Start taking Pantoprazole 40 mg twice daily and Famotidine (Pepcid) 20 mg twice daily. Her pain medicine is called Norco.     Please  and start Flomax    I placed a referral to urology.  If you have not received a call from them in 10 days, please call them yourself and schedule an appointment:     [facility] - Amanda Durham MD  [address] [place] [address], [address] [place] [address]  [phone]

## 2022-07-01 NOTE — PROGRESS NOTES
7/1/2022    Josie Kelly    Chief Complaint   Patient presents with    Follow-up     for pneumonia and ct scan, patient reports she is still having back pain, and having difficulty swallowing, patients primary care giver is in the hospital and has nobody to take care of her during the day       HPI  History obtained from the patient and her daughter, Jon Palmer. Jadon Fan is POA. Hazel Davenport) lives with the patient and is her caregiver (she is in the hospital for toe amputation right now). Daughter, Jon Palmer, is the one who drives her to appointments. Felicia Solorio is a 78 y.o. female who presents today for follow up. Patient complains of pain in her epigastric region which radiates to her back. She states that due to the pain, she has not been able to wear a bra. Her pain is worst in the back, in the thoracic region, though. Patient rates this pain as 7 out of 10 right now but states that at times, it's a 10. Pain is constant though it varies in intensity. She states that belching makes the pain worse, and she has been belching more frequently than usual. Denies nausea and vomiting. Admits constipation. Denies overly hard stools. She states her stools are soft. She has a BM daily or every other day. Admits heartburn \"sometimes. \" Patient states that the pain in her back is worse after eating. Patient has had cholecystectomy, hysterectomy, right kidney resuspension. Patient states she never had a cough. Patient was seen by Dr. Josephine Gordon on 6/15/2022 for epigastric pain, nausea. CT abdomen pelvis and blood work were ordered. CT abdomen and pelvis on 6/16/2022 showed:  -Lingular infiltrate and small left pleural effusion  -Calculus in the left renal pelvis without hydronephrosis   -Dilated right renal pelvis and proximal right ureter     Dr. Josephine Gordon sent Flomax for the patient on 6/16/2022, but the patient did not pick this up from the pharmacy.   He also sent to Augmentin and azithromycin for the pneumonia, which the patient states she completed. Dr. Honey Khan sent Coty Odor on 6/29/2022. The patient has not yet filled her prescription for Norco yet. Patient last filled hydrocodoneacetaminophen 7.5-325 mg (90 tablets for 30 days) on 5/24/2022. REVIEW OF SYMPTOMS  Review of Systems   Constitutional: Negative for chills and fever. Respiratory: Negative for cough and shortness of breath. Gastrointestinal: Negative for diarrhea and vomiting. PAST MEDICAL HISTORY  Past Medical History:   Diagnosis Date    Acute diastolic CHF (congestive heart failure) ([facility] [address] 12/23/2014    Acute urinary tract infection 07/20/2019    Single Kidney    Anemia     Chronic low back pain     s/p epidural steroids    COPD (chronic obstructive pulmonary disease) ([facility])     moderate to severe    COVID 10/20/2021    Depression     Family history of cardiac disorder     Father, Brother    GERD (gastroesophageal reflux disease)     Goiter     s/p radioactive Iodine/ Low T4    H/O blood clots     right leg after miscarriage    H/O cardiovascular stress test 06/25/2013 6/13-WNL EF 70%    H/O cardiovascular stress test 08/10/2017    Normal study EF 70%    H/O: CVA (cerebrovascular accident) 07/20/2019    Heart murmur     History of blood transfusion     2017    History of LIMITED Echocardiogram 11/08/2019    EF 55-60%, Normal left ventricle structure and systolic function , no regional wall motion abnormalities were detected    [place] (hard of hearing)     hearing aides    Hx of blood clots     Hx of echocardiogram 07/11/2013    EF>55%, mild MR, mild TR, abn lVSFx stage 1     HX OTHER MEDICAL     PCP is Dr Cal Mayorga; Cardiologist is Dr Thayer Never 07/30/2013    Peripheral Angiogram.  Sluggish flow RLE, med mgmt.      Hydrocephalus, adult [facility])     shunt    Hyperlipidemia     Hyperthyroidism 07/20/2019    Kidney stone     \"had stone last summer 2016- passed it\"    Memory loss     Old MI (myocardial infarction) 2001    Osteoarthritis     Pneumonia     recurrent    Solitary kidney     Unspecified cerebral artery occlusion with cerebral infarction     \"they discovered I had stroke in 1993- with CT scan of head but never knew I had it\"       FAMILY HISTORY  Family History   Problem Relation Age of Onset    Stroke Mother     Heart Disease Mother     Asthma Mother     Diabetes Mother     Cancer Mother         uterine    Emphysema Father     Stroke Father     Heart Disease Father     Heart Defect Father         hardening of arteries    Diabetes Sister     Other Brother         pneumonia    Cancer Maternal Grandfather         stomach    Diabetes Paternal Grandmother     Stroke Paternal Grandmother     Diabetes Brother     Heart Disease Brother     Alcohol Abuse Maternal Aunt        SOCIAL HISTORY  Social History     Socioeconomic History    Marital status:      Spouse name: None    Number of children: 11    Years of education: None    Highest education level: None   Occupational History    Occupation: mental health counselor retired   Tobacco Use    Smoking status: Former Smoker     Years: 59.00     Types: Cigarettes     Start date: 10/7/1960    Smokeless tobacco: Never Used    Tobacco comment: pt quit 11/4/19   Vaping Use    Vaping Use: Never used   Substance and Sexual Activity    Alcohol use: Yes     Comment: rarely/ average \"2-3 times per year'    Drug use: No    Sexual activity: None   Other Topics Concern    None   Social History Narrative    Do you donate blood or plasma? no    Caffeine intake? Moderate    Advance directive? yes    Is blood transfusion acceptable in an emergency?  yes      Social Determinants of Health     Financial Resource Strain:     Difficulty of Paying Living Expenses: Not on file   Food Insecurity:     Worried About Running Out of Food in the Last Year: Not on file    [address] [place] [address] Place of Food in the Last Year: Not on file   Transportation Needs:     Lack of Transportation (Medical): Not on file    Lack of Transportation (Non-Medical):  Not on file   Physical Activity:     Days of Exercise per Week: Not on file    Minutes of Exercise per Session: Not on file   Stress:     Feeling of Stress : Not on file   Social Connections:     Frequency of Communication with Friends and Family: Not on file    Frequency of Social Gatherings with Friends and Family: Not on file    Attends Buddhist Services: Not on file    Active Member of [address] [place] [address] or Organizations: Not on file    Attends Club or Organization Meetings: Not on file    Marital Status: Not on file   Intimate Partner Violence:     Fear of Current or Ex-Partner: Not on file    Emotionally Abused: Not on file    Physically Abused: Not on file    Sexually Abused: Not on file   Housing Stability:     Unable to Pay for Housing in the Last Year: Not on file    Number of [place] in the Last Year: Not on file    Unstable Housing in the Last Year: Not on file        SURGICAL HISTORY  Past Surgical History:   Procedure Laterality Date   [place]    disc ruptured   [address]  2001    right shunt   [place]    reconstruction of breast bone and rib-\"pigeon chest\"    CHOLECYSTECTOMY  1990    COLONOSCOPY  7/2011    F/U 7/2016    CORONARY ANGIOPLASTY WITH STENT PLACEMENT  2001    CSF SHUNT      ENDOSCOPY, COLON, DIAGNOSTIC      per old chart pt had EGD done with admission 2/17/2017    HYSTERECTOMY (CERVIX STATUS UNKNOWN)  1978    [facility] B[facility]-dub/fibroids    KIDNEY SURGERY Right 1967    suspension   [address] [facility]    right     PACEMAKER INSERTION Left 09/26/2019    [employer] XT DR ALEX Torres     ROTATOR CUFF REPAIR  1997    right    TUBAL LIGATION  1972       CURRENT MEDICATIONS  Current Outpatient Medications   Medication Sig Dispense Refill    pantoprazole (PROTONIX) 40 MG tablet TAKE 1 TABLET BY MOUTH TWICE DAILY 180 tablet 1    famotidine (PEPCID) 20 MG tablet Take 1 tablet by mouth 2 times daily 60 tablet 3    primidone (MYSOLINE) 50 MG tablet Take 1 tablet by mouth 3 times daily 270 tablet 1    gabapentin (NEURONTIN) 400 MG capsule TAKE 1 CAPSULE BY MOUTH THREE TIMES DAILY 270 capsule 0    XIIDRA 5 % SOLN INSTILL 1 DROP IN BOTH EYES TWICE DAILY      mirtazapine (REMERON) 15 MG tablet TAKE 1 TABLET BY MOUTH AT BEDTIME      metFORMIN (GLUCOPHAGE) 500 MG tablet TAKE 1 TABLET BY MOUTH EVERY NIGHT 90 tablet 1    sucralfate (CARAFATE) 1 GM tablet TAKE 1 TABLET BY MOUTH FOUR TIMES DAILY 120 tablet 5    furosemide (LASIX) 20 MG tablet TAKE 1 TABLET BY MOUTH LEGS DAILY AS NEEDED IF LEGS START TO SWELL UP (Patient taking differently: Take 20 mg by mouth every other day ) 30 tablet 5    albuterol sulfate HFA (VENTOLIN HFA) 108 (90 Base) MCG/ACT inhaler Inhale 2 puffs into the lungs 4 times daily as needed for Wheezing 54 g 3    budesonide-formoterol (SYMBICORT) 160-4.5 MCG/ACT AERO Inhale 2 puffs into the lungs 2 times daily 30.6 g 3    atorvastatin (LIPITOR) 10 MG tablet Take 1 tablet by mouth daily 90 tablet 1    Cyanocobalamin (B-12) 500 MCG TABS Take 2 tablets by mouth daily (Patient taking differently: Take 2 tablets by mouth in the morning and at bedtime ) 180 tablet 1    donepezil (ARICEPT) 10 MG tablet Take 1 tablet by mouth nightly 90 tablet 1    DULoxetine (CYMBALTA) 60 MG extended release capsule Take 1 capsule by mouth nightly 90 capsule 1    ELIQUIS 2.5 MG TABS tablet Take 1 tablet by mouth 2 times daily 180 tablet 1    ferrous sulfate (IRON 325) 325 (65 Fe) MG tablet Take 1 tablet by mouth nightly Until you start iv iron 90 tablet 1    midodrine (PROAMATINE) 5 MG tablet Take 1 tablet by mouth 3 times daily (with meals) 270 tablet 1    montelukast (SINGULAIR) 10 MG tablet Take 1 tablet by mouth daily 90 tablet 1    ipratropium (ATROVENT) 0.06 % nasal spray 1 spray by Each Nostril route 4 times daily as needed for Rhinitis 1 Bottle 3    ARIPiprazole (ABILIFY) 2 MG tablet TAKE 1 TABLET BY MOUTH DAILY      aspirin 81 MG tablet Take 81 mg by mouth daily      HYDROcodone-acetaminophen (NORCO) 7.5-325 MG per tablet Take 0.5-1 tablets by mouth 3 times daily as needed for Pain (m54.5) for up to 30 days. (Patient not taking: Reported on 7/1/2022) 90 tablet 0    tamsulosin (FLOMAX) 0.4 MG capsule Take 1 capsule by mouth daily (Patient not taking: Reported on 7/1/2022) 30 capsule 0    ketorolac (ACULAR) 0.5 % ophthalmic solution  (Patient not taking: Reported on 5/25/2022)      prednisoLONE acetate (PRED FORTE) 1 % ophthalmic suspension  (Patient not taking: Reported on 5/25/2022)       No current facility-administered medications for this visit. ALLERGIES  Allergies   Allergen Reactions    Codeine Itching    Bactrim [Sulfamethoxazole-Trimethoprim]      dizziness    Sulfamethoxazole-Trimethoprim        RECENT LABS    Lab Results   Component Value Date    LABA1C 5.2 11/22/2021     Lab Results   Component Value Date    .5 11/22/2021       Lab Results   Component Value Date    CHOL 133 09/27/2019    CHOL 137 01/10/2014    CHOL 224 06/26/2013     Lab Results   Component Value Date    LDLCALC 125 06/26/2013       Lab Results   Component Value Date    WBC 6.0 06/16/2022    HGB 12.3 (L) 06/16/2022    HCT 42.1 06/16/2022    .9 (H) 06/16/2022     06/16/2022       PHYSICAL EXAM  /64   Pulse 76   Ht 5' 3\" (1.6 m)   Wt 126 lb (57.2 kg)   SpO2 91%   BMI 22.32 kg/m²     Physical Exam  Constitutional:       Appearance: Normal appearance. HENT:      Head: Normocephalic and atraumatic. Eyes:      Comments: EOM grossly intact. Cardiovascular:      Rate and Rhythm: Normal rate and regular rhythm. Heart sounds: No murmur heard. No friction rub. No gallop. Pulmonary:      Effort: Pulmonary effort is normal.      Breath sounds: Normal breath sounds. No wheezing, rhonchi or rales. Abdominal:      Palpations: Abdomen is soft.  There is no mass. Tenderness: There is abdominal tenderness. There is no right CVA tenderness, left CVA tenderness or guarding. Comments: Patient is mild to moderately tender to epigastric region. Skin:     General: Skin is warm and dry. Neurological:      Mental Status: She is alert and oriented to person, place, and time. Comments: Cranial nerves II-XII grossly intact   Psychiatric:         Mood and Affect: Mood normal.         Behavior: Behavior normal.         ASSESSMENT & PLAN  1. Epigastric pain  Based on the patient's history, pain is likely due to GERD. Will check chest x-ray, though. She has not been taking her pantoprazole twice daily. Refilled this medication for her and instructed her to restart it right away. Add famotidine 20 mg twice daily, as well. Instructed the patient to take Mylanta or Tums as needed throughout the day for symptoms. - XR CHEST (2 VW); Future    2. Gastroesophageal reflux disease, unspecified whether esophagitis present  See above (#1). - pantoprazole (PROTONIX) 40 MG tablet; TAKE 1 TABLET BY MOUTH TWICE DAILY  Dispense: 180 tablet; Refill: 1  - famotidine (PEPCID) 20 MG tablet; Take 1 tablet by mouth 2 times daily  Dispense: 60 tablet; Refill: 3    3. Community acquired pneumonia of left lower lobe of lung  CT abdomen and pelvis showed lingular infiltrate and small left pleural effusion. Patient reports that she completed the Augmentin and azithromycin that Dr. Sheyla Almaraz sent in for her. We will recheck chest x-ray and advise based on findings. If the patient still has signs of pneumonia, Dr. Sheyla Almaraz recommends referral to pulmonology. - XR CHEST (2 VW); Future    4. Nephrolithiasis  Refer the patient to urology and instructed the patient to call and schedule an appointment herself if she has not heard from them within the next few days.   Patient was at first confused about this because she states that she is already seen a kidney doctor (her nephrologist,  Varun Muñoz). I explained that the urologist is a different type of specialist than a nephrologist.  I instructed the patient to  the Flomax that Dr. Sage Friday sent in for her and start this medication.  - Radha Avalos MD, Urology, [facility]  - Urinalysis with Microscopic    I spent 51 minutes on this encounter. The patient is a poor historian herself, so her daughter, Timmy Nuñez, provided some of the history but was also a poor historian because she does not live with the patient and is not up-to-date on her medical information. I thoroughly examined the patient and I also spent time reviewing patient records, consulting with the patient's PCP, Dr. Sage Friday, and documenting. Return in about 4 weeks (around 7/29/2022).             Electronically signed by Mine Subramanian PA-C on 7/1/2022

## 2022-07-02 ENCOUNTER — HOSPITAL ENCOUNTER (OUTPATIENT)
Dept: GENERAL RADIOLOGY | Age: 82
Discharge: HOME | End: 2022-07-02
Payer: MEDICARE

## 2022-07-02 ENCOUNTER — HOSPITAL ENCOUNTER (OUTPATIENT)
Age: 82
Discharge: HOME | End: 2022-07-02
Payer: MEDICARE

## 2022-07-02 DIAGNOSIS — R10.13 EPIGASTRIC PAIN: ICD-10-CM

## 2022-07-02 DIAGNOSIS — J18.9 COMMUNITY ACQUIRED PNEUMONIA OF LEFT LOWER LOBE OF LUNG: ICD-10-CM

## 2022-07-02 PROCEDURE — 71046 X-RAY EXAM CHEST 2 VIEWS: CPT

## 2022-07-03 DIAGNOSIS — I95.1 ORTHOSTASIS: ICD-10-CM

## 2022-07-05 RX ORDER — FAMOTIDINE 20 MG/1
TABLET, FILM COATED ORAL
Qty: 180 TABLET | OUTPATIENT
Start: 2022-07-05

## 2022-07-05 RX ORDER — TAMSULOSIN HYDROCHLORIDE 0.4 MG/1
0.4 CAPSULE ORAL DAILY
Qty: 90 CAPSULE | OUTPATIENT
Start: 2022-07-05

## 2022-07-05 RX ORDER — MIDODRINE HYDROCHLORIDE 5 MG/1
TABLET ORAL
Qty: 270 TABLET | Refills: 1 | Status: SHIPPED | OUTPATIENT
Start: 2022-07-05

## 2022-10-20 NOTE — PROGRESS NOTES
Discharge instructions reviewed with patient and family including follow up, s/s of infection, dressing care and discharge medications. Patient verbalized understanding. No questions at this time. Nestor Hernández (:  1940) is a Established patient, here for evaluation of the following:    Assessment & Plan   Below is the assessment and plan developed based on review of pertinent history, physical exam, labs, studies, and medications. 1. Leg cramp  -     US ANKLE BRACHIAL INDEX; Future  -     DULoxetine (CYMBALTA) 60 MG extended release capsule; Take 1 capsule by mouth nightly, Disp-90 capsule, R-1Normal  -     Iron and TIBC; Future  -     Ferritin; Future  2. [facility]  -     donepezil (ARICEPT) 10 MG tablet; Take 1 tablet by mouth nightly, Disp-90 tablet, R-1Normal  3. Simple chronic bronchitis (HCC)  -     albuterol (PROVENTIL) (5 MG/ML) 0.5% nebulizer solution; Take 1 mL by nebulization 4 times daily, Disp-120 mL, R-0Normal  4. Chronic diastolic CHF (congestive heart failure) ([facility])  -     furosemide (LASIX) 20 MG tablet; TAKE 1 TABLET BY MOUTH DAILY AS NEEDED IF LEGS START TO SWELL UP, Disp-30 tablet, R-0Normal  5. Moderate episode of recurrent major depressive disorder ([facility]  6. Sinus node dysfunction (HCC)  7. Type 2 diabetes mellitus without complication, without long-term current use of insulin (HCC)  -     atorvastatin (LIPITOR) 10 MG tablet; Take 1 tablet by mouth daily, Disp-90 tablet, R-1Normal  -     gabapentin (NEURONTIN) 400 MG capsule; Take 1 capsule by mouth 3 times daily for 90 days. , Disp-270 capsule, R-0Normal  -     metFORMIN (GLUCOPHAGE) 500 MG tablet; Take 1 tablet by mouth nightly, Disp-90 tablet, R-1Normal  8. Other specified symptoms and signs involving the circulatory and respiratory systems   -     US ANKLE BRACHIAL INDEX; Future  9. Chronic bilateral low back pain without sciatica  -     gabapentin (NEURONTIN) 400 MG capsule; Take 1 capsule by mouth 3 times daily for 90 days. , Disp-270 capsule, R-0Normal  10.  Normocytic anemia  -     Cyanocobalamin (B-12) 500 MCG TABS; Take 2 tablets by mouth daily, Disp-180 tablet, R-1Normal  -     ferrous sulfate (IRON 325) 325 (65 Fe) MG tablet; Take 1 tablet by mouth nightly Until you start iv iron, Disp-90 tablet, R-1Normal  -     Iron and TIBC; Future  -     Ferritin; Future  11. Orthostasis  -     midodrine (PROAMATINE) 5 MG tablet; Take 1 tablet by mouth 3 times daily (with meals), Disp-270 tablet, R-1Normal  12. Chronic bronchitis, unspecified chronic bronchitis type (HCC)  -     montelukast (SINGULAIR) 10 MG tablet; Take 1 tablet by mouth daily, Disp-90 tablet, R-1Normal  13. Gastroesophageal reflux disease, unspecified whether esophagitis present  -     pantoprazole (PROTONIX) 40 MG tablet; TAKE 1 TABLET BY MOUTH TWICE DAILY, Disp-180 tablet, R-1Normal  14. Other iron deficiency anemia  -     Iron and TIBC; Future  -     Ferritin; Future  15. Paroxysmal atrial fibrillation (HCC)  -     ELIQUIS 2.5 MG TABS tablet; Take 1 tablet by mouth 2 times daily, Disp-180 tablet, R-1, DAWNormal    Given worsening leg pain hx. Cad will order CHATA to assess PAD  Increase neurontin to 400mg TID    Continue aspirin, eliquis, statin  Hx. Of AVM GI tiny bleed, resolved    Will obtain iron panel, lipid panel with next lab draw    Return in about 3 months (around 5/22/2022). Subjective   HPI   79 yo F presents via VIRTUAL appointment for follow up on chronic conditions. Jade Pi still having nerve pain, some m. Spasms. Following with Cardiology who would like patient to have further imaging to assess    Patient notes worsening pain levels. This is keeping her up at night. Currently on iron supplement currently taking nightly instead of AM.     Review of Systems   Constitutional: Negative for chills and fatigue. HENT: Negative for congestion and sore throat. Respiratory: Negative for shortness of breath and wheezing. Cardiovascular: Negative for chest pain and palpitations. Gastrointestinal: Negative for abdominal pain and nausea. Genitourinary: Negative for frequency and urgency.    Musculoskeletal: +neuropathy   Neurological: Negative for light-headedness. Objective   Patient-Reported Vitals  Patient-Reported Height: 5 3       Physical Exam      Constitutional: [x] Appears well-developed and well-nourished [x] No apparent distress      [] Abnormal -     Mental status: [x] Alert and awake  [x] Oriented to person/place/time [x] Able to follow commands    [] Abnormal -     Eyes:   EOM    [x]  Normal    [] Abnormal -   Sclera  [x]  Normal    [] Abnormal -          Discharge [x]  None visible   [] Abnormal -     HENT: [x] Normocephalic, atraumatic  [] Abnormal -   [x] Mouth/Throat: Mucous membranes are moist    External Ears [x] Normal  [] Abnormal -    Neck: [x] No visualized mass [] Abnormal -     Pulmonary/Chest: [x] Respiratory effort normal   [x] No visualized signs of difficulty breathing or respiratory distress        [] Abnormal -      Musculoskeletal:   [x] Normal gait with no signs of ataxia         [x] Normal range of motion of neck        [] Abnormal -     Neurological:        [x] No Facial Asymmetry (Cranial nerve 7 motor function) (limited exam due to video visit)          [x] No gaze palsy        [] Abnormal -          Skin:        [x] No significant exanthematous lesions or discoloration noted on facial skin         [] Abnormal -            Psychiatric:       [x] Normal Affect [] Abnormal -        [x] No Hallucinations    Other pertinent observable physical exam findings:-             On this date 2/22/2022 I have spent 30 minutes reviewing previous notes, test results and face to face (virtual) with the patient discussing the diagnosis and importance of compliance with the treatment plan as well as documenting on the day of the visit. Golden Pandey, was evaluated through a synchronous (real-time) audio-video encounter. The patient (or guardian if applicable) is aware that this is a billable service, which includes applicable co-pays.  This Virtual Visit was conducted with patient's (and/or legal guardian's) consent. The visit was conducted pursuant to the emergency declaration under the [facility], [address] [place] [address] authority and the [employer] and [employer] General Act. Patient identification was verified, and a caregiver was present when appropriate. The patient was located at home in a state where the provider was licensed to provide care.        --Sinan Rico, DO